# Patient Record
Sex: FEMALE | Race: WHITE | NOT HISPANIC OR LATINO | Employment: OTHER | URBAN - METROPOLITAN AREA
[De-identification: names, ages, dates, MRNs, and addresses within clinical notes are randomized per-mention and may not be internally consistent; named-entity substitution may affect disease eponyms.]

---

## 2018-03-29 PROCEDURE — 87624 HPV HI-RISK TYP POOLED RSLT: CPT | Performed by: OBSTETRICS & GYNECOLOGY

## 2018-03-29 PROCEDURE — G0145 SCR C/V CYTO,THINLAYER,RESCR: HCPCS | Performed by: OBSTETRICS & GYNECOLOGY

## 2018-03-30 ENCOUNTER — LAB REQUISITION (OUTPATIENT)
Dept: LAB | Facility: HOSPITAL | Age: 68
End: 2018-03-30
Payer: COMMERCIAL

## 2018-03-30 DIAGNOSIS — Z11.51 ENCOUNTER FOR SCREENING FOR HUMAN PAPILLOMAVIRUS (HPV): ICD-10-CM

## 2018-03-30 DIAGNOSIS — Z12.72 ENCOUNTER FOR SCREENING FOR MALIGNANT NEOPLASM OF VAGINA: ICD-10-CM

## 2018-04-03 LAB — HPV RRNA GENITAL QL NAA+PROBE: NORMAL

## 2018-04-04 LAB
LAB AP GYN PRIMARY INTERPRETATION: NORMAL
Lab: NORMAL

## 2018-04-24 LAB
ANION GAP SERPL CALCULATED.3IONS-SCNC: 11 MMOL/L (ref 5–14)
BUN SERPL-MCNC: 12 MG/DL (ref 5–25)
CALCIUM SERPL-MCNC: 9.7 MG/DL (ref 8.4–10.2)
CHLORIDE SERPL-SCNC: 100 MEQ/L (ref 97–108)
CO2 SERPL-SCNC: 29 MMOL/L (ref 22–30)
CREATINE, SERUM (HISTORICAL): 0.68 MG/DL (ref 0.6–1.2)
EGFR (HISTORICAL): >60 ML/MIN/1.73 M2
GLUCOSE SERPL-MCNC: 85 MG/DL (ref 70–99)
HCT VFR BLD AUTO: 43.1 % (ref 36–46)
HGB BLD-MCNC: 13.9 G/DL (ref 12–16)
POTASSIUM SERPL-SCNC: 4.4 MEQ/L (ref 3.6–5)
SODIUM SERPL-SCNC: 140 MEQ/L (ref 137–147)

## 2019-03-27 ENCOUNTER — LAB (OUTPATIENT)
Dept: LAB | Facility: CLINIC | Age: 69
End: 2019-03-27
Payer: COMMERCIAL

## 2019-03-27 ENCOUNTER — OFFICE VISIT (OUTPATIENT)
Dept: ENDOCRINOLOGY | Facility: CLINIC | Age: 69
End: 2019-03-27
Payer: COMMERCIAL

## 2019-03-27 ENCOUNTER — TELEPHONE (OUTPATIENT)
Dept: FAMILY MEDICINE CLINIC | Facility: CLINIC | Age: 69
End: 2019-03-27

## 2019-03-27 VITALS
HEIGHT: 68 IN | SYSTOLIC BLOOD PRESSURE: 140 MMHG | DIASTOLIC BLOOD PRESSURE: 90 MMHG | BODY MASS INDEX: 21.86 KG/M2 | WEIGHT: 144.2 LBS | HEART RATE: 67 BPM

## 2019-03-27 DIAGNOSIS — E04.2 MULTIPLE THYROID NODULES: Primary | ICD-10-CM

## 2019-03-27 DIAGNOSIS — E04.2 MULTIPLE THYROID NODULES: ICD-10-CM

## 2019-03-27 DIAGNOSIS — R53.83 FATIGUE, UNSPECIFIED TYPE: ICD-10-CM

## 2019-03-27 LAB
25(OH)D3 SERPL-MCNC: 33.2 NG/ML (ref 30–100)
BASOPHILS # BLD AUTO: 0.08 THOUSANDS/ΜL (ref 0–0.1)
BASOPHILS NFR BLD AUTO: 1 % (ref 0–1)
EOSINOPHIL # BLD AUTO: 0.21 THOUSAND/ΜL (ref 0–0.61)
EOSINOPHIL NFR BLD AUTO: 2 % (ref 0–6)
ERYTHROCYTE [DISTWIDTH] IN BLOOD BY AUTOMATED COUNT: 11.8 % (ref 11.6–15.1)
HCT VFR BLD AUTO: 43.9 % (ref 34.8–46.1)
HGB BLD-MCNC: 14.5 G/DL (ref 11.5–15.4)
IMM GRANULOCYTES # BLD AUTO: 0.02 THOUSAND/UL (ref 0–0.2)
IMM GRANULOCYTES NFR BLD AUTO: 0 % (ref 0–2)
LYMPHOCYTES # BLD AUTO: 2.08 THOUSANDS/ΜL (ref 0.6–4.47)
LYMPHOCYTES NFR BLD AUTO: 21 % (ref 14–44)
MCH RBC QN AUTO: 32 PG (ref 26.8–34.3)
MCHC RBC AUTO-ENTMCNC: 33 G/DL (ref 31.4–37.4)
MCV RBC AUTO: 97 FL (ref 82–98)
MONOCYTES # BLD AUTO: 0.82 THOUSAND/ΜL (ref 0.17–1.22)
MONOCYTES NFR BLD AUTO: 8 % (ref 4–12)
NEUTROPHILS # BLD AUTO: 6.55 THOUSANDS/ΜL (ref 1.85–7.62)
NEUTS SEG NFR BLD AUTO: 68 % (ref 43–75)
NRBC BLD AUTO-RTO: 0 /100 WBCS
PLATELET # BLD AUTO: 282 THOUSANDS/UL (ref 149–390)
PMV BLD AUTO: 9.4 FL (ref 8.9–12.7)
RBC # BLD AUTO: 4.53 MILLION/UL (ref 3.81–5.12)
T4 FREE SERPL-MCNC: 0.94 NG/DL (ref 0.76–1.46)
TSH SERPL DL<=0.05 MIU/L-ACNC: 2.69 UIU/ML (ref 0.36–3.74)
WBC # BLD AUTO: 9.76 THOUSAND/UL (ref 4.31–10.16)

## 2019-03-27 PROCEDURE — 36415 COLL VENOUS BLD VENIPUNCTURE: CPT

## 2019-03-27 PROCEDURE — 82306 VITAMIN D 25 HYDROXY: CPT

## 2019-03-27 PROCEDURE — 85025 COMPLETE CBC W/AUTO DIFF WBC: CPT

## 2019-03-27 PROCEDURE — 84443 ASSAY THYROID STIM HORMONE: CPT

## 2019-03-27 PROCEDURE — 99245 OFF/OP CONSLTJ NEW/EST HI 55: CPT | Performed by: INTERNAL MEDICINE

## 2019-03-27 PROCEDURE — 86376 MICROSOMAL ANTIBODY EACH: CPT

## 2019-03-27 PROCEDURE — 86800 THYROGLOBULIN ANTIBODY: CPT

## 2019-03-27 PROCEDURE — 84439 ASSAY OF FREE THYROXINE: CPT

## 2019-03-27 NOTE — LETTER
March 27, 2019     Moris Alan MD  [address] [place] [address]    Patient: Perry Cotton   YOB: 1950   Date of Visit: 3/27/2019       Dear Dr Churchill : Thank you for referring Perry Cotton to me for evaluation  Below are my notes for this consultation  If you have questions, please do not hesitate to call me  I look forward to following your patient along with you  Sincerely,        Tonie Street MD        CC: No Recipients  Tonie Street MD  3/27/2019  9:26 AM  Sign at close encounter   Perry Cotton 77 y o  female MRN: [id number]    Encounter: [id number]      Assessment/Plan     Problem List Items Addressed This Visit        Endocrine    Multiple thyroid nodules - Primary     Right sided thyroid nodule met criteria for FNAB however its been almost a year since the sonogram was done - will repeat thyroid ultrasound - discuss the option of FNAB with or without option for Afirma to exclude malignancy   patinet is agreeable and Will order with option  Of Afirma   Will check thyroid function tests- if tsh low she will need a scan however she doesn't have any symptoms of hyperthyroidism          Relevant Orders    TSH, 3rd generation Lab Collect    T4, free Lab Collect    Thyroid Antibodies Panel Lab Collect    US thyroid       Other    Fatigue    Relevant Orders    TSH, 3rd generation Lab Collect    CBC and differential Lab Collect    Vitamin D 25 hydroxy Lab Collect        CC:   Thyroid nodule     History of Present Illness     HPI:69 y/o female referred here for evaluation of thyroid nodules- she had a thyroid Ultrasound done  last year - after  She was noticed to have thyromegaly on physical exam    No obstructive symptoms   No FH of thyroid cancer , no history of radiation to neck  C/o fatigue - few years , cold intolerance- chronic      Review of Systems   Constitutional: Positive for fatigue  Negative for unexpected weight change     Respiratory: Negative for cough and shortness of breath  Cardiovascular: Negative for palpitations and leg swelling  Gastrointestinal: Positive for constipation  Negative for nausea and vomiting  Musculoskeletal: Negative for arthralgias, gait problem and myalgias  Skin: Negative for wound  Psychiatric/Behavioral: Negative for sleep disturbance  All other systems reviewed and are negative  Historical Information   History reviewed  No pertinent past medical history  Past Surgical History:   Procedure Laterality Date    APPENDECTOMY      HYSTERECTOMY      total     Social History   Social History     Substance and Sexual Activity   Alcohol Use Yes    Comment: sometimes     Social History     Substance and Sexual Activity   Drug Use Not on file     Social History     Tobacco Use   Smoking Status Former Smoker    Packs/day: 0 50    Types: Cigarettes    Last attempt to quit: 1978    Years since quittin 2   Smokeless Tobacco Never Used   Tobacco Comment    no passive smoke exposure     Family History:   Family History   Problem Relation Age of Onset    Rheum arthritis Sister     Hypothyroidism Sister     Hashimoto's thyroiditis Family        Meds/Allergies   Current Outpatient Medications   Medication Sig Dispense Refill    Multiple Vitamins-Minerals (PRESERVISION AREDS 2+MULTI VIT PO) Take by mouth       No current facility-administered medications for this visit  Allergies   Allergen Reactions    Codeine        Objective   Vitals: Blood pressure 140/90, pulse 67, height 5' 8" (1 727 m), weight 65 4 kg (144 lb 3 2 oz)  Physical Exam   Constitutional: She is oriented to person, place, and time  She appears well-developed and well-nourished  No distress  HENT:   Head: Normocephalic and atraumatic  Eyes: Pupils are equal, round, and reactive to light  EOM are normal    Neck: Normal range of motion  Neck supple  Thyromegaly present     Cardiovascular: Normal rate, regular rhythm and normal heart sounds  No murmur heard  Pulmonary/Chest: Effort normal and breath sounds normal  No respiratory distress  She has no wheezes  She has no rales  Abdominal: Soft  Bowel sounds are normal  She exhibits no distension and no mass  There is no tenderness  Musculoskeletal: Normal range of motion  She exhibits no edema or deformity  Lymphadenopathy:     She has no cervical adenopathy  Neurological: She is alert and oriented to person, place, and time  Skin: Skin is warm and dry  No erythema  Psychiatric: She has a normal mood and affect  Her behavior is normal  Judgment and thought content normal        The history was obtained from the review of the chart, patient  Lab Results:    April 2018- BMP with normal range       Imaging Studies:      May 2018-thyroid ultrasound  Right lobe measures 5 2 x 1 5 x 2 2 cm-at the posterior aspect solid appearing nodule 1 7x1  3x1 4 cm   Left lobe cystic nodule 0 3 cm     I have personally reviewed pertinent reports  Portions of the record may have been created with voice recognition software  Occasional wrong word or "sound a like" substitutions may have occurred due to the inherent limitations of voice recognition software  Read the chart carefully and recognize, using context, where substitutions have occurred

## 2019-03-27 NOTE — ASSESSMENT & PLAN NOTE
Right sided thyroid nodule met criteria for FNAB however its been almost a year since the sonogram was done - will repeat thyroid ultrasound - discuss the option of FNAB with or without option for Afirma to exclude malignancy   giovani is agreeable and Will order with option  Of Afirma     Will check thyroid function tests- if tsh low she will need a scan however she doesn't have any symptoms of hyperthyroidism

## 2019-03-27 NOTE — PROGRESS NOTES
Perry Cotton 77 y o  female MRN: [id number]    Encounter: [id number]      Assessment/Plan     Problem List Items Addressed This Visit        Endocrine    Multiple thyroid nodules - Primary     Right sided thyroid nodule met criteria for FNAB however its been almost a year since the sonogram was done - will repeat thyroid ultrasound - discuss the option of FNAB with or without option for Afirma to exclude malignancy   patinet is agreeable and Will order with option  Of Afirma   Will check thyroid function tests- if tsh low she will need a scan however she doesn't have any symptoms of hyperthyroidism          Relevant Orders    TSH, 3rd generation Lab Collect    T4, free Lab Collect    Thyroid Antibodies Panel Lab Collect    US thyroid       Other    Fatigue    Relevant Orders    TSH, 3rd generation Lab Collect    CBC and differential Lab Collect    Vitamin D 25 hydroxy Lab Collect        CC:   Thyroid nodule     History of Present Illness     HPI:69 y/o female referred here for evaluation of thyroid nodules- she had a thyroid Ultrasound done  last year - after  She was noticed to have thyromegaly on physical exam    No obstructive symptoms   No FH of thyroid cancer , no history of radiation to neck  C/o fatigue - few years , cold intolerance- chronic      Review of Systems   Constitutional: Positive for fatigue  Negative for unexpected weight change  Respiratory: Negative for cough and shortness of breath  Cardiovascular: Negative for palpitations and leg swelling  Gastrointestinal: Positive for constipation  Negative for nausea and vomiting  Musculoskeletal: Negative for arthralgias, gait problem and myalgias  Skin: Negative for wound  Psychiatric/Behavioral: Negative for sleep disturbance  All other systems reviewed and are negative  Historical Information   History reviewed  No pertinent past medical history    Past Surgical History:   Procedure Laterality Date   [address] HYSTERECTOMY      total     Social History   Social History     Substance and Sexual Activity   Alcohol Use Yes    Comment: sometimes     Social History     Substance and Sexual Activity   Drug Use Not on file     Social History     Tobacco Use   Smoking Status Former Smoker    Packs/day: 0 50    Types: Cigarettes    Last attempt to quit: 1978    Years since quittin 2   Smokeless Tobacco Never Used   Tobacco Comment    no passive smoke exposure     Family History:   Family History   Problem Relation Age of Onset    Rheum arthritis Sister     Hypothyroidism Sister     Hashimoto's thyroiditis Family        Meds/Allergies   Current Outpatient Medications   Medication Sig Dispense Refill    Multiple Vitamins-Minerals (PRESERVISION AREDS 2+MULTI VIT PO) Take by mouth       No current facility-administered medications for this visit  Allergies   Allergen Reactions    Codeine        Objective   Vitals: Blood pressure 140/90, pulse 67, height 5' 8" (1 727 m), weight 65 4 kg (144 lb 3 2 oz)  Physical Exam   Constitutional: She is oriented to person, place, and time  She appears well-developed and well-nourished  No distress  HENT:   Head: Normocephalic and atraumatic  Eyes: Pupils are equal, round, and reactive to light  EOM are normal    Neck: Normal range of motion  Neck supple  Thyromegaly present  Cardiovascular: Normal rate, regular rhythm and normal heart sounds  No murmur heard  Pulmonary/Chest: Effort normal and breath sounds normal  No respiratory distress  She has no wheezes  She has no rales  Abdominal: Soft  Bowel sounds are normal  She exhibits no distension and no mass  There is no tenderness  Musculoskeletal: Normal range of motion  She exhibits no edema or deformity  Lymphadenopathy:     She has no cervical adenopathy  Neurological: She is alert and oriented to person, place, and time  Skin: Skin is warm and dry  No erythema     Psychiatric: She has a normal mood and affect  Her behavior is normal  Judgment and thought content normal        The history was obtained from the review of the chart, patient  Lab Results:    April 2018- BMP with normal range       Imaging Studies:      May 2018-thyroid ultrasound  Right lobe measures 5 2 x 1 5 x 2 2 cm-at the posterior aspect solid appearing nodule 1 7x1  3x1 4 cm   Left lobe cystic nodule 0 3 cm     I have personally reviewed pertinent reports  Portions of the record may have been created with voice recognition software  Occasional wrong word or "sound a like" substitutions may have occurred due to the inherent limitations of voice recognition software  Read the chart carefully and recognize, using context, where substitutions have occurred

## 2019-03-28 LAB
THYROGLOB AB SERPL-ACNC: 28 IU/ML (ref 0–0.9)
THYROPEROXIDASE AB SERPL-ACNC: 105 IU/ML (ref 0–34)

## 2019-03-29 ENCOUNTER — TELEPHONE (OUTPATIENT)
Dept: ENDOCRINOLOGY | Facility: CLINIC | Age: 69
End: 2019-03-29

## 2019-04-01 ENCOUNTER — HOSPITAL ENCOUNTER (OUTPATIENT)
Dept: ULTRASOUND IMAGING | Facility: HOSPITAL | Age: 69
Discharge: HOME | End: 2019-04-01
Attending: INTERNAL MEDICINE
Payer: COMMERCIAL

## 2019-04-01 DIAGNOSIS — E04.2 MULTIPLE THYROID NODULES: ICD-10-CM

## 2019-04-01 PROCEDURE — 76536 US EXAM OF HEAD AND NECK: CPT

## 2019-04-09 ENCOUNTER — TELEPHONE (OUTPATIENT)
Dept: ENDOCRINOLOGY | Facility: CLINIC | Age: 69
End: 2019-04-09

## 2019-05-02 ENCOUNTER — OFFICE VISIT (OUTPATIENT)
Dept: OTOLARYNGOLOGY | Facility: CLINIC | Age: 69
End: 2019-05-02
Payer: COMMERCIAL

## 2019-05-02 VITALS
WEIGHT: 141.2 LBS | HEIGHT: 68 IN | HEART RATE: 98 BPM | DIASTOLIC BLOOD PRESSURE: 81 MMHG | BODY MASS INDEX: 21.4 KG/M2 | RESPIRATION RATE: 17 BRPM | SYSTOLIC BLOOD PRESSURE: 136 MMHG

## 2019-05-02 DIAGNOSIS — H92.01 RIGHT EAR PAIN: Primary | ICD-10-CM

## 2019-05-02 DIAGNOSIS — H61.23 BILATERAL IMPACTED CERUMEN: ICD-10-CM

## 2019-05-02 DIAGNOSIS — R13.12 OROPHARYNGEAL DYSPHAGIA: ICD-10-CM

## 2019-05-02 DIAGNOSIS — K14.8 TONGUE MASS: ICD-10-CM

## 2019-05-02 PROCEDURE — 99244 OFF/OP CNSLTJ NEW/EST MOD 40: CPT | Performed by: SPECIALIST

## 2019-05-02 PROCEDURE — 69210 REMOVE IMPACTED EAR WAX UNI: CPT | Performed by: SPECIALIST

## 2019-05-02 PROCEDURE — 31575 DIAGNOSTIC LARYNGOSCOPY: CPT | Performed by: SPECIALIST

## 2019-05-13 ENCOUNTER — TELEPHONE (OUTPATIENT)
Dept: HEMATOLOGY ONCOLOGY | Facility: CLINIC | Age: 69
End: 2019-05-13

## 2019-05-14 ENCOUNTER — HOSPITAL ENCOUNTER (OUTPATIENT)
Dept: RADIOLOGY | Age: 69
Discharge: HOME | End: 2019-05-14
Payer: COMMERCIAL

## 2019-05-14 DIAGNOSIS — C02.9 TONGUE CANCER (HCC): ICD-10-CM

## 2019-05-14 PROCEDURE — 78815 PET IMAGE W/CT SKULL-THIGH: CPT

## 2019-05-14 PROCEDURE — 82948 REAGENT STRIP/BLOOD GLUCOSE: CPT

## 2019-05-14 PROCEDURE — A9552 F18 FDG: HCPCS

## 2019-05-14 RX ORDER — LORAZEPAM 2 MG/ML
1 INJECTION INTRAMUSCULAR
Status: COMPLETED | OUTPATIENT
Start: 2019-05-14 | End: 2019-05-14

## 2019-05-14 RX ADMIN — LORAZEPAM 1 MG: 2 INJECTION INTRAMUSCULAR; INTRAVENOUS at 07:10

## 2019-05-15 LAB — GLUCOSE SERPL-MCNC: 106 MG/DL (ref 65–140)

## 2019-05-16 PROBLEM — C01 CANCER OF BASE OF TONGUE (HCC): Status: ACTIVE | Noted: 2019-05-03

## 2019-05-20 ENCOUNTER — RADIATION ONCOLOGY CONSULT (OUTPATIENT)
Dept: RADIATION ONCOLOGY | Facility: CLINIC | Age: 69
End: 2019-05-20
Attending: RADIOLOGY
Payer: COMMERCIAL

## 2019-05-20 VITALS
DIASTOLIC BLOOD PRESSURE: 80 MMHG | RESPIRATION RATE: 16 BRPM | HEIGHT: 68 IN | WEIGHT: 139.4 LBS | TEMPERATURE: 97.7 F | SYSTOLIC BLOOD PRESSURE: 140 MMHG | HEART RATE: 69 BPM | BODY MASS INDEX: 21.13 KG/M2 | OXYGEN SATURATION: 98 %

## 2019-05-20 DIAGNOSIS — C01 CANCER OF BASE OF TONGUE (HCC): Primary | ICD-10-CM

## 2019-05-20 PROCEDURE — 99211 OFF/OP EST MAY X REQ PHY/QHP: CPT | Performed by: RADIOLOGY

## 2019-05-22 ENCOUNTER — TRANSCRIBE ORDERS (OUTPATIENT)
Dept: ADMINISTRATIVE | Facility: HOSPITAL | Age: 69
End: 2019-05-22

## 2019-05-22 ENCOUNTER — CONSULT (OUTPATIENT)
Dept: HEMATOLOGY ONCOLOGY | Facility: CLINIC | Age: 69
End: 2019-05-22
Payer: COMMERCIAL

## 2019-05-22 VITALS
HEART RATE: 75 BPM | SYSTOLIC BLOOD PRESSURE: 130 MMHG | WEIGHT: 136.6 LBS | DIASTOLIC BLOOD PRESSURE: 80 MMHG | HEIGHT: 68 IN | OXYGEN SATURATION: 97 % | BODY MASS INDEX: 20.7 KG/M2 | RESPIRATION RATE: 18 BRPM | TEMPERATURE: 98.7 F

## 2019-05-22 DIAGNOSIS — C01 CANCER OF BASE OF TONGUE (HCC): Primary | ICD-10-CM

## 2019-05-22 PROCEDURE — 99245 OFF/OP CONSLTJ NEW/EST HI 55: CPT | Performed by: INTERNAL MEDICINE

## 2019-05-23 ENCOUNTER — APPOINTMENT (OUTPATIENT)
Dept: RADIATION ONCOLOGY | Facility: CLINIC | Age: 69
End: 2019-05-23
Attending: RADIOLOGY
Payer: COMMERCIAL

## 2019-05-23 DIAGNOSIS — Z12.31 ENCOUNTER FOR MAMMOGRAM TO ESTABLISH BASELINE MAMMOGRAM: Primary | ICD-10-CM

## 2019-05-23 PROCEDURE — 77334 RADIATION TREATMENT AID(S): CPT | Performed by: RADIOLOGY

## 2019-05-28 ENCOUNTER — HOSPITAL ENCOUNTER (OUTPATIENT)
Dept: MAMMOGRAPHY | Facility: CLINIC | Age: 69
Discharge: HOME | End: 2019-05-28
Payer: COMMERCIAL

## 2019-05-28 ENCOUNTER — TELEPHONE (OUTPATIENT)
Dept: NUTRITION | Facility: CLINIC | Age: 69
End: 2019-05-28

## 2019-05-28 ENCOUNTER — APPOINTMENT (OUTPATIENT)
Dept: LAB | Facility: HOSPITAL | Age: 69
End: 2019-05-28
Attending: INTERNAL MEDICINE
Payer: COMMERCIAL

## 2019-05-28 VITALS — HEIGHT: 68 IN | WEIGHT: 136 LBS | BODY MASS INDEX: 20.61 KG/M2

## 2019-05-28 DIAGNOSIS — C01 CANCER OF BASE OF TONGUE (HCC): ICD-10-CM

## 2019-05-28 DIAGNOSIS — Z12.39 BREAST CANCER SCREENING: ICD-10-CM

## 2019-05-28 LAB
ALBUMIN SERPL BCP-MCNC: 4.3 G/DL (ref 3–5.2)
ALP SERPL-CCNC: 82 U/L (ref 43–122)
ALT SERPL W P-5'-P-CCNC: 17 U/L (ref 9–52)
ANION GAP SERPL CALCULATED.3IONS-SCNC: 10 MMOL/L (ref 5–14)
AST SERPL W P-5'-P-CCNC: 24 U/L (ref 14–36)
BASOPHILS # BLD AUTO: 0.1 THOUSANDS/ΜL (ref 0–0.1)
BASOPHILS NFR BLD AUTO: 1 % (ref 0–1)
BILIRUB SERPL-MCNC: 0.6 MG/DL
BUN SERPL-MCNC: 13 MG/DL (ref 5–25)
CALCIUM SERPL-MCNC: 9.6 MG/DL (ref 8.4–10.2)
CHLORIDE SERPL-SCNC: 102 MMOL/L (ref 97–108)
CO2 SERPL-SCNC: 28 MMOL/L (ref 22–30)
CREAT SERPL-MCNC: 0.87 MG/DL (ref 0.6–1.2)
CRP SERPL QL: <3 MG/L
EOSINOPHIL # BLD AUTO: 0.2 THOUSAND/ΜL (ref 0–0.4)
EOSINOPHIL NFR BLD AUTO: 3 % (ref 0–6)
ERYTHROCYTE [DISTWIDTH] IN BLOOD BY AUTOMATED COUNT: 12.7 %
ERYTHROCYTE [SEDIMENTATION RATE] IN BLOOD: 11 MM/HOUR (ref 1–20)
FERRITIN SERPL-MCNC: 86 NG/ML (ref 8–388)
FOLATE SERPL-MCNC: >20 NG/ML (ref 3.1–17.5)
GFR SERPL CREATININE-BSD FRML MDRD: 67 ML/MIN/1.73SQ M
GLUCOSE P FAST SERPL-MCNC: 99 MG/DL (ref 70–99)
HCT VFR BLD AUTO: 42.1 % (ref 36–46)
HGB BLD-MCNC: 13.8 G/DL (ref 12–16)
IRON SATN MFR SERPL: 18 %
IRON SERPL-MCNC: 55 UG/DL (ref 50–170)
LDH SERPL-CCNC: 424 U/L (ref 313–618)
LYMPHOCYTES # BLD AUTO: 1.9 THOUSANDS/ΜL (ref 0.5–4)
LYMPHOCYTES NFR BLD AUTO: 30 % (ref 25–45)
MCH RBC QN AUTO: 30.9 PG (ref 26–34)
MCHC RBC AUTO-ENTMCNC: 32.9 G/DL (ref 31–36)
MCV RBC AUTO: 94 FL (ref 80–100)
MONOCYTES # BLD AUTO: 0.7 THOUSAND/ΜL (ref 0.2–0.9)
MONOCYTES NFR BLD AUTO: 12 % (ref 1–10)
NEUTROPHILS # BLD AUTO: 3.5 THOUSANDS/ΜL (ref 1.8–7.8)
NEUTS SEG NFR BLD AUTO: 55 % (ref 45–65)
PLATELET # BLD AUTO: 288 THOUSANDS/UL (ref 150–450)
PMV BLD AUTO: 7.7 FL (ref 8.9–12.7)
POTASSIUM SERPL-SCNC: 3.4 MMOL/L (ref 3.6–5)
PROT SERPL-MCNC: 7.2 G/DL (ref 5.9–8.4)
RBC # BLD AUTO: 4.48 MILLION/UL (ref 4–5.2)
SODIUM SERPL-SCNC: 140 MMOL/L (ref 137–147)
TIBC SERPL-MCNC: 302 UG/DL (ref 250–450)
VIT B12 SERPL-MCNC: 394 PG/ML (ref 100–900)
WBC # BLD AUTO: 6.4 THOUSAND/UL (ref 4.5–11)

## 2019-05-28 PROCEDURE — 83615 LACTATE (LD) (LDH) ENZYME: CPT

## 2019-05-28 PROCEDURE — 83550 IRON BINDING TEST: CPT

## 2019-05-28 PROCEDURE — 80053 COMPREHEN METABOLIC PANEL: CPT

## 2019-05-28 PROCEDURE — 85025 COMPLETE CBC W/AUTO DIFF WBC: CPT

## 2019-05-28 PROCEDURE — 83540 ASSAY OF IRON: CPT

## 2019-05-28 PROCEDURE — 82728 ASSAY OF FERRITIN: CPT

## 2019-05-28 PROCEDURE — 77067 SCR MAMMO BI INCL CAD: CPT

## 2019-05-28 PROCEDURE — 85652 RBC SED RATE AUTOMATED: CPT

## 2019-05-28 PROCEDURE — 82746 ASSAY OF FOLIC ACID SERUM: CPT

## 2019-05-28 PROCEDURE — 36415 COLL VENOUS BLD VENIPUNCTURE: CPT

## 2019-05-28 PROCEDURE — 82607 VITAMIN B-12: CPT

## 2019-05-28 PROCEDURE — 86140 C-REACTIVE PROTEIN: CPT

## 2019-05-29 ENCOUNTER — TELEPHONE (OUTPATIENT)
Dept: NUTRITION | Facility: CLINIC | Age: 69
End: 2019-05-29

## 2019-05-29 PROBLEM — I71.2 THORACIC AORTIC ANEURYSM (HCC): Status: ACTIVE | Noted: 2019-05-29

## 2019-05-29 PROBLEM — I71.20 THORACIC AORTIC ANEURYSM: Status: ACTIVE | Noted: 2019-05-29

## 2019-05-30 ENCOUNTER — TRANSCRIBE ORDERS (OUTPATIENT)
Dept: ADMINISTRATIVE | Facility: HOSPITAL | Age: 69
End: 2019-05-30

## 2019-05-30 DIAGNOSIS — C01 MALIGNANT NEOPLASM OF BASE OF TONGUE (HCC): Primary | ICD-10-CM

## 2019-05-31 ENCOUNTER — HOSPITAL ENCOUNTER (OUTPATIENT)
Dept: CT IMAGING | Facility: HOSPITAL | Age: 69
Discharge: HOME | End: 2019-05-31
Payer: COMMERCIAL

## 2019-05-31 DIAGNOSIS — C01 MALIGNANT NEOPLASM OF BASE OF TONGUE (HCC): ICD-10-CM

## 2019-05-31 PROCEDURE — 70491 CT SOFT TISSUE NECK W/DYE: CPT

## 2019-05-31 RX ADMIN — IOHEXOL 85 ML: 350 INJECTION, SOLUTION INTRAVENOUS at 09:36

## 2019-06-03 ENCOUNTER — APPOINTMENT (OUTPATIENT)
Dept: RADIATION ONCOLOGY | Facility: CLINIC | Age: 69
End: 2019-06-03
Attending: RADIOLOGY
Payer: COMMERCIAL

## 2019-06-03 ENCOUNTER — OFFICE VISIT (OUTPATIENT)
Dept: HEMATOLOGY ONCOLOGY | Facility: CLINIC | Age: 69
End: 2019-06-03
Payer: COMMERCIAL

## 2019-06-03 ENCOUNTER — DOCUMENTATION (OUTPATIENT)
Dept: HEMATOLOGY ONCOLOGY | Facility: CLINIC | Age: 69
End: 2019-06-03

## 2019-06-03 ENCOUNTER — TELEPHONE (OUTPATIENT)
Dept: HEMATOLOGY ONCOLOGY | Facility: CLINIC | Age: 69
End: 2019-06-03

## 2019-06-03 ENCOUNTER — OFFICE VISIT (OUTPATIENT)
Dept: FAMILY MEDICINE CLINIC | Facility: CLINIC | Age: 69
End: 2019-06-03
Payer: COMMERCIAL

## 2019-06-03 VITALS
BODY MASS INDEX: 20.76 KG/M2 | WEIGHT: 137 LBS | RESPIRATION RATE: 16 BRPM | HEART RATE: 67 BPM | HEIGHT: 68 IN | OXYGEN SATURATION: 95 % | SYSTOLIC BLOOD PRESSURE: 120 MMHG | TEMPERATURE: 98 F | DIASTOLIC BLOOD PRESSURE: 74 MMHG

## 2019-06-03 VITALS
RESPIRATION RATE: 20 BRPM | HEART RATE: 72 BPM | HEIGHT: 68 IN | DIASTOLIC BLOOD PRESSURE: 72 MMHG | SYSTOLIC BLOOD PRESSURE: 116 MMHG | BODY MASS INDEX: 20.73 KG/M2 | WEIGHT: 136.8 LBS

## 2019-06-03 DIAGNOSIS — C01 CANCER OF BASE OF TONGUE (HCC): Primary | ICD-10-CM

## 2019-06-03 DIAGNOSIS — E04.2 MULTIPLE THYROID NODULES: ICD-10-CM

## 2019-06-03 DIAGNOSIS — Z51.11 ENCOUNTER FOR ANTINEOPLASTIC CHEMOTHERAPY: ICD-10-CM

## 2019-06-03 DIAGNOSIS — I71.2 THORACIC AORTIC ANEURYSM WITHOUT RUPTURE (HCC): ICD-10-CM

## 2019-06-03 DIAGNOSIS — E53.8 B12 DEFICIENCY: ICD-10-CM

## 2019-06-03 PROCEDURE — 99214 OFFICE O/P EST MOD 30 MIN: CPT | Performed by: NURSE PRACTITIONER

## 2019-06-03 PROCEDURE — 99203 OFFICE O/P NEW LOW 30 MIN: CPT | Performed by: FAMILY MEDICINE

## 2019-06-03 RX ORDER — ONDANSETRON 8 MG/1
8 TABLET, ORALLY DISINTEGRATING ORAL EVERY 8 HOURS PRN
Qty: 20 TABLET | Refills: 5 | Status: SHIPPED | OUTPATIENT
Start: 2019-06-03 | End: 2019-06-28 | Stop reason: ALTCHOICE

## 2019-06-03 RX ORDER — OMEPRAZOLE 20 MG/1
20 CAPSULE, DELAYED RELEASE ORAL DAILY
Qty: 30 CAPSULE | Refills: 3 | Status: SHIPPED | OUTPATIENT
Start: 2019-06-03 | End: 2020-06-11

## 2019-06-03 RX ORDER — SODIUM CHLORIDE 9 MG/ML
20 INJECTION, SOLUTION INTRAVENOUS ONCE
Status: CANCELLED | OUTPATIENT
Start: 2019-06-10

## 2019-06-03 RX ORDER — OLANZAPINE 10 MG/1
10 TABLET ORAL
Qty: 3 TABLET | Refills: 2 | Status: SHIPPED | OUTPATIENT
Start: 2019-06-03 | End: 2019-06-17 | Stop reason: SDUPTHER

## 2019-06-03 NOTE — PROGRESS NOTES
Patient education given on Cisplatin  Patient and her daughter present and engaged  Discussed mechanism of action, administration, supportive care measures, and possible adverse effects   Patient provided with a new patient packet, educational brochures on each medication and a thermometer  Patient instructed to hydrate herself well (minimum 2 lt per day excluding caffeineated beverages) call if she develops significant adverse effects, temperature over 100 4° F, or with any further questions or concerns  Patient is aware of how to contact provider/nurse during and after office hours  Patient's questions answered toher satisfaction and the patient expresses understanding and acceptance of instructions and treatment  Chemo consent was signed  Patient to phone office back after her appointment with Rad  Dept on Thursday as she will then know the start date of her Radiation which will be concurrent with chemo  Rxs for Zyprexa, Omeprazole, and Zofran ODT sent to patient's pharmacy

## 2019-06-05 ENCOUNTER — HOSPITAL ENCOUNTER (OUTPATIENT)
Dept: RADIOLOGY | Facility: HOSPITAL | Age: 69
Discharge: HOME | End: 2019-06-05
Payer: COMMERCIAL

## 2019-06-05 ENCOUNTER — HOSPITAL ENCOUNTER (OUTPATIENT)
Dept: INTERVENTIONAL RADIOLOGY/VASCULAR | Facility: HOSPITAL | Age: 69
Discharge: HOME | End: 2019-06-05
Payer: COMMERCIAL

## 2019-06-05 DIAGNOSIS — Z95.828 S/P PICC CENTRAL LINE PLACEMENT: ICD-10-CM

## 2019-06-05 DIAGNOSIS — C01 CANCER OF BASE OF TONGUE (HCC): ICD-10-CM

## 2019-06-05 PROCEDURE — 77338 DESIGN MLC DEVICE FOR IMRT: CPT | Performed by: RADIOLOGY

## 2019-06-05 PROCEDURE — 71045 X-RAY EXAM CHEST 1 VIEW: CPT

## 2019-06-05 PROCEDURE — 77300 RADIATION THERAPY DOSE PLAN: CPT | Performed by: RADIOLOGY

## 2019-06-05 PROCEDURE — 36569 INSJ PICC 5 YR+ W/O IMAGING: CPT

## 2019-06-05 PROCEDURE — NC001 PR NO CHARGE: Performed by: NURSE PRACTITIONER

## 2019-06-05 PROCEDURE — 77301 RADIOTHERAPY DOSE PLAN IMRT: CPT | Performed by: RADIOLOGY

## 2019-06-06 ENCOUNTER — APPOINTMENT (OUTPATIENT)
Dept: RADIATION ONCOLOGY | Facility: CLINIC | Age: 69
End: 2019-06-06
Attending: RADIOLOGY
Payer: COMMERCIAL

## 2019-06-06 DIAGNOSIS — C01 CANCER OF BASE OF TONGUE (HCC): ICD-10-CM

## 2019-06-06 PROCEDURE — 77417 THER RADIOLOGY PORT IMAGE(S): CPT | Performed by: RADIOLOGY

## 2019-06-10 ENCOUNTER — APPOINTMENT (OUTPATIENT)
Dept: RADIATION ONCOLOGY | Facility: CLINIC | Age: 69
End: 2019-06-10
Attending: RADIOLOGY
Payer: COMMERCIAL

## 2019-06-10 ENCOUNTER — HOSPITAL ENCOUNTER (OUTPATIENT)
Dept: INFUSION CENTER | Facility: CLINIC | Age: 69
Discharge: HOME | End: 2019-06-10
Payer: COMMERCIAL

## 2019-06-10 VITALS
DIASTOLIC BLOOD PRESSURE: 84 MMHG | WEIGHT: 135.36 LBS | HEART RATE: 71 BPM | BODY MASS INDEX: 22.55 KG/M2 | HEIGHT: 65 IN | RESPIRATION RATE: 16 BRPM | SYSTOLIC BLOOD PRESSURE: 123 MMHG | TEMPERATURE: 96.1 F

## 2019-06-10 DIAGNOSIS — C01 CANCER OF BASE OF TONGUE (HCC): Primary | ICD-10-CM

## 2019-06-10 LAB
ALBUMIN SERPL BCP-MCNC: 3.8 G/DL (ref 3.5–5.7)
ALP SERPL-CCNC: 83 U/L (ref 46–116)
ALT SERPL W P-5'-P-CCNC: 22 U/L (ref 12–78)
ANION GAP SERPL CALCULATED.3IONS-SCNC: 13 MMOL/L (ref 4–13)
AST SERPL W P-5'-P-CCNC: 28 U/L (ref 5–45)
BASOPHILS # BLD AUTO: 0.03 THOUSANDS/ΜL (ref 0–0.1)
BASOPHILS NFR BLD AUTO: 0 % (ref 0–1)
BILIRUB SERPL-MCNC: 0.9 MG/DL (ref 0.2–1)
BUN SERPL-MCNC: 12 MG/DL (ref 5–25)
CALCIUM SERPL-MCNC: 9.3 MG/DL (ref 8.3–10.1)
CHLORIDE SERPL-SCNC: 104 MMOL/L (ref 98–108)
CO2 SERPL-SCNC: 28 MMOL/L (ref 21–32)
CREAT SERPL-MCNC: 0.7 MG/DL (ref 0.6–1.3)
EOSINOPHIL # BLD AUTO: 0.17 THOUSAND/ΜL (ref 0–0.61)
EOSINOPHIL NFR BLD AUTO: 2 % (ref 0–6)
ERYTHROCYTE [DISTWIDTH] IN BLOOD BY AUTOMATED COUNT: 12.1 % (ref 11.6–15.1)
GFR SERPL CREATININE-BSD FRML MDRD: 87 ML/MIN/1.73SQ M
GLUCOSE SERPL-MCNC: 103 MG/DL (ref 65–140)
HCT VFR BLD AUTO: 39 % (ref 34.8–46.1)
HGB BLD-MCNC: 13.4 G/DL (ref 11.5–15.4)
LYMPHOCYTES # BLD AUTO: 1.39 THOUSANDS/ΜL (ref 0.6–4.47)
LYMPHOCYTES NFR BLD AUTO: 15 % (ref 14–44)
MAGNESIUM SERPL-MCNC: 2.7 MG/DL (ref 1.6–2.6)
MCH RBC QN AUTO: 32.3 PG (ref 26.8–34.3)
MCHC RBC AUTO-ENTMCNC: 34.4 G/DL (ref 31.4–37.4)
MCV RBC AUTO: 94 FL (ref 82–98)
MONOCYTES # BLD AUTO: 0.82 THOUSAND/ΜL (ref 0.17–1.22)
MONOCYTES NFR BLD AUTO: 9 % (ref 4–12)
NEUTROPHILS # BLD AUTO: 6.7 THOUSANDS/ΜL (ref 1.85–7.62)
NEUTS SEG NFR BLD AUTO: 74 % (ref 43–75)
PLATELET # BLD AUTO: 200 THOUSANDS/UL (ref 149–390)
PMV BLD AUTO: 9.3 FL (ref 8.9–12.7)
POTASSIUM SERPL-SCNC: 4 MMOL/L (ref 3.5–5.3)
PROT SERPL-MCNC: 7.1 G/DL (ref 6.4–8.2)
RBC # BLD AUTO: 4.15 MILLION/UL (ref 3.81–5.12)
SODIUM SERPL-SCNC: 145 MMOL/L (ref 136–145)
WBC # BLD AUTO: 9.11 THOUSAND/UL (ref 4.31–10.16)

## 2019-06-10 PROCEDURE — 96367 TX/PROPH/DG ADDL SEQ IV INF: CPT

## 2019-06-10 PROCEDURE — 83735 ASSAY OF MAGNESIUM: CPT

## 2019-06-10 PROCEDURE — 85025 COMPLETE CBC W/AUTO DIFF WBC: CPT

## 2019-06-10 PROCEDURE — 80053 COMPREHEN METABOLIC PANEL: CPT

## 2019-06-10 PROCEDURE — 96366 THER/PROPH/DIAG IV INF ADDON: CPT

## 2019-06-10 PROCEDURE — 77386 HB NTSTY MODUL RAD TX DLVR CPLX: CPT | Performed by: RADIOLOGY

## 2019-06-10 PROCEDURE — 96413 CHEMO IV INFUSION 1 HR: CPT

## 2019-06-10 RX ORDER — SODIUM CHLORIDE 9 MG/ML
20 INJECTION, SOLUTION INTRAVENOUS ONCE
Status: COMPLETED | OUTPATIENT
Start: 2019-06-10 | End: 2019-06-10

## 2019-06-10 RX ADMIN — SODIUM CHLORIDE 150 MG: 0.9 INJECTION, SOLUTION INTRAVENOUS at 12:14

## 2019-06-10 RX ADMIN — CISPLATIN 174 MG: 1 INJECTION INTRAVENOUS at 13:07

## 2019-06-10 RX ADMIN — DEXAMETHASONE SODIUM PHOSPHATE: 10 INJECTION, SOLUTION INTRAMUSCULAR; INTRAVENOUS at 11:40

## 2019-06-10 RX ADMIN — POTASSIUM CHLORIDE: 2 INJECTION, SOLUTION, CONCENTRATE INTRAVENOUS at 14:05

## 2019-06-10 RX ADMIN — SODIUM CHLORIDE 20 ML/HR: 0.9 INJECTION, SOLUTION INTRAVENOUS at 11:39

## 2019-06-10 RX ADMIN — POTASSIUM CHLORIDE: 2 INJECTION, SOLUTION, CONCENTRATE INTRAVENOUS at 09:35

## 2019-06-10 NOTE — PROGRESS NOTES
Patient arrived on unit today for first chemotherapy  Patient has concurrent chemo/radiation  Answered any questions prior to initiating treatment  Patient tolerated treatment today without incident  Groshong dressing changed  Encouraged patient to take antinausea meds prn  Verbalized understanding  Encouraged patient to call Dr Sukhdeep Villalpando office if any issues/concerns after leaving  Aware of next appointment   AVS given to patient

## 2019-06-11 ENCOUNTER — APPOINTMENT (OUTPATIENT)
Dept: RADIATION ONCOLOGY | Facility: CLINIC | Age: 69
End: 2019-06-11
Attending: RADIOLOGY
Payer: COMMERCIAL

## 2019-06-11 PROCEDURE — 77386 HB NTSTY MODUL RAD TX DLVR CPLX: CPT | Performed by: RADIOLOGY

## 2019-06-12 ENCOUNTER — APPOINTMENT (OUTPATIENT)
Dept: RADIATION ONCOLOGY | Facility: CLINIC | Age: 69
End: 2019-06-12
Attending: RADIOLOGY
Payer: COMMERCIAL

## 2019-06-12 ENCOUNTER — DOCUMENTATION (OUTPATIENT)
Dept: NUTRITION | Facility: CLINIC | Age: 69
End: 2019-06-12

## 2019-06-12 DIAGNOSIS — C01 MALIGNANT NEOPLASM OF BASE OF TONGUE (HCC): Primary | ICD-10-CM

## 2019-06-12 PROCEDURE — 77386 HB NTSTY MODUL RAD TX DLVR CPLX: CPT | Performed by: RADIOLOGY

## 2019-06-13 ENCOUNTER — APPOINTMENT (OUTPATIENT)
Dept: RADIATION ONCOLOGY | Facility: CLINIC | Age: 69
End: 2019-06-13
Attending: RADIOLOGY
Payer: COMMERCIAL

## 2019-06-13 PROCEDURE — 77386 HB NTSTY MODUL RAD TX DLVR CPLX: CPT | Performed by: RADIOLOGY

## 2019-06-13 PROCEDURE — 77417 THER RADIOLOGY PORT IMAGE(S): CPT | Performed by: RADIOLOGY

## 2019-06-14 ENCOUNTER — APPOINTMENT (OUTPATIENT)
Dept: RADIATION ONCOLOGY | Facility: CLINIC | Age: 69
End: 2019-06-14
Attending: RADIOLOGY
Payer: COMMERCIAL

## 2019-06-14 PROCEDURE — 77336 RADIATION PHYSICS CONSULT: CPT | Performed by: RADIOLOGY

## 2019-06-14 PROCEDURE — 77386 HB NTSTY MODUL RAD TX DLVR CPLX: CPT | Performed by: RADIOLOGY

## 2019-06-17 ENCOUNTER — APPOINTMENT (OUTPATIENT)
Dept: RADIATION ONCOLOGY | Facility: CLINIC | Age: 69
End: 2019-06-17
Attending: RADIOLOGY
Payer: COMMERCIAL

## 2019-06-17 ENCOUNTER — HOSPITAL ENCOUNTER (OUTPATIENT)
Dept: INFUSION CENTER | Facility: CLINIC | Age: 69
Discharge: HOME | End: 2019-06-17

## 2019-06-17 ENCOUNTER — OFFICE VISIT (OUTPATIENT)
Dept: HEMATOLOGY ONCOLOGY | Facility: CLINIC | Age: 69
End: 2019-06-17
Payer: COMMERCIAL

## 2019-06-17 ENCOUNTER — APPOINTMENT (OUTPATIENT)
Dept: LAB | Facility: CLINIC | Age: 69
End: 2019-06-17
Attending: RADIOLOGY
Payer: COMMERCIAL

## 2019-06-17 VITALS
RESPIRATION RATE: 16 BRPM | BODY MASS INDEX: 21.83 KG/M2 | DIASTOLIC BLOOD PRESSURE: 80 MMHG | HEART RATE: 83 BPM | HEIGHT: 65 IN | TEMPERATURE: 99.1 F | SYSTOLIC BLOOD PRESSURE: 134 MMHG | OXYGEN SATURATION: 98 % | WEIGHT: 131 LBS

## 2019-06-17 DIAGNOSIS — T45.1X5A CHEMOTHERAPY INDUCED NAUSEA AND VOMITING: ICD-10-CM

## 2019-06-17 DIAGNOSIS — C01 CANCER OF BASE OF TONGUE (HCC): Primary | ICD-10-CM

## 2019-06-17 DIAGNOSIS — B37.0 ORAL CANDIDIASIS: ICD-10-CM

## 2019-06-17 DIAGNOSIS — F32.89 OTHER DEPRESSION: ICD-10-CM

## 2019-06-17 DIAGNOSIS — R11.2 CHEMOTHERAPY INDUCED NAUSEA AND VOMITING: ICD-10-CM

## 2019-06-17 DIAGNOSIS — Z51.11 ENCOUNTER FOR ANTINEOPLASTIC CHEMOTHERAPY: ICD-10-CM

## 2019-06-17 PROBLEM — E86.0 DEHYDRATION: Status: ACTIVE | Noted: 2019-06-17

## 2019-06-17 PROCEDURE — 99215 OFFICE O/P EST HI 40 MIN: CPT | Performed by: NURSE PRACTITIONER

## 2019-06-17 PROCEDURE — 77386 HB NTSTY MODUL RAD TX DLVR CPLX: CPT | Performed by: RADIOLOGY

## 2019-06-17 RX ORDER — CLOTRIMAZOLE 10 MG/1
10 LOZENGE ORAL; TOPICAL
Qty: 70 TABLET | Refills: 1 | Status: SHIPPED | OUTPATIENT
Start: 2019-06-17 | End: 2019-09-16 | Stop reason: ALTCHOICE

## 2019-06-17 RX ORDER — PROCHLORPERAZINE MALEATE 10 MG
10 TABLET ORAL EVERY 6 HOURS PRN
Qty: 60 TABLET | Refills: 3 | Status: SHIPPED | OUTPATIENT
Start: 2019-06-17 | End: 2019-07-04 | Stop reason: SDUPTHER

## 2019-06-17 RX ORDER — OLANZAPINE 10 MG/1
10 TABLET ORAL
Qty: 12 TABLET | Refills: 3 | Status: SHIPPED | OUTPATIENT
Start: 2019-06-17 | End: 2019-06-28 | Stop reason: SDUPTHER

## 2019-06-17 NOTE — PROGRESS NOTES
Pt without complaint  Groshong PICC dressing changed and blood work drawn from central line as well  Pt tolerated all well, left unit without question or concern, declines AVS today

## 2019-06-18 ENCOUNTER — HOSPITAL ENCOUNTER (OUTPATIENT)
Dept: INFUSION CENTER | Facility: CLINIC | Age: 69
Discharge: HOME | End: 2019-06-18
Payer: COMMERCIAL

## 2019-06-18 ENCOUNTER — APPOINTMENT (OUTPATIENT)
Dept: RADIATION ONCOLOGY | Facility: CLINIC | Age: 69
End: 2019-06-18
Attending: RADIOLOGY
Payer: COMMERCIAL

## 2019-06-18 DIAGNOSIS — E86.0 DEHYDRATION: Primary | ICD-10-CM

## 2019-06-18 PROCEDURE — 96360 HYDRATION IV INFUSION INIT: CPT

## 2019-06-18 PROCEDURE — 77386 HB NTSTY MODUL RAD TX DLVR CPLX: CPT | Performed by: RADIOLOGY

## 2019-06-18 RX ADMIN — SODIUM CHLORIDE 1000 ML: 0.9 INJECTION, SOLUTION INTRAVENOUS at 12:00

## 2019-06-18 NOTE — PROGRESS NOTES
Pt  Tolerated hydration without adverse event  Pt  Is scheduled for hydration twice weekly at this time  Pt  Verbalizes nausea relieved since beginning Compazine  Pt  Also verbalizes LBM yesterday  Constipation has also been ongoing  Pt  Also states she is being treated for Thrush  Radiation treatment continues  Declined need for AVS  Picc line flushed and secured post hydration

## 2019-06-19 ENCOUNTER — APPOINTMENT (OUTPATIENT)
Dept: RADIATION ONCOLOGY | Facility: CLINIC | Age: 69
End: 2019-06-19
Attending: RADIOLOGY
Payer: COMMERCIAL

## 2019-06-19 ENCOUNTER — TELEPHONE (OUTPATIENT)
Dept: NUTRITION | Facility: CLINIC | Age: 69
End: 2019-06-19

## 2019-06-19 PROCEDURE — 77387 GUIDANCE FOR RADJ TX DLVR: CPT | Performed by: RADIOLOGY

## 2019-06-19 PROCEDURE — 77386 HB NTSTY MODUL RAD TX DLVR CPLX: CPT | Performed by: RADIOLOGY

## 2019-06-20 ENCOUNTER — APPOINTMENT (OUTPATIENT)
Dept: RADIATION ONCOLOGY | Facility: CLINIC | Age: 69
End: 2019-06-20
Attending: RADIOLOGY
Payer: COMMERCIAL

## 2019-06-20 ENCOUNTER — HOSPITAL ENCOUNTER (OUTPATIENT)
Dept: INFUSION CENTER | Facility: CLINIC | Age: 69
Discharge: HOME | End: 2019-06-20
Payer: COMMERCIAL

## 2019-06-20 VITALS
TEMPERATURE: 96.1 F | HEART RATE: 70 BPM | DIASTOLIC BLOOD PRESSURE: 73 MMHG | RESPIRATION RATE: 18 BRPM | SYSTOLIC BLOOD PRESSURE: 123 MMHG

## 2019-06-20 DIAGNOSIS — E86.0 DEHYDRATION: Primary | ICD-10-CM

## 2019-06-20 PROCEDURE — 77417 THER RADIOLOGY PORT IMAGE(S): CPT | Performed by: RADIOLOGY

## 2019-06-20 PROCEDURE — 96360 HYDRATION IV INFUSION INIT: CPT

## 2019-06-20 PROCEDURE — 77386 HB NTSTY MODUL RAD TX DLVR CPLX: CPT | Performed by: RADIOLOGY

## 2019-06-20 RX ADMIN — SODIUM CHLORIDE 1000 ML: 0.9 INJECTION, SOLUTION INTRAVENOUS at 11:46

## 2019-06-20 NOTE — PLAN OF CARE
Problem: PAIN - ADULT  Goal: Verbalizes/displays adequate comfort level or baseline comfort level  Description  Interventions:  - Encourage patient to monitor pain and request assistance  - Assess pain using appropriate pain scale  - Administer analgesics based on type and severity of pain and evaluate response  - Implement non-pharmacological measures as appropriate and evaluate response  - Consider cultural and social influences on pain and pain management  - Notify physician/advanced practitioner if interventions unsuccessful or patient reports new pain  Outcome: Progressing     Problem: INFECTION - ADULT  Goal: Absence or prevention of progression during hospitalization  Description  INTERVENTIONS:  - Assess and monitor for signs and symptoms of infection  - Monitor lab/diagnostic results  - Monitor all insertion sites, i e  indwelling lines, tubes, and drains  - Monitor endotracheal (as able) and nasal secretions for changes in amount and color  - [place] appropriate cooling/warming therapies per order  - Administer medications as ordered  - Instruct and encourage patient and family to use good hand hygiene technique  - Identify and instruct in appropriate isolation precautions for identified infection/condition  Outcome: Progressing  Goal: Absence of fever/infection during neutropenic period  Description  INTERVENTIONS:  - Monitor WBC  - Implement neutropenic guidelines  Outcome: Progressing     Problem: Knowledge Deficit  Goal: Patient/family/caregiver demonstrates understanding of disease process, treatment plan, medications, and discharge instructions  Description  Complete learning assessment and assess knowledge base    Interventions:  - Provide teaching at level of understanding  - Provide teaching via preferred learning methods  Outcome: Progressing

## 2019-06-20 NOTE — PROGRESS NOTES
Pt  Denied new symptoms or concerns today  Pt  Tolerated hydration without adverse event  Future appointments reviewed    Declined AVS

## 2019-06-21 ENCOUNTER — TELEPHONE (OUTPATIENT)
Dept: HEMATOLOGY ONCOLOGY | Facility: CLINIC | Age: 69
End: 2019-06-21

## 2019-06-21 ENCOUNTER — APPOINTMENT (OUTPATIENT)
Dept: RADIATION ONCOLOGY | Facility: CLINIC | Age: 69
End: 2019-06-21
Attending: RADIOLOGY
Payer: COMMERCIAL

## 2019-06-21 DIAGNOSIS — C01 CANCER OF BASE OF TONGUE (HCC): ICD-10-CM

## 2019-06-21 PROCEDURE — 77386 HB NTSTY MODUL RAD TX DLVR CPLX: CPT | Performed by: RADIOLOGY

## 2019-06-21 PROCEDURE — 77336 RADIATION PHYSICS CONSULT: CPT | Performed by: RADIOLOGY

## 2019-06-21 RX ORDER — SODIUM CHLORIDE 9 MG/ML
20 INJECTION, SOLUTION INTRAVENOUS ONCE
Status: CANCELLED | OUTPATIENT
Start: 2019-07-01

## 2019-06-24 ENCOUNTER — APPOINTMENT (OUTPATIENT)
Dept: RADIATION ONCOLOGY | Facility: CLINIC | Age: 69
End: 2019-06-24
Attending: RADIOLOGY
Payer: COMMERCIAL

## 2019-06-24 ENCOUNTER — HOSPITAL ENCOUNTER (OUTPATIENT)
Dept: INFUSION CENTER | Facility: CLINIC | Age: 69
Discharge: HOME | End: 2019-06-24
Payer: COMMERCIAL

## 2019-06-24 ENCOUNTER — APPOINTMENT (OUTPATIENT)
Dept: LAB | Facility: CLINIC | Age: 69
End: 2019-06-24
Attending: RADIOLOGY
Payer: COMMERCIAL

## 2019-06-24 DIAGNOSIS — C01 CANCER OF BASE OF TONGUE (HCC): Primary | ICD-10-CM

## 2019-06-24 PROCEDURE — 77386 HB NTSTY MODUL RAD TX DLVR CPLX: CPT | Performed by: RADIOLOGY

## 2019-06-24 PROCEDURE — 99211 OFF/OP EST MAY X REQ PHY/QHP: CPT

## 2019-06-25 ENCOUNTER — APPOINTMENT (OUTPATIENT)
Dept: RADIATION ONCOLOGY | Facility: CLINIC | Age: 69
End: 2019-06-25
Attending: RADIOLOGY
Payer: COMMERCIAL

## 2019-06-25 ENCOUNTER — HOSPITAL ENCOUNTER (OUTPATIENT)
Dept: INFUSION CENTER | Facility: CLINIC | Age: 69
Discharge: HOME | End: 2019-06-25
Payer: COMMERCIAL

## 2019-06-25 ENCOUNTER — NUTRITION (OUTPATIENT)
Dept: NUTRITION | Facility: CLINIC | Age: 69
End: 2019-06-25

## 2019-06-25 VITALS
DIASTOLIC BLOOD PRESSURE: 75 MMHG | SYSTOLIC BLOOD PRESSURE: 133 MMHG | TEMPERATURE: 97.6 F | RESPIRATION RATE: 18 BRPM | HEART RATE: 75 BPM

## 2019-06-25 DIAGNOSIS — E86.0 DEHYDRATION: Primary | ICD-10-CM

## 2019-06-25 DIAGNOSIS — Z71.3 NUTRITIONAL COUNSELING: Primary | ICD-10-CM

## 2019-06-25 PROCEDURE — 96360 HYDRATION IV INFUSION INIT: CPT

## 2019-06-25 PROCEDURE — 77386 HB NTSTY MODUL RAD TX DLVR CPLX: CPT | Performed by: RADIOLOGY

## 2019-06-25 RX ADMIN — SODIUM CHLORIDE 1000 ML: 0.9 INJECTION, SOLUTION INTRAVENOUS at 12:05

## 2019-06-25 NOTE — PLAN OF CARE
Problem: Potential for Falls  Goal: Patient will remain free of falls  Description  INTERVENTIONS:  - Assess patient frequently for physical needs  -  Identify cognitive and physical deficits and behaviors that affect risk of falls    -  [place] fall precautions as indicated by assessment   - Educate patient/family on patient safety including physical limitations  - Instruct patient to call for assistance with activity based on assessment  - Modify environment to reduce risk of injury  - Consider OT/PT consult to assist with strengthening/mobility  Outcome: Progressing

## 2019-06-26 ENCOUNTER — APPOINTMENT (OUTPATIENT)
Dept: RADIATION ONCOLOGY | Facility: CLINIC | Age: 69
End: 2019-06-26
Attending: RADIOLOGY
Payer: COMMERCIAL

## 2019-06-26 PROCEDURE — 77386 HB NTSTY MODUL RAD TX DLVR CPLX: CPT | Performed by: RADIOLOGY

## 2019-06-27 ENCOUNTER — APPOINTMENT (OUTPATIENT)
Dept: RADIATION ONCOLOGY | Facility: CLINIC | Age: 69
End: 2019-06-27
Attending: RADIOLOGY
Payer: COMMERCIAL

## 2019-06-27 ENCOUNTER — HOSPITAL ENCOUNTER (OUTPATIENT)
Dept: INFUSION CENTER | Facility: CLINIC | Age: 69
Discharge: HOME | End: 2019-06-27
Payer: COMMERCIAL

## 2019-06-27 VITALS
TEMPERATURE: 97.2 F | HEART RATE: 77 BPM | DIASTOLIC BLOOD PRESSURE: 91 MMHG | RESPIRATION RATE: 18 BRPM | SYSTOLIC BLOOD PRESSURE: 145 MMHG

## 2019-06-27 DIAGNOSIS — E86.0 DEHYDRATION: Primary | ICD-10-CM

## 2019-06-27 DIAGNOSIS — C01 CANCER OF BASE OF TONGUE (HCC): ICD-10-CM

## 2019-06-27 DIAGNOSIS — C01 MALIGNANT NEOPLASM OF BASE OF TONGUE (HCC): Primary | ICD-10-CM

## 2019-06-27 PROCEDURE — 96360 HYDRATION IV INFUSION INIT: CPT

## 2019-06-27 PROCEDURE — 77386 HB NTSTY MODUL RAD TX DLVR CPLX: CPT | Performed by: RADIOLOGY

## 2019-06-27 PROCEDURE — 77417 THER RADIOLOGY PORT IMAGE(S): CPT | Performed by: RADIOLOGY

## 2019-06-27 RX ADMIN — SODIUM CHLORIDE 1000 ML: 0.9 INJECTION, SOLUTION INTRAVENOUS at 12:51

## 2019-06-28 ENCOUNTER — OFFICE VISIT (OUTPATIENT)
Dept: HEMATOLOGY ONCOLOGY | Facility: CLINIC | Age: 69
End: 2019-06-28
Payer: COMMERCIAL

## 2019-06-28 ENCOUNTER — APPOINTMENT (OUTPATIENT)
Dept: RADIATION ONCOLOGY | Facility: CLINIC | Age: 69
End: 2019-06-28
Attending: RADIOLOGY
Payer: COMMERCIAL

## 2019-06-28 VITALS
SYSTOLIC BLOOD PRESSURE: 130 MMHG | OXYGEN SATURATION: 97 % | BODY MASS INDEX: 21.8 KG/M2 | HEART RATE: 78 BPM | TEMPERATURE: 99.8 F | DIASTOLIC BLOOD PRESSURE: 80 MMHG | WEIGHT: 131 LBS | RESPIRATION RATE: 16 BRPM

## 2019-06-28 DIAGNOSIS — Z51.11 ENCOUNTER FOR ANTINEOPLASTIC CHEMOTHERAPY: ICD-10-CM

## 2019-06-28 DIAGNOSIS — C01 CANCER OF BASE OF TONGUE (HCC): Primary | ICD-10-CM

## 2019-06-28 PROCEDURE — 77386 HB NTSTY MODUL RAD TX DLVR CPLX: CPT | Performed by: RADIOLOGY

## 2019-06-28 PROCEDURE — 99215 OFFICE O/P EST HI 40 MIN: CPT | Performed by: INTERNAL MEDICINE

## 2019-06-28 PROCEDURE — 77336 RADIATION PHYSICS CONSULT: CPT | Performed by: RADIOLOGY

## 2019-06-28 RX ORDER — OLANZAPINE 10 MG/1
10 TABLET ORAL
Qty: 12 TABLET | Refills: 3 | Status: SHIPPED | OUTPATIENT
Start: 2019-06-28 | End: 2020-06-11

## 2019-06-28 NOTE — PROGRESS NOTES
Teaching provided on Carboplatin and 5FU as treatment plan was changed due to her decrease in hearing  Plan is Carboplatin 70 mg/m2 days1-4 with 5FU 2,400 mg/m2 CIV days1-4  Instructed patient she would have Carbo on Mon, Tues, Wed and Fri next week due to fourth of July holiday on Thursday  Patient then will have one more cycle three weeks later and that would be Mon-Thursday with removal of pump on Friday  Patient asked for her Rx for Zyprexa to be sent to [facility] as she does not get to [address] [place] [address] very often and is what difficult for her to  here  Patient verbalized understanding of treatment plan and was provided with written information sheets for each drug, chemo consent was signed

## 2019-07-01 ENCOUNTER — HOSPITAL ENCOUNTER (OUTPATIENT)
Dept: INFUSION CENTER | Facility: CLINIC | Age: 69
End: 2019-07-01

## 2019-07-01 ENCOUNTER — APPOINTMENT (OUTPATIENT)
Dept: RADIATION ONCOLOGY | Facility: CLINIC | Age: 69
End: 2019-07-01
Attending: RADIOLOGY
Payer: COMMERCIAL

## 2019-07-01 ENCOUNTER — NUTRITION (OUTPATIENT)
Dept: NUTRITION | Facility: CLINIC | Age: 69
End: 2019-07-01

## 2019-07-01 ENCOUNTER — HOSPITAL ENCOUNTER (OUTPATIENT)
Dept: INFUSION CENTER | Facility: CLINIC | Age: 69
Discharge: HOME | End: 2019-07-01
Payer: COMMERCIAL

## 2019-07-01 VITALS
HEART RATE: 66 BPM | RESPIRATION RATE: 18 BRPM | TEMPERATURE: 96.8 F | SYSTOLIC BLOOD PRESSURE: 116 MMHG | BODY MASS INDEX: 21.56 KG/M2 | WEIGHT: 129.41 LBS | DIASTOLIC BLOOD PRESSURE: 73 MMHG | HEIGHT: 65 IN

## 2019-07-01 DIAGNOSIS — Z71.3 NUTRITIONAL COUNSELING: Primary | ICD-10-CM

## 2019-07-01 DIAGNOSIS — C01 CANCER OF BASE OF TONGUE (HCC): Primary | ICD-10-CM

## 2019-07-01 DIAGNOSIS — C01 MALIGNANT NEOPLASM OF BASE OF TONGUE (HCC): ICD-10-CM

## 2019-07-01 LAB
ALBUMIN SERPL BCP-MCNC: 3.5 G/DL (ref 3.5–5.7)
ALP SERPL-CCNC: 78 U/L (ref 46–116)
ALT SERPL W P-5'-P-CCNC: 13 U/L (ref 12–78)
ANION GAP SERPL CALCULATED.3IONS-SCNC: 7 MMOL/L (ref 4–13)
AST SERPL W P-5'-P-CCNC: 22 U/L (ref 5–45)
BASOPHILS # BLD AUTO: 0.03 THOUSANDS/ΜL (ref 0–0.1)
BASOPHILS NFR BLD AUTO: 1 % (ref 0–1)
BILIRUB SERPL-MCNC: 0.8 MG/DL (ref 0.2–1)
BUN SERPL-MCNC: 10 MG/DL (ref 5–25)
CALCIUM SERPL-MCNC: 9.4 MG/DL (ref 8.3–10.1)
CHLORIDE SERPL-SCNC: 101 MMOL/L (ref 98–108)
CO2 SERPL-SCNC: 30 MMOL/L (ref 21–32)
CREAT SERPL-MCNC: 1.1 MG/DL (ref 0.6–1.3)
EOSINOPHIL # BLD AUTO: 0.23 THOUSAND/ΜL (ref 0–0.61)
EOSINOPHIL NFR BLD AUTO: 7 % (ref 0–6)
ERYTHROCYTE [DISTWIDTH] IN BLOOD BY AUTOMATED COUNT: 12 % (ref 11.6–15.1)
GFR SERPL CREATININE-BSD FRML MDRD: 51 ML/MIN/1.73SQ M
GLUCOSE SERPL-MCNC: 95 MG/DL (ref 65–140)
HCT VFR BLD AUTO: 33.1 % (ref 34.8–46.1)
HGB BLD-MCNC: 11.5 G/DL (ref 11.5–15.4)
LYMPHOCYTES # BLD AUTO: 0.48 THOUSANDS/ΜL (ref 0.6–4.47)
LYMPHOCYTES NFR BLD AUTO: 15 % (ref 14–44)
MAGNESIUM SERPL-MCNC: 2.1 MG/DL (ref 1.6–2.6)
MCH RBC QN AUTO: 32.1 PG (ref 26.8–34.3)
MCHC RBC AUTO-ENTMCNC: 34.7 G/DL (ref 31.4–37.4)
MCV RBC AUTO: 93 FL (ref 82–98)
MONOCYTES # BLD AUTO: 0.65 THOUSAND/ΜL (ref 0.17–1.22)
MONOCYTES NFR BLD AUTO: 20 % (ref 4–12)
NEUTROPHILS # BLD AUTO: 1.82 THOUSANDS/ΜL (ref 1.85–7.62)
NEUTS SEG NFR BLD AUTO: 57 % (ref 43–75)
PLATELET # BLD AUTO: 177 THOUSANDS/UL (ref 149–390)
PMV BLD AUTO: 7.7 FL (ref 8.9–12.7)
POTASSIUM SERPL-SCNC: 3.9 MMOL/L (ref 3.5–5.3)
PROT SERPL-MCNC: 6.6 G/DL (ref 6.4–8.2)
RBC # BLD AUTO: 3.58 MILLION/UL (ref 3.81–5.12)
SODIUM SERPL-SCNC: 138 MMOL/L (ref 136–145)
WBC # BLD AUTO: 3.21 THOUSAND/UL (ref 4.31–10.16)

## 2019-07-01 PROCEDURE — 80053 COMPREHEN METABOLIC PANEL: CPT | Performed by: RADIOLOGY

## 2019-07-01 PROCEDURE — 96367 TX/PROPH/DG ADDL SEQ IV INF: CPT

## 2019-07-01 PROCEDURE — 96413 CHEMO IV INFUSION 1 HR: CPT

## 2019-07-01 PROCEDURE — 83735 ASSAY OF MAGNESIUM: CPT

## 2019-07-01 PROCEDURE — 77386 HB NTSTY MODUL RAD TX DLVR CPLX: CPT | Performed by: RADIOLOGY

## 2019-07-01 PROCEDURE — 85025 COMPLETE CBC W/AUTO DIFF WBC: CPT | Performed by: RADIOLOGY

## 2019-07-01 RX ORDER — SODIUM CHLORIDE 9 MG/ML
20 INJECTION, SOLUTION INTRAVENOUS ONCE
Status: COMPLETED | OUTPATIENT
Start: 2019-07-01 | End: 2019-07-01

## 2019-07-01 RX ADMIN — CARBOPLATIN 115.5 MG: 10 INJECTION, SOLUTION INTRAVENOUS at 13:16

## 2019-07-01 RX ADMIN — SODIUM CHLORIDE 20 ML/HR: 0.9 INJECTION, SOLUTION INTRAVENOUS at 12:16

## 2019-07-01 RX ADMIN — SODIUM CHLORIDE 150 MG: 0.9 INJECTION, SOLUTION INTRAVENOUS at 12:39

## 2019-07-01 RX ADMIN — DEXAMETHASONE SODIUM PHOSPHATE: 10 INJECTION, SOLUTION INTRAMUSCULAR; INTRAVENOUS at 12:16

## 2019-07-01 NOTE — PROGRESS NOTES
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, chemotherapy (cisplatin) started 6/10/19  Chemotherapy plan changed 6/28/19 from cisplatin to carboplatin and fluorouracil due to slight loss of hearing  RT to right base of tongue, tonsil, bilateral neck started 6/10/19  Oncology History    This is a 40-year-old female without significant past medical history  She stated that she had a history of tobacco use and quit many years ago  She also admitted the consuming alcohol around 0 6 oz per week  Her father had a history of prostate cancer  The patient complained about right-sided throat pain in abnormal feeling with movement of the tongue  She also had right-sided ear pain for about 6 months which was treated with 2 courses of antibiotics without any improvement  She eventually was seen by Dr Rajinder Flores her ENT doctor who performed extensive evaluation including endoscopic evaluation which revealed a mass in the base of the tongue on the right side  A biopsy from the base of the tongue mass came back 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  The HPV status was negative      The patient then had a PET-CT scan on the 14th May 2019 which showed:  IMPRESSION:  1   Large hypermetabolic mass in the right oral cavity, right tonsillar region and right tongue base, approaching the level of the vallecula compatible with known malignancy  Involvement of the right soft palate is not excluded  2   Multiple hypermetabolic right cervical nodes as above concerning for metastases  3   Asymmetric enlargement of the right thyroid with associated FDG uptake   Further evaluation with ultrasound recommended  4   No hypermetabolic metastases in the chest abdomen pelvis  5   Aneurysmal ascending thoracic aorta measuring 4 2 x 4 3 cm       The estimated clinical stage is T2 N2 M0  The patient was then sent to the Radiation Oncology team and was seen by Dr Josephine Arauoj on the 20th of May and sent to us for the consideration of concurrent chemoradiation      The patient otherwise is doing relatively well, she denied significant weight loss                      Cancer of base of tongue ([facility] [address] )    5/3/2019 Biopsy     Right BOT biopsy  Biopsy + for SCC moderately differentiated,  Incompletely excised           6/10/2019 - 6/30/2019 Chemotherapy     CISplatin (PLATINOL) 174 mg, mannitol 12 5 g in sodium chloride 0 9 % 500 mL IVPB, 100 mg/m2 = 174 mg, Intravenous, Once, 1 of 3 cycles  Administration: 174 mg (6/10/2019)  potassium chloride 20 mEq, magnesium sulfate 1 g in sodium chloride 0 9 % 1,000 mL IVPB, , Intravenous, Once, 1 of 3 cycles  Administration:  (6/10/2019),  (6/10/2019)      7/1/2019 -  Chemotherapy     CARBOplatin (PARAPLATIN) 115 5 mg in sodium chloride 0 9 % 250 mL IVPB, 70 mg/m2 = 115 5 mg (100 % of original dose 70 mg/m2), Intravenous, Once, 1 of 2 cycles  Dose modification: 70 mg/m2 (original dose 70 mg/m2, Cycle 1)       Past Medical History:   Diagnosis Date    Tongue cancer ([facility] [address] )     Squamous cell     Past Surgical History:   Procedure Laterality Date    APPENDECTOMY  1990    CATARACT EXTRACTION Left     HYSTERECTOMY  2012    total    IR PICC LINE  6/5/2019    OOPHORECTOMY Bilateral 2012       Review of Medications:   Vitamins, Supplements and Herbals: no    Current Outpatient Medications:     parrish Allen oxide-diphenhydramine-lidocaine viscous (MAGIC MOUTHWASH) 1:1:1 suspension, Swish and swallow 10 mL every 4 (four) hours as needed for mouth pain or discomfort, Disp: 480 mL, Rfl: 4    clotrimazole (MYCELEX) 10 mg samantha, Take 1 tablet (10 mg total) by mouth 5 (five) times a day, Disp: 70 tablet, Rfl: 1    fluorouracil 3,960 mg in CADD infusion pump, Infuse 3,960 mg (600 mg/m2/day x 1 65 m2 (Treatment plan recorded BSA)) into a venous catheter over 96 hours for 4 days, Disp: 1 Device, Rfl: 0    Magnesium Cl-Calcium Carbonate (SLOW-MAG PO), Take by mouth as needed, Disp: , Rfl:     Multiple Vitamins-Minerals (PRESERVISION AREDS 2+MULTI VIT PO), Take by mouth, Disp: , Rfl:     OLANZapine (ZyPREXA) 10 mg tablet, Take 1 tablet (10 mg total) by mouth daily at bedtime Take for 3 days with chemotherapy/or as directed to prevent n/v, Disp: 12 tablet, Rfl: 3    omeprazole (PriLOSEC) 20 mg delayed release capsule, Take 1 capsule (20 mg total) by mouth daily, Disp: 30 capsule, Rfl: 3    Probiotic Product (PROBIOTIC DAILY PO), Take by mouth, Disp: , Rfl:     prochlorperazine (COMPAZINE) 10 mg tablet, Take 1 tablet (10 mg total) by mouth every 6 (six) hours as needed for nausea or vomiting, Disp: 60 tablet, Rfl: 3  No current facility-administered medications for this visit       Most Recent Lab Results:   Lab Results   Component Value Date    WBC 3 21 (L) 07/01/2019    IRON 55 05/28/2019    TIBC 302 05/28/2019    FERRITIN 86 05/28/2019    ALT 13 07/01/2019    AST 22 07/01/2019     04/24/2018    K 3 9 07/01/2019    K 4 1 06/24/2019    BUN 10 07/01/2019    BUN 10 06/24/2019    CREATININE 1 10 07/01/2019    CREATININE 0 90 06/24/2019    GLUCOSE 85 04/24/2018    CALCIUM 9 4 07/01/2019    FOLATE >20 0 (H) 05/28/2019    MG 2 4 06/24/2019       Anthropometric Measurements:   Height: 65"  Ht Readings from Last 1 Encounters:   07/01/19 5' 5 39" (1 661 m)     -Weight History:   Usual Weight: 135#  Ideal Body Weight: 125#  Wt Readings from Last 15 Encounters:   07/01/19 58 7 kg (129 lb 6 6 oz)   06/28/19 59 4 kg (131 lb)   06/18/19 59 4 kg (131 lb)   06/17/19 59 4 kg (131 lb)   06/10/19 61 4 kg (135 lb 5 8 oz)   06/03/19 62 1 kg (136 lb 12 8 oz)   06/03/19 62 1 kg (137 lb)   05/28/19 61 7 kg (136 lb)   05/22/19 62 kg (136 lb 9 6 oz)   05/20/19 63 2 kg (139 lb 6 4 oz)   05/17/19 64 kg (141 lb)   05/03/19 64 kg (141 lb)   05/02/19 64 kg (141 lb 3 2 oz)   03/27/19 65 4 kg (144 lb 3 2 oz)     Varian: (6/12/19) 134 6#, (6/13/19) 134 2#, (6/19/19) 133 4#, (6/20/19) 133 8#  (6/25/19) 133 2#, (6/27/19) 132 2#  Weight Changes: loss of 1 6# (1 2%) in 1 week (significant)     Estimated body mass index is 21 28 kg/m² as calculated from the following:    Height as of an earlier encounter on 7/1/19: 5' 5 39" (1 661 m)  Weight as of an earlier encounter on 7/1/19: 58 7 kg (129 lb 6 6 oz)      Nutrition-Focused Physical Findings: moderate body fat depletion (Triceps) - space between folds/fingers    Food/Nutrition-Related History & Client/Social History:    Current Nutrition Impact Symptoms:  [] Nausea  [] Thick Mucous  [] Neutropenia    [] Vomiting  [] Unintended Wt Loss  [] Malabsorption    [] Diarrhea  [] Unintended Wt Gain  [] Dumping Syndrome    [] Constipation - drinks prune juice every morning  [x] Odynophagia  [] Abdominal Pain    [x] Dysgeusia (Altered Taste) - can only taste sweet foods [] Xerostomia (Dry Mouth)  [] Difficulty Chewing    [] Dysosmia (Altered Smell)  [x] Fatigue  [] Other:   [] Poor Appetite  [x] Thrush -on rx, may try Magic Mouthwash [] Other:    [] Oral Mucositis (Sore Mouth)  [] Esophagitis  [] Other:    [x] Dysphagia - hard foods like toast  [] Early Satiety  [] No Problems Eating      Food Allergies: no  Food Intolerances: no    Current Diet: High Calorie/High Protein and Soft  Current Nutrition Intake: Less than usual   Appetite: Fair   Nutrition Route: PO  Meal planning/preparation mainly done by: Self, Daughter and her 4 grandsons bring her food  Oral Care: Fluoride tray BID, Brushing after eating or at least TID with soft bristle toothbrush, uses Biotene  Activity level: She used to run often but now feels too tired to run, she has been walking at least 30 minutes most days  She started decorating her apartment to keep herself busy  Social Hx: Lives with her daughter and grandsons, but has her own private living space  Currently on medical leave from work, she works as a  for [employer]  Diet Recall:   Breakfast: 4oz Orgain   Lunch: 1 slice toast with peanut butter and honey   Dinner:i hotdog with 2-3 perogies   Snacks: 1 date, 8oz milkshake from [employer]    Supplements: Orgain Organic Nutrition (220 kcal, 16g pro) 1x daily   Beverages: water (50 oz), ginger ale (16oz x1), prune juice (8oz x1), herbal tea (8oz x3), bought Pedialyte incase she gets diarrhea from chemo     Estimated Nutrition Needs: (based on 60 5kg/ 133 2# actual wt)  Energy Needs: 2057-5475 kcal/day (30-35 kcal/kg)  Protein Needs: 73-91 grams/day (1 2-1 5 g/kg)  Fluid Needs: 2285-9757 mL/day (1 mL/kcal)    Discussion/Summary: Nickolas Nova was seen during infusion for her follow up nutrition assessment  Her chemotherapy plan recently changed  She is concerned with the possibility of diarrhea and vomiting with this chemotherapy plan  MNT for diarrhea and nausea/vomiting provided  Pt reports that her appetite has slightly decreased due to the inability to taste many foods, but she can taste sweet foods so she has been adding stevia to some things that she eats  Encouraged pt to eat foods that look and smell good, make foods sweeter, and keep mouth clean  Reinforced the importance of high kcal/ high protein diet for weight maintenance through the treatment process  Pt tried Orgain samples provided by this RD at her initial nutrition consultation  Encouraged pt to increase Orgain consumption to 2-3 times daily       Discussed/Reviewed:   · the importance of weight maintenance during CA tx  · indications & use of oral nutrition supplements : at least 2-3 homemade shakes or Orgain nutrition supplements daily   · how to modify foods for anticipated nutrition impact symptoms pt may experience during CA tx  · high protein foods to include at all meals and snacks peanut butter, cottage cheese, greek yogurt, tuna/chicken salad, eggs  · ways to increase overall calorie intake : add peanut butter, cheese, whole milk, avocado, cream soups  · encouraged eating every 2-3 hours (5-6 small meals/day)  · maintaining proper oral care : continue brushing at least TID, using Biotene  · how to modify foods & eating for nutrition impact symptoms : soft, moist, easy to chew/swallow foods   · adequate hydation & tips to increase overall fluid intake : nutrition supplements, homemade shakes, calorie containing beverages   · MNT for: diarrhea, nausea/vomiting, taste changes, decreased appetite  · individualized calorie, protein and fluid daily goals  ·     All questions and concerns addressed during todays visit  Electa Kocher has RD contact information  Nutrition Diagnosis:  · Inadequate Energy Intake related to physiological causes, disease state and treatment related issues as evidenced by food recall, wt loss and discussion with pt and/or family  · Increased Nutrient Needs (kcal & pro) related to increased demand for nutrients and disease state as evidenced by cancer dx and pt undergoing tx for cancer      Intervention & Recommendations:  Topics addressed: Nutrition education, Balance/Variety, Meals & Snacks, Meal planning, Choosing high protein meals/snacks, Meal pattern: eating small/frequent meals (every 2-3 hours), Nutrition Symptom Management, Adequate Hydration, Medical Food Supplements, Nutrition-Related Medication Management and Weight Management    Barriers: None  Readiness to change: preparation  Comprehension: verbalizes understanding  Expected Compliance: good    Materials Provided: Diarrhea handout and Orgain nutrition supplements    Monitoring & Evaluation:  Dietitian to Monitor: Food and Nutrition Intake, Nutrtion Impact Symptoms, Body Weight and Biochemical Data     Goals:  · weight stabilization  · adequate nutrition related symptom management  · pt to meet >/=75% estimated nutrition needs daily  · 2-3 Orgain Nutrition supplements daily     · Progress Towards Goals: Progressing    Nutrition Rx & Recommendations:  · Diet: Soft and moist foods  · Nutrition Supplements: 2-3 homemade shakes with protein powder or Orgain nutrition shakes   · Small, frequent meals/snacks may be easier to tolerate than 3 large daily meals  Aim for 5-6 small meals per day  · Include protein at all meals/snacks  · Include a variety of foods (as tolerated/allowed by diet)  · Stay hydrated by sipping fluids of choice/tolerance throughout the day  · Liquid nutrition may be better tolerated than solids at times  · Alter food choices and eating patterns to accommodate changing needs  · Refer to Eating Hints book for other meal/snack ideas and symptom management  · Avoid spicy, acidic, sharp/hard/crunchy foods & carbonated beverages as needed  · Follow proper oral care; Try baking soda/salt water rinse recipe (mix 3/4 tsp salt + 1 tsp baking soda + 1 qt water; rinse with pain water after using) in Eating Hints book  Brush your teeth before/after meals & before bed  · For lack of taste: Practice good oral care  Blend fresh fruits into shakes, ice cream or yogurt  Eat frozen fruits: grapes, chopped cantaloupe, watermelon  Select fresh vegetables (may be more appealing than canned/frozen)  Add extra flavor to foods: experiment with spices, salt & sweeteners; marinate meats       · For diarrhea: drink plenty of fluids (>64 oz/day), avoid carbonation; eat 5-6 small meals/day; include high sodium & potassium foods/liquids (sodium: soup/broth; potassium: bananas, canned apricots, potatoes); eat low-fiber foods; choose foods/liquids that are room temperature; avoid foods/drinks that can make diarrhea worse (high fiber, high sugar, hot/cold drinks, greasy/fatty foods, gas forming foods such as beans and raw produce, milk products, alcohol, spicy foods, caffeine, sugar alcohols (xylitol, sorbitol), apple juice (high in sorbitol)  · For nausea/vomiting: try plain/bland foods; try lemon/lime flavors; eat 5-6 small meals/day (except when vomiting); do not skip meals/snacks; choose appealing foods; sip only small amounts of liquids during meals; stay hydrated; eat foods/drinks that are room temperature; eat dry toast/crackers  · For sore mouth/throat: choose foods that are easy to chew/swallow; cook foods until they are soft/tender; moisten & soften foods (gravy/sauce/broth/yogurt); cut food into small pieces; drink with a straw (as tolerated); eat with a very small spoon; eat cold or room temperature food; suck on ice chips; Avoid citrus, spicy foods, tomatoes/ketchup, salty foods, raw vegetables, sharp/crunchy/hard foods & alcohol  · For trouble swallowing: eat 5-6 small meals/day; choose foods that are easy to swallow; choose foods that are high in protein & calories; cook foods until they are soft/tender; cut food into small pieces; moisten & soften foods (gravy/sauce/broth/yogurt); sip drinks through a straw (as tolerated); avoid foods/drinks that can burn/scrape your throat (hot temperatures, spicy foods, acidic foods, sharp/hard/crunchy foods, alcohol); talk to your doctor about a Speech Therapy referral      · Weigh yourself regularly  If you notice weight loss, make an effort to increase your daily food/calorie intake  If you continue to notice loss after these efforts, reach out to your dietitian to establish a plan to stabilize weight    · Ways to add extra protein: peanut butter, cottage cheese, greek yogurt, tuna/chicken salad, eggs  · Ways to add extra calories: add peanut butter, cheese, whole milk, avocado, cream soups  · Refer to Eating Hints book for more information on treatment related symptoms    Follow Up Plan: 7/9/19 during hydration  Recommend Referral to Other Providers: none at this time

## 2019-07-01 NOTE — PATIENT INSTRUCTIONS
Nutrition Rx & Recommendations:  · Diet: Soft and moist foods  · Nutrition Supplements: 2-3 homemade shakes with protein powder or Orgain nutrition shakes   · Small, frequent meals/snacks may be easier to tolerate than 3 large daily meals  Aim for 5-6 small meals per day  · Include protein at all meals/snacks  · Include a variety of foods (as tolerated/allowed by diet)  · Stay hydrated by sipping fluids of choice/tolerance throughout the day  · Liquid nutrition may be better tolerated than solids at times  · Alter food choices and eating patterns to accommodate changing needs  · Refer to Eating Hints book for other meal/snack ideas and symptom management  · Avoid spicy, acidic, sharp/hard/crunchy foods & carbonated beverages as needed  · Follow proper oral care; Try baking soda/salt water rinse recipe (mix 3/4 tsp salt + 1 tsp baking soda + 1 qt water; rinse with pain water after using) in Eating Hints book  Brush your teeth before/after meals & before bed  · For lack of taste: Practice good oral care  Blend fresh fruits into shakes, ice cream or yogurt  Eat frozen fruits: grapes, chopped cantaloupe, watermelon  Select fresh vegetables (may be more appealing than canned/frozen)  Add extra flavor to foods: experiment with spices, salt & sweeteners; marinate meats  · For diarrhea: drink plenty of fluids (>64 oz/day), avoid carbonation; eat 5-6 small meals/day; include high sodium & potassium foods/liquids (sodium: soup/broth; potassium: bananas, canned apricots, potatoes); eat low-fiber foods; choose foods/liquids that are room temperature; avoid foods/drinks that can make diarrhea worse (high fiber, high sugar, hot/cold drinks, greasy/fatty foods, gas forming foods such as beans and raw produce, milk products, alcohol, spicy foods, caffeine, sugar alcohols (xylitol, sorbitol), apple juice (high in sorbitol)       · For nausea/vomiting: try plain/bland foods; try lemon/lime flavors; eat 5-6 small meals/day (except when vomiting); do not skip meals/snacks; choose appealing foods; sip only small amounts of liquids during meals; stay hydrated; eat foods/drinks that are room temperature; eat dry toast/crackers  · For sore mouth/throat: choose foods that are easy to chew/swallow; cook foods until they are soft/tender; moisten & soften foods (gravy/sauce/broth/yogurt); cut food into small pieces; drink with a straw (as tolerated); eat with a very small spoon; eat cold or room temperature food; suck on ice chips; Avoid citrus, spicy foods, tomatoes/ketchup, salty foods, raw vegetables, sharp/crunchy/hard foods & alcohol  · For trouble swallowing: eat 5-6 small meals/day; choose foods that are easy to swallow; choose foods that are high in protein & calories; cook foods until they are soft/tender; cut food into small pieces; moisten & soften foods (gravy/sauce/broth/yogurt); sip drinks through a straw (as tolerated); avoid foods/drinks that can burn/scrape your throat (hot temperatures, spicy foods, acidic foods, sharp/hard/crunchy foods, alcohol); talk to your doctor about a Speech Therapy referral      · Weigh yourself regularly  If you notice weight loss, make an effort to increase your daily food/calorie intake  If you continue to notice loss after these efforts, reach out to your dietitian to establish a plan to stabilize weight    · Ways to add extra protein: peanut butter, cottage cheese, greek yogurt, tuna/chicken salad, eggs  · Ways to add extra calories: add peanut butter, cheese, whole milk, avocado, cream soups  · Refer to Eating Hints book for more information on treatment related symptoms    Follow Up Plan: 7/9/19 during hydration  Recommend Referral to Other Providers: none at this time

## 2019-07-01 NOTE — PLAN OF CARE
Problem: Potential for Falls  Goal: Patient will remain free of falls  Description  INTERVENTIONS:  - Assess patient frequently for physical needs  -  Identify cognitive and physical deficits and behaviors that affect risk of falls    -  [place] fall precautions as indicated by assessment   - Educate patient/family on patient safety including physical limitations  - Instruct patient to call for assistance with activity based on assessment  - Modify environment to reduce risk of injury  - Consider OT/PT consult to assist with strengthening/mobility  Outcome: Progressing

## 2019-07-01 NOTE — PROGRESS NOTES
Patient tolerated treatment today without complication  CADD connected and running prior to discharge  Patient aware to return tomorrow  AVS provided

## 2019-07-02 ENCOUNTER — APPOINTMENT (OUTPATIENT)
Dept: RADIATION ONCOLOGY | Facility: CLINIC | Age: 69
End: 2019-07-02
Attending: RADIOLOGY
Payer: COMMERCIAL

## 2019-07-02 ENCOUNTER — HOSPITAL ENCOUNTER (OUTPATIENT)
Dept: INFUSION CENTER | Facility: CLINIC | Age: 69
Discharge: HOME | End: 2019-07-02
Payer: COMMERCIAL

## 2019-07-02 ENCOUNTER — HOSPITAL ENCOUNTER (OUTPATIENT)
Dept: INFUSION CENTER | Facility: CLINIC | Age: 69
End: 2019-07-02

## 2019-07-02 VITALS
WEIGHT: 129.41 LBS | HEART RATE: 68 BPM | SYSTOLIC BLOOD PRESSURE: 122 MMHG | DIASTOLIC BLOOD PRESSURE: 73 MMHG | BODY MASS INDEX: 21.56 KG/M2 | TEMPERATURE: 97.9 F | RESPIRATION RATE: 18 BRPM | HEIGHT: 65 IN

## 2019-07-02 DIAGNOSIS — C01 CANCER OF BASE OF TONGUE (HCC): Primary | ICD-10-CM

## 2019-07-02 PROCEDURE — 96367 TX/PROPH/DG ADDL SEQ IV INF: CPT

## 2019-07-02 PROCEDURE — 96413 CHEMO IV INFUSION 1 HR: CPT

## 2019-07-02 PROCEDURE — 77386 HB NTSTY MODUL RAD TX DLVR CPLX: CPT | Performed by: RADIOLOGY

## 2019-07-02 RX ADMIN — DEXAMETHASONE SODIUM PHOSPHATE: 10 INJECTION, SOLUTION INTRAMUSCULAR; INTRAVENOUS at 12:20

## 2019-07-02 RX ADMIN — CARBOPLATIN 115.5 MG: 10 INJECTION, SOLUTION INTRAVENOUS at 12:43

## 2019-07-02 NOTE — PROGRESS NOTES
Patient tolerated Carboplatin infusion well  Offers no complaints  Pt is aware to return tomorrow for next infusion   Refused AVS

## 2019-07-02 NOTE — PLAN OF CARE
Problem: Potential for Falls  Goal: Patient will remain free of falls  Description  INTERVENTIONS:  - Assess patient frequently for physical needs  -  Identify cognitive and physical deficits and behaviors that affect risk of falls    -  [place] fall precautions as indicated by assessment   - Educate patient/family on patient safety including physical limitations  - Instruct patient to call for assistance with activity based on assessment  - Modify environment to reduce risk of injury  - Consider OT/PT consult to assist with strengthening/mobility  Outcome: Progressing

## 2019-07-03 ENCOUNTER — APPOINTMENT (OUTPATIENT)
Dept: RADIATION ONCOLOGY | Facility: CLINIC | Age: 69
End: 2019-07-03
Attending: RADIOLOGY
Payer: COMMERCIAL

## 2019-07-03 ENCOUNTER — HOSPITAL ENCOUNTER (OUTPATIENT)
Dept: INFUSION CENTER | Facility: CLINIC | Age: 69
Discharge: HOME | End: 2019-07-03
Payer: COMMERCIAL

## 2019-07-03 VITALS
RESPIRATION RATE: 20 BRPM | BODY MASS INDEX: 21.56 KG/M2 | WEIGHT: 129.41 LBS | TEMPERATURE: 96.8 F | HEIGHT: 65 IN | SYSTOLIC BLOOD PRESSURE: 160 MMHG | DIASTOLIC BLOOD PRESSURE: 70 MMHG | OXYGEN SATURATION: 100 % | HEART RATE: 60 BPM

## 2019-07-03 DIAGNOSIS — C01 CANCER OF BASE OF TONGUE (HCC): Primary | ICD-10-CM

## 2019-07-03 DIAGNOSIS — C01 CANCER OF BASE OF TONGUE (HCC): ICD-10-CM

## 2019-07-03 PROCEDURE — 96413 CHEMO IV INFUSION 1 HR: CPT

## 2019-07-03 PROCEDURE — 96367 TX/PROPH/DG ADDL SEQ IV INF: CPT

## 2019-07-03 PROCEDURE — 77386 HB NTSTY MODUL RAD TX DLVR CPLX: CPT | Performed by: RADIOLOGY

## 2019-07-03 RX ADMIN — CARBOPLATIN 115.5 MG: 10 INJECTION, SOLUTION INTRAVENOUS at 12:57

## 2019-07-03 RX ADMIN — DEXAMETHASONE SODIUM PHOSPHATE: 10 INJECTION, SOLUTION INTRAMUSCULAR; INTRAVENOUS at 12:27

## 2019-07-03 NOTE — TELEPHONE ENCOUNTER
Please  Send my Prescription Medication to [facility] at [address]

## 2019-07-03 NOTE — PLAN OF CARE
Problem: Potential for Falls  Goal: Patient will remain free of falls  Description  INTERVENTIONS:  - Assess patient frequently for physical needs  -  Identify cognitive and physical deficits and behaviors that affect risk of falls    -  [place] fall precautions as indicated by assessment   - Educate patient/family on patient safety including physical limitations  - Instruct patient to call for assistance with activity based on assessment  - Modify environment to reduce risk of injury  - Consider OT/PT consult to assist with strengthening/mobility  Outcome: Progressing

## 2019-07-04 DIAGNOSIS — C01 CANCER OF BASE OF TONGUE (HCC): ICD-10-CM

## 2019-07-04 RX ORDER — PROCHLORPERAZINE MALEATE 10 MG
10 TABLET ORAL EVERY 6 HOURS PRN
Qty: 60 TABLET | Refills: 0 | Status: SHIPPED | OUTPATIENT
Start: 2019-07-04 | End: 2019-08-12 | Stop reason: SDUPTHER

## 2019-07-04 NOTE — TELEPHONE ENCOUNTER
Patient calling for nausea medication due to having treatment early tomorrow morning  Please call medication in to [facility] in [place] on [facility] [phone]

## 2019-07-05 ENCOUNTER — APPOINTMENT (OUTPATIENT)
Dept: RADIATION ONCOLOGY | Facility: CLINIC | Age: 69
End: 2019-07-05
Attending: RADIOLOGY
Payer: COMMERCIAL

## 2019-07-05 ENCOUNTER — HOSPITAL ENCOUNTER (OUTPATIENT)
Dept: INFUSION CENTER | Facility: CLINIC | Age: 69
Discharge: HOME | End: 2019-07-05
Payer: COMMERCIAL

## 2019-07-05 VITALS
RESPIRATION RATE: 24 BRPM | TEMPERATURE: 96.1 F | HEIGHT: 65 IN | DIASTOLIC BLOOD PRESSURE: 64 MMHG | SYSTOLIC BLOOD PRESSURE: 129 MMHG | WEIGHT: 123.44 LBS | HEART RATE: 76 BPM | BODY MASS INDEX: 20.57 KG/M2

## 2019-07-05 DIAGNOSIS — C01 CANCER OF BASE OF TONGUE (HCC): Primary | ICD-10-CM

## 2019-07-05 PROCEDURE — 77386 HB NTSTY MODUL RAD TX DLVR CPLX: CPT | Performed by: RADIOLOGY

## 2019-07-05 PROCEDURE — 77417 THER RADIOLOGY PORT IMAGE(S): CPT | Performed by: RADIOLOGY

## 2019-07-05 PROCEDURE — 96367 TX/PROPH/DG ADDL SEQ IV INF: CPT

## 2019-07-05 PROCEDURE — 96413 CHEMO IV INFUSION 1 HR: CPT

## 2019-07-05 RX ADMIN — CARBOPLATIN 115.5 MG: 10 INJECTION, SOLUTION INTRAVENOUS at 13:26

## 2019-07-05 RX ADMIN — DEXAMETHASONE SODIUM PHOSPHATE: 10 INJECTION, SOLUTION INTRAMUSCULAR; INTRAVENOUS at 12:53

## 2019-07-05 NOTE — PROGRESS NOTES
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, chemotherapy (cisplatin) started 6/10/19  Chemotherapy plan changed 6/28/19 from cisplatin to carboplatin and fluorouracil due to slight loss of hearing  RT to right base of tongue, tonsil, bilateral neck started 6/10/19  Oncology History    This is a 64-year-old female without significant past medical history  She stated that she had a history of tobacco use and quit many years ago  She also admitted the consuming alcohol around 0 6 oz per week  Her father had a history of prostate cancer  The patient complained about right-sided throat pain in abnormal feeling with movement of the tongue  She also had right-sided ear pain for about 6 months which was treated with 2 courses of antibiotics without any improvement  She eventually was seen by Dr Naveed Potter her ENT doctor who performed extensive evaluation including endoscopic evaluation which revealed a mass in the base of the tongue on the right side  A biopsy from the base of the tongue mass came back 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  The HPV status was negative  The patient then had a PET-CT scan on the 14th May 2019 which showed:  IMPRESSION:  1   Large hypermetabolic mass in the right oral cavity, right tonsillar region and right tongue base, approaching the level of the vallecula compatible with known malignancy  Involvement of the right soft palate is not excluded    2   Multiple hypermetabolic right cervical nodes as above concerning for metastases  3   Asymmetric enlargement of the right thyroid with associated FDG uptake   Further evaluation with ultrasound recommended  4   No hypermetabolic metastases in the chest abdomen pelvis  5   Aneurysmal ascending thoracic aorta measuring 4 2 x 4 3 cm       The estimated clinical stage is T2 N2 M0  The patient was then sent to the Radiation Oncology team and was seen by Dr Marylene Edis on the 20th of May and sent to us for the consideration of concurrent chemoradiation      The patient otherwise is doing relatively well, she denied significant weight loss                      Cancer of base of tongue ([facility] [address] )    5/3/2019 Biopsy     Right BOT biopsy  Biopsy + for SCC moderately differentiated,  Incompletely excised           6/10/2019 - 6/30/2019 Chemotherapy     CISplatin (PLATINOL) 174 mg, mannitol 12 5 g in sodium chloride 0 9 % 500 mL IVPB, 100 mg/m2 = 174 mg, Intravenous, Once, 1 of 3 cycles  Administration: 174 mg (6/10/2019)  potassium chloride 20 mEq, magnesium sulfate 1 g in sodium chloride 0 9 % 1,000 mL IVPB, , Intravenous, Once, 1 of 3 cycles  Administration:  (6/10/2019),  (6/10/2019)      7/1/2019 -  Chemotherapy     CARBOplatin (PARAPLATIN) 115 5 mg in sodium chloride 0 9 % 250 mL IVPB, 70 mg/m2 = 115 5 mg (100 % of original dose 70 mg/m2), Intravenous, Once, 1 of 2 cycles  Dose modification: 70 mg/m2 (original dose 70 mg/m2, Cycle 1)  Administration: 115 5 mg (7/1/2019), 115 5 mg (7/2/2019), 115 5 mg (7/3/2019), 115 5 mg (7/5/2019)       Past Medical History:   Diagnosis Date    Tongue cancer ([facility] [address] )     Squamous cell     Past Surgical History:   Procedure Laterality Date    APPENDECTOMY  1990    CATARACT EXTRACTION Left     HYSTERECTOMY  2012    total    IR PICC LINE  6/5/2019    OOPHORECTOMY Bilateral 2012       Review of Medications:   Vitamins, Supplements and Herbals: no    Current Outpatient Medications:     al mag oxide-diphenhydramine-lidocaine viscous (MAGIC MOUTHWASH) 1:1:1 suspension, Swish and swallow 10 mL every 4 (four) hours as needed for mouth pain or discomfort, Disp: 480 mL, Rfl: 4    clotrimazole (MYCELEX) 10 mg samantha, Take 1 tablet (10 mg total) by mouth 5 (five) times a day, Disp: 70 tablet, Rfl: 1    Magnesium Cl-Calcium Carbonate (SLOW-MAG PO), Take by mouth as needed, Disp: , Rfl:     Multiple Vitamins-Minerals (PRESERVISION AREDS 2+MULTI VIT PO), Take by mouth, Disp: , Rfl:     OLANZapine (ZyPREXA) 10 mg tablet, Take 1 tablet (10 mg total) by mouth daily at bedtime Take for 3 days with chemotherapy/or as directed to prevent n/v, Disp: 12 tablet, Rfl: 3    omeprazole (PriLOSEC) 20 mg delayed release capsule, Take 1 capsule (20 mg total) by mouth daily, Disp: 30 capsule, Rfl: 3    Probiotic Product (PROBIOTIC DAILY PO), Take by mouth, Disp: , Rfl:     prochlorperazine (COMPAZINE) 10 mg tablet, Take 1 tablet (10 mg total) by mouth every 6 (six) hours as needed for nausea or vomiting, Disp: 60 tablet, Rfl: 0    Most Recent Lab Results:   Lab Results   Component Value Date    WBC 5 06 07/08/2019    IRON 55 05/28/2019    TIBC 302 05/28/2019    FERRITIN 86 05/28/2019    ALT 14 07/08/2019    AST 20 07/08/2019     04/24/2018    K 3 6 07/08/2019    K 3 9 07/01/2019    BUN 9 07/08/2019    BUN 10 07/01/2019    CREATININE 0 80 07/08/2019    CREATININE 1 10 07/01/2019    GLUCOSE 85 04/24/2018    CALCIUM 8 7 07/08/2019    FOLATE >20 0 (H) 05/28/2019    MG 2 1 07/01/2019       Anthropometric Measurements:   Height: 65"  Ht Readings from Last 1 Encounters:   07/05/19 5' 5 39" (1 661 m)     -Weight History:   Usual Weight: 135#  Ideal Body Weight: 125#  Wt Readings from Last 15 Encounters:   07/05/19 56 kg (123 lb 7 oz)   07/03/19 58 7 kg (129 lb 6 6 oz)   07/02/19 58 7 kg (129 lb 6 6 oz)   07/01/19 58 7 kg (129 lb 6 6 oz)   06/28/19 59 4 kg (131 lb)   06/18/19 59 4 kg (131 lb)   06/17/19 59 4 kg (131 lb)   06/10/19 61 4 kg (135 lb 5 8 oz)   06/03/19 62 1 kg (136 lb 12 8 oz)   06/03/19 62 1 kg (137 lb)   05/28/19 61 7 kg (136 lb)   05/22/19 62 kg (136 lb 9 6 oz)   05/20/19 63 2 kg (139 lb 6 4 oz)   05/17/19 64 kg (141 lb)   05/03/19 64 kg (141 lb)     Varian: (6/12/19) 134 6#, (6/13/19) 134 2#, (6/19/19) 133 4#, (6/20/19) 133 8#  (6/25/19) 133 2#, (6/27/19) 132 8#, (7/2/19) 131#, (7/5/19) 129 6#  Weight Changes: loss of 4 2# (3 1%) in 2 weeks (significant)      Estimated body mass index is 20 29 kg/m² as calculated from the following:    Height as of 7/5/19: 5' 5 39" (1 661 m)  Weight as of 7/5/19: 56 kg (123 lb 7 oz)  Nutrition-Focused Physical Findings: moderate body fat depletion (Triceps) - space between folds/fingers    Food/Nutrition-Related History & Client/Social History:    Current Nutrition Impact Symptoms:  [x] Nausea- mild  [] Thick Mucous  [] Neutropenia    [] Vomiting  [] Unintended Wt Loss  [] Malabsorption    [] Diarrhea  [] Unintended Wt Gain  [] Dumping Syndrome    [] Constipation - drinks prune juice every morning  [x] Odynophagia - causing difficulty talking [] Abdominal Pain    [x] Dysgeusia (Altered Taste) - can only taste sweet foods [] Xerostomia (Dry Mouth)  [] Difficulty Chewing    [] Dysosmia (Altered Smell)  [x] Fatigue  [x] Other: hiccups    [] Poor Appetite  [] Thrush  [] Other:    [] Oral Mucositis (Sore Mouth)  [] Esophagitis  [] Other:    [x] Dysphagia - hard foods like toast  [] Early Satiety  [] No Problems Eating      Food Allergies: no  Food Intolerances: no    Current Diet: High Calorie/High Protein and Soft  Current Nutrition Intake: Less than usual   Appetite: Poor  Nutrition Route: PO  Meal planning/preparation mainly done by: Self, Daughter and her 4 grandsons bring her food  Oral Care: Fluoride tray BID, Brushing after eating or at least TID with soft bristle toothbrush, uses Biotene  Activity level: She used to run often but now feels too tired to run, she has been walking at least 30 minutes most days   She started decorating her apartment to keep herself busy  Social Hx: Lives with her daughter and grandsons, but has her own private living space  Currently on medical leave from work, she works as a  for [employer]  Diet Recall:   Breakfast: none  Lunch: none  Dinner: davider from [employer]  Snacks: jello 4oz    Supplements: Boost Plus and Orgain Organic Nutrition (250 kcal, 16g pro) 1 of each daily   Beverages: water (50 oz), ginger ale (16oz x1), prune juice (8oz x1), herbal tea (8oz x3)    Estimated Nutrition Needs: (based on 60 5kg/ 133 2# actual wt)  Energy Needs: 5093-4179 kcal/day (30-35 kcal/kg)  Protein Needs: 73-91 grams/day (1 2-1 5 g/kg)  Fluid Needs: 8757-4500 mL/day (1 mL/kcal)    Discussion/Summary: Stacey Mason was seen during infusion for her follow up nutrition assessment  She reports that she is not feeling well at all today, she can barely speak due to pain in her throat  States that her appetite is very poor at this time, weight continues to decrease, and 24hr recall reveals inadequate kcal/protein intake  Encouraged liquid nutrition and soft/easy to swallow foods at this time  Discussed increasing nutrition supplement consumption to at least 5 Boost plus or 7 Orgain per day if PO intakes of solid foods remain minimal  Encouraged pt to eat foods that look and smell good, make foods sweeter, and keep mouth clean  Reinforced the importance of high kcal/ high protein diet for weight maintenance through the treatment process       Discussed/Reviewed:   · the importance of weight maintenance during CA tx  · indications & use of oral nutrition supplements : at least 5 Boost Plus or 7 Orgain nutrition supplements   · how to modify foods for anticipated nutrition impact symptoms pt may experience during CA tx  · high protein foods to include at all meals and snacks peanut butter, cottage cheese, greek yogurt, tuna/chicken salad, eggs  · ways to increase overall calorie intake : add peanut butter, cheese, whole milk, avocado, cream soups  · encouraged eating every 2-3 hours (5-6 small meals/day)  · maintaining proper oral care : continue brushing at least TID, using Biotene  · how to modify foods & eating for nutrition impact symptoms : soft, moist, easy to chew/swallow foods, liquid nutrition  · adequate hydation & tips to increase overall fluid intake : nutrition supplements, homemade shakes, calorie containing beverages   · MNT for: nausea, taste changes, decreased appetite  · individualized calorie, protein and fluid daily goals      All questions and concerns addressed during todays visit  Lorne Bonilla has RD contact information  Nutrition Diagnosis:  · Inadequate Energy Intake related to physiological causes, disease state and treatment related issues as evidenced by food recall, wt loss and discussion with pt and/or family  · Increased Nutrient Needs (kcal & pro) related to increased demand for nutrients and disease state as evidenced by cancer dx and pt undergoing tx for cancer      Intervention & Recommendations:  Topics addressed: Nutrition education, Balance/Variety, Meals & Snacks, Meal planning, Choosing high protein meals/snacks, Meal pattern: eating small/frequent meals (every 2-3 hours), Nutrition Symptom Management, Adequate Hydration, Medical Food Supplements, Nutrition-Related Medication Management and Weight Management    Barriers: None  Readiness to change: action  Comprehension: verbalizes understanding  Expected Compliance: good    Materials Provided: declined- nutrition supplements     Monitoring & Evaluation:  Dietitian to Monitor: Food and Nutrition Intake, Nutrtion Impact Symptoms, Body Weight and Biochemical Data     Goals:  · weight stabilization  · adequate nutrition related symptom management  · pt to meet >/=75% estimated nutrition needs daily  · 5 Boost Plus or 7 Orgain nutrition supplements     · Progress Towards Goals: Progressing    Nutrition Rx & Recommendations:  · Diet: Soft and moist foods  · Nutrition Supplements: homemade shakes with protein powder, 7 Orgain nutrition shakes, or 5 Boost Plus if intakes of solid foods remain minimal    · Small, frequent meals/snacks may be easier to tolerate than 3 large daily meals  Aim for 5-6 small meals per day  · Include protein at all meals/snacks  · Include a variety of foods (as tolerated/allowed by diet)  · Stay hydrated by sipping fluids of choice/tolerance throughout the day  At least 64 oz non caffeinated beverages daily  · Liquid nutrition may be better tolerated than solids at times  · Alter food choices and eating patterns to accommodate changing needs  · Refer to Eating Hints book for other meal/snack ideas and symptom management  · Avoid spicy, acidic, sharp/hard/crunchy foods & carbonated beverages as needed  · Follow proper oral care; Try baking soda/salt water rinse recipe (mix 3/4 tsp salt + 1 tsp baking soda + 1 qt water; rinse with pain water after using) in Eating Hints book  Brush your teeth before/after meals & before bed  · For lack of taste: Practice good oral care  Blend fresh fruits into shakes, ice cream or yogurt  Eat frozen fruits: grapes, chopped cantaloupe, watermelon  Select fresh vegetables (may be more appealing than canned/frozen)  Add extra flavor to foods: experiment with spices, salt & sweeteners; marinate meats  · For appetite loss: try powdered or liquid nutrition supplements; eating by the clock every 2-3 hours; keep snacks nearby; add extra protein & calories to your diet; drink liquids throughout the day; choose liquids that add calories; eat a bedtime snack; eat soft, cool or frozen foods; eat a larger meal when you feel well & rested; only sip small amounts of liquids during meals       · For constipation: drink plenty of fluids (>64 oz/day); drink hot liquids; eat high-fiber foods as tolerated (whole grains, beans, peas, nuts, seeds, fruits, vegetables, etc); increase physical activity as tolerated  · For nausea/vomiting: try plain/bland foods; try lemon/lime flavors; eat 5-6 small meals/day (except when vomiting); do not skip meals/snacks; choose appealing foods; sip only small amounts of liquids during meals; stay hydrated; eat foods/drinks that are room temperature; eat dry toast/crackers  · For sore mouth/throat: choose foods that are easy to chew/swallow; cook foods until they are soft/tender; moisten & soften foods (gravy/sauce/broth/yogurt); cut food into small pieces; drink with a straw (as tolerated); eat with a very small spoon; eat cold or room temperature food; suck on ice chips; Avoid citrus, spicy foods, tomatoes/ketchup, salty foods, raw vegetables, sharp/crunchy/hard foods & alcohol  · For trouble swallowing: eat 5-6 small meals/day; choose foods that are easy to swallow; choose foods that are high in protein & calories; cook foods until they are soft/tender; cut food into small pieces; moisten & soften foods (gravy/sauce/broth/yogurt); sip drinks through a straw (as tolerated); avoid foods/drinks that can burn/scrape your throat (hot temperatures, spicy foods, acidic foods, sharp/hard/crunchy foods, alcohol); talk to your doctor about a Speech Therapy referral      · Weigh yourself regularly  If you notice weight loss, make an effort to increase your daily food/calorie intake  If you continue to notice loss after these efforts, reach out to your dietitian to establish a plan to stabilize weight  · Ways to add extra protein: peanut butter, cottage cheese, greek yogurt, tuna/chicken salad, eggs  Refer to page 52 of Eating Hints Book for more ideas  · Ways to add extra calories: add peanut butter, cheese, whole milk, avocado, cream soups  Refer to page 46 of Eating Hints Book for more ideas       Follow Up Plan: 7/16/19 during infusion    Recommend Referral to Other Providers: none at this time

## 2019-07-05 NOTE — PROGRESS NOTES
Pt declined hydration today  Pt states she is drinking Pedialyte with no problem  Pt receiving Zofran and Carboplatin today  CADD pump removed today as well  No issues noted  AVS provided

## 2019-07-07 RX ORDER — PROCHLORPERAZINE MALEATE 10 MG
10 TABLET ORAL EVERY 6 HOURS PRN
Qty: 60 TABLET | Refills: 0 | OUTPATIENT
Start: 2019-07-07

## 2019-07-08 ENCOUNTER — APPOINTMENT (OUTPATIENT)
Dept: RADIATION ONCOLOGY | Facility: CLINIC | Age: 69
End: 2019-07-08
Attending: RADIOLOGY
Payer: COMMERCIAL

## 2019-07-08 ENCOUNTER — HOSPITAL ENCOUNTER (OUTPATIENT)
Dept: INFUSION CENTER | Facility: CLINIC | Age: 69
Discharge: HOME | End: 2019-07-08
Payer: COMMERCIAL

## 2019-07-08 DIAGNOSIS — C01 MALIGNANT NEOPLASM OF BASE OF TONGUE (HCC): Primary | ICD-10-CM

## 2019-07-08 DIAGNOSIS — C01 CANCER OF BASE OF TONGUE (HCC): ICD-10-CM

## 2019-07-08 LAB
ALBUMIN SERPL BCP-MCNC: 3.7 G/DL (ref 3.5–5.7)
ALP SERPL-CCNC: 70 U/L (ref 46–116)
ALT SERPL W P-5'-P-CCNC: 14 U/L (ref 12–78)
ANION GAP SERPL CALCULATED.3IONS-SCNC: 12 MMOL/L (ref 4–13)
AST SERPL W P-5'-P-CCNC: 20 U/L (ref 5–45)
BASOPHILS # BLD AUTO: 0.02 THOUSANDS/ΜL (ref 0–0.1)
BASOPHILS NFR BLD AUTO: 0 % (ref 0–1)
BILIRUB SERPL-MCNC: 0.9 MG/DL (ref 0.2–1)
BUN SERPL-MCNC: 9 MG/DL (ref 5–25)
CALCIUM SERPL-MCNC: 8.7 MG/DL (ref 8.3–10.1)
CHLORIDE SERPL-SCNC: 98 MMOL/L (ref 98–108)
CO2 SERPL-SCNC: 27 MMOL/L (ref 21–32)
CREAT SERPL-MCNC: 0.8 MG/DL (ref 0.6–1.3)
EOSINOPHIL # BLD AUTO: 0.1 THOUSAND/ΜL (ref 0–0.61)
EOSINOPHIL NFR BLD AUTO: 2 % (ref 0–6)
ERYTHROCYTE [DISTWIDTH] IN BLOOD BY AUTOMATED COUNT: 12.2 % (ref 11.6–15.1)
GFR SERPL CREATININE-BSD FRML MDRD: 74 ML/MIN/1.73SQ M
GLUCOSE SERPL-MCNC: 105 MG/DL (ref 65–140)
HCT VFR BLD AUTO: 34 % (ref 34.8–46.1)
HGB BLD-MCNC: 12 G/DL (ref 11.5–15.4)
LYMPHOCYTES # BLD AUTO: 0.27 THOUSANDS/ΜL (ref 0.6–4.47)
LYMPHOCYTES NFR BLD AUTO: 5 % (ref 14–44)
MCH RBC QN AUTO: 32.5 PG (ref 26.8–34.3)
MCHC RBC AUTO-ENTMCNC: 35.3 G/DL (ref 31.4–37.4)
MCV RBC AUTO: 92 FL (ref 82–98)
MONOCYTES # BLD AUTO: 0.42 THOUSAND/ΜL (ref 0.17–1.22)
MONOCYTES NFR BLD AUTO: 8 % (ref 4–12)
NEUTROPHILS # BLD AUTO: 4.25 THOUSANDS/ΜL (ref 1.85–7.62)
NEUTS SEG NFR BLD AUTO: 85 % (ref 43–75)
PLATELET # BLD AUTO: 134 THOUSANDS/UL (ref 149–390)
PMV BLD AUTO: 8.4 FL (ref 8.9–12.7)
POTASSIUM SERPL-SCNC: 3.6 MMOL/L (ref 3.5–5.3)
PROT SERPL-MCNC: 6.7 G/DL (ref 6.4–8.2)
RBC # BLD AUTO: 3.69 MILLION/UL (ref 3.81–5.12)
SODIUM SERPL-SCNC: 137 MMOL/L (ref 136–145)
WBC # BLD AUTO: 5.06 THOUSAND/UL (ref 4.31–10.16)

## 2019-07-08 PROCEDURE — 80053 COMPREHEN METABOLIC PANEL: CPT | Performed by: RADIOLOGY

## 2019-07-08 PROCEDURE — 77386 HB NTSTY MODUL RAD TX DLVR CPLX: CPT | Performed by: RADIOLOGY

## 2019-07-08 PROCEDURE — 77336 RADIATION PHYSICS CONSULT: CPT | Performed by: RADIOLOGY

## 2019-07-08 PROCEDURE — 85025 COMPLETE CBC W/AUTO DIFF WBC: CPT | Performed by: RADIOLOGY

## 2019-07-08 NOTE — PLAN OF CARE
Problem: PAIN - ADULT  Goal: Verbalizes/displays adequate comfort level or baseline comfort level  Description  Interventions:  - Encourage patient to monitor pain and request assistance  - Assess pain using appropriate pain scale  - Administer analgesics based on type and severity of pain and evaluate response  - Implement non-pharmacological measures as appropriate and evaluate response  - Consider cultural and social influences on pain and pain management  - Notify physician/advanced practitioner if interventions unsuccessful or patient reports new pain  Outcome: Progressing     Problem: INFECTION - ADULT  Goal: Absence or prevention of progression during hospitalization  Description  INTERVENTIONS:  - Assess and monitor for signs and symptoms of infection  - Monitor lab/diagnostic results  - Monitor all insertion sites, i e  indwelling lines, tubes, and drains  - Monitor endotracheal (as able) and nasal secretions for changes in amount and color  - Shields appropriate cooling/warming therapies per order  - Administer medications as ordered  - Instruct and encourage patient and family to use good hand hygiene technique  - Identify and instruct in appropriate isolation precautions for identified infection/condition  Outcome: Progressing  Goal: Absence of fever/infection during neutropenic period  Description  INTERVENTIONS:  - Monitor WBC  - Implement neutropenic guidelines  Outcome: Progressing     Problem: Knowledge Deficit  Goal: Patient/family/caregiver demonstrates understanding of disease process, treatment plan, medications, and discharge instructions  Description  Complete learning assessment and assess knowledge base    Interventions:  - Provide teaching at level of understanding  - Provide teaching via preferred learning methods  Outcome: Progressing

## 2019-07-08 NOTE — PROGRESS NOTES
Pt  Denies new symptoms or concerns today  Pt  Continues with XRT and hydration  Picc Line 7 day dressing change completed  Excellent blood return noted  CBC and CMP obtained as ordered  Future appointments reviewed   Declined AVS

## 2019-07-09 ENCOUNTER — APPOINTMENT (OUTPATIENT)
Dept: RADIATION ONCOLOGY | Facility: CLINIC | Age: 69
End: 2019-07-09
Attending: RADIOLOGY
Payer: COMMERCIAL

## 2019-07-09 ENCOUNTER — NUTRITION (OUTPATIENT)
Dept: NUTRITION | Facility: CLINIC | Age: 69
End: 2019-07-09

## 2019-07-09 ENCOUNTER — HOSPITAL ENCOUNTER (OUTPATIENT)
Dept: INFUSION CENTER | Facility: CLINIC | Age: 69
Discharge: HOME | End: 2019-07-09
Payer: COMMERCIAL

## 2019-07-09 VITALS
SYSTOLIC BLOOD PRESSURE: 121 MMHG | DIASTOLIC BLOOD PRESSURE: 76 MMHG | HEART RATE: 91 BPM | RESPIRATION RATE: 18 BRPM | TEMPERATURE: 97.5 F

## 2019-07-09 DIAGNOSIS — Z71.3 NUTRITIONAL COUNSELING: Primary | ICD-10-CM

## 2019-07-09 DIAGNOSIS — E86.0 DEHYDRATION: Primary | ICD-10-CM

## 2019-07-09 PROCEDURE — 77386 HB NTSTY MODUL RAD TX DLVR CPLX: CPT | Performed by: RADIOLOGY

## 2019-07-09 PROCEDURE — 96360 HYDRATION IV INFUSION INIT: CPT

## 2019-07-09 RX ADMIN — SODIUM CHLORIDE 1000 ML: 0.9 INJECTION, SOLUTION INTRAVENOUS at 12:28

## 2019-07-09 NOTE — PROGRESS NOTES
Pt tolerated IV hydration  Pt without question or concern, declines AVS today   Aware of next appt for same on Thursday

## 2019-07-09 NOTE — PATIENT INSTRUCTIONS
Nutrition Rx & Recommendations:  · Diet: Soft and moist foods  · Nutrition Supplements: homemade shakes with protein powder, 7 Orgain nutrition shakes, or 5 Boost Plus if intakes of solid foods remain minimal    · Small, frequent meals/snacks may be easier to tolerate than 3 large daily meals  Aim for 5-6 small meals per day  · Include protein at all meals/snacks  · Include a variety of foods (as tolerated/allowed by diet)  · Stay hydrated by sipping fluids of choice/tolerance throughout the day  At least 64 oz non caffeinated beverages daily  · Liquid nutrition may be better tolerated than solids at times  · Alter food choices and eating patterns to accommodate changing needs  · Refer to Eating Hints book for other meal/snack ideas and symptom management  · Avoid spicy, acidic, sharp/hard/crunchy foods & carbonated beverages as needed  · Follow proper oral care; Try baking soda/salt water rinse recipe (mix 3/4 tsp salt + 1 tsp baking soda + 1 qt water; rinse with pain water after using) in Eating Hints book  Brush your teeth before/after meals & before bed  · For lack of taste: Practice good oral care  Blend fresh fruits into shakes, ice cream or yogurt  Eat frozen fruits: grapes, chopped cantaloupe, watermelon  Select fresh vegetables (may be more appealing than canned/frozen)  Add extra flavor to foods: experiment with spices, salt & sweeteners; marinate meats  · For appetite loss: try powdered or liquid nutrition supplements; eating by the clock every 2-3 hours; keep snacks nearby; add extra protein & calories to your diet; drink liquids throughout the day; choose liquids that add calories; eat a bedtime snack; eat soft, cool or frozen foods; eat a larger meal when you feel well & rested; only sip small amounts of liquids during meals       · For constipation: drink plenty of fluids (>64 oz/day); drink hot liquids; eat high-fiber foods as tolerated (whole grains, beans, peas, nuts, seeds, fruits, vegetables, etc); increase physical activity as tolerated  · For nausea/vomiting: try plain/bland foods; try lemon/lime flavors; eat 5-6 small meals/day (except when vomiting); do not skip meals/snacks; choose appealing foods; sip only small amounts of liquids during meals; stay hydrated; eat foods/drinks that are room temperature; eat dry toast/crackers  · For sore mouth/throat: choose foods that are easy to chew/swallow; cook foods until they are soft/tender; moisten & soften foods (gravy/sauce/broth/yogurt); cut food into small pieces; drink with a straw (as tolerated); eat with a very small spoon; eat cold or room temperature food; suck on ice chips; Avoid citrus, spicy foods, tomatoes/ketchup, salty foods, raw vegetables, sharp/crunchy/hard foods & alcohol  · For trouble swallowing: eat 5-6 small meals/day; choose foods that are easy to swallow; choose foods that are high in protein & calories; cook foods until they are soft/tender; cut food into small pieces; moisten & soften foods (gravy/sauce/broth/yogurt); sip drinks through a straw (as tolerated); avoid foods/drinks that can burn/scrape your throat (hot temperatures, spicy foods, acidic foods, sharp/hard/crunchy foods, alcohol); talk to your doctor about a Speech Therapy referral      · Weigh yourself regularly  If you notice weight loss, make an effort to increase your daily food/calorie intake  If you continue to notice loss after these efforts, reach out to your dietitian to establish a plan to stabilize weight  · Ways to add extra protein: peanut butter, cottage cheese, greek yogurt, tuna/chicken salad, eggs  Refer to page 52 of Eating Hints Book for more ideas  · Ways to add extra calories: add peanut butter, cheese, whole milk, avocado, cream soups  Refer to page 46 of Eating Hints Book for more ideas       Follow Up Plan: 7/16/19 during infusion    Recommend Referral to Other Providers: none at this time

## 2019-07-09 NOTE — PLAN OF CARE
Problem: Potential for Falls  Goal: Patient will remain free of falls  Description  INTERVENTIONS:  - Assess patient frequently for physical needs  -  Identify cognitive and physical deficits and behaviors that affect risk of falls    -  Herod fall precautions as indicated by assessment   - Educate patient/family on patient safety including physical limitations  - Instruct patient to call for assistance with activity based on assessment  - Modify environment to reduce risk of injury  - Consider OT/PT consult to assist with strengthening/mobility  Outcome: Progressing

## 2019-07-10 ENCOUNTER — APPOINTMENT (OUTPATIENT)
Dept: RADIATION ONCOLOGY | Facility: CLINIC | Age: 69
End: 2019-07-10
Attending: RADIOLOGY
Payer: COMMERCIAL

## 2019-07-10 PROCEDURE — 77386 HB NTSTY MODUL RAD TX DLVR CPLX: CPT | Performed by: RADIOLOGY

## 2019-07-11 ENCOUNTER — APPOINTMENT (OUTPATIENT)
Dept: RADIATION ONCOLOGY | Facility: CLINIC | Age: 69
End: 2019-07-11
Attending: RADIOLOGY
Payer: COMMERCIAL

## 2019-07-11 PROCEDURE — 77386 HB NTSTY MODUL RAD TX DLVR CPLX: CPT | Performed by: RADIOLOGY

## 2019-07-11 PROCEDURE — 77417 THER RADIOLOGY PORT IMAGE(S): CPT | Performed by: RADIOLOGY

## 2019-07-12 ENCOUNTER — APPOINTMENT (OUTPATIENT)
Dept: RADIATION ONCOLOGY | Facility: CLINIC | Age: 69
End: 2019-07-12
Attending: RADIOLOGY
Payer: COMMERCIAL

## 2019-07-12 PROCEDURE — 77386 HB NTSTY MODUL RAD TX DLVR CPLX: CPT | Performed by: RADIOLOGY

## 2019-07-15 ENCOUNTER — OFFICE VISIT (OUTPATIENT)
Dept: FAMILY MEDICINE CLINIC | Facility: CLINIC | Age: 69
End: 2019-07-15
Payer: COMMERCIAL

## 2019-07-15 ENCOUNTER — HOSPITAL ENCOUNTER (OUTPATIENT)
Dept: INFUSION CENTER | Facility: CLINIC | Age: 69
Discharge: HOME | End: 2019-07-15
Payer: COMMERCIAL

## 2019-07-15 ENCOUNTER — APPOINTMENT (OUTPATIENT)
Dept: RADIATION ONCOLOGY | Facility: CLINIC | Age: 69
End: 2019-07-15
Attending: RADIOLOGY
Payer: COMMERCIAL

## 2019-07-15 VITALS
BODY MASS INDEX: 20.49 KG/M2 | DIASTOLIC BLOOD PRESSURE: 68 MMHG | SYSTOLIC BLOOD PRESSURE: 104 MMHG | HEART RATE: 84 BPM | HEIGHT: 65 IN | WEIGHT: 123 LBS

## 2019-07-15 DIAGNOSIS — C01 CANCER OF BASE OF TONGUE (HCC): Primary | ICD-10-CM

## 2019-07-15 DIAGNOSIS — D63.8 ANEMIA IN OTHER CHRONIC DISEASES CLASSIFIED ELSEWHERE: ICD-10-CM

## 2019-07-15 DIAGNOSIS — E86.0 DEHYDRATION: Primary | ICD-10-CM

## 2019-07-15 DIAGNOSIS — C01 CANCER OF BASE OF TONGUE (HCC): ICD-10-CM

## 2019-07-15 DIAGNOSIS — E04.2 MULTIPLE THYROID NODULES: ICD-10-CM

## 2019-07-15 DIAGNOSIS — E86.0 DEHYDRATION: ICD-10-CM

## 2019-07-15 PROBLEM — D64.9 ANEMIA: Status: ACTIVE | Noted: 2019-07-15

## 2019-07-15 LAB
ALBUMIN SERPL BCP-MCNC: 3.6 G/DL (ref 3.5–5.7)
ALP SERPL-CCNC: 65 U/L (ref 46–116)
ALT SERPL W P-5'-P-CCNC: 14 U/L (ref 12–78)
ANION GAP SERPL CALCULATED.3IONS-SCNC: 10 MMOL/L (ref 4–13)
AST SERPL W P-5'-P-CCNC: 22 U/L (ref 5–45)
BASOPHILS # BLD AUTO: 0.02 THOUSANDS/ΜL (ref 0–0.1)
BASOPHILS NFR BLD AUTO: 0 % (ref 0–1)
BILIRUB SERPL-MCNC: 0.8 MG/DL (ref 0.2–1)
BUN SERPL-MCNC: 7 MG/DL (ref 5–25)
CALCIUM SERPL-MCNC: 9.5 MG/DL (ref 8.3–10.1)
CHLORIDE SERPL-SCNC: 99 MMOL/L (ref 98–108)
CO2 SERPL-SCNC: 28 MMOL/L (ref 21–32)
CREAT SERPL-MCNC: 0.8 MG/DL (ref 0.6–1.3)
EOSINOPHIL # BLD AUTO: 0.06 THOUSAND/ΜL (ref 0–0.61)
EOSINOPHIL NFR BLD AUTO: 1 % (ref 0–6)
ERYTHROCYTE [DISTWIDTH] IN BLOOD BY AUTOMATED COUNT: 12.2 % (ref 11.6–15.1)
GFR SERPL CREATININE-BSD FRML MDRD: 74 ML/MIN/1.73SQ M
GLUCOSE SERPL-MCNC: 94 MG/DL (ref 65–140)
HCT VFR BLD AUTO: 28.9 % (ref 34.8–46.1)
HGB BLD-MCNC: 10.1 G/DL (ref 11.5–15.4)
LYMPHOCYTES # BLD AUTO: 0.36 THOUSANDS/ΜL (ref 0.6–4.47)
LYMPHOCYTES NFR BLD AUTO: 8 % (ref 14–44)
MCH RBC QN AUTO: 32.7 PG (ref 26.8–34.3)
MCHC RBC AUTO-ENTMCNC: 34.9 G/DL (ref 31.4–37.4)
MCV RBC AUTO: 94 FL (ref 82–98)
MONOCYTES # BLD AUTO: 0.59 THOUSAND/ΜL (ref 0.17–1.22)
MONOCYTES NFR BLD AUTO: 13 % (ref 4–12)
NEUTROPHILS # BLD AUTO: 3.64 THOUSANDS/ΜL (ref 1.85–7.62)
NEUTS SEG NFR BLD AUTO: 78 % (ref 43–75)
PLATELET # BLD AUTO: 219 THOUSANDS/UL (ref 149–390)
PMV BLD AUTO: 8.3 FL (ref 8.9–12.7)
POTASSIUM SERPL-SCNC: 3.5 MMOL/L (ref 3.5–5.3)
PROT SERPL-MCNC: 6.4 G/DL (ref 6.4–8.2)
RBC # BLD AUTO: 3.09 MILLION/UL (ref 3.81–5.12)
SODIUM SERPL-SCNC: 137 MMOL/L (ref 136–145)
WBC # BLD AUTO: 4.67 THOUSAND/UL (ref 4.31–10.16)

## 2019-07-15 PROCEDURE — 80053 COMPREHEN METABOLIC PANEL: CPT

## 2019-07-15 PROCEDURE — 99214 OFFICE O/P EST MOD 30 MIN: CPT | Performed by: FAMILY MEDICINE

## 2019-07-15 PROCEDURE — 77386 HB NTSTY MODUL RAD TX DLVR CPLX: CPT | Performed by: RADIOLOGY

## 2019-07-15 PROCEDURE — 77336 RADIATION PHYSICS CONSULT: CPT | Performed by: RADIOLOGY

## 2019-07-15 PROCEDURE — 85025 COMPLETE CBC W/AUTO DIFF WBC: CPT

## 2019-07-15 RX ORDER — SODIUM CHLORIDE 9 MG/ML
20 INJECTION, SOLUTION INTRAVENOUS ONCE
Status: CANCELLED | OUTPATIENT
Start: 2019-07-22

## 2019-07-15 NOTE — ASSESSMENT & PLAN NOTE
Patient was anemic today, reviewed the prior testing from the beginning of July  That blood work showed that she was not anemic  Recommend that she speak with Hematology/Oncology about this

## 2019-07-15 NOTE — ASSESSMENT & PLAN NOTE
Continues with XRT and chemo  No changes for the moment  Directed by Radiation Oncology and Hematology Oncology  I did mention that she should probably consider having hand  for people entering her home

## 2019-07-15 NOTE — PROGRESS NOTES
Pt  Denies new symptoms or concerns today  Pt  States she does not feel dehydrated at this time  Labs obtained  7 day picc dressing changed without difficulty  Picc Line flushed well with excellent blood return  Future appointments reviewed  RN spoke to office Bailee Ortiz) related to releasing hydration order in error  Luci Guerrero stated she would discuss with Clay HUGHES  Pt  Will  Call office if hydration needed

## 2019-07-15 NOTE — PROGRESS NOTES
Assessment and Plan:    Problem List Items Addressed This Visit     Anemia     Patient was anemic today, reviewed the prior testing from the beginning of July  That blood work showed that she was not anemic  Recommend that she speak with Hematology/Oncology about this  Cancer of base of tongue ([facility] [address] ) - Primary     Continues with XRT and chemo  No changes for the moment  Directed by Radiation Oncology and Hematology Oncology  I did mention that she should probably consider having hand  for people entering her home  Dehydration     Patient likely does have some minor dehydration secondary to issues with chemo and radiation affecting her senses  She seems to be doing better using [place] instant breakfast   Would recommend that she continue this  Of note, she also has been using boost and Ensure, but needs to dilute those with on milk or water as she cannot tolerate them straight  Multiple thyroid nodules     Stable before  Nothing on CT previously  No changes for the moment  Diagnoses and all orders for this visit:    [facility] base [facility] )    Dehydration    Multiple thyroid nodules    Anemia in other chronic diseases classified elsewhere              Subjective:      Patient ID: Bobby Osorio is a 68 y o  female  CC:    Chief Complaint   Patient presents with    Follow-up     Cancer of base of tongue  mjs       HPI:    Week 6/7 of XRT, and chemo sched for next week  Chemo was changed per Heme onc due to hearing loss  She states she is feeling better about herself  Reviewed and confirmed patient did not want feeding tube  Eating is sparse per patient  Consistancy is the issue  [place] instant breakfast is OK  She noted smell returned yesterday, and she will be looking at diet in future  She states she feels less tired  Some pain in throat   She felt that there were lumps moving, and Heme/onc told her it was side effect of the XRT     She was concerned before about lumps in thyroid  The following portions of the patient's history were reviewed and updated as appropriate: allergies, current medications, past family history, past medical history, past social history, past surgical history and problem list       Review of Systems      Data to review:   CMP reviewed  It was normal   White count 4 67, hemoglobin 10 1, hematocrit 28 9, platelets 937       Objective:    Vitals:    07/15/19 1533   BP: 104/68   Pulse: 84   Weight: 55 8 kg (123 lb)   Height: 5' 5 39" (1 661 m)        Physical Exam

## 2019-07-15 NOTE — ASSESSMENT & PLAN NOTE
Patient likely does have some minor dehydration secondary to issues with chemo and radiation affecting her senses  She seems to be doing better using [place] instant breakfast   Would recommend that she continue this  Of note, she also has been using boost and Ensure, but needs to dilute those with on milk or water as she cannot tolerate them straight

## 2019-07-15 NOTE — PATIENT INSTRUCTIONS
Problem List Items Addressed This Visit     Anemia     Patient was anemic today, reviewed the prior testing from the beginning of July  That blood work showed that she was not anemic  Recommend that she speak with Hematology/Oncology about this  Cancer of base of tongue ([facility] [address] ) - Primary     Continues with XRT and chemo  No changes for the moment  Directed by Radiation Oncology and Hematology Oncology  I did mention that she should probably consider having hand  for people entering her home  Dehydration     Patient likely does have some minor dehydration secondary to issues with chemo and radiation affecting her senses  She seems to be doing better using [place] instant breakfast   Would recommend that she continue this  Of note, she also has been using boost and Ensure, but needs to dilute those with on milk or water as she cannot tolerate them straight  Multiple thyroid nodules     Stable before  Nothing on CT previously  No changes for the moment

## 2019-07-15 NOTE — PROGRESS NOTES
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, chemotherapy (cisplatin) started 6/10/19  Chemotherapy plan changed 6/28/19 from cisplatin to carboplatin and fluorouracil due to slight loss of hearing  RT to right base of tongue, tonsil, bilateral neck started 6/10/19  Oncology History    This is a 68-year-old female without significant past medical history  She stated that she had a history of tobacco use and quit many years ago  She also admitted the consuming alcohol around 0 6 oz per week  Her father had a history of prostate cancer  The patient complained about right-sided throat pain in abnormal feeling with movement of the tongue  She also had right-sided ear pain for about 6 months which was treated with 2 courses of antibiotics without any improvement  She eventually was seen by Dr Gayathri Figueroa her ENT doctor who performed extensive evaluation including endoscopic evaluation which revealed a mass in the base of the tongue on the right side  A biopsy from the base of the tongue mass came back 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  The HPV status was negative  The patient then had a PET-CT scan on the 14th May 2019 which showed:  IMPRESSION:  1   Large hypermetabolic mass in the right oral cavity, right tonsillar region and right tongue base, approaching the level of the vallecula compatible with known malignancy  Involvement of the right soft palate is not excluded    2   Multiple hypermetabolic right cervical nodes as above concerning for metastases  3   Asymmetric enlargement of the right thyroid with associated FDG uptake   Further evaluation with ultrasound recommended  4   No hypermetabolic metastases in the chest abdomen pelvis  5   Aneurysmal ascending thoracic aorta measuring 4 2 x 4 3 cm       The estimated clinical stage is T2 N2 M0  The patient was then sent to the Radiation Oncology team and was seen by Dr Emily Deal on the 20th of May and sent to us for the consideration of concurrent chemoradiation      The patient otherwise is doing relatively well, she denied significant weight loss                      Cancer of base of tongue ([facility] [address] )    5/3/2019 Biopsy     Right BOT biopsy  Biopsy + for SCC moderately differentiated,  Incompletely excised           6/10/2019 - 6/30/2019 Chemotherapy     CISplatin (PLATINOL) 174 mg, mannitol 12 5 g in sodium chloride 0 9 % 500 mL IVPB, 100 mg/m2 = 174 mg, Intravenous, Once, 1 of 3 cycles  Administration: 174 mg (6/10/2019)  potassium chloride 20 mEq, magnesium sulfate 1 g in sodium chloride 0 9 % 1,000 mL IVPB, , Intravenous, Once, 1 of 3 cycles  Administration:  (6/10/2019),  (6/10/2019)      7/1/2019 -  Chemotherapy     CARBOplatin (PARAPLATIN) 115 5 mg in sodium chloride 0 9 % 250 mL IVPB, 70 mg/m2 = 115 5 mg (100 % of original dose 70 mg/m2), Intravenous, Once, 1 of 2 cycles  Dose modification: 70 mg/m2 (original dose 70 mg/m2, Cycle 1)  Administration: 115 5 mg (7/1/2019), 115 5 mg (7/2/2019), 115 5 mg (7/3/2019), 115 5 mg (7/5/2019)       Past Medical History:   Diagnosis Date    Tongue cancer ([facility] [address] )     Squamous cell     Past Surgical History:   Procedure Laterality Date    APPENDECTOMY  1990    CATARACT EXTRACTION Left     HYSTERECTOMY  2012    total    IR PICC LINE  6/5/2019    OOPHORECTOMY Bilateral 2012       Review of Medications:   Vitamins, Supplements and Herbals: no    Current Outpatient Medications:     al mag oxide-diphenhydramine-lidocaine viscous (MAGIC MOUTHWASH) 1:1:1 suspension, Swish and swallow 10 mL every 4 (four) hours as needed for mouth pain or discomfort, Disp: 480 mL, Rfl: 4    clotrimazole (MYCELEX) 10 mg samantha, Take 1 tablet (10 mg total) by mouth 5 (five) times a day, Disp: 70 tablet, Rfl: 1    fluorouracil 3,960 mg in CADD infusion pump, Infuse 3,960 mg (600 mg/m2/day x 1 65 m2 (Treatment plan recorded BSA)) into a venous catheter over 96 hours for 4 days, Disp: 1 Device, Rfl: 0    Magnesium Cl-Calcium Carbonate (SLOW-MAG PO), Take by mouth as needed, Disp: , Rfl:     Multiple Vitamins-Minerals (PRESERVISION AREDS 2+MULTI VIT PO), Take by mouth, Disp: , Rfl:     OLANZapine (ZyPREXA) 10 mg tablet, Take 1 tablet (10 mg total) by mouth daily at bedtime Take for 3 days with chemotherapy/or as directed to prevent n/v, Disp: 12 tablet, Rfl: 3    omeprazole (PriLOSEC) 20 mg delayed release capsule, Take 1 capsule (20 mg total) by mouth daily, Disp: 30 capsule, Rfl: 3    Probiotic Product (PROBIOTIC DAILY PO), Take by mouth, Disp: , Rfl:     prochlorperazine (COMPAZINE) 10 mg tablet, Take 1 tablet (10 mg total) by mouth every 6 (six) hours as needed for nausea or vomiting, Disp: 60 tablet, Rfl: 0  No current facility-administered medications for this visit       Facility-Administered Medications Ordered in Other Visits:     sodium chloride 0 9 % bolus 1,000 mL, 1,000 mL, Intravenous, Once, Syd Herrera MD    Most Recent Lab Results:   Lab Results   Component Value Date    WBC 4 67 07/15/2019    IRON 55 05/28/2019    TIBC 302 05/28/2019    FERRITIN 86 05/28/2019    ALT 14 07/15/2019    AST 22 07/15/2019     04/24/2018    K 3 5 07/15/2019    K 3 6 07/08/2019    BUN 7 07/15/2019    BUN 9 07/08/2019    CREATININE 0 80 07/15/2019    CREATININE 0 80 07/08/2019    GLUCOSE 85 04/24/2018    CALCIUM 9 5 07/15/2019    FOLATE >20 0 (H) 05/28/2019    MG 2 1 07/01/2019       Anthropometric Measurements:   Height: 65"  Ht Readings from Last 1 Encounters: 07/15/19 5' 5 39" (1 661 m)     -Weight History:   Usual Weight: 135#  Ideal Body Weight: 125#  Wt Readings from Last 15 Encounters:   07/15/19 55 8 kg (123 lb)   07/05/19 56 kg (123 lb 7 oz)   07/03/19 58 7 kg (129 lb 6 6 oz)   07/02/19 58 7 kg (129 lb 6 6 oz)   07/01/19 58 7 kg (129 lb 6 6 oz)   06/28/19 59 4 kg (131 lb)   06/18/19 59 4 kg (131 lb)   06/17/19 59 4 kg (131 lb)   06/10/19 61 4 kg (135 lb 5 8 oz)   06/03/19 62 1 kg (136 lb 12 8 oz)   06/03/19 62 1 kg (137 lb)   05/28/19 61 7 kg (136 lb)   05/22/19 62 kg (136 lb 9 6 oz)   05/20/19 63 2 kg (139 lb 6 4 oz)   05/17/19 64 kg (141 lb)     Varian: (6/12/19) 134 6#, (6/13/19) 134 2#, (6/19/19) 133 4#, (6/20/19) 133 8#  (6/25/19) 133 2#, (6/27/19) 132 8#, (7/2/19) 131#, (7/5/19) 129 6#, (7/11/19) 124 2#, (7/16/18) 122 8#  Weight Changes: loss of 1 4# (1 1%) in 1 week (significant) and loss of 11 4# (8 5%) in 1 month (significant)      Estimated body mass index is 20 22 kg/m² as calculated from the following:    Height as of 7/15/19: 5' 5 39" (1 661 m)  Weight as of 7/15/19: 55 8 kg (123 lb)      Nutrition-Focused Physical Findings: severe body fat depletion (Triceps) - space between folds/fingers     Food/Nutrition-Related History & Client/Social History:    Current Nutrition Impact Symptoms:  [] Nausea- improved [] Thick Mucous  [] Neutropenia    [] Vomiting  [] Unintended Wt Loss  [] Malabsorption    [] Diarrhea  [] Unintended Wt Gain  [] Dumping Syndrome    [] Constipation - drinks prune juice every morning  [x] Odynophagia - causing difficulty talking [] Abdominal Pain    [x] Dysgeusia (Altered Taste) - can only taste sweet foods [] Xerostomia (Dry Mouth)  [] Difficulty Chewing    [] Dysosmia (Altered Smell)  [x] Fatigue  [x] Other: hiccups    [] Poor Appetite  [] Thrush  [] Other:    [] Oral Mucositis (Sore Mouth)  [] Esophagitis  [] Other:    [x] Dysphagia - hard foods like toast, feels like she has a lump in her throat  [] Early Satiety  [] No Problems Eating      Food Allergies: no  Food Intolerances: no    Current Diet: High Calorie/High Protein and Soft  Current Nutrition Intake: Unchanged from usual  Appetite: Poor  Nutrition Route: PO  Meal planning/preparation mainly done by: Self, Daughter and her 4 grandsons bring her food  Oral Care: Fluoride tray BID, Brushing after eating or at least TID with soft bristle toothbrush, uses Biotene  Activity level: She used to run often but now feels too tired to run, she has been walking at least 30 minutes most days  She started decorating her apartment to keep herself busy  Social Hx: Lives with her daughter and grandsons, but has her own private living space  Currently on medical leave from work, she works as a  for [employer]  Diet Recall:   Breakfast: [place] instant breakfast made with water (130kcal, 5g pro)  Lunch: blueberries 1/2c  Dinner: [place] instant breakfast made with water   Snack: [employer] nutrition shake     Supplements: [employer] 1-2x daily and [employer]gain Organic Nutrition (250 kcal, 16g pro) 1x daily  Beverages: water (50 oz), ginger ale (16oz x1), prune juice (8oz x1), herbal tea (8oz x1-3)    Estimated Nutrition Needs: (based on 55 8kg/ 122 8# actual wt)  Energy Needs: 1955-8944 kcal/day (35-40 kcal/kg)  Protein Needs:  grams/day (1 5-2 g/kg)  Fluid Needs: 3564-7693 mL/day (1 mL/kcal)    Discussion/Summary: [facility] was seen during infusion for her follow up nutrition assessment  She reports that she started drinking [place] Instant breakfast mixed with water  Encouraged pt to mix with whole milk or vanilla Orgain nutrition shake instead of water for higher calories and protein  24 hr recall reveals inadequate PO intakes, encouraged pt to increase nutrition supplement intake, at least 5 Boost plus or 7 Orgain per day if PO intakes of solid foods remain inadequate   Reinforced the importance of high kcal/ high protein diet for weight maintenance through the treatment process  Lee Ann Liu reports that she feels like she has a lump in her throat which makes eating difficult  Reinforced soft/easy to swallow diet and liquid nutrition  Provided MNT for taste changes, poor appetite, and difficulty swallowing  Discussed/Reviewed:   · the importance of weight maintenance during CA tx  · indications & use of oral nutrition supplements : at least 5 Boost Plus/[place] instant breakfast, 7 Orgain nutrition supplements   · how to modify foods for anticipated nutrition impact symptoms pt may experience during CA tx  · high protein foods to include at all meals and snacks peanut butter, cottage cheese, greek yogurt, tuna/chicken salad, eggs  · ways to increase overall calorie intake : add peanut butter, cheese, whole milk, avocado, cream soups  · encouraged eating every 2-3 hours (5-6 small meals/day)  · maintaining proper oral care : continue brushing at least TID, using Biotene  · how to modify foods & eating for nutrition impact symptoms : soft, moist, easy to chew/swallow foods, liquid nutrition  · adequate hydation & tips to increase overall fluid intake : nutrition supplements, homemade shakes, calorie containing beverages   · MNT for: taste changes, difficulty swallowing, poor appetite  · individualized calorie, protein and fluid daily goals      All questions and concerns addressed during todays visit  Lee Ann Liu has RD contact information  Nutrition Diagnosis:  · Inadequate Energy Intake related to physiological causes, disease state and treatment related issues as evidenced by food recall, wt loss and discussion with pt and/or family  · Increased Nutrient Needs (kcal & pro) related to increased demand for nutrients and disease state as evidenced by cancer dx and pt undergoing tx for cancer    · Patient has clinical indicators (or ASPEN criteria) consistent with severe protein-calorie malnutrition in the context of Chronic Illness as evidenced by >5% wt loss in 1 month, </=75% energy intake vs  Estimated needs for >/=1 month and severe body fat depletion (Triceps) - space between folds/fingers  Intervention & Recommendations:  Topics addressed: Nutrition education, Balance/Variety, Meals & Snacks, Meal planning, Choosing high protein meals/snacks, Meal pattern: eating small/frequent meals (every 2-3 hours), Nutrition Symptom Management, Adequate Hydration, Medical Food Supplements, Nutrition-Related Medication Management and Weight Management    Barriers: None  Readiness to change: action  Comprehension: verbalizes understanding  Expected Compliance: good    Materials Provided: Orgain Organic Nutrition Shakes, Orgain coupons    Monitoring & Evaluation:  Dietitian to Monitor: Food and Nutrition Intake, Nutrtion Impact Symptoms, Body Weight and Biochemical Data     Goals:  · weight stabilization  · adequate nutrition related symptom management  · pt to meet >/=75% estimated nutrition needs daily  · 5 Boost Plus or 7 Orgain nutrition supplements     · Progress Towards Goals: Progressing    Nutrition Rx & Recommendations:  · Diet: Soft and moist foods  · Nutrition Supplements: homemade shakes with protein powder, 7 [employer]gain nutrition shakes, or 5 Boost Plus if intakes of solid foods remain minimal    · Small, frequent meals/snacks may be easier to tolerate than 3 large daily meals  Aim for 5-6 small meals per day  · Include protein at all meals/snacks  · Include a variety of foods (as tolerated/allowed by diet)  · Stay hydrated by sipping fluids of choice/tolerance throughout the day  At least 64 oz non caffeinated beverages daily  · Liquid nutrition may be better tolerated than solids at times  · Alter food choices and eating patterns to accommodate changing needs  · Refer to Eating Hints book for other meal/snack ideas and symptom management  · Avoid spicy, acidic, sharp/hard/crunchy foods & carbonated beverages as needed    · Follow proper oral care; Try baking soda/salt water rinse recipe (mix 3/4 tsp salt + 1 tsp baking soda + 1 qt water; rinse with pain water after using) in Eating Hints book  Brush your teeth before/after meals & before bed  · For lack of taste: Practice good oral care  Blend fresh fruits into shakes, ice cream or yogurt  Eat frozen fruits: grapes, chopped cantaloupe, watermelon  Select fresh vegetables (may be more appealing than canned/frozen)  Add extra flavor to foods: experiment with spices, salt & sweeteners; marinate meats  · For appetite loss: try powdered or liquid nutrition supplements; eating by the clock every 2-3 hours; keep snacks nearby; add extra protein & calories to your diet; drink liquids throughout the day; choose liquids that add calories; eat a bedtime snack; eat soft, cool or frozen foods; eat a larger meal when you feel well & rested; only sip small amounts of liquids during meals  · For sore mouth/throat: choose foods that are easy to chew/swallow; cook foods until they are soft/tender; moisten & soften foods (gravy/sauce/broth/yogurt); cut food into small pieces; drink with a straw (as tolerated); eat with a very small spoon; eat cold or room temperature food; suck on ice chips; Avoid citrus, spicy foods, tomatoes/ketchup, salty foods, raw vegetables, sharp/crunchy/hard foods & alcohol  · For trouble swallowing: eat 5-6 small meals/day; choose foods that are easy to swallow; choose foods that are high in protein & calories; cook foods until they are soft/tender; cut food into small pieces; moisten & soften foods (gravy/sauce/broth/yogurt); sip drinks through a straw (as tolerated); avoid foods/drinks that can burn/scrape your throat (hot temperatures, spicy foods, acidic foods, sharp/hard/crunchy foods, alcohol); talk to your doctor about a Speech Therapy referral      · Weigh yourself regularly  If you notice weight loss, make an effort to increase your daily food/calorie intake   If you continue to notice loss after these efforts, reach out to your dietitian to establish a plan to stabilize weight  · Ways to add extra protein: peanut butter, cottage cheese, greek yogurt, tuna/chicken salad, eggs  Refer to page 52 of Eating Hints Book for more ideas  · Ways to add extra calories: add peanut butter, cheese, whole milk, avocado, cream soups  Refer to page 46 of Eating Hints Book for more ideas       Follow Up Plan: 7/23/19 during infusion   Recommend Referral to Other Providers: none at this time

## 2019-07-16 ENCOUNTER — HOSPITAL ENCOUNTER (OUTPATIENT)
Dept: INFUSION CENTER | Facility: CLINIC | Age: 69
Discharge: HOME | End: 2019-07-16
Payer: COMMERCIAL

## 2019-07-16 ENCOUNTER — NUTRITION (OUTPATIENT)
Dept: NUTRITION | Facility: CLINIC | Age: 69
End: 2019-07-16

## 2019-07-16 ENCOUNTER — APPOINTMENT (OUTPATIENT)
Dept: RADIATION ONCOLOGY | Facility: CLINIC | Age: 69
End: 2019-07-16
Attending: RADIOLOGY
Payer: COMMERCIAL

## 2019-07-16 VITALS
DIASTOLIC BLOOD PRESSURE: 70 MMHG | HEART RATE: 72 BPM | TEMPERATURE: 98.3 F | SYSTOLIC BLOOD PRESSURE: 115 MMHG | RESPIRATION RATE: 16 BRPM

## 2019-07-16 DIAGNOSIS — C01 CANCER OF BASE OF TONGUE (HCC): ICD-10-CM

## 2019-07-16 DIAGNOSIS — E86.0 DEHYDRATION: Primary | ICD-10-CM

## 2019-07-16 DIAGNOSIS — Z71.3 NUTRITIONAL COUNSELING: Primary | ICD-10-CM

## 2019-07-16 PROCEDURE — 77386 HB NTSTY MODUL RAD TX DLVR CPLX: CPT | Performed by: RADIOLOGY

## 2019-07-16 PROCEDURE — 96360 HYDRATION IV INFUSION INIT: CPT

## 2019-07-16 RX ADMIN — SODIUM CHLORIDE 1000 ML: 0.9 INJECTION, SOLUTION INTRAVENOUS at 11:45

## 2019-07-16 NOTE — PATIENT INSTRUCTIONS
Nutrition Rx & Recommendations:  · Diet: Soft and moist foods  · Nutrition Supplements: homemade shakes with protein powder, 7 Orgain nutrition shakes, or 5 Boost Plus if intakes of solid foods remain minimal    · Small, frequent meals/snacks may be easier to tolerate than 3 large daily meals  Aim for 5-6 small meals per day  · Include protein at all meals/snacks  · Include a variety of foods (as tolerated/allowed by diet)  · Stay hydrated by sipping fluids of choice/tolerance throughout the day  At least 64 oz non caffeinated beverages daily  · Liquid nutrition may be better tolerated than solids at times  · Alter food choices and eating patterns to accommodate changing needs  · Refer to Eating Hints book for other meal/snack ideas and symptom management  · Avoid spicy, acidic, sharp/hard/crunchy foods & carbonated beverages as needed  · Follow proper oral care; Try baking soda/salt water rinse recipe (mix 3/4 tsp salt + 1 tsp baking soda + 1 qt water; rinse with pain water after using) in Eating Hints book  Brush your teeth before/after meals & before bed  · For lack of taste: Practice good oral care  Blend fresh fruits into shakes, ice cream or yogurt  Eat frozen fruits: grapes, chopped cantaloupe, watermelon  Select fresh vegetables (may be more appealing than canned/frozen)  Add extra flavor to foods: experiment with spices, salt & sweeteners; marinate meats  · For appetite loss: try powdered or liquid nutrition supplements; eating by the clock every 2-3 hours; keep snacks nearby; add extra protein & calories to your diet; drink liquids throughout the day; choose liquids that add calories; eat a bedtime snack; eat soft, cool or frozen foods; eat a larger meal when you feel well & rested; only sip small amounts of liquids during meals       · For sore mouth/throat: choose foods that are easy to chew/swallow; cook foods until they are soft/tender; moisten & soften foods (gravy/sauce/broth/yogurt); cut food into small pieces; drink with a straw (as tolerated); eat with a very small spoon; eat cold or room temperature food; suck on ice chips; Avoid citrus, spicy foods, tomatoes/ketchup, salty foods, raw vegetables, sharp/crunchy/hard foods & alcohol  · For trouble swallowing: eat 5-6 small meals/day; choose foods that are easy to swallow; choose foods that are high in protein & calories; cook foods until they are soft/tender; cut food into small pieces; moisten & soften foods (gravy/sauce/broth/yogurt); sip drinks through a straw (as tolerated); avoid foods/drinks that can burn/scrape your throat (hot temperatures, spicy foods, acidic foods, sharp/hard/crunchy foods, alcohol); talk to your doctor about a Speech Therapy referral      · Weigh yourself regularly  If you notice weight loss, make an effort to increase your daily food/calorie intake  If you continue to notice loss after these efforts, reach out to your dietitian to establish a plan to stabilize weight  · Ways to add extra protein: peanut butter, cottage cheese, greek yogurt, tuna/chicken salad, eggs  Refer to page 52 of Eating Hints Book for more ideas  · Ways to add extra calories: add peanut butter, cheese, whole milk, avocado, cream soups  Refer to page 46 of Eating Hints Book for more ideas       Follow Up Plan: 7/23/19 during infusion   Recommend Referral to Other Providers: none at this time

## 2019-07-16 NOTE — PROGRESS NOTES
Pt  Denies new symptoms or concern today  Pt  Continues to have difficulty eating and drinking, but states she is tolerating carnation instant breakfast   Hydration ordered today; 1000 ml of NSS infusing

## 2019-07-17 ENCOUNTER — APPOINTMENT (OUTPATIENT)
Dept: RADIATION ONCOLOGY | Facility: CLINIC | Age: 69
End: 2019-07-17
Attending: RADIOLOGY
Payer: COMMERCIAL

## 2019-07-17 PROCEDURE — 77386 HB NTSTY MODUL RAD TX DLVR CPLX: CPT | Performed by: RADIOLOGY

## 2019-07-18 ENCOUNTER — DOCUMENTATION (OUTPATIENT)
Dept: HEMATOLOGY ONCOLOGY | Facility: CLINIC | Age: 69
End: 2019-07-18

## 2019-07-18 ENCOUNTER — APPOINTMENT (OUTPATIENT)
Dept: RADIATION ONCOLOGY | Facility: CLINIC | Age: 69
End: 2019-07-18
Attending: RADIOLOGY
Payer: COMMERCIAL

## 2019-07-18 ENCOUNTER — HOSPITAL ENCOUNTER (OUTPATIENT)
Dept: INFUSION CENTER | Facility: CLINIC | Age: 69
Discharge: HOME | End: 2019-07-18
Payer: COMMERCIAL

## 2019-07-18 VITALS
DIASTOLIC BLOOD PRESSURE: 76 MMHG | SYSTOLIC BLOOD PRESSURE: 127 MMHG | RESPIRATION RATE: 18 BRPM | TEMPERATURE: 99.2 F | HEART RATE: 71 BPM

## 2019-07-18 DIAGNOSIS — C01 CANCER OF BASE OF TONGUE (HCC): Primary | ICD-10-CM

## 2019-07-18 DIAGNOSIS — E86.0 DEHYDRATION: ICD-10-CM

## 2019-07-18 PROCEDURE — 77417 THER RADIOLOGY PORT IMAGE(S): CPT | Performed by: RADIOLOGY

## 2019-07-18 PROCEDURE — 96360 HYDRATION IV INFUSION INIT: CPT

## 2019-07-18 PROCEDURE — 77386 HB NTSTY MODUL RAD TX DLVR CPLX: CPT | Performed by: RADIOLOGY

## 2019-07-18 RX ORDER — PHENOL 1.4 %
1 AEROSOL, SPRAY (ML) MUCOUS MEMBRANE
COMMUNITY
End: 2020-06-11

## 2019-07-18 RX ADMIN — SODIUM CHLORIDE 1000 ML: 0.9 INJECTION, SOLUTION INTRAVENOUS at 12:49

## 2019-07-18 NOTE — PROGRESS NOTES
Patients CHG dressing appeared to be cloudy and bulging  Upon removing the dressing, drainage and redness was noted  A photo was sent via tiger text to Dr Martina Daniel  The dressing was replaced and the patient was encouraged to call Dr Martina Daniel if it starts to itch, drain or she has a fever  [facility] NP responded after the patient had left  I relayed the information to Saqib

## 2019-07-18 NOTE — PLAN OF CARE
Problem: Potential for Falls  Goal: Patient will remain free of falls  Description  INTERVENTIONS:  - Assess patient frequently for physical needs  -  Identify cognitive and physical deficits and behaviors that affect risk of falls    -  [place] fall precautions as indicated by assessment   - Educate patient/family on patient safety including physical limitations  - Instruct patient to call for assistance with activity based on assessment  - Modify environment to reduce risk of injury  - Consider OT/PT consult to assist with strengthening/mobility  Outcome: Progressing

## 2019-07-19 ENCOUNTER — DOCUMENTATION (OUTPATIENT)
Dept: HEMATOLOGY ONCOLOGY | Facility: CLINIC | Age: 69
End: 2019-07-19

## 2019-07-19 ENCOUNTER — OFFICE VISIT (OUTPATIENT)
Dept: HEMATOLOGY ONCOLOGY | Facility: CLINIC | Age: 69
End: 2019-07-19
Payer: COMMERCIAL

## 2019-07-19 ENCOUNTER — APPOINTMENT (OUTPATIENT)
Dept: RADIATION ONCOLOGY | Facility: CLINIC | Age: 69
End: 2019-07-19
Attending: RADIOLOGY
Payer: COMMERCIAL

## 2019-07-19 VITALS
TEMPERATURE: 98.8 F | SYSTOLIC BLOOD PRESSURE: 110 MMHG | WEIGHT: 124.8 LBS | DIASTOLIC BLOOD PRESSURE: 60 MMHG | BODY MASS INDEX: 20.79 KG/M2 | HEIGHT: 65 IN | RESPIRATION RATE: 16 BRPM | OXYGEN SATURATION: 98 % | HEART RATE: 81 BPM

## 2019-07-19 DIAGNOSIS — C01 CANCER OF BASE OF TONGUE (HCC): Primary | ICD-10-CM

## 2019-07-19 DIAGNOSIS — K11.7 INCREASED OROPHARYNGEAL SECRETIONS: ICD-10-CM

## 2019-07-19 PROCEDURE — 77386 HB NTSTY MODUL RAD TX DLVR CPLX: CPT | Performed by: RADIOLOGY

## 2019-07-19 PROCEDURE — 99214 OFFICE O/P EST MOD 30 MIN: CPT | Performed by: NURSE PRACTITIONER

## 2019-07-19 RX ORDER — SODIUM CHLORIDE 9 MG/ML
20 INJECTION, SOLUTION INTRAVENOUS ONCE
Status: CANCELLED | OUTPATIENT
Start: 2019-07-30

## 2019-07-19 RX ORDER — SCOLOPAMINE TRANSDERMAL SYSTEM 1 MG/1
1 PATCH, EXTENDED RELEASE TRANSDERMAL
Qty: 10 PATCH | Refills: 1 | Status: SHIPPED | OUTPATIENT
Start: 2019-07-19 | End: 2019-08-09 | Stop reason: ALTCHOICE

## 2019-07-19 NOTE — PROGRESS NOTES
Met with patient in [facility] radiation oncology  She is now almost done with her RT  She is concerned about returning to work in time to keep her job  I encourage her to reach out to HR person and discuss this with them  She will be seeing Dr Trupti Crawford later today should she need any further paperwork  She has red, sore skin on her neck, pain in her throat, but overall is doing ok  She has my contact information should she need help with anything

## 2019-07-19 NOTE — PROGRESS NOTES
Hematology/Oncology Outpatient Follow-up  Ronit Figueroa 68 y o  female 1950 [id number]    Date:  7/19/2019      Assessment and Plan:  1  Cancer of base of tongue [facility])  Patient will get her final cycle of carboplatin and 5 FU concurrently with her radiation therapy on Monday 07/22/2019 pending her repeat labs Monday morning  She states that her last radiation therapy treatment will be 07/29/2019  She was encouraged to continue to try to eat/drink as much as possible p o  and keep drinking the [place] instant breakfast multiple times per day if that is the only thing that she can tolerate  We will continue the biweekly additional IV hydration, she was told that we could do this daily Monday through Friday if she is unable to get adequate p o  Hydration  She should continue to take her p r n  Antiemetics as needed for nausea  The patient will follow up again in about 2 weeks with repeat labs prior  She is aware to contact us if needed in the interim  There is no obvious hint of infection, erythema, edema or warmth to the left PICC antecubital site based off of today's examination, the dressing is intact  There is what appears to be some whitish gray eschar tissue surrounding the insertion site and a small area of skin irritation/erythema under the dressing to the right of the insertion site  Patient is without fever and denies pain or tenderness to the area  She was instructed to monitor the area and her temperature closely over the weekend; if she notices any purulent draining, redness or fever over 100° F she should report immediately to the emergency department for further evaluation  Otherwise will re-evaluate the site on Monday prior to her treatment and make a decision if she will need to have a new PICC line or not  - CBC and differential; Future  - Comprehensive metabolic panel; Future  - Magnesium;  Future  - Infusion Calculated Appointment Request; Future  - CBC and differential; Future  - Comprehensive metabolic panel; Future  - Magnesium; Future  - Infusion Calculated Appointment Request; Future    2  Increased oropharyngeal secretions  Patient is complaining about the thick ropy secretion that gets stuck/pool in her throat  She has been using Mucinex without any relief  We will try a scopolamine patch to see if this will help decrease the secretions  She was instructed on use script sent to her local pharmacy  - scopolamine (TRANSDERM-SCOP) 1 5 mg/3 days TD 72 hr patch; Place 1 patch on the skin every third day  Dispense: 10 patch; Refill: 1    HPI:  The patient presents today for a follow-up visit  She is scheduled to get her final cycle of her carboplatin +5 FU concurrently with her radiation therapy on Monday 07/22/2019  The patient states that she is tolerating the carboplatin and 5 FU better than the cisplatin  She is reporting a moderate amount of fatigue, poor p o  Intake, taste changes, mild nausea which is relieved with her p r n  antiemetics, and thick ropy secretions that tend to pool in her throat  She is taking Mucinex for the secretions and that it does not seem to be helping  She is mainly only tolerating [place] instant breakfast at this point and some fluids p o  She tries to eat and drink other things and they tend to come right back up  She continues to get additional IV hydration twice weekly in the infusion center  She has lost approximately 6 lb since her last visit 3 weeks ago  The patient denies any fevers, rigors, infection or diarrhea  There was apparently some concern over her left antecubital PICC line yesterday with reported redness and yellow drainage with her dressing change  Her most recent laboratory studies from Monday 07/15/2019 showed normal WBC 4 67, ANC 3 64, H&H 10 1/28 9 and platelet 283  CMP and magnesium were normal   She is scheduled to get some repeat blood work Monday morning prior to starting her treatment      Oncology History    This is a 68-year-old female without significant past medical history  She stated that she had a history of tobacco use and quit many years ago  She also admitted the consuming alcohol around 0 6 oz per week  Her father had a history of prostate cancer  The patient complained about right-sided throat pain in abnormal feeling with movement of the tongue  She also had right-sided ear pain for about 6 months which was treated with 2 courses of antibiotics without any improvement  She eventually was seen by Dr Yonathan Canales her ENT doctor who performed extensive evaluation including endoscopic evaluation which revealed a mass in the base of the tongue on the right side  A biopsy from the base of the tongue mass came back 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  The HPV status was negative  The patient then had a PET-CT scan on the 14th May 2019 which showed:  IMPRESSION:  1   Large hypermetabolic mass in the right oral cavity, right tonsillar region and right tongue base, approaching the level of the vallecula compatible with known malignancy  Involvement of the right soft palate is not excluded  2   Multiple hypermetabolic right cervical nodes as above concerning for metastases  3   Asymmetric enlargement of the right thyroid with associated FDG uptake   Further evaluation with ultrasound recommended  4   No hypermetabolic metastases in the chest abdomen pelvis  5   Aneurysmal ascending thoracic aorta measuring 4 2 x 4 3 cm       The estimated clinical stage is T2 N2 M0  The patient was then sent to the Radiation Oncology team and was seen by Dr Arnol Burrell on the 20th of May and sent to us for the consideration of concurrent chemoradiation      The patient otherwise is doing relatively well, she denied significant weight loss                      Cancer of base of tongue ([facility] [address] )    5/3/2019 Biopsy     Right BOT biopsy   Biopsy + for SCC moderately differentiated,  Incompletely excised  6/10/2019 - 6/30/2019 Chemotherapy     CISplatin (PLATINOL) 174 mg, mannitol 12 5 g in sodium chloride 0 9 % 500 mL IVPB, 100 mg/m2 = 174 mg, Intravenous, Once, 1 of 3 cycles  Administration: 174 mg (6/10/2019)  potassium chloride 20 mEq, magnesium sulfate 1 g in sodium chloride 0 9 % 1,000 mL IVPB, , Intravenous, Once, 1 of 3 cycles  Administration:  (6/10/2019),  (6/10/2019)  (1 cycle only d/c'd due to significant hearing loss after 1 treatment)        7/1/2019 -  Chemotherapy     CARBOplatin (PARAPLATIN) 115 5 mg in sodium chloride 0 9 % 250 mL IVPB, 70 mg/m2 = 115 5 mg (100 % of original dose 70 mg/m2), Intravenous, Once, 2 of 2 cycles  Dose modification: 70 mg/m2 (original dose 70 mg/m2, Cycle 1)  Administration: 115 5 mg (7/1/2019), 115 5 mg (7/2/2019), 115 5 mg (7/3/2019), 115 5 mg (7/5/2019)         Interval history:    ROS: Review of Systems   Constitutional: Positive for unexpected weight change  Negative for activity change, appetite change, chills, fatigue and fever  Pain in throat/neck rated 5/10   HENT: Positive for sore throat and trouble swallowing  Negative for mouth sores and nosebleeds  Eyes: Negative  Respiratory: Positive for cough (mild)  Negative for chest tightness and shortness of breath  Cardiovascular: Negative for chest pain, palpitations and leg swelling  Gastrointestinal: Positive for constipation and nausea  Negative for abdominal distention, abdominal pain, blood in stool, diarrhea and vomiting  Genitourinary: Negative for difficulty urinating, dysuria, frequency, hematuria and urgency  Musculoskeletal: Negative for arthralgias, back pain, gait problem, joint swelling and myalgias  Skin: Positive for rash and wound (radiation skin changes to the neck)  Negative for color change and pallor  Neurological: Negative for dizziness, weakness, light-headedness, numbness and headaches  Hematological: Negative for adenopathy   Does not bruise/bleed easily  Psychiatric/Behavioral: Positive for dysphoric mood and sleep disturbance (improved with p r n  Melatonin)  The patient is nervous/anxious  Past Medical History:   Diagnosis Date    Tongue cancer ([address] )     Squamous cell       Past Surgical History:   Procedure Laterality Date    APPENDECTOMY      CATARACT EXTRACTION Left     HYSTERECTOMY  2012    total    IR PICC LINE  2019    OOPHORECTOMY Bilateral 2012       Social History     Socioeconomic History    Marital status:      Spouse name: None    Number of children: None    Years of education: None    Highest education level: None   Occupational History    None   Social Needs    Financial resource strain: None    Food insecurity:     Worry: None     Inability: None    Transportation needs:     Medical: None     Non-medical: None   Tobacco Use    Smoking status: Former Smoker     Packs/day: 0 50     Years: 5 00     Pack years: 2 50     Types: Cigarettes     Last attempt to quit: 1978     Years since quittin 5    Smokeless tobacco: Never Used    Tobacco comment: no passive smoke exposure   Substance and Sexual Activity    Alcohol use:  Yes     Alcohol/week: 1 0 standard drinks     Types: 1 Glasses of wine per week     Frequency: 2-4 times a month     Drinks per session: 1 or 2     Binge frequency: Never     Comment: occasional    Drug use: Never    Sexual activity: None   Lifestyle    Physical activity:     Days per week: None     Minutes per session: None    Stress: None   Relationships    Social connections:     Talks on phone: None     Gets together: None     Attends Bahai service: None     Active member of club or organization: None     Attends meetings of clubs or organizations: None     Relationship status: None    Intimate partner violence:     Fear of current or ex partner: None     Emotionally abused: None     Physically abused: None     Forced sexual activity: None   Other Topics Concern    None   Social History Narrative    None       Family History   Problem Relation Age of Onset    Rheum arthritis Sister     Hypothyroidism Sister    Garrett Hero Hashimoto's thyroiditis Family     Prostate cancer Father     Parkinsonism Mother         Cardiac abnormality [facility])       Allergies   Allergen Reactions    Codeine Anaphylaxis         Current Outpatient Medications:     al mag oxide-diphenhydramine-lidocaine viscous (MAGIC MOUTHWASH) 1:1:1 suspension, Swish and swallow 10 mL every 4 (four) hours as needed for mouth pain or discomfort, Disp: 480 mL, Rfl: 4    clotrimazole (MYCELEX) 10 mg samantha, Take 1 tablet (10 mg total) by mouth 5 (five) times a day, Disp: 70 tablet, Rfl: 1    fluorouracil 3,960 mg in CADD infusion pump, Infuse 3,960 mg (600 mg/m2/day x 1 65 m2 (Treatment plan recorded BSA)) into a venous catheter over 96 hours for 4 days, Disp: 1 Device, Rfl: 0    Magnesium Cl-Calcium Carbonate (SLOW-MAG PO), Take by mouth as needed, Disp: , Rfl:     Melatonin 10 MG TABS, Take 1 tablet by mouth daily at bedtime as needed, Disp: , Rfl:     Multiple Vitamins-Minerals (PRESERVISION AREDS 2+MULTI VIT PO), Take by mouth, Disp: , Rfl:     OLANZapine (ZyPREXA) 10 mg tablet, Take 1 tablet (10 mg total) by mouth daily at bedtime Take for 3 days with chemotherapy/or as directed to prevent n/v, Disp: 12 tablet, Rfl: 3    omeprazole (PriLOSEC) 20 mg delayed release capsule, Take 1 capsule (20 mg total) by mouth daily, Disp: 30 capsule, Rfl: 3    Probiotic Product (PROBIOTIC DAILY PO), Take by mouth, Disp: , Rfl:     prochlorperazine (COMPAZINE) 10 mg tablet, Take 1 tablet (10 mg total) by mouth every 6 (six) hours as needed for nausea or vomiting, Disp: 60 tablet, Rfl: 0    scopolamine (TRANSDERM-SCOP) 1 5 mg/3 days TD 72 hr patch, Place 1 patch on the skin every third day, Disp: 10 patch, Rfl: 1      Physical Exam:  /60 (BP Location: Right arm, Cuff Size: Adult)   Pulse 81   Temp 98 8 °F (37 1 °C) (Tympanic)   Resp 16    5' 5 39" (1 661 m)   Wt 56 6 kg (124 lb 12 8 oz)   SpO2 98%   BMI 20 52 kg/m²     Physical Exam   Constitutional: She is oriented to person, place, and time  She appears well-developed and well-nourished  No distress  HENT:   Head: Normocephalic and atraumatic  Mouth/Throat: Oral lesions (mass to the base of the right tongue significantly decreased in size) present  Posterior oropharyngeal erythema present  No oropharyngeal exudate  Eyes: Pupils are equal, round, and reactive to light  Conjunctivae are normal  No scleral icterus  Neck: Normal range of motion  Neck supple  No thyromegaly present  Cardiovascular: Normal rate, regular rhythm, normal heart sounds and intact distal pulses  No murmur heard  Pulmonary/Chest: Effort normal and breath sounds normal  No respiratory distress  Abdominal: Soft  Bowel sounds are normal  She exhibits no distension  There is no hepatosplenomegaly  There is no tenderness  Musculoskeletal: Normal range of motion  She exhibits no edema  Lymphadenopathy:     She has no cervical adenopathy  She has no axillary adenopathy  Neurological: She is alert and oriented to person, place, and time  Skin: Skin is warm and dry  No rash noted  She is not diaphoretic  There is erythema (to the entire neck, some sloghing yellow eschar tissue noted to the anterior and right neck)  No pallor  Psychiatric: She has a normal mood and affect  Her behavior is normal  Judgment and thought content normal    Vitals reviewed          Labs:  Lab Results   Component Value Date    WBC 4 67 07/15/2019    HGB 10 1 (L) 07/15/2019    HCT 28 9 (L) 07/15/2019    MCV 94 07/15/2019     07/15/2019     Lab Results   Component Value Date     04/24/2018    K 3 5 07/15/2019    CL 99 07/15/2019    CO2 28 07/15/2019    ANIONGAP 11 04/24/2018    BUN 7 07/15/2019    CREATININE 0 80 07/15/2019    GLUCOSE 85 04/24/2018    GLUF 99 05/28/2019    CALCIUM 9 5 07/15/2019    AST 22 07/15/2019    ALT 14 07/15/2019    ALKPHOS 65 07/15/2019    EGFR 74 07/15/2019         Patient voiced understanding and agreement in the above discussion  Aware to contact our office with questions/symptoms in the interim

## 2019-07-22 ENCOUNTER — TELEPHONE (OUTPATIENT)
Dept: HEMATOLOGY ONCOLOGY | Facility: CLINIC | Age: 69
End: 2019-07-22

## 2019-07-22 ENCOUNTER — HOSPITAL ENCOUNTER (OUTPATIENT)
Dept: INFUSION CENTER | Facility: CLINIC | Age: 69
Discharge: HOME | End: 2019-07-22
Payer: COMMERCIAL

## 2019-07-22 ENCOUNTER — APPOINTMENT (OUTPATIENT)
Dept: RADIATION ONCOLOGY | Facility: CLINIC | Age: 69
End: 2019-07-22
Attending: RADIOLOGY
Payer: COMMERCIAL

## 2019-07-22 VITALS
DIASTOLIC BLOOD PRESSURE: 71 MMHG | TEMPERATURE: 98.3 F | SYSTOLIC BLOOD PRESSURE: 112 MMHG | RESPIRATION RATE: 16 BRPM | WEIGHT: 120.04 LBS | HEART RATE: 74 BPM | BODY MASS INDEX: 19.74 KG/M2

## 2019-07-22 DIAGNOSIS — C01 CANCER OF BASE OF TONGUE (HCC): Primary | ICD-10-CM

## 2019-07-22 DIAGNOSIS — B99.9 LOCALIZED INFECTION: Primary | ICD-10-CM

## 2019-07-22 LAB
ALBUMIN SERPL BCP-MCNC: 3.4 G/DL (ref 3.5–5.7)
ALP SERPL-CCNC: 63 U/L (ref 46–116)
ALT SERPL W P-5'-P-CCNC: 13 U/L (ref 12–78)
ANION GAP SERPL CALCULATED.3IONS-SCNC: 6 MMOL/L (ref 4–13)
AST SERPL W P-5'-P-CCNC: 20 U/L (ref 5–45)
BASOPHILS # BLD MANUAL: 0.03 THOUSAND/UL (ref 0–0.1)
BASOPHILS NFR MAR MANUAL: 1 % (ref 0–1)
BILIRUB SERPL-MCNC: 0.6 MG/DL (ref 0.2–1)
BUN SERPL-MCNC: 9 MG/DL (ref 5–25)
CALCIUM SERPL-MCNC: 9.1 MG/DL (ref 8.3–10.1)
CHLORIDE SERPL-SCNC: 103 MMOL/L (ref 98–108)
CO2 SERPL-SCNC: 30 MMOL/L (ref 21–32)
CREAT SERPL-MCNC: 0.7 MG/DL (ref 0.6–1.3)
EOSINOPHIL # BLD MANUAL: 0.05 THOUSAND/UL (ref 0–0.4)
EOSINOPHIL NFR BLD MANUAL: 2 % (ref 0–6)
ERYTHROCYTE [DISTWIDTH] IN BLOOD BY AUTOMATED COUNT: 13.2 % (ref 11.6–15.1)
GFR SERPL CREATININE-BSD FRML MDRD: 87 ML/MIN/1.73SQ M
GLUCOSE SERPL-MCNC: 102 MG/DL (ref 65–140)
HCT VFR BLD AUTO: 28.7 % (ref 34.8–46.1)
HGB BLD-MCNC: 10.1 G/DL (ref 11.5–15.4)
LYMPHOCYTES # BLD AUTO: 0.3 THOUSAND/UL (ref 0.6–4.47)
LYMPHOCYTES # BLD AUTO: 11 % (ref 14–44)
MAGNESIUM SERPL-MCNC: 2.1 MG/DL (ref 1.6–2.6)
MCH RBC QN AUTO: 33 PG (ref 26.8–34.3)
MCHC RBC AUTO-ENTMCNC: 35.2 G/DL (ref 31.4–37.4)
MCV RBC AUTO: 94 FL (ref 82–98)
MONOCYTES # BLD AUTO: 0.49 THOUSAND/UL (ref 0–1.22)
MONOCYTES NFR BLD: 18 % (ref 4–12)
NEUTROPHILS # BLD MANUAL: 1.84 THOUSAND/UL (ref 1.85–7.62)
NEUTS BAND NFR BLD MANUAL: 5 % (ref 0–8)
NEUTS SEG NFR BLD AUTO: 63 % (ref 43–75)
PLATELET # BLD AUTO: 165 THOUSANDS/UL (ref 149–390)
PLATELET BLD QL SMEAR: ADEQUATE
PMV BLD AUTO: 7.5 FL (ref 8.9–12.7)
POTASSIUM SERPL-SCNC: 3.5 MMOL/L (ref 3.5–5.3)
PROT SERPL-MCNC: 6.4 G/DL (ref 6.4–8.2)
RBC # BLD AUTO: 3.06 MILLION/UL (ref 3.81–5.12)
RBC MORPH BLD: NORMAL
SODIUM SERPL-SCNC: 139 MMOL/L (ref 136–145)
TOTAL CELLS COUNTED SPEC: 100
WBC # BLD AUTO: 2.7 THOUSAND/UL (ref 4.31–10.16)

## 2019-07-22 PROCEDURE — 80053 COMPREHEN METABOLIC PANEL: CPT

## 2019-07-22 PROCEDURE — 77386 HB NTSTY MODUL RAD TX DLVR CPLX: CPT | Performed by: RADIOLOGY

## 2019-07-22 PROCEDURE — 85027 COMPLETE CBC AUTOMATED: CPT

## 2019-07-22 PROCEDURE — 87070 CULTURE OTHR SPECIMN AEROBIC: CPT | Performed by: NURSE PRACTITIONER

## 2019-07-22 PROCEDURE — 85007 BL SMEAR W/DIFF WBC COUNT: CPT

## 2019-07-22 PROCEDURE — 77336 RADIATION PHYSICS CONSULT: CPT | Performed by: RADIOLOGY

## 2019-07-22 PROCEDURE — 83735 ASSAY OF MAGNESIUM: CPT

## 2019-07-22 RX ORDER — CEPHALEXIN 500 MG/1
500 CAPSULE ORAL EVERY 6 HOURS SCHEDULED
Qty: 28 CAPSULE | Refills: 0 | Status: SHIPPED | OUTPATIENT
Start: 2019-07-22 | End: 2019-07-29

## 2019-07-22 NOTE — PROGRESS NOTES
PICC line removed and tip sent for culture  Patient aware that chemo will be rescheduled  Cecile Cohen will follow up

## 2019-07-22 NOTE — PROGRESS NOTES
Patient's PICC line site appears to be worsening since Friday with increased yellowish drainage and increased erythema over the weekend  We will discontinue the PICC line today and culture the tip  Patient is without systemic symptoms appears to be localized  We will start patient on Keflex 500 mg p o  Q i d  X7 days  I did discuss with Dr Carole Ward patient's final concurrent chemotherapy treatment that is due today and current situation  He would prefer that we delay her chemotherapy by 1 week while she is on the antibiotics  We will have patient come in for a nurse visit on Friday to assess symptoms/re-evaluate the area and make a decision at that time if a new PICC line will reinserted or not  Her final treatment will likely be given next Monday 7/29/19

## 2019-07-22 NOTE — PLAN OF CARE
Problem: INFECTION - ADULT  Goal: Absence or prevention of progression during hospitalization  Description  INTERVENTIONS:  - Assess and monitor for signs and symptoms of infection  - Monitor lab/diagnostic results  - Monitor all insertion sites, i e  indwelling lines, tubes, and drains  - Monitor endotracheal (as able) and nasal secretions for changes in amount and color  - [place] appropriate cooling/warming therapies per order  - Administer medications as ordered  - Instruct and encourage patient and family to use good hand hygiene technique  - Identify and instruct in appropriate isolation precautions for identified infection/condition  Outcome: Progressing

## 2019-07-22 NOTE — PROGRESS NOTES
Central labs were drawn  PICC line dressing appears to be swollen  Upon removal of the dressing  Exudate was noted at the insertion site  A photo was sent to Clay Marks NP via tiger text  Clear dressing applied while awaiting response

## 2019-07-22 NOTE — TELEPHONE ENCOUNTER
Phoned patient and informed her that Dr Trupti Crawford did want patinet to wait one week before having picc line replaced  Patient will stop here on Friday 7/26/19 so nurse can evaluate the picc line site  Patient will be on her Rx Keflex for one week  Patient does want to have the IV hydration on Tuesday and Thursday this week and Im was sent to Faythe Bosworth, RN infusion nurse at [facility] [place]

## 2019-07-23 ENCOUNTER — NUTRITION (OUTPATIENT)
Dept: NUTRITION | Facility: CLINIC | Age: 69
End: 2019-07-23

## 2019-07-23 ENCOUNTER — APPOINTMENT (OUTPATIENT)
Dept: RADIATION ONCOLOGY | Facility: CLINIC | Age: 69
End: 2019-07-23
Attending: RADIOLOGY
Payer: COMMERCIAL

## 2019-07-23 ENCOUNTER — HOSPITAL ENCOUNTER (OUTPATIENT)
Dept: INFUSION CENTER | Facility: CLINIC | Age: 69
Discharge: HOME | End: 2019-07-23
Payer: COMMERCIAL

## 2019-07-23 VITALS
DIASTOLIC BLOOD PRESSURE: 62 MMHG | SYSTOLIC BLOOD PRESSURE: 98 MMHG | TEMPERATURE: 98.9 F | HEART RATE: 70 BPM | RESPIRATION RATE: 18 BRPM

## 2019-07-23 DIAGNOSIS — Z71.3 NUTRITIONAL COUNSELING: Primary | ICD-10-CM

## 2019-07-23 DIAGNOSIS — E86.0 DEHYDRATION: Primary | ICD-10-CM

## 2019-07-23 PROCEDURE — 77386 HB NTSTY MODUL RAD TX DLVR CPLX: CPT | Performed by: RADIOLOGY

## 2019-07-23 PROCEDURE — 96360 HYDRATION IV INFUSION INIT: CPT

## 2019-07-23 RX ADMIN — SODIUM CHLORIDE 1000 ML: 0.9 INJECTION, SOLUTION INTRAVENOUS at 12:24

## 2019-07-23 NOTE — PROGRESS NOTES
Pt without complaint, tolerated IV hydration via NSL well, left unit in stable condition,declines AVS but aware of appt on Thursday for IV hydration again

## 2019-07-23 NOTE — PLAN OF CARE
Problem: Potential for Falls  Goal: Patient will remain free of falls  Description  INTERVENTIONS:  - Assess patient frequently for physical needs  -  Identify cognitive and physical deficits and behaviors that affect risk of falls    -  [place] fall precautions as indicated by assessment   - Educate patient/family on patient safety including physical limitations  - Instruct patient to call for assistance with activity based on assessment  - Modify environment to reduce risk of injury  - Consider OT/PT consult to assist with strengthening/mobility  Outcome: Progressing

## 2019-07-23 NOTE — PROGRESS NOTES
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, chemotherapy (cisplatin) started 6/10/19  Chemotherapy plan changed 6/28/19 from cisplatin to carboplatin and fluorouracil due to slight loss of hearing  RT to right base of tongue, tonsil, bilateral neck started 6/10/19  Oncology History    This is a 15-year-old female without significant past medical history  She stated that she had a history of tobacco use and quit many years ago  She also admitted the consuming alcohol around 0 6 oz per week  Her father had a history of prostate cancer  The patient complained about right-sided throat pain in abnormal feeling with movement of the tongue  She also had right-sided ear pain for about 6 months which was treated with 2 courses of antibiotics without any improvement  She eventually was seen by Dr Isabella Davis her ENT doctor who performed extensive evaluation including endoscopic evaluation which revealed a mass in the base of the tongue on the right side  A biopsy from the base of the tongue mass came back 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  The HPV status was negative  The patient then had a PET-CT scan on the 14th May 2019 which showed:  IMPRESSION:  1   Large hypermetabolic mass in the right oral cavity, right tonsillar region and right tongue base, approaching the level of the vallecula compatible with known malignancy  Involvement of the right soft palate is not excluded    2   Multiple hypermetabolic right cervical nodes as above concerning for metastases  3   Asymmetric enlargement of the right thyroid with associated FDG uptake   Further evaluation with ultrasound recommended  4   No hypermetabolic metastases in the chest abdomen pelvis  5   Aneurysmal ascending thoracic aorta measuring 4 2 x 4 3 cm       The estimated clinical stage is T2 N2 M0  The patient was then sent to the Radiation Oncology team and was seen by Dr Sebastián Rose on the 20th of May and sent to us for the consideration of concurrent chemoradiation      The patient otherwise is doing relatively well, she denied significant weight loss                      Cancer of base of tongue ([facility] [address] )    5/3/2019 Biopsy     Right BOT biopsy  Biopsy + for SCC moderately differentiated,  Incompletely excised           6/10/2019 - 6/30/2019 Chemotherapy     CISplatin (PLATINOL) 174 mg, mannitol 12 5 g in sodium chloride 0 9 % 500 mL IVPB, 100 mg/m2 = 174 mg, Intravenous, Once, 1 of 3 cycles  Administration: 174 mg (6/10/2019)  potassium chloride 20 mEq, magnesium sulfate 1 g in sodium chloride 0 9 % 1,000 mL IVPB, , Intravenous, Once, 1 of 3 cycles  Administration:  (6/10/2019),  (6/10/2019)  (1 cycle only d/c'd due to significant hearing loss after 1 treatment)        7/1/2019 -  Chemotherapy     CARBOplatin (PARAPLATIN) 115 5 mg in sodium chloride 0 9 % 250 mL IVPB, 70 mg/m2 = 115 5 mg (100 % of original dose 70 mg/m2), Intravenous, Once, 2 of 2 cycles  Dose modification: 70 mg/m2 (original dose 70 mg/m2, Cycle 1)  Administration: 115 5 mg (7/1/2019), 115 5 mg (7/2/2019), 115 5 mg (7/3/2019), 115 5 mg (7/5/2019)       Past Medical History:   Diagnosis Date    Tongue cancer ([address] )     Squamous cell     Past Surgical History:   Procedure Laterality Date    APPENDECTOMY  1990    CATARACT EXTRACTION Left     HYSTERECTOMY  2012    total    IR PICC LINE  6/5/2019    OOPHORECTOMY Bilateral 2012       Review of Medications:   Vitamins, Supplements and Herbals: no    Current Outpatient Medications:     al mag oxide-diphenhydramine-lidocaine viscous (MAGIC MOUTHWASH) 1:1:1 suspension, Swish and swallow 10 mL every 4 (four) hours as needed for mouth pain or discomfort, Disp: 480 mL, Rfl: 4    cephalexin (KEFLEX) 500 mg capsule, Take 1 capsule (500 mg total) by mouth every 6 (six) hours for 7 days, Disp: 28 capsule, Rfl: 0    clotrimazole (MYCELEX) 10 mg samantha, Take 1 tablet (10 mg total) by mouth 5 (five) times a day, Disp: 70 tablet, Rfl: 1    Magnesium Cl-Calcium Carbonate (SLOW-MAG PO), Take by mouth as needed, Disp: , Rfl:     Melatonin 10 MG TABS, Take 1 tablet by mouth daily at bedtime as needed, Disp: , Rfl:     Multiple Vitamins-Minerals (PRESERVISION AREDS 2+MULTI VIT PO), Take by mouth, Disp: , Rfl:     OLANZapine (ZyPREXA) 10 mg tablet, Take 1 tablet (10 mg total) by mouth daily at bedtime Take for 3 days with chemotherapy/or as directed to prevent n/v, Disp: 12 tablet, Rfl: 3    omeprazole (PriLOSEC) 20 mg delayed release capsule, Take 1 capsule (20 mg total) by mouth daily, Disp: 30 capsule, Rfl: 3    Probiotic Product (PROBIOTIC DAILY PO), Take by mouth, Disp: , Rfl:     prochlorperazine (COMPAZINE) 10 mg tablet, Take 1 tablet (10 mg total) by mouth every 6 (six) hours as needed for nausea or vomiting, Disp: 60 tablet, Rfl: 0    scopolamine (TRANSDERM-SCOP) 1 5 mg/3 days TD 72 hr patch, Place 1 patch on the skin every third day, Disp: 10 patch, Rfl: 1    Most Recent Lab Results:   Lab Results   Component Value Date    WBC 2 70 (L) 07/22/2019    IRON 55 05/28/2019    TIBC 302 05/28/2019    FERRITIN 86 05/28/2019    ALT 13 07/22/2019    AST 20 07/22/2019     04/24/2018    K 3 5 07/22/2019    K 3 5 07/15/2019    BUN 9 07/22/2019    BUN 7 07/15/2019    CREATININE 0 70 07/22/2019    CREATININE 0 80 07/15/2019    GLUCOSE 85 04/24/2018    CALCIUM 9 1 07/22/2019    FOLATE >20 0 (H) 05/28/2019    MG 2 1 07/22/2019       Anthropometric Measurements:   Height: 65"  Ht Readings from Last 1 Encounters:   07/19/19 5' 5 39" (1 661 m)     -Weight History:   Usual Weight: 135#  Ideal Body Weight: 125#  Wt Readings from Last 15 Encounters:   07/22/19 54 5 kg (120 lb 0 7 oz)   07/19/19 56 6 kg (124 lb 12 8 oz)   07/15/19 55 8 kg (123 lb)   07/05/19 56 kg (123 lb 7 oz)   07/03/19 58 7 kg (129 lb 6 6 oz)   07/02/19 58 7 kg (129 lb 6 6 oz)   07/01/19 58 7 kg (129 lb 6 6 oz)   06/28/19 59 4 kg (131 lb)   06/18/19 59 4 kg (131 lb)   06/17/19 59 4 kg (131 lb)   06/10/19 61 4 kg (135 lb 5 8 oz)   06/03/19 62 1 kg (136 lb 12 8 oz)   06/03/19 62 1 kg (137 lb)   05/28/19 61 7 kg (136 lb)   05/22/19 62 kg (136 lb 9 6 oz)     Varian: (6/12/19) 134 6#, (6/13/19) 134 2#, (6/19/19) 133 4#, (6/20/19) 133 8#  (6/25/19) 133 2#, (6/27/19) 132 8#, (7/2/19) 131#, (7/5/19) 129 6#, (7/11/19) 124 2#, (7/16/18) 122 8#, (7/18/19) 123 2#, (7/23/19) 120 0#  Weight Changes: loss of 2 8# (2 3%) in 1 week (significant) and loss of 13 2# (9 9%) in 1 month (significant)    Estimated body mass index is 19 74 kg/m² as calculated from the following:    Height as of 7/19/19: 5' 5 39" (1 661 m)  Weight as of 7/22/19: 54 5 kg (120 lb 0 7 oz)      Nutrition-Focused Physical Findings: severe body fat depletion (Triceps) - space between folds/fingers     Food/Nutrition-Related History & Client/Social History:    Current Nutrition Impact Symptoms:  [] Nausea [] Thick Mucous  [] Neutropenia    [] Vomiting  [] Unintended Wt Loss  [] Malabsorption    [] Diarrhea  [] Unintended Wt Gain  [] Dumping Syndrome    [] Constipation - drinks prune juice every morning  [x] Odynophagia  [] Abdominal Pain    [x] Dysgeusia (Altered Taste) - can only taste sweet foods [] Xerostomia (Dry Mouth)  [] Difficulty Chewing    [] Dysosmia (Altered Smell)  [x] Fatigue  [] Other:    [] Poor Appetite  [] Thrush  [] Other:    [] Oral Mucositis (Sore Mouth)  [] Esophagitis  [] Other:    [x] Dysphagia - hard foods like toast, feels like she has a lump in her throat [] Early Satiety  [] No Problems Eating      Food Allergies: no  Food Intolerances: no    Current Diet: High Calorie/High Protein, Soft and Liquids  Current Nutrition Intake: Unchanged from usual  Appetite: Poor  Nutrition Route: PO  Meal planning/preparation mainly done by: Self, Daughter and her 4 grandsons bring her food  Oral Care: Fluoride tray BID, Brushing after eating or at least TID with soft bristle toothbrush, uses Biotene  Activity level: She used to run often but now feels too tired to run, she has been walking at least 30 minutes most days  She started decorating her apartment to keep herself busy  Social Hx: Lives with her daughter and grandsons, but has her own private living space  Currently on medical leave from work, she works as a  for [employer] Recall:   Breakfast: carnation instant breakfast made with 5 5oz Orgain nutrition shake   Lunch: Ensure Enlive   Dinner: Ensure [employer]  Snack: Ensure [employer]      Supplements: Ensure Enlive 3x daily, [employer] 1x daily  and  half (5 5 oz) Orgain Organic Nutrition   Beverages: water (50 oz), ginger ale (16oz x1), prune juice (8oz x1), herbal tea (8oz x1-3), Pedialyte (8oz daily)     Estimated Nutrition Needs: (based on 55 8kg/ 122 8# actual wt)  Energy Needs: 6944-0082 kcal/day (35-40 kcal/kg)  Protein Needs:  grams/day (1 5-2 g/kg)  Fluid Needs: 7263-5300 mL/day (1 mL/kcal)    Discussion/Summary: Dain Reyes was seen in [facility] for her nutrition follow up  She reports that her main source of nutrition is liquid nutrition (3 Ensure Enlive, 1 [place] Instant Breakfast with 5 5 oz Orgain nutrition shake)  This =1220 kcal and 73g pro (58% est calorie needs, 80% estimated protein needs)   24 hr recall reveals inadequate PO intakes, encouraged patient to consume at least 5 Ensure Enlive and 1 [place] Instant breakfast mixed with 1 whole carton of Orgain nutrition shake (11oz) instead of 1/2 carton to help prevent further weight loss  Patient expressed concern that her RT is ending next week, but she continues to have trouble eating  Explained to patient that symptoms like dysgeusia, dysphagia, and odynophagia may linger for some time after radiation treatments end and reassured patient that she can continue to meet with this RD even after treatment ends to discuss cancer preventative eating patterns until she feels comfortable with her nutrition status  Discussed/Reviewed:   · the importance of weight maintenance during CA tx  · indications & use of oral nutrition supplements 5 Ensure Enlive + 1 [place] Breakfast mixed with 11oz Orgain Organic Nutrition Supplement   · how to modify foods for anticipated nutrition impact symptoms pt may experience during CA tx  · high protein foods to include at all meals and snacks peanut butter, cottage cheese, greek yogurt, tuna/chicken salad, eggs  · ways to increase overall calorie intake : add peanut butter, cheese, whole milk, avocado, cream soups  · encouraged eating every 2-3 hours (5-6 small meals/day)  · maintaining proper oral care : continue brushing at least TID, using Biotene  · how to modify foods & eating for nutrition impact symptoms : soft, moist, easy to chew/swallow foods, liquid nutrition  · adequate hydation & tips to increase overall fluid intake : nutrition supplements, homemade shakes, calorie containing beverages   · MNT for: taste changes, difficulty swallowing, poor appetite  · recipe suggestions/resources  · individualized calorie, protein and fluid daily goals      All questions and concerns addressed during todays visit  Carmine Alfaro has RD contact information  Nutrition Diagnosis:  · Inadequate Energy Intake related to physiological causes, disease state and treatment related issues as evidenced by food recall, wt loss and discussion with pt and/or family    · Increased Nutrient Needs (kcal & pro) related to increased demand for nutrients and disease state as evidenced by cancer dx and pt undergoing tx for cancer  · Patient has clinical indicators (or ASPEN criteria) consistent with severe protein-calorie malnutrition in the context of Chronic Illness as evidenced by >5% wt loss in 1 month, </=75% energy intake vs  Estimated needs for >/=1 month and severe body fat depletion (Triceps) - space between folds/fingers  Intervention & Recommendations:  Topics addressed: Nutrition education, Balance/Variety, Meals & Snacks, Meal planning, Choosing high protein meals/snacks, Meal pattern: eating small/frequent meals (every 2-3 hours), Nutrition Symptom Management, Adequate Hydration, Medical Food Supplements, Nutrition-Related Medication Management and Weight Management    Barriers: None  Readiness to change: action  Comprehension: verbalizes understanding  Expected Compliance: good    Materials Provided: Orgain Organic Nutrition Shakes    Monitoring & Evaluation:  Dietitian to Monitor: Food and Nutrition Intake, Nutrtion Impact Symptoms, Body Weight and Biochemical Data     Goals:  · weight stabilization  · adequate nutrition related symptom management  · pt to meet >/=75% estimated nutrition needs daily  · 5 Ensure Enlive, 1 [place] instant breakfast mized with 11 oz Orgain     · Progress Towards Goals: Progressing    Nutrition Rx & Recommendations:  · Diet: Soft and moist foods, high calorie, high protein  · Nutrition Supplements: 5 Ensure Enlive, 1 [place] Instant Breakfast mixed with 11 oz Orgain  · Small, frequent meals/snacks may be easier to tolerate than 3 large daily meals  Aim for 5-6 small meals per day  · Include protein at all meals/snacks  · Include a variety of foods (as tolerated/allowed by diet)  · Stay hydrated by sipping fluids of choice/tolerance throughout the day  At least 64 oz non caffeinated beverages daily  · Liquid nutrition may be better tolerated than solids at times    · Alter food choices and eating patterns to accommodate changing needs  · Refer to Eating Hints book for other meal/snack ideas and symptom management  · Avoid spicy, acidic, sharp/hard/crunchy foods & carbonated beverages as needed  · Follow proper oral care; Try baking soda/salt water rinse recipe (mix 3/4 tsp salt + 1 tsp baking soda + 1 qt water; rinse with pain water after using) in Eating Hints book  Brush your teeth before/after meals & before bed  · For lack of taste: Practice good oral care  Blend fresh fruits into shakes, ice cream or yogurt  Eat frozen fruits: grapes, chopped cantaloupe, watermelon  Select fresh vegetables (may be more appealing than canned/frozen)  Add extra flavor to foods: experiment with spices, salt & sweeteners; marinate meats  · For appetite loss: try powdered or liquid nutrition supplements; eating by the clock every 2-3 hours; keep snacks nearby; add extra protein & calories to your diet; drink liquids throughout the day; choose liquids that add calories; eat a bedtime snack; eat soft, cool or frozen foods; eat a larger meal when you feel well & rested; only sip small amounts of liquids during meals  · For sore mouth/throat: choose foods that are easy to chew/swallow; cook foods until they are soft/tender; moisten & soften foods (gravy/sauce/broth/yogurt); cut food into small pieces; drink with a straw (as tolerated); eat with a very small spoon; eat cold or room temperature food; suck on ice chips; Avoid citrus, spicy foods, tomatoes/ketchup, salty foods, raw vegetables, sharp/crunchy/hard foods & alcohol       · For trouble swallowing: eat 5-6 small meals/day; choose foods that are easy to swallow; choose foods that are high in protein & calories; cook foods until they are soft/tender; cut food into small pieces; moisten & soften foods (gravy/sauce/broth/yogurt); sip drinks through a straw (as tolerated); avoid foods/drinks that can burn/scrape your throat (hot temperatures, spicy foods, acidic foods, sharp/hard/crunchy foods, alcohol); talk to your doctor about a Speech Therapy referral      · Weigh yourself regularly  If you notice weight loss, make an effort to increase your daily food/calorie intake  If you continue to notice loss after these efforts, reach out to your dietitian to establish a plan to stabilize weight  · High calorie foods to try:  peanut butter, cheese, whole milk, avocado, cream soups  (see pages 52-53 in your Eating Hints book)  · High protein foods to try: peanut butter, cottage cheese, greek yogurt, tuna/chicken salad, eggs    (see pages 49-51 in your Eating Hints book)    Follow Up Plan: 7/30/19 during infusion    Recommend Referral to Other Providers: none at this time

## 2019-07-23 NOTE — PATIENT INSTRUCTIONS
Nutrition Rx & Recommendations:  · Diet: Soft and moist foods, high calorie, high protein  · Nutrition Supplements: 5 Ensure Enlive, 1 [place] Instant Breakfast mixed with 11 oz Orgain  · Small, frequent meals/snacks may be easier to tolerate than 3 large daily meals  Aim for 5-6 small meals per day  · Include protein at all meals/snacks  · Include a variety of foods (as tolerated/allowed by diet)  · Stay hydrated by sipping fluids of choice/tolerance throughout the day  At least 64 oz non caffeinated beverages daily  · Liquid nutrition may be better tolerated than solids at times  · Alter food choices and eating patterns to accommodate changing needs  · Refer to Eating Hints book for other meal/snack ideas and symptom management  · Avoid spicy, acidic, sharp/hard/crunchy foods & carbonated beverages as needed  · Follow proper oral care; Try baking soda/salt water rinse recipe (mix 3/4 tsp salt + 1 tsp baking soda + 1 qt water; rinse with pain water after using) in Eating Hints book  Brush your teeth before/after meals & before bed  · For lack of taste: Practice good oral care  Blend fresh fruits into shakes, ice cream or yogurt  Eat frozen fruits: grapes, chopped cantaloupe, watermelon  Select fresh vegetables (may be more appealing than canned/frozen)  Add extra flavor to foods: experiment with spices, salt & sweeteners; marinate meats  · For appetite loss: try powdered or liquid nutrition supplements; eating by the clock every 2-3 hours; keep snacks nearby; add extra protein & calories to your diet; drink liquids throughout the day; choose liquids that add calories; eat a bedtime snack; eat soft, cool or frozen foods; eat a larger meal when you feel well & rested; only sip small amounts of liquids during meals       · For sore mouth/throat: choose foods that are easy to chew/swallow; cook foods until they are soft/tender; moisten & soften foods (gravy/sauce/broth/yogurt); cut food into small pieces; drink with a straw (as tolerated); eat with a very small spoon; eat cold or room temperature food; suck on ice chips; Avoid citrus, spicy foods, tomatoes/ketchup, salty foods, raw vegetables, sharp/crunchy/hard foods & alcohol  · For trouble swallowing: eat 5-6 small meals/day; choose foods that are easy to swallow; choose foods that are high in protein & calories; cook foods until they are soft/tender; cut food into small pieces; moisten & soften foods (gravy/sauce/broth/yogurt); sip drinks through a straw (as tolerated); avoid foods/drinks that can burn/scrape your throat (hot temperatures, spicy foods, acidic foods, sharp/hard/crunchy foods, alcohol); talk to your doctor about a Speech Therapy referral      · Weigh yourself regularly  If you notice weight loss, make an effort to increase your daily food/calorie intake  If you continue to notice loss after these efforts, reach out to your dietitian to establish a plan to stabilize weight  · High calorie foods to try:  peanut butter, cheese, whole milk, avocado, cream soups  (see pages 52-53 in your Eating Hints book)  · High protein foods to try: peanut butter, cottage cheese, greek yogurt, tuna/chicken salad, eggs    (see pages 49-51 in your Eating Hints book)    Follow Up Plan: 7/30/19 during infusion    Recommend Referral to Other Providers: none at this time

## 2019-07-24 ENCOUNTER — APPOINTMENT (OUTPATIENT)
Dept: RADIATION ONCOLOGY | Facility: CLINIC | Age: 69
End: 2019-07-24
Attending: RADIOLOGY
Payer: COMMERCIAL

## 2019-07-24 PROCEDURE — 77386 HB NTSTY MODUL RAD TX DLVR CPLX: CPT | Performed by: RADIOLOGY

## 2019-07-25 ENCOUNTER — HOSPITAL ENCOUNTER (OUTPATIENT)
Dept: INFUSION CENTER | Facility: CLINIC | Age: 69
Discharge: HOME | End: 2019-07-25
Payer: COMMERCIAL

## 2019-07-25 ENCOUNTER — APPOINTMENT (OUTPATIENT)
Dept: RADIATION ONCOLOGY | Facility: CLINIC | Age: 69
End: 2019-07-25
Attending: RADIOLOGY
Payer: COMMERCIAL

## 2019-07-25 VITALS
RESPIRATION RATE: 18 BRPM | SYSTOLIC BLOOD PRESSURE: 103 MMHG | DIASTOLIC BLOOD PRESSURE: 65 MMHG | HEART RATE: 69 BPM | TEMPERATURE: 97.6 F

## 2019-07-25 DIAGNOSIS — E86.0 DEHYDRATION: Primary | ICD-10-CM

## 2019-07-25 LAB
BACTERIA CATH TIP CULT: NO GROWTH
BACTERIA CATH TIP CULT: NORMAL

## 2019-07-25 PROCEDURE — 77386 HB NTSTY MODUL RAD TX DLVR CPLX: CPT | Performed by: RADIOLOGY

## 2019-07-25 PROCEDURE — 96360 HYDRATION IV INFUSION INIT: CPT

## 2019-07-25 RX ADMIN — SODIUM CHLORIDE 1000 ML: 0.9 INJECTION, SOLUTION INTRAVENOUS at 11:51

## 2019-07-26 ENCOUNTER — APPOINTMENT (OUTPATIENT)
Dept: RADIATION ONCOLOGY | Facility: CLINIC | Age: 69
End: 2019-07-26
Attending: RADIOLOGY
Payer: COMMERCIAL

## 2019-07-26 ENCOUNTER — TELEPHONE (OUTPATIENT)
Dept: HEMATOLOGY ONCOLOGY | Facility: CLINIC | Age: 69
End: 2019-07-26

## 2019-07-26 DIAGNOSIS — Z51.11 ENCOUNTER FOR ANTINEOPLASTIC CHEMOTHERAPY: Primary | ICD-10-CM

## 2019-07-26 DIAGNOSIS — C01 CANCER OF BASE OF TONGUE (HCC): ICD-10-CM

## 2019-07-26 PROCEDURE — 77386 HB NTSTY MODUL RAD TX DLVR CPLX: CPT | Performed by: RADIOLOGY

## 2019-07-26 NOTE — TELEPHONE ENCOUNTER
Phoned [facility] to inform them that patient will have a new piccline placed on Tuesday at 7:30 am in [place], per infusion staff I will call patient and have her go to [facility] after picc line placement  Patient will have the Cadd pump removed on Sat 8/3/19 in [place]  Patient was informed that she should report to [facility] which is on [facility] in [place]  Patient and her daughter verbalized understanding of all

## 2019-07-26 NOTE — PROGRESS NOTES
Patient came in today for evaluation of picc line site after removal of Picc line on Mon  Skin is healed and no signs of infection  Patient denies any fevers or chills  Scheduled picc line placement at [facility] on Tues 7/30/19 at 7:30 am then patient to go over to [facility] after picc line placement  Patient already scheduled for removal of Cadd pump at [place] on Sat 8/319

## 2019-07-26 NOTE — PROGRESS NOTES
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, chemotherapy (cisplatin) started 6/10/19  Chemotherapy plan changed 6/28/19 from cisplatin to carboplatin and fluorouracil due to slight loss of hearing  RT to right base of tongue, tonsil, bilateral neck started 6/10/19, EOT 7/29/19  Oncology History    This is a 33-year-old female without significant past medical history  She stated that she had a history of tobacco use and quit many years ago  She also admitted the consuming alcohol around 0 6 oz per week  Her father had a history of prostate cancer  The patient complained about right-sided throat pain in abnormal feeling with movement of the tongue  She also had right-sided ear pain for about 6 months which was treated with 2 courses of antibiotics without any improvement  She eventually was seen by Dr Oconnell Fails her ENT doctor who performed extensive evaluation including endoscopic evaluation which revealed a mass in the base of the tongue on the right side  A biopsy from the base of the tongue mass came back 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  The HPV status was negative  The patient then had a PET-CT scan on the 14th May 2019 which showed:  IMPRESSION:  1   Large hypermetabolic mass in the right oral cavity, right tonsillar region and right tongue base, approaching the level of the vallecula compatible with known malignancy  Involvement of the right soft palate is not excluded    2   Multiple hypermetabolic right cervical nodes as above concerning for metastases  3   Asymmetric enlargement of the right thyroid with associated FDG uptake   Further evaluation with ultrasound recommended  4   No hypermetabolic metastases in the chest abdomen pelvis  5   Aneurysmal ascending thoracic aorta measuring 4 2 x 4 3 cm       The estimated clinical stage is T2 N2 M0  The patient was then sent to the Radiation Oncology team and was seen by Dr Josephine Araujo on the 20th of May and sent to us for the consideration of concurrent chemoradiation      The patient otherwise is doing relatively well, she denied significant weight loss                      Cancer of base of tongue ([facility] [address] )    5/3/2019 Biopsy     Right BOT biopsy  Biopsy + for SCC moderately differentiated,  Incompletely excised           6/10/2019 - 6/30/2019 Chemotherapy     CISplatin (PLATINOL) 174 mg, mannitol 12 5 g in sodium chloride 0 9 % 500 mL IVPB, 100 mg/m2 = 174 mg, Intravenous, Once, 1 of 3 cycles  Administration: 174 mg (6/10/2019)  potassium chloride 20 mEq, magnesium sulfate 1 g in sodium chloride 0 9 % 1,000 mL IVPB, , Intravenous, Once, 1 of 3 cycles  Administration:  (6/10/2019),  (6/10/2019)  (1 cycle only d/c'd due to significant hearing loss after 1 treatment)        7/1/2019 -  Chemotherapy     CARBOplatin (PARAPLATIN) 115 5 mg in sodium chloride 0 9 % 250 mL IVPB, 70 mg/m2 = 115 5 mg (100 % of original dose 70 mg/m2), Intravenous, Once, 2 of 2 cycles  Dose modification: 70 mg/m2 (original dose 70 mg/m2, Cycle 1)  Administration: 115 5 mg (7/1/2019), 115 5 mg (7/2/2019), 115 5 mg (7/3/2019), 115 5 mg (7/5/2019)       Past Medical History:   Diagnosis Date    Tongue cancer ([address] )     Squamous cell     Past Surgical History:   Procedure Laterality Date    APPENDECTOMY  1990    CATARACT EXTRACTION Left     HYSTERECTOMY  2012    total    IR PICC LINE  6/5/2019    OOPHORECTOMY Bilateral 2012       Review of Medications:   Vitamins, Supplements and Herbals: no    Current Outpatient Medications:     al mag oxide-diphenhydramine-lidocaine viscous (MAGIC MOUTHWASH) 1:1:1 suspension, Swish and swallow 10 mL every 4 (four) hours as needed for mouth pain or discomfort, Disp: 480 mL, Rfl: 4    clotrimazole (MYCELEX) 10 mg samantha, Take 1 tablet (10 mg total) by mouth 5 (five) times a day, Disp: 70 tablet, Rfl: 1    fluorouracil 3,960 mg in CADD infusion pump, Infuse 3,960 mg (600 mg/m2/day x 1 65 m2 (Treatment plan recorded BSA)) into a venous catheter over 96 hours for 4 days, Disp: 1 Device, Rfl: 0    Magnesium Cl-Calcium Carbonate (SLOW-MAG PO), Take by mouth as needed, Disp: , Rfl:     Melatonin 10 MG TABS, Take 1 tablet by mouth daily at bedtime as needed, Disp: , Rfl:     Multiple Vitamins-Minerals (PRESERVISION AREDS 2+MULTI VIT PO), Take by mouth, Disp: , Rfl:     OLANZapine (ZyPREXA) 10 mg tablet, Take 1 tablet (10 mg total) by mouth daily at bedtime Take for 3 days with chemotherapy/or as directed to prevent n/v, Disp: 12 tablet, Rfl: 3    omeprazole (PriLOSEC) 20 mg delayed release capsule, Take 1 capsule (20 mg total) by mouth daily, Disp: 30 capsule, Rfl: 3    Probiotic Product (PROBIOTIC DAILY PO), Take by mouth, Disp: , Rfl:     prochlorperazine (COMPAZINE) 10 mg tablet, Take 1 tablet (10 mg total) by mouth every 6 (six) hours as needed for nausea or vomiting, Disp: 60 tablet, Rfl: 0    scopolamine (TRANSDERM-SCOP) 1 5 mg/3 days TD 72 hr patch, Place 1 patch on the skin every third day, Disp: 10 patch, Rfl: 1    Most Recent Lab Results:   Lab Results   Component Value Date    WBC 2 70 (L) 07/22/2019    IRON 55 05/28/2019    TIBC 302 05/28/2019    FERRITIN 86 05/28/2019    ALT 13 07/22/2019    AST 20 07/22/2019     04/24/2018    K 3 5 07/22/2019    K 3 5 07/15/2019    BUN 9 07/22/2019    BUN 7 07/15/2019    CREATININE 0 70 07/22/2019    CREATININE 0 80 07/15/2019    GLUCOSE 85 04/24/2018    CALCIUM 9 1 07/22/2019    FOLATE >20 0 (H) 05/28/2019    MG 2 1 07/22/2019       Anthropometric Measurements:   Height: 65"  Ht Readings from Last 1 Encounters:   07/19/19 5' 5 39" (1 661 m)     -Weight History:   Usual Weight: 135#  Ideal Body Weight: 125#  Wt Readings from Last 15 Encounters:   07/22/19 54 5 kg (120 lb 0 7 oz)   07/19/19 56 6 kg (124 lb 12 8 oz)   07/15/19 55 8 kg (123 lb)   07/05/19 56 kg (123 lb 7 oz)   07/03/19 58 7 kg (129 lb 6 6 oz)   07/02/19 58 7 kg (129 lb 6 6 oz)   07/01/19 58 7 kg (129 lb 6 6 oz)   06/28/19 59 4 kg (131 lb)   06/18/19 59 4 kg (131 lb)   06/17/19 59 4 kg (131 lb)   06/10/19 61 4 kg (135 lb 5 8 oz)   06/03/19 62 1 kg (136 lb 12 8 oz)   06/03/19 62 1 kg (137 lb)   05/28/19 61 7 kg (136 lb)   05/22/19 62 kg (136 lb 9 6 oz)     Varian: (6/12/19) 134 6#, (6/13/19) 134 2#, (6/19/19) 133 4#, (6/20/19) 133 8#  (6/25/19) 133 2#, (6/27/19) 132 8#, (7/2/19) 131#, (7/5/19) 129 6#, (7/11/19) 124 2#, (7/16/18) 122 8#, (7/18/19) 123 2#, (7/23/19) 120 0#, (7/26/19) 121#  Weight Changes: loss of 2 2# (1 8%) in 1 week (significant) and loss of 12 2# (9 2%) in 1 month (significant) - varian weights used for calculation       Estimated body mass index is 19 74 kg/m² as calculated from the following:    Height as of 7/19/19: 5' 5 39" (1 661 m)  Weight as of 7/22/19: 54 5 kg (120 lb 0 7 oz)      Nutrition-Focused Physical Findings: severe body fat depletion (Triceps) - space between folds/fingers     Food/Nutrition-Related History & Client/Social History:    Current Nutrition Impact Symptoms:  [] Nausea [x] Thick Mucous  [] Neutropenia    [] Vomiting  [] Unintended Wt Loss  [] Malabsorption    [] Diarrhea  [] Unintended Wt Gain  [] Dumping Syndrome    [] Constipation - drinks prune juice every morning  [x] Odynophagia -improving [] Abdominal Pain    [x] Dysgeusia (Altered Taste) - can only taste sweet foods [x] Xerostomia (Dry Mouth)-uses Biotene  [] Difficulty Chewing    [] Dysosmia (Altered Smell)  [x] Fatigue  [] Other:    [x] Poor Appetite  [] Zeenat Sifuentes [] Other:    [] Oral Mucositis (Sore Mouth)  [] Esophagitis  [] Other:    [x] Dysphagia - due to throat pain  [] Early Satiety  [] No Problems Eating      Food Allergies: no  Food Intolerances: no    Current Diet: Liquids  Current Nutrition Intake: Unchanged from usual  Appetite: Poor  Nutrition Route: PO  Meal planning/preparation mainly done by: Self, Daughter and her 4 grandsons bring her food  Oral Care: Fluoride tray BID, Brushing after eating or at least TID with soft bristle toothbrush, uses Biotene  Activity level: She used to run often but now feels too tired to run, she has been walking at least 30 minutes most days  She started decorating her apartment to keep herself busy  Social Hx: Lives with her daughter and grandsons, but has her own private living space  Currently on medical leave from work, she works as a  for [employer]:   Breakfast: carnation instant breakfast made with 5 5oz Orgain nutrition shake   Lunch: Ensure Enlive with 1/4c milk  Dinner: [employer] with 5 5 oz Orgain nutrition shake   Snack: none    Supplements: Ensure Enlive mixed with 1/4c milk and [employer] mixed with 1/2 carton (5 5 oz) Orgain Organic Nutrition shake  - total of 3x daily of Ensure/milk or [place]/Orgain   Beverages: water (50 oz), ginger ale (16oz x1), prune juice (8oz x1), herbal tea (8oz x1-3), Pedialyte (8oz daily)     Estimated Nutrition Needs: (based on 55 8kg/ 122 8# actual wt)  Energy Needs: 3229-6536 kcal/day (35-40 kcal/kg)  Protein Needs:  grams/day (1 5-2 g/kg)  Fluid Needs: 9234-9312 mL/day (1 mL/kcal)    Discussion/Summary: Jessica Perez was seen in infusion for nutrition follow up  7/29/19 was her EOT day for radiation therapy  She reports that main source of nutrition continues to be liquid nutrition   She will have a total of 3 daily of either Ensure Enlive mixed with 1/4c milk or [employer] mixed with 1/2 carton (5 5 oz) of Orgain Organic Nutrition Shake  Encouraged pt to increase her total intake to at least 5-6 Ensure/milk or [place]/Orgain to better help meet nutrition needs  Jame Peoples reports that her throat pain has improved slightly  Discussed soft/easy to swallow foods that patient can trial such as scrambled eggs, jello, greek yogurt  Encouraged proper hydration and use of Biotene or Baking Soda Salt Water Rinse to help dry mouth  Will continue to meet with patient and provide nutrition counseling as nutrition impact symptoms continue to inhibit her ability to consume adequate calories and protein  Discussed/Reviewed:   · the importance of weight maintenance during CA tx  · indications & use of oral nutrition supplements 5-6 Ensure Enlive OR [place] Breakfast mixed with 11oz Orgain Organic Nutrition Supplement   · how to modify foods for anticipated nutrition impact symptoms pt may experience during CA tx  · high protein foods to include at all meals and snacks peanut butter, cottage cheese, greek yogurt, tuna/chicken salad, eggs  · ways to increase overall calorie intake : add peanut butter, cheese, whole milk, avocado, cream soups  · encouraged eating every 2-3 hours (5-6 small meals/day)  · maintaining proper oral care : continue brushing at least TID, using Biotene  · how to modify foods & eating for nutrition impact symptoms : soft, moist, easy to chew/swallow foods, liquid nutrition  · adequate hydation & tips to increase overall fluid intake : nutrition supplements, homemade shakes, calorie containing beverages   · MNT for: taste changes, difficulty swallowing, poor appetite, dry mouth  · recipe suggestions/resources  · nutrition strategies to promote healing post-tx  · individualized calorie, protein and fluid daily goals      All questions and concerns addressed during todays visit  Jame Peoples has RD contact information      Nutrition Diagnosis:  · Inadequate Energy Intake related to physiological causes, disease state and treatment related issues as evidenced by food recall, wt loss and discussion with pt and/or family  · Increased Nutrient Needs (kcal & pro) related to increased demand for nutrients and disease state as evidenced by cancer dx and pt undergoing tx for cancer  · Patient has clinical indicators (or ASPEN criteria) consistent with severe protein-calorie malnutrition in the context of Chronic Illness as evidenced by >5% wt loss in 1 month, </=75% energy intake vs  Estimated needs for >/=1 month and severe body fat depletion (Triceps) - space between folds/fingers  Intervention & Recommendations:  Topics addressed: Nutrition education, Balance/Variety, Meals & Snacks, Meal planning, Choosing high protein meals/snacks, Meal pattern: eating small/frequent meals (every 2-3 hours), Nutrition Symptom Management, Adequate Hydration, Medical Food Supplements, Nutrition-Related Medication Management and Weight Management    Barriers: None  Readiness to change: action  Comprehension: verbalizes understanding  Expected Compliance: good    Materials Provided: Orgain samples, Orgain coupons and [employer] samples    Monitoring & Evaluation:  Dietitian to Monitor: Food and Nutrition Intake, Nutrtion Impact Symptoms, Body Weight and Biochemical Data     Goals:  · weight stabilization  · adequate nutrition related symptom management  · pt to meet >/=75% estimated nutrition needs daily  · 5-6 Ensure Enlive OR Botkins instant breakfast mized with 11 oz Orgain     · Progress Towards Goals: Progressing    Nutrition Rx & Recommendations:  · Diet: Soft and moist foods, high calorie, high protein  · Nutrition Supplements: 5-6 Ensure Enlive OR Botkins Instant Breakfast mixed with 11 oz Orgain  May use homemade shakes/smoothies as desired  If using a pre-made shake/bar, choose ones with >300 calories and >10 grams protein (ex  Ensure Enlive, Ensure Plus, Boost Plus, Boost Very High Calorie, etc )    · Small, frequent meals/snacks may be easier to tolerate than 3 large daily meals  Aim for 5-6 small meals per day  · Include protein at all meals/snacks  · Include a variety of foods (as tolerated/allowed by diet)  · Stay hydrated by sipping fluids of choice/tolerance throughout the day  At least 64 oz non caffeinated beverages daily  · Liquid nutrition may be better tolerated than solids at times  · Alter food choices and eating patterns to accommodate changing needs  · Refer to Eating Hints book for other meal/snack ideas and symptom management  · Avoid spicy, acidic, sharp/hard/crunchy foods & carbonated beverages as needed  · Follow proper oral care; Try baking soda/salt water rinse recipe (mix 3/4 tsp salt + 1 tsp baking soda + 1 qt water; rinse with pain water after using) in Eating Hints book (pg 18)  Brush your teeth before/after meals & before bed  · For lack of taste: Practice good oral care  Blend fresh fruits into shakes, ice cream or yogurt  Eat frozen fruits: grapes, chopped cantaloupe, watermelon  Select fresh vegetables (may be more appealing than canned/frozen)  Add extra flavor to foods: experiment with spices, salt & sweeteners, extra oil/butter; marinate meats (see pages 29-30 in your Eating Hints book)  · For appetite loss: try powdered or liquid nutrition supplements; eating by the clock every 2-3 hours; set a timer to remind yourself to eat, keep snacks nearby; add extra protein & calories to your diet; drink liquids in between meals, choose liquids that add calories; eat a bedtime snack; eat soft, cool or frozen foods; eat a larger meal when you feel well & rested; only sip small amounts of liquids during meals (see pages 10-12 in your Eating Hints book)    · For sore mouth/throat: choose foods that are easy to chew/swallow; cook foods until they are soft/tender; moisten & soften foods (gravy/sauce/broth/yogurt); cut food into small pieces; drink with a straw (as tolerated); eat with a very small spoon; eat cold or room temperature food; suck on ice chips; Avoid citrus, spicy foods, tomatoes/ketchup, salty foods, raw vegetables, sharp/crunchy/hard foods & alcohol (see pages 23-28 in your Eating Hints book)  · For trouble swallowing: eat 5-6 small meals/day; choose foods that are easy to swallow; choose foods that are high in protein & calories; cook foods until they are soft/tender; cut food into small pieces; moisten & soften foods (gravy/sauce/broth/yogurt); sip drinks through a straw (as tolerated); avoid foods/drinks that can burn/scrape your throat (hot temperatures, spicy foods, acidic foods, sharp/hard/crunchy foods, alcohol); talk to your doctor about a Speech Therapy referral for a swallowing evaluation (see page 26-28 in your Eating Hints book)  · For dry mouth: sip water throughout the day; try very sweet drinks; chew gum or suck on hard candy, popsicles, & ice chips; eat easy to swallow foods (such as pureed cooked foods or soups); moisten food with sauce/gravy/salad dressing; do not drink beer, wine, or any type of alcohol; keep lips moist with lip balm; avoid tobacco products & second-hand smoke; try Biotene as needed (see pages 17-18 in your Eating Hints book)  Follow proper oral care; Try baking soda/salt water rinse recipe (mix 3/4 tsp salt + 1 tsp baking soda + 1 qt water; rinse with pain water after using) in Eating Hints book (pg 18)  Brush your teeth before/after meals & before bed  · For thick mucous: make sure you are staying well hydrated (>64 oz/day), try swishing and spitting with plain carbonated water (unless you have a sore mouth), talk to your doctor about trying Mucinex  · Weigh yourself regularly  If you notice weight loss, make an effort to increase your daily food/calorie intake  If you continue to notice loss after these efforts, reach out to your dietitian to establish a plan to stabilize weight    · High calorie foods to try:  peanut butter, cheese, whole milk, avocado, cream soups  (see pages 52-53 in your Eating Hints book)  · High protein foods to try: peanut butter, cottage cheese, greek yogurt, tuna/chicken salad, eggs    (see pages 49-51 in your Eating Hints book)  · Soft/easy to swallow foods to try: jello, greek yogurt, scrambled eggs, mashed potatoes     Follow Up Plan: 8/14/19 phone visit @11am   Recommend Referral to Other Providers: none at this time

## 2019-07-29 ENCOUNTER — APPOINTMENT (OUTPATIENT)
Dept: RADIATION ONCOLOGY | Facility: CLINIC | Age: 69
End: 2019-07-29
Attending: RADIOLOGY
Payer: COMMERCIAL

## 2019-07-29 DIAGNOSIS — C01 CANCER OF BASE OF TONGUE (HCC): Primary | ICD-10-CM

## 2019-07-29 PROCEDURE — 77336 RADIATION PHYSICS CONSULT: CPT | Performed by: RADIOLOGY

## 2019-07-29 PROCEDURE — 77386 HB NTSTY MODUL RAD TX DLVR CPLX: CPT | Performed by: RADIOLOGY

## 2019-07-30 ENCOUNTER — HOSPITAL ENCOUNTER (OUTPATIENT)
Dept: INFUSION CENTER | Facility: HOSPITAL | Age: 69
Discharge: HOME | End: 2019-07-30
Payer: COMMERCIAL

## 2019-07-30 ENCOUNTER — NUTRITION (OUTPATIENT)
Dept: NUTRITION | Facility: CLINIC | Age: 69
End: 2019-07-30

## 2019-07-30 ENCOUNTER — HOSPITAL ENCOUNTER (OUTPATIENT)
Dept: INFUSION CENTER | Facility: CLINIC | Age: 69
Discharge: HOME | End: 2019-07-30
Payer: COMMERCIAL

## 2019-07-30 ENCOUNTER — HOSPITAL ENCOUNTER (OUTPATIENT)
Dept: INFUSION CENTER | Facility: CLINIC | Age: 69
End: 2019-07-30

## 2019-07-30 VITALS
RESPIRATION RATE: 18 BRPM | HEART RATE: 73 BPM | BODY MASS INDEX: 19.47 KG/M2 | HEIGHT: 65 IN | WEIGHT: 116.84 LBS | SYSTOLIC BLOOD PRESSURE: 112 MMHG | DIASTOLIC BLOOD PRESSURE: 68 MMHG | TEMPERATURE: 97.7 F

## 2019-07-30 DIAGNOSIS — C01 CANCER OF BASE OF TONGUE (HCC): Primary | ICD-10-CM

## 2019-07-30 DIAGNOSIS — C01 MALIGNANT NEOPLASM OF BASE OF TONGUE (HCC): Primary | ICD-10-CM

## 2019-07-30 DIAGNOSIS — Z51.11 ENCOUNTER FOR CHEMOTHERAPY MANAGEMENT: ICD-10-CM

## 2019-07-30 DIAGNOSIS — E86.0 DEHYDRATION: Primary | ICD-10-CM

## 2019-07-30 DIAGNOSIS — Z71.3 NUTRITIONAL COUNSELING: Primary | ICD-10-CM

## 2019-07-30 DIAGNOSIS — C01 CANCER OF BASE OF TONGUE (HCC): ICD-10-CM

## 2019-07-30 LAB
ALBUMIN SERPL BCP-MCNC: 3.4 G/DL (ref 3.5–5.7)
ALP SERPL-CCNC: 61 U/L (ref 46–116)
ALT SERPL W P-5'-P-CCNC: 11 U/L (ref 12–78)
ANION GAP SERPL CALCULATED.3IONS-SCNC: 8 MMOL/L (ref 4–13)
AST SERPL W P-5'-P-CCNC: 20 U/L (ref 5–45)
BASOPHILS # BLD AUTO: 0.02 THOUSANDS/ΜL (ref 0–0.1)
BASOPHILS NFR BLD AUTO: 0 % (ref 0–1)
BILIRUB SERPL-MCNC: 0.7 MG/DL (ref 0.2–1)
BUN SERPL-MCNC: 10 MG/DL (ref 5–25)
CALCIUM SERPL-MCNC: 9.8 MG/DL (ref 8.3–10.1)
CHLORIDE SERPL-SCNC: 104 MMOL/L (ref 98–108)
CO2 SERPL-SCNC: 30 MMOL/L (ref 21–32)
CREAT SERPL-MCNC: 0.8 MG/DL (ref 0.6–1.3)
EOSINOPHIL # BLD AUTO: 0.08 THOUSAND/ΜL (ref 0–0.61)
EOSINOPHIL NFR BLD AUTO: 1 % (ref 0–6)
ERYTHROCYTE [DISTWIDTH] IN BLOOD BY AUTOMATED COUNT: 14.1 % (ref 11.6–15.1)
GFR SERPL CREATININE-BSD FRML MDRD: 74 ML/MIN/1.73SQ M
GLUCOSE SERPL-MCNC: 94 MG/DL (ref 65–140)
HCT VFR BLD AUTO: 28.9 % (ref 34.8–46.1)
HGB BLD-MCNC: 9.9 G/DL (ref 11.5–15.4)
LYMPHOCYTES # BLD AUTO: 0.35 THOUSANDS/ΜL (ref 0.6–4.47)
LYMPHOCYTES NFR BLD AUTO: 6 % (ref 14–44)
MAGNESIUM SERPL-MCNC: 2.1 MG/DL (ref 1.6–2.6)
MCH RBC QN AUTO: 32.9 PG (ref 26.8–34.3)
MCHC RBC AUTO-ENTMCNC: 34.3 G/DL (ref 31.4–37.4)
MCV RBC AUTO: 96 FL (ref 82–98)
MONOCYTES # BLD AUTO: 0.38 THOUSAND/ΜL (ref 0.17–1.22)
MONOCYTES NFR BLD AUTO: 7 % (ref 4–12)
NEUTROPHILS # BLD AUTO: 4.78 THOUSANDS/ΜL (ref 1.85–7.62)
NEUTS SEG NFR BLD AUTO: 86 % (ref 43–75)
PLATELET # BLD AUTO: 207 THOUSANDS/UL (ref 149–390)
PMV BLD AUTO: 8.6 FL (ref 8.9–12.7)
POTASSIUM SERPL-SCNC: 3.9 MMOL/L (ref 3.5–5.3)
PROT SERPL-MCNC: 6.5 G/DL (ref 6.4–8.2)
RBC # BLD AUTO: 3.01 MILLION/UL (ref 3.81–5.12)
SODIUM SERPL-SCNC: 142 MMOL/L (ref 136–145)
WBC # BLD AUTO: 5.61 THOUSAND/UL (ref 4.31–10.16)

## 2019-07-30 PROCEDURE — 36569 INSJ PICC 5 YR+ W/O IMAGING: CPT

## 2019-07-30 PROCEDURE — 83735 ASSAY OF MAGNESIUM: CPT | Performed by: INTERNAL MEDICINE

## 2019-07-30 PROCEDURE — 85025 COMPLETE CBC W/AUTO DIFF WBC: CPT | Performed by: INTERNAL MEDICINE

## 2019-07-30 PROCEDURE — 96367 TX/PROPH/DG ADDL SEQ IV INF: CPT

## 2019-07-30 PROCEDURE — 36593 DECLOT VASCULAR DEVICE: CPT

## 2019-07-30 PROCEDURE — G0498 CHEMO EXTEND IV INFUS W/PUMP: HCPCS

## 2019-07-30 PROCEDURE — 80053 COMPREHEN METABOLIC PANEL: CPT | Performed by: INTERNAL MEDICINE

## 2019-07-30 PROCEDURE — 96413 CHEMO IV INFUSION 1 HR: CPT

## 2019-07-30 RX ORDER — SODIUM CHLORIDE 9 MG/ML
20 INJECTION, SOLUTION INTRAVENOUS ONCE
Status: COMPLETED | OUTPATIENT
Start: 2019-07-30 | End: 2019-07-30

## 2019-07-30 RX ADMIN — SODIUM CHLORIDE 20 ML/HR: 0.9 INJECTION, SOLUTION INTRAVENOUS at 12:42

## 2019-07-30 RX ADMIN — SODIUM CHLORIDE 150 MG: 0.9 INJECTION, SOLUTION INTRAVENOUS at 13:09

## 2019-07-30 RX ADMIN — ALTEPLASE 2 MG: 2.2 INJECTION, POWDER, LYOPHILIZED, FOR SOLUTION INTRAVENOUS at 13:30

## 2019-07-30 RX ADMIN — CARBOPLATIN 115.5 MG: 10 INJECTION, SOLUTION INTRAVENOUS at 13:43

## 2019-07-30 RX ADMIN — DEXAMETHASONE SODIUM PHOSPHATE: 10 INJECTION, SOLUTION INTRAMUSCULAR; INTRAVENOUS at 12:43

## 2019-07-30 NOTE — PROCEDURES
PICC Line Insertion  Date/Time: 7/30/2019 10:28 AM  Performed by: Sal Damian RN  Authorized by: John Medrano MD     Patient location:  Other (comment) (infusion center)  Other Assisting Provider: Yes (comment) ([facility] inf tech)    Consent:     Consent obtained:  Written    Consent given by:  Patient    Procedural risks discussed: consent obtained by physician  [place] protocol:     Procedure explained and questions answered to patient or proxy's satisfaction: yes      Relevant documents present and verified: yes      Test results available and properly labeled: yes      Radiology Images displayed and confirmed  If images not available, report reviewed: yes      Required blood products, implants, devices, and special equipment available: yes      Site/side marked: yes      Immediately prior to procedure, a time out was called: yes      Patient identity confirmed:  Verbally with patient  Pre-procedure details:     Hand hygiene: Hand hygiene performed prior to insertion      Skin preparation:  ChloraPrep    Skin preparation agent: Skin preparation agent completely dried prior to procedure    Indications:     PICC line indications: chemotherapy    Anesthesia (see MAR for exact dosages):      Anesthesia method:  Local infiltration    Local anesthetic:  Lidocaine 1% w/o epi (2ml)  Procedure details:     Location:  [place]    Vessel type: vein      Laterality:  Right    Approach: percutaneous technique used      Patient position:  Flat    Procedural supplies:  Double lumen    Catheter size:  5 Fr    Landmarks identified: yes      Ultrasound guidance: yes      Sterile ultrasound techniques: Sterile gel and sterile probe covers were used      Number of attempts:  1    Successful placement: yes      Vessel of catheter tip end:  [place]lock 3CG confirmed    Total catheter length (cm):  39    Catheter out on skin (cm):  0    Max flow rate:  999    Arm circumference:  23  Post-procedure details: Post-procedure:  Securement device placed and dressing applied    Assessment:  Blood return through all ports and free fluid flow    Post-procedure complications: none      Patient tolerance of procedure:   Tolerated well, no immediate complications

## 2019-07-30 NOTE — PLAN OF CARE
Problem: Potential for Falls  Goal: Patient will remain free of falls  Description  INTERVENTIONS:  - Assess patient frequently for physical needs  -  Identify cognitive and physical deficits and behaviors that affect risk of falls    -  [place] fall precautions as indicated by assessment   - Educate patient/family on patient safety including physical limitations  - Instruct patient to call for assistance with activity based on assessment  - Modify environment to reduce risk of injury  - Consider OT/PT consult to assist with strengthening/mobility  Outcome: Progressing

## 2019-07-30 NOTE — PROGRESS NOTES
Cth-rigoberto 2mg to purple port of PICC r/t no blood return, after repositioning several times with attempting to flush in between

## 2019-07-30 NOTE — PATIENT INSTRUCTIONS
Nutrition Rx & Recommendations:  · Diet: Soft and moist foods, high calorie, high protein  · Nutrition Supplements: 5-6 Ensure Enlive OR [place] Instant Breakfast mixed with 11 oz [place]  May use homemade shakes/smoothies as desired  If using a pre-made shake/bar, choose ones with >300 calories and >10 grams protein (ex  Ensure Enlive, Ensure Plus, Boost Plus, Boost Very High Calorie, etc )  · Small, frequent meals/snacks may be easier to tolerate than 3 large daily meals  Aim for 5-6 small meals per day  · Include protein at all meals/snacks  · Include a variety of foods (as tolerated/allowed by diet)  · Stay hydrated by sipping fluids of choice/tolerance throughout the day  At least 64 oz non caffeinated beverages daily  · Liquid nutrition may be better tolerated than solids at times  · Alter food choices and eating patterns to accommodate changing needs  · Refer to Eating Hints book for other meal/snack ideas and symptom management  · Avoid spicy, acidic, sharp/hard/crunchy foods & carbonated beverages as needed  · Follow proper oral care; Try baking soda/salt water rinse recipe (mix 3/4 tsp salt + 1 tsp baking soda + 1 qt water; rinse with pain water after using) in Eating Hints book (pg 18)  Brush your teeth before/after meals & before bed  · For lack of taste: Practice good oral care  Blend fresh fruits into shakes, ice cream or yogurt  Eat frozen fruits: grapes, chopped cantaloupe, watermelon  Select fresh vegetables (may be more appealing than canned/frozen)  Add extra flavor to foods: experiment with spices, salt & sweeteners, extra oil/butter; marinate meats (see pages 29-30 in your Eating Hints book)    · For appetite loss: try powdered or liquid nutrition supplements; eating by the clock every 2-3 hours; set a timer to remind yourself to eat, keep snacks nearby; add extra protein & calories to your diet; drink liquids in between meals, choose liquids that add calories; eat a bedtime snack; eat soft, cool or frozen foods; eat a larger meal when you feel well & rested; only sip small amounts of liquids during meals (see pages 10-12 in your Eating Hints book)  · For sore mouth/throat: choose foods that are easy to chew/swallow; cook foods until they are soft/tender; moisten & soften foods (gravy/sauce/broth/yogurt); cut food into small pieces; drink with a straw (as tolerated); eat with a very small spoon; eat cold or room temperature food; suck on ice chips; Avoid citrus, spicy foods, tomatoes/ketchup, salty foods, raw vegetables, sharp/crunchy/hard foods & alcohol (see pages 23-28 in your Eating Hints book)  · For trouble swallowing: eat 5-6 small meals/day; choose foods that are easy to swallow; choose foods that are high in protein & calories; cook foods until they are soft/tender; cut food into small pieces; moisten & soften foods (gravy/sauce/broth/yogurt); sip drinks through a straw (as tolerated); avoid foods/drinks that can burn/scrape your throat (hot temperatures, spicy foods, acidic foods, sharp/hard/crunchy foods, alcohol); talk to your doctor about a Speech Therapy referral for a swallowing evaluation (see page 26-28 in your Eating Hints book)  · For dry mouth: sip water throughout the day; try very sweet drinks; chew gum or suck on hard candy, popsicles, & ice chips; eat easy to swallow foods (such as pureed cooked foods or soups); moisten food with sauce/gravy/salad dressing; do not drink beer, wine, or any type of alcohol; keep lips moist with lip balm; avoid tobacco products & second-hand smoke; try Biotene as needed (see pages 17-18 in your Eating Hints book)  Follow proper oral care; Try baking soda/salt water rinse recipe (mix 3/4 tsp salt + 1 tsp baking soda + 1 qt water; rinse with pain water after using) in Eating Hints book (pg 18)  Brush your teeth before/after meals & before bed    · For thick mucous: make sure you are staying well hydrated (>64 oz/day), try swishing and spitting with plain carbonated water (unless you have a sore mouth), talk to your doctor about trying Mucinex  · Weigh yourself regularly  If you notice weight loss, make an effort to increase your daily food/calorie intake  If you continue to notice loss after these efforts, reach out to your dietitian to establish a plan to stabilize weight  · High calorie foods to try:  peanut butter, cheese, whole milk, avocado, cream soups  (see pages 52-53 in your Eating Hints book)  · High protein foods to try: peanut butter, cottage cheese, greek yogurt, tuna/chicken salad, eggs    (see pages 49-51 in your Eating Hints book)  · Soft/easy to swallow foods to try: jello, greek yogurt, scrambled eggs, mashed potatoes     Follow Up Plan: 8/14/19 phone visit @11am   Recommend Referral to Other Providers: none at this time

## 2019-07-30 NOTE — PROGRESS NOTES
Cath-rigoberto effectual in purple port  Both ports patent with blood return noted  Tolerated chemo well, will return 7/31 for day #2 carboplatin

## 2019-07-31 ENCOUNTER — HOSPITAL ENCOUNTER (OUTPATIENT)
Dept: INFUSION CENTER | Facility: CLINIC | Age: 69
Discharge: HOME | End: 2019-07-31
Payer: COMMERCIAL

## 2019-07-31 ENCOUNTER — APPOINTMENT (OUTPATIENT)
Dept: RADIATION ONCOLOGY | Facility: CLINIC | Age: 69
End: 2019-07-31
Attending: RADIOLOGY
Payer: COMMERCIAL

## 2019-07-31 VITALS
HEIGHT: 65 IN | HEART RATE: 61 BPM | WEIGHT: 116.84 LBS | BODY MASS INDEX: 19.47 KG/M2 | DIASTOLIC BLOOD PRESSURE: 67 MMHG | TEMPERATURE: 97.2 F | RESPIRATION RATE: 18 BRPM | SYSTOLIC BLOOD PRESSURE: 103 MMHG

## 2019-07-31 DIAGNOSIS — Z51.11 ENCOUNTER FOR CHEMOTHERAPY MANAGEMENT: Primary | ICD-10-CM

## 2019-07-31 DIAGNOSIS — C01 CANCER OF BASE OF TONGUE (HCC): ICD-10-CM

## 2019-07-31 PROCEDURE — 96367 TX/PROPH/DG ADDL SEQ IV INF: CPT

## 2019-07-31 PROCEDURE — 96413 CHEMO IV INFUSION 1 HR: CPT

## 2019-07-31 RX ADMIN — CARBOPLATIN 115.5 MG: 10 INJECTION, SOLUTION INTRAVENOUS at 12:42

## 2019-07-31 RX ADMIN — DEXAMETHASONE SODIUM PHOSPHATE: 10 INJECTION, SOLUTION INTRAMUSCULAR; INTRAVENOUS at 12:17

## 2019-07-31 NOTE — PROGRESS NOTES
Pt  Tolerated Carboplatin without adverse event  5FU continues to infuse as ordered via cadd pump through Picc Line, red lumen  Carboplatin was infused through purple lumen and flushed with excellent blood return upon completion of chemotherapy  Future appointments scheduled  AVS provided

## 2019-07-31 NOTE — PLAN OF CARE
Problem: PAIN - ADULT  Goal: Verbalizes/displays adequate comfort level or baseline comfort level  Description  Interventions:  - Encourage patient to monitor pain and request assistance  - Assess pain using appropriate pain scale  - Administer analgesics based on type and severity of pain and evaluate response  - Implement non-pharmacological measures as appropriate and evaluate response  - Consider cultural and social influences on pain and pain management  - Notify physician/advanced practitioner if interventions unsuccessful or patient reports new pain  Outcome: Progressing     Problem: INFECTION - ADULT  Goal: Absence or prevention of progression during hospitalization  Description  INTERVENTIONS:  - Assess and monitor for signs and symptoms of infection  - Monitor lab/diagnostic results  - Monitor all insertion sites, i e  indwelling lines, tubes, and drains  - Monitor endotracheal (as able) and nasal secretions for changes in amount and color  - [place] appropriate cooling/warming therapies per order  - Administer medications as ordered  - Instruct and encourage patient and family to use good hand hygiene technique  - Identify and instruct in appropriate isolation precautions for identified infection/condition  Outcome: Progressing  Goal: Absence of fever/infection during neutropenic period  Description  INTERVENTIONS:  - Monitor WBC  - Implement neutropenic guidelines  Outcome: Progressing     Problem: Knowledge Deficit  Goal: Patient/family/caregiver demonstrates understanding of disease process, treatment plan, medications, and discharge instructions  Description  Complete learning assessment and assess knowledge base    Interventions:  - Provide teaching at level of understanding  - Provide teaching via preferred learning methods  Outcome: Progressing

## 2019-08-01 ENCOUNTER — HOSPITAL ENCOUNTER (OUTPATIENT)
Dept: INFUSION CENTER | Facility: CLINIC | Age: 69
Discharge: HOME | End: 2019-08-01
Payer: COMMERCIAL

## 2019-08-01 VITALS — BODY MASS INDEX: 19.47 KG/M2 | WEIGHT: 116.84 LBS | HEIGHT: 65 IN

## 2019-08-01 DIAGNOSIS — Z51.11 ENCOUNTER FOR CHEMOTHERAPY MANAGEMENT: ICD-10-CM

## 2019-08-01 DIAGNOSIS — C01 CANCER OF BASE OF TONGUE (HCC): ICD-10-CM

## 2019-08-01 DIAGNOSIS — E86.0 DEHYDRATION: Primary | ICD-10-CM

## 2019-08-01 PROCEDURE — 96367 TX/PROPH/DG ADDL SEQ IV INF: CPT

## 2019-08-01 PROCEDURE — 96413 CHEMO IV INFUSION 1 HR: CPT

## 2019-08-01 RX ORDER — SODIUM CHLORIDE 9 MG/ML
20 INJECTION, SOLUTION INTRAVENOUS CONTINUOUS
Status: DISCONTINUED | OUTPATIENT
Start: 2019-08-02 | End: 2019-08-05 | Stop reason: HOSPADM

## 2019-08-01 RX ORDER — SODIUM CHLORIDE 9 MG/ML
20 INJECTION, SOLUTION INTRAVENOUS CONTINUOUS
Status: DISCONTINUED | OUTPATIENT
Start: 2019-08-01 | End: 2019-08-04 | Stop reason: HOSPADM

## 2019-08-01 RX ADMIN — SODIUM CHLORIDE 20 ML/HR: 0.9 INJECTION, SOLUTION INTRAVENOUS at 12:05

## 2019-08-01 RX ADMIN — DEXAMETHASONE SODIUM PHOSPHATE: 10 INJECTION, SOLUTION INTRAMUSCULAR; INTRAVENOUS at 12:23

## 2019-08-01 RX ADMIN — CARBOPLATIN 115.5 MG: 10 INJECTION, SOLUTION INTRAVENOUS at 12:43

## 2019-08-01 NOTE — PROGRESS NOTES
Pt  Denied new symptoms or concerns today  5FU continuous infusion noted via cadd pump through red lumen of Picc Line  Pt  Tolerated carboplatin via purple lumen without adverse event  Future appointments reviewed  Pt  Will return 8/2/2019   Declined AVS

## 2019-08-01 NOTE — PLAN OF CARE
Problem: PAIN - ADULT  Goal: Verbalizes/displays adequate comfort level or baseline comfort level  Description  Interventions:  - Encourage patient to monitor pain and request assistance  - Assess pain using appropriate pain scale  - Administer analgesics based on type and severity of pain and evaluate response  - Implement non-pharmacological measures as appropriate and evaluate response  - Consider cultural and social influences on pain and pain management  - Notify physician/advanced practitioner if interventions unsuccessful or patient reports new pain  Outcome: Progressing     Problem: INFECTION - ADULT  Goal: Absence or prevention of progression during hospitalization  Description  INTERVENTIONS:  - Assess and monitor for signs and symptoms of infection  - Monitor lab/diagnostic results  - Monitor all insertion sites, i e  indwelling lines, tubes, and drains  - Monitor endotracheal (as able) and nasal secretions for changes in amount and color  - [place] appropriate cooling/warming therapies per order  - Administer medications as ordered  - Instruct and encourage patient and family to use good hand hygiene technique  - Identify and instruct in appropriate isolation precautions for identified infection/condition  Outcome: Progressing  Goal: Absence of fever/infection during neutropenic period  Description  INTERVENTIONS:  - Monitor WBC  - Implement neutropenic guidelines  Outcome: Progressing     Problem: Knowledge Deficit  Goal: Patient/family/caregiver demonstrates understanding of disease process, treatment plan, medications, and discharge instructions  Description  Complete learning assessment and assess knowledge base    Interventions:  - Provide teaching at level of understanding  - Provide teaching via preferred learning methods  Outcome: Progressing

## 2019-08-02 ENCOUNTER — DOCUMENTATION (OUTPATIENT)
Dept: HEMATOLOGY ONCOLOGY | Facility: CLINIC | Age: 69
End: 2019-08-02

## 2019-08-02 ENCOUNTER — HOSPITAL ENCOUNTER (OUTPATIENT)
Dept: INFUSION CENTER | Facility: CLINIC | Age: 69
Discharge: HOME | End: 2019-08-02
Payer: COMMERCIAL

## 2019-08-02 VITALS
RESPIRATION RATE: 18 BRPM | WEIGHT: 116.84 LBS | HEART RATE: 70 BPM | SYSTOLIC BLOOD PRESSURE: 102 MMHG | BODY MASS INDEX: 19.47 KG/M2 | TEMPERATURE: 97.5 F | DIASTOLIC BLOOD PRESSURE: 68 MMHG | HEIGHT: 65 IN

## 2019-08-02 DIAGNOSIS — C01 CANCER OF BASE OF TONGUE (HCC): ICD-10-CM

## 2019-08-02 DIAGNOSIS — Z51.11 ENCOUNTER FOR CHEMOTHERAPY MANAGEMENT: Primary | ICD-10-CM

## 2019-08-02 PROCEDURE — 96367 TX/PROPH/DG ADDL SEQ IV INF: CPT

## 2019-08-02 PROCEDURE — 96413 CHEMO IV INFUSION 1 HR: CPT

## 2019-08-02 RX ADMIN — SODIUM CHLORIDE 20 ML/HR: 0.9 INJECTION, SOLUTION INTRAVENOUS at 12:21

## 2019-08-02 RX ADMIN — DEXAMETHASONE SODIUM PHOSPHATE: 10 INJECTION, SOLUTION INTRAMUSCULAR; INTRAVENOUS at 12:21

## 2019-08-02 RX ADMIN — CARBOPLATIN 115.5 MG: 10 INJECTION, SOLUTION INTRAVENOUS at 12:48

## 2019-08-02 NOTE — PROGRESS NOTES
Pt here for day 4, cycle 2 chemotherapy  Pt received Carboplatin as ordered and tolerated well  96hour 5FU continues to infuse via PICC line and CADD pump  Infusion will be complete tomorrow at 1500  At that time, PICC line is due for removal  Edgar Huerta RN with Dr Kwaku Malik notified that we need an order for PICC line to be removed  She will obtain order from Dr Kwaku Malik and place in [place]  Pt has appts scheduled next week for prn hydration  At this time, she does not think she will need/want these appts but we have left them on the schedule just in case pt's decides otherwise  Pt left unit in stable condition, without question or concern

## 2019-08-02 NOTE — PLAN OF CARE
Problem: Potential for Falls  Goal: Patient will remain free of falls  Description  INTERVENTIONS:  - Assess patient frequently for physical needs  -  Identify cognitive and physical deficits and behaviors that affect risk of falls    -  [place] fall precautions as indicated by assessment   - Educate patient/family on patient safety including physical limitations  - Instruct patient to call for assistance with activity based on assessment  - Modify environment to reduce risk of injury  - Consider OT/PT consult to assist with strengthening/mobility  Outcome: Progressing

## 2019-08-02 NOTE — PROGRESS NOTES
Met with Angy Blanco in the infusion center  Overall she is feeling well  No has Silvadene for her neck which she says is working well for her  She was able to get her [place] paperwork completed  She has no other questions or concerns at this time  She will call if anything changes

## 2019-08-03 ENCOUNTER — HOSPITAL ENCOUNTER (OUTPATIENT)
Dept: INFUSION CENTER | Facility: HOSPITAL | Age: 69
Discharge: HOME | End: 2019-08-03

## 2019-08-03 DIAGNOSIS — C01 CANCER OF BASE OF TONGUE (HCC): ICD-10-CM

## 2019-08-03 DIAGNOSIS — Z51.11 ENCOUNTER FOR CHEMOTHERAPY MANAGEMENT: Primary | ICD-10-CM

## 2019-08-03 NOTE — PROGRESS NOTES
Pt tolerated PICC removal without incident, removed by GAIL Santos, RN  Pressure held for several minutes, pressure dressing/coband placed, pt aware to leave dressing in place for 24 hours

## 2019-08-03 NOTE — PROGRESS NOTES
Pt here for CADD disconnect, pump has reservoir volume of 3 1 ml with run time of 1 ml/hr  Paged Dr Justin Berger, [facility] to disconnect pump with remaining volume, verbal order taken

## 2019-08-06 ENCOUNTER — HOSPITAL ENCOUNTER (OUTPATIENT)
Dept: INFUSION CENTER | Facility: CLINIC | Age: 69
Discharge: HOME | End: 2019-08-06
Payer: COMMERCIAL

## 2019-08-06 VITALS
SYSTOLIC BLOOD PRESSURE: 99 MMHG | TEMPERATURE: 98 F | DIASTOLIC BLOOD PRESSURE: 71 MMHG | RESPIRATION RATE: 18 BRPM | HEART RATE: 97 BPM

## 2019-08-06 DIAGNOSIS — E86.0 DEHYDRATION: Primary | ICD-10-CM

## 2019-08-06 PROCEDURE — 96360 HYDRATION IV INFUSION INIT: CPT

## 2019-08-06 RX ADMIN — SODIUM CHLORIDE 1000 ML: 0.9 INJECTION, SOLUTION INTRAVENOUS at 12:10

## 2019-08-08 ENCOUNTER — HOSPITAL ENCOUNTER (OUTPATIENT)
Dept: INFUSION CENTER | Facility: CLINIC | Age: 69
Discharge: HOME | End: 2019-08-08
Payer: COMMERCIAL

## 2019-08-08 VITALS
RESPIRATION RATE: 18 BRPM | WEIGHT: 115 LBS | TEMPERATURE: 99.3 F | BODY MASS INDEX: 19.14 KG/M2 | HEART RATE: 90 BPM | SYSTOLIC BLOOD PRESSURE: 117 MMHG | DIASTOLIC BLOOD PRESSURE: 72 MMHG

## 2019-08-08 DIAGNOSIS — E86.0 DEHYDRATION: Primary | ICD-10-CM

## 2019-08-08 PROCEDURE — 96360 HYDRATION IV INFUSION INIT: CPT

## 2019-08-08 RX ADMIN — SODIUM CHLORIDE 1000 ML: 0.9 INJECTION, SOLUTION INTRAVENOUS at 13:25

## 2019-08-08 NOTE — PLAN OF CARE
Problem: Potential for Falls  Goal: Patient will remain free of falls  Description  INTERVENTIONS:  - Assess patient frequently for physical needs  -  Identify cognitive and physical deficits and behaviors that affect risk of falls    -  [place] fall precautions as indicated by assessment   - Educate patient/family on patient safety including physical limitations  - Instruct patient to call for assistance with activity based on assessment  - Modify environment to reduce risk of injury  - Consider OT/PT consult to assist with strengthening/mobility  Outcome: Progressing

## 2019-08-08 NOTE — PROGRESS NOTES
Patient arrived to the unit reporting that it is difficult to swallow and drink  I will contact radiation for her since she is having trouble talking  Patient tolerated her iv hydration well without adverse reaction

## 2019-08-09 ENCOUNTER — OFFICE VISIT (OUTPATIENT)
Dept: HEMATOLOGY ONCOLOGY | Facility: CLINIC | Age: 69
End: 2019-08-09
Payer: COMMERCIAL

## 2019-08-09 ENCOUNTER — APPOINTMENT (OUTPATIENT)
Dept: LAB | Facility: HOSPITAL | Age: 69
End: 2019-08-09
Payer: COMMERCIAL

## 2019-08-09 VITALS
DIASTOLIC BLOOD PRESSURE: 62 MMHG | HEIGHT: 65 IN | HEART RATE: 87 BPM | OXYGEN SATURATION: 100 % | BODY MASS INDEX: 19.09 KG/M2 | RESPIRATION RATE: 16 BRPM | WEIGHT: 114.6 LBS | SYSTOLIC BLOOD PRESSURE: 110 MMHG | TEMPERATURE: 96.9 F

## 2019-08-09 DIAGNOSIS — C01 CANCER OF BASE OF TONGUE (HCC): ICD-10-CM

## 2019-08-09 DIAGNOSIS — E87.6 HYPOKALEMIA: ICD-10-CM

## 2019-08-09 DIAGNOSIS — C01 CANCER OF BASE OF TONGUE (HCC): Primary | ICD-10-CM

## 2019-08-09 LAB
ALBUMIN SERPL BCP-MCNC: 3.8 G/DL (ref 3–5.2)
ALP SERPL-CCNC: 61 U/L (ref 43–122)
ALT SERPL W P-5'-P-CCNC: 16 U/L (ref 9–52)
ANION GAP SERPL CALCULATED.3IONS-SCNC: 8 MMOL/L (ref 5–14)
ANISOCYTOSIS BLD QL SMEAR: PRESENT
AST SERPL W P-5'-P-CCNC: 24 U/L (ref 14–36)
BASOPHILS # BLD AUTO: 0.03 THOUSAND/UL (ref 0–0.1)
BASOPHILS NFR MAR MANUAL: 1 % (ref 0–1)
BILIRUB SERPL-MCNC: 0.5 MG/DL
BUN SERPL-MCNC: 10 MG/DL (ref 5–25)
CALCIUM SERPL-MCNC: 9.5 MG/DL (ref 8.4–10.2)
CHLORIDE SERPL-SCNC: 100 MMOL/L (ref 97–108)
CO2 SERPL-SCNC: 31 MMOL/L (ref 22–30)
CREAT SERPL-MCNC: 0.68 MG/DL (ref 0.6–1.2)
EOSINOPHIL # BLD AUTO: 0.03 THOUSAND/UL (ref 0–0.4)
EOSINOPHIL NFR BLD MANUAL: 1 % (ref 0–6)
ERYTHROCYTE [DISTWIDTH] IN BLOOD BY AUTOMATED COUNT: 16.1 %
FERRITIN SERPL-MCNC: 458 NG/ML (ref 8–388)
GFR SERPL CREATININE-BSD FRML MDRD: 88 ML/MIN/1.73SQ M
GLUCOSE SERPL-MCNC: 115 MG/DL (ref 70–99)
HCT VFR BLD AUTO: 27.9 % (ref 36–46)
HGB BLD-MCNC: 9.5 G/DL (ref 12–16)
IRON SATN MFR SERPL: 23 %
IRON SERPL-MCNC: 41 UG/DL (ref 50–170)
LYMPHOCYTES # BLD AUTO: 0.31 THOUSAND/UL (ref 0.5–4)
LYMPHOCYTES # BLD AUTO: 9 % (ref 25–45)
MAGNESIUM SERPL-MCNC: 1.9 MG/DL (ref 1.6–2.3)
MCH RBC QN AUTO: 33 PG (ref 26–34)
MCHC RBC AUTO-ENTMCNC: 34.1 G/DL (ref 31–36)
MCV RBC AUTO: 97 FL (ref 80–100)
MONOCYTES # BLD AUTO: 0.65 THOUSAND/UL (ref 0.2–0.9)
MONOCYTES NFR BLD AUTO: 19 % (ref 1–10)
NEUTS BAND NFR BLD MANUAL: 1 % (ref 0–8)
NEUTS SEG # BLD: 2.38 THOUSAND/UL (ref 1.8–7.8)
NEUTS SEG NFR BLD AUTO: 69 %
PLATELET # BLD AUTO: 213 THOUSANDS/UL (ref 150–450)
PLATELET BLD QL SMEAR: ADEQUATE
PMV BLD AUTO: 6.8 FL (ref 8.9–12.7)
POTASSIUM SERPL-SCNC: 3.4 MMOL/L (ref 3.6–5)
PROT SERPL-MCNC: 6.7 G/DL (ref 5.9–8.4)
RBC # BLD AUTO: 2.89 MILLION/UL (ref 4–5.2)
RBC MORPH BLD: ABNORMAL
SODIUM SERPL-SCNC: 139 MMOL/L (ref 137–147)
TIBC SERPL-MCNC: 178 UG/DL (ref 250–450)
TOTAL CELLS COUNTED SPEC: 100
VIT B12 SERPL-MCNC: 723 PG/ML (ref 100–900)
WBC # BLD AUTO: 3.4 THOUSAND/UL (ref 4.5–11)

## 2019-08-09 PROCEDURE — 85027 COMPLETE CBC AUTOMATED: CPT

## 2019-08-09 PROCEDURE — 99214 OFFICE O/P EST MOD 30 MIN: CPT | Performed by: NURSE PRACTITIONER

## 2019-08-09 PROCEDURE — 83735 ASSAY OF MAGNESIUM: CPT | Performed by: NURSE PRACTITIONER

## 2019-08-09 PROCEDURE — 85007 BL SMEAR W/DIFF WBC COUNT: CPT

## 2019-08-09 PROCEDURE — 80053 COMPREHEN METABOLIC PANEL: CPT

## 2019-08-09 PROCEDURE — 82728 ASSAY OF FERRITIN: CPT

## 2019-08-09 PROCEDURE — 82607 VITAMIN B-12: CPT

## 2019-08-09 PROCEDURE — 36415 COLL VENOUS BLD VENIPUNCTURE: CPT

## 2019-08-09 PROCEDURE — 83550 IRON BINDING TEST: CPT

## 2019-08-09 PROCEDURE — 83540 ASSAY OF IRON: CPT

## 2019-08-09 NOTE — PROGRESS NOTES
Hematology/Oncology Outpatient Follow-up  Maria M Welsh 68 y o  female [id number]    Date:  8/9/2019      Assessment and Plan:  1  Cancer of base of tongue ([facility] [address] )  The patient completed her concurrent radiation and chemotherapy- carboplatin and 5 FU last week and is currently recovering from the treatment  As expected she does have a lot of fatigue, dysphagia, mucositis, radiation skin changes  She was encouraged to rest and hydrate herself well  She will continue to drink her Ensure and [place] instant breakfast drinks for her nutrition  The patient was encouraged to continue to use her Magic mouthwash as needed along with the Mycelex Troches to prevent further oral candidiasis  We will continue patient's additional IV hydration twice a week for now until she is feeling better  Patient will follow up again in about 3 weeks with Dr Sarbjit Mireles to assess how she is progressing post treatment  She is aware to call us in the interim with any issues, questions or concerns  We will aim to get repeat imaging with either a PET-CT or CT scan around 12 weeks post treatment which will be due around mid October 2019, this will be scheduled at her next follow-up visit if not already ordered by Radiation Oncology  - CBC and differential; Future  - Comprehensive metabolic panel; Future  - Magnesium; Future  - Iron Panel (Includes Iron Saturation, Iron, and TIBC); Future  - Ferritin; Future  - Vitamin B12; Future  - CBC and differential; Future  - Comprehensive metabolic panel; Future  - Magnesium; Future     2  Hypokalemia  Patient has a mild hypokalemia 3 4 likely from poor p o  intake and recent chemotherapy  We will add some potassium to her IV fluids she is receiving twice a week  HPI:  The patient presents today for a follow-up visit  She completed her radiation therapy on 07/29/2019 and completed her final cycle of her the carboplatin+5 FU on 08/03/2019    Her final cycle of chemotherapy was delayed by 1 week due to a local infection to her left PICC line site which cleared up nicely after DC and 1 week's worth of Keflex  The tip of the catheter was cultured and was negative for any bacterial growth  The patient is recovering from her concurrent chemo radiation, she reports significant fatigue and sleeping a lot  She does reports dysphagia, sore throat, mucositis and frequent thick ropy pooling secretions in her throat  She has found that using toothettes to clean and remove the mucus from her oral cavity is very helpful  She continues to use her Mycelex Troches on a regular basis and her Magic mouthwash as needed for throat and mouth discomfort which is helpful  She is not able to tolerate solid foods and has no taste at this time  She is drinking 3 Ensure or [place] instant breakfast drinks daily and is hydrating herself with about 4 large glasses of water per day  The patient is also receiving additional IV hydration twice a week in the infusion center  She has lost about 10 lb since her last office visit about 3 weeks ago  Continues to have significant radiation dermatitis to her neck for which she is applying Silvadene t i d  and states that this has improved the skin irritation  The patient denies any dizziness or headaches but does admit to me that she took Benadryl 1 day last week at bedtime and got very dizzy to the point she almost passed out during the night when she got up to go to the bathroom  She has not had any more Benadryl with no further episodes of dizziness  The patient also mentions to me that 1 day last week she was extremely constipated and while straining on the toilet she got hot and then cold and almost passed out which was likely a vasovagal response  She has had no further episodes of this and is moving her bowels now without any difficulty  The patient states she has a follow-up appointment with Radiation Oncology on 08/30/2019 and ENT 09/16/2019      The patient did have some repeat laboratory studies after the visit today which showed WBC 3 4, ANC 2 38, H&H 9 5/27 9 platelet count 745  Her CMP and magnesium are within normal limits with the exception of a slightly lower than average potassium 3 4  Oncology History    This is a 77-year-old female without significant past medical history  She stated that she had a history of tobacco use and quit many years ago  She also admitted the consuming alcohol around 0 6 oz per week  Her father had a history of prostate cancer  The patient complained about right-sided throat pain in abnormal feeling with movement of the tongue  She also had right-sided ear pain for about 6 months which was treated with 2 courses of antibiotics without any improvement  She eventually was seen by Dr Yonathan Canales her ENT doctor who performed extensive evaluation including endoscopic evaluation which revealed a mass in the base of the tongue on the right side  A biopsy from the base of the tongue mass came back 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  The HPV status was negative  The patient then had a PET-CT scan on the 14th May 2019 which showed:  IMPRESSION:  1   Large hypermetabolic mass in the right oral cavity, right tonsillar region and right tongue base, approaching the level of the vallecula compatible with known malignancy  Involvement of the right soft palate is not excluded  2   Multiple hypermetabolic right cervical nodes as above concerning for metastases  3   Asymmetric enlargement of the right thyroid with associated FDG uptake   Further evaluation with ultrasound recommended  4   No hypermetabolic metastases in the chest abdomen pelvis  5   Aneurysmal ascending thoracic aorta measuring 4 2 x 4 3 cm       The estimated clinical stage is T2 N2 M0    The patient was then sent to the Radiation Oncology team and was seen by Dr Arnol Burrell on the 20th of May and sent to us for the consideration of concurrent chemoradiation      The patient otherwise is doing relatively well, she denied significant weight loss                      Cancer of base of tongue ([facility] [place] [address] )    5/3/2019 Biopsy     Right BOT biopsy  Biopsy + for SCC moderately differentiated,  Incompletely excised  6/10/2019 - 6/30/2019 Chemotherapy     CISplatin (PLATINOL) 174 mg, mannitol 12 5 g in sodium chloride 0 9 % 500 mL IVPB, 100 mg/m2 = 174 mg, Intravenous, Once, 1 of 3 cycles  Administration: 174 mg (6/10/2019)  potassium chloride 20 mEq, magnesium sulfate 1 g in sodium chloride 0 9 % 1,000 mL IVPB, , Intravenous, Once, 1 of 3 cycles  Administration:  (6/10/2019),  (6/10/2019)  (1 cycle only d/c'd due to significant hearing loss after 1 treatment)        7/1/2019 - 8/3/2019 Chemotherapy     CARBOplatin (PARAPLATIN) 115 5 mg in sodium chloride 0 9 % 250 mL IVPB, 70 mg/m2 = 115 5 mg (100 % of original dose 70 mg/m2), Intravenous, Once, 2 of 2 cycles  Dose modification: 70 mg/m2 (original dose 70 mg/m2, Cycle 1)  Administration: 115 5 mg (7/1/2019), 115 5 mg (7/2/2019), 115 5 mg (7/3/2019), 115 5 mg (7/5/2019), 115 5 mg (7/31/2019), 115 5 mg (8/1/2019), 115 5 mg (8/2/2019), 115 5 mg (7/30/2019)  (completed 2 cycles)           Interval history:    ROS: Review of Systems   Constitutional: Positive for appetite change, fatigue and unexpected weight change  Negative for activity change, chills and fever  HENT: Positive for mouth sores and trouble swallowing  Negative for congestion, nosebleeds and sore throat  Hearing loss: Improved  Rates the pain in her throat/tongue and roof of her mouth 9/10  Eyes: Negative  Respiratory: Positive for cough  Negative for chest tightness and shortness of breath  Cardiovascular: Negative for chest pain, palpitations and leg swelling  Gastrointestinal: Negative for abdominal distention, abdominal pain, blood in stool, constipation, diarrhea, nausea and vomiting     Genitourinary: Negative for difficulty urinating, dysuria, frequency, hematuria and urgency  Musculoskeletal: Negative for arthralgias, back pain, gait problem, joint swelling and myalgias  Skin: Positive for wound  Negative for color change, pallor and rash  Neurological: Positive for weakness  Negative for dizziness, light-headedness, numbness and headaches  Hematological: Negative for adenopathy  Does not bruise/bleed easily  Psychiatric/Behavioral: Positive for dysphoric mood and sleep disturbance  The patient is nervous/anxious  Past Medical History:   Diagnosis Date    Tongue cancer ([facility] [address] )     Squamous cell       Past Surgical History:   Procedure Laterality Date    APPENDECTOMY      CATARACT EXTRACTION Left     HYSTERECTOMY  2012    total    IR PICC LINE  2019    OOPHORECTOMY Bilateral 2012       Social History     Socioeconomic History    Marital status:      Spouse name: None    Number of children: None    Years of education: None    Highest education level: None   Occupational History    None   Social Needs    Financial resource strain: None    Food insecurity:     Worry: None     Inability: None    Transportation needs:     Medical: None     Non-medical: None   Tobacco Use    Smoking status: Former Smoker     Packs/day: 0 50     Years: 5 00     Pack years: 2 50     Types: Cigarettes     Last attempt to quit: 1978     Years since quittin 6    Smokeless tobacco: Never Used    Tobacco comment: no passive smoke exposure   Substance and Sexual Activity    Alcohol use:  Yes     Alcohol/week: 1 0 standard drinks     Types: 1 Glasses of wine per week     Frequency: 2-4 times a month     Drinks per session: 1 or 2     Binge frequency: Never     Comment: occasional    Drug use: Never    Sexual activity: None   Lifestyle    Physical activity:     Days per week: None     Minutes per session: None    Stress: None   Relationships    Social connections:     Talks on phone: None Gets together: None     Attends [employer] service: None     Active member of club or organization: None     Attends meetings of clubs or organizations: None     Relationship status: None    Intimate partner violence:     Fear of current or ex partner: None     Emotionally abused: None     Physically abused: None     Forced sexual activity: None   Other Topics Concern    None   Social History Narrative    None       Family History   Problem Relation Age of Onset    Rheum arthritis Sister     Hypothyroidism Sister     Hashimoto's thyroiditis Family     Prostate cancer Father     Parkinsonism Mother         Cardiac abnormality ([place])       Allergies   Allergen Reactions    Codeine Anaphylaxis         Current Outpatient Medications:     al mag oxide-diphenhydramine-lidocaine viscous (MAGIC MOUTHWASH) 1:1:1 suspension, Swish and swallow 10 mL every 4 (four) hours as needed for mouth pain or discomfort, Disp: 480 mL, Rfl: 4    clotrimazole (MYCELEX) 10 mg samantha, Take 1 tablet (10 mg total) by mouth 5 (five) times a day, Disp: 70 tablet, Rfl: 1    DENTAGEL 1 1 % GEL, AT BED AFTER BRUSHING AND FLOSSING APPLY 1 DROP OF GEL PER TOOTH LEAVE TRAYS IN MOUTH FOR 10 MINUTES, Disp: , Rfl: 11    Magnesium Cl-Calcium Carbonate (SLOW-MAG PO), Take by mouth as needed, Disp: , Rfl:     Melatonin 10 MG TABS, Take 1 tablet by mouth daily at bedtime as needed, Disp: , Rfl:     Multiple Vitamins-Minerals (PRESERVISION AREDS 2+MULTI VIT PO), Take by mouth, Disp: , Rfl:     OLANZapine (ZyPREXA) 10 mg tablet, Take 1 tablet (10 mg total) by mouth daily at bedtime Take for 3 days with chemotherapy/or as directed to prevent n/v, Disp: 12 tablet, Rfl: 3    omeprazole (PriLOSEC) 20 mg delayed release capsule, Take 1 capsule (20 mg total) by mouth daily, Disp: 30 capsule, Rfl: 3    Probiotic Product (PROBIOTIC DAILY PO), Take by mouth, Disp: , Rfl:     prochlorperazine (COMPAZINE) 10 mg tablet, Take 1 tablet (10 mg total) by mouth every 6 (six) hours as needed for nausea or vomiting, Disp: 60 tablet, Rfl: 0    scopolamine (TRANSDERM-SCOP) 1 5 mg/3 days TD 72 hr patch, Place 1 patch on the skin every third day, Disp: 10 patch, Rfl: 1    silver sulfadiazine (SILVADENE,SSD) 1 % cream, Apply topically 2 (two) times a day, Disp: 400 g, Rfl: 2  No current facility-administered medications for this visit  Physical Exam:  /62 (BP Location: Left arm, Cuff Size: Adult)   Pulse 87   Temp (!) 96 9 °F (36 1 °C) (Tympanic)   Resp 16   Ht 5' 5" (1 651 m)   Wt 52 kg (114 lb 9 6 oz)   SpO2 100%   BMI 19 07 kg/m²     Physical Exam   Constitutional: She is oriented to person, place, and time  No distress  Appears fatigued   HENT:   Head: Normocephalic and atraumatic  Mouth/Throat: Posterior oropharyngeal erythema present  Right posterior pharynx white/ulcerated   Eyes: Pupils are equal, round, and reactive to light  Conjunctivae are normal  No scleral icterus  Neck: Normal range of motion  Neck supple  No thyromegaly present  Cardiovascular: Normal rate, regular rhythm, normal heart sounds and intact distal pulses  No murmur heard  Pulmonary/Chest: Effort normal and breath sounds normal  No respiratory distress  Abdominal: Soft  Bowel sounds are normal  She exhibits no distension  There is no hepatosplenomegaly  There is no tenderness  Musculoskeletal: Normal range of motion  She exhibits no edema  Lymphadenopathy:     She has no cervical adenopathy  She has no axillary adenopathy  Neurological: She is alert and oriented to person, place, and time  Skin: Skin is warm and dry  No rash noted  She is not diaphoretic  There is erythema (To the mid and right neck, skin is intact, sloughing yellow eschar noted to the right neck and a small amount to the anterior neck)  No pallor  Psychiatric: Her behavior is normal  Judgment and thought content normal  She exhibits a depressed mood     Vitals reviewed  Labs:  Lab Results   Component Value Date    WBC 3 40 (L) 08/09/2019    HGB 9 5 (L) 08/09/2019    HCT 27 9 (L) 08/09/2019    MCV 97 08/09/2019     08/09/2019     Lab Results   Component Value Date     04/24/2018    K 3 4 (L) 08/09/2019     08/09/2019    CO2 31 (H) 08/09/2019    ANIONGAP 11 04/24/2018    BUN 10 08/09/2019    CREATININE 0 68 08/09/2019    GLUCOSE 85 04/24/2018    GLUF 99 05/28/2019    CALCIUM 9 5 08/09/2019    AST 24 08/09/2019    ALT 16 08/09/2019    ALKPHOS 61 08/09/2019    EGFR 88 08/09/2019         Patient voiced understanding and agreement in the above discussion  Aware to contact our office with questions/symptoms in the interim

## 2019-08-12 ENCOUNTER — TELEPHONE (OUTPATIENT)
Dept: HEMATOLOGY ONCOLOGY | Facility: CLINIC | Age: 69
End: 2019-08-12

## 2019-08-12 DIAGNOSIS — C01 CANCER OF BASE OF TONGUE (HCC): ICD-10-CM

## 2019-08-12 RX ORDER — PROCHLORPERAZINE MALEATE 10 MG
10 TABLET ORAL EVERY 6 HOURS PRN
Qty: 60 TABLET | Refills: 0 | Status: SHIPPED | OUTPATIENT
Start: 2019-08-12 | End: 2020-06-11

## 2019-08-13 ENCOUNTER — HOSPITAL ENCOUNTER (OUTPATIENT)
Dept: INFUSION CENTER | Facility: CLINIC | Age: 69
Discharge: HOME | End: 2019-08-13
Payer: COMMERCIAL

## 2019-08-13 VITALS
TEMPERATURE: 97.1 F | HEART RATE: 80 BPM | RESPIRATION RATE: 18 BRPM | DIASTOLIC BLOOD PRESSURE: 72 MMHG | SYSTOLIC BLOOD PRESSURE: 108 MMHG

## 2019-08-13 DIAGNOSIS — E87.6 HYPOKALEMIA: ICD-10-CM

## 2019-08-13 DIAGNOSIS — E86.0 DEHYDRATION: Primary | ICD-10-CM

## 2019-08-13 PROCEDURE — 96365 THER/PROPH/DIAG IV INF INIT: CPT

## 2019-08-13 RX ADMIN — POTASSIUM CHLORIDE: 2 INJECTION, SOLUTION, CONCENTRATE INTRAVENOUS at 13:49

## 2019-08-13 NOTE — PLAN OF CARE
Problem: Potential for Falls  Goal: Patient will remain free of falls  Description  INTERVENTIONS:  - Assess patient frequently for physical needs  -  Identify cognitive and physical deficits and behaviors that affect risk of falls    -  Mobile fall precautions as indicated by assessment   - Educate patient/family on patient safety including physical limitations  - Instruct patient to call for assistance with activity based on assessment  - Modify environment to reduce risk of injury  - Consider OT/PT consult to assist with strengthening/mobility  Outcome: Progressing

## 2019-08-13 NOTE — PROGRESS NOTES
Pt tolerated hydration today without incident  Pt was provided with AVS and is aware of future appts

## 2019-08-14 ENCOUNTER — NUTRITION (OUTPATIENT)
Dept: NUTRITION | Facility: CLINIC | Age: 69
End: 2019-08-14

## 2019-08-14 DIAGNOSIS — Z71.3 NUTRITIONAL COUNSELING: Primary | ICD-10-CM

## 2019-08-14 NOTE — PATIENT INSTRUCTIONS
Nutrition Rx & Recommendations:  · Diet: Soft and moist foods, high calorie, high protein  · Nutrition Supplements: 5-6 Ensure Enlive OR [place] Instant Breakfast mixed with Ensure Enlive  May use homemade shakes/smoothies as desired  If using a pre-made shake/bar, choose ones with >300 calories and >10 grams protein (ex  Ensure Enlive, Ensure Plus, Boost Plus, Boost Very High Calorie, etc )  · Small, frequent meals/snacks may be easier to tolerate than 3 large daily meals  Aim for 5-6 small meals per day  · Include protein at all meals/snacks  · Include a variety of foods (as tolerated/allowed by diet)  · Stay hydrated by sipping fluids of choice/tolerance throughout the day  At least 64 oz non caffeinated beverages daily  · Liquid nutrition may be better tolerated than solids at times  · Alter food choices and eating patterns to accommodate changing needs  · Refer to Eating Hints book for other meal/snack ideas and symptom management  · Avoid spicy, acidic, sharp/hard/crunchy foods & carbonated beverages as needed  · Follow proper oral care; Try baking soda/salt water rinse recipe (mix 3/4 tsp salt + 1 tsp baking soda + 1 qt water; rinse with pain water after using) in Eating Hints book (pg 18)  Brush your teeth before/after meals & before bed  · For lack of taste: Practice good oral care  Blend fresh fruits into shakes, ice cream or yogurt  Eat frozen fruits: grapes, chopped cantaloupe, watermelon  Select fresh vegetables (may be more appealing than canned/frozen)  Add extra flavor to foods: experiment with spices, salt & sweeteners, extra oil/butter; marinate meats (see pages 29-30 in your Eating Hints book)    · For appetite loss: try powdered or liquid nutrition supplements; eating by the clock every 2-3 hours; set a timer to remind yourself to eat, keep snacks nearby; add extra protein & calories to your diet; drink liquids in between meals, choose liquids that add calories; eat a bedtime snack; eat soft, cool or frozen foods; eat a larger meal when you feel well & rested; only sip small amounts of liquids during meals (see pages 10-12 in your Eating Hints book)  · For sore mouth/throat: choose foods that are easy to chew/swallow; cook foods until they are soft/tender; moisten & soften foods (gravy/sauce/broth/yogurt); cut food into small pieces; drink with a straw (as tolerated); eat with a very small spoon; eat cold or room temperature food; suck on ice chips; Avoid citrus, spicy foods, tomatoes/ketchup, salty foods, raw vegetables, sharp/crunchy/hard foods & alcohol (see pages 23-28 in your Eating Hints book)  · For trouble swallowing: eat 5-6 small meals/day; choose foods that are easy to swallow; choose foods that are high in protein & calories; cook foods until they are soft/tender; cut food into small pieces; moisten & soften foods (gravy/sauce/broth/yogurt); sip drinks through a straw (as tolerated); avoid foods/drinks that can burn/scrape your throat (hot temperatures, spicy foods, acidic foods, sharp/hard/crunchy foods, alcohol); talk to your doctor about a Speech Therapy referral for a swallowing evaluation (see page 26-28 in your Eating Hints book)  · For dry mouth: sip water throughout the day; try very sweet drinks; chew gum or suck on hard candy, popsicles, & ice chips; eat easy to swallow foods (such as pureed cooked foods or soups); moisten food with sauce/gravy/salad dressing; do not drink beer, wine, or any type of alcohol; keep lips moist with lip balm; avoid tobacco products & second-hand smoke; try Biotene as needed (see pages 17-18 in your Eating Hints book)  Follow proper oral care; Try baking soda/salt water rinse recipe (mix 3/4 tsp salt + 1 tsp baking soda + 1 qt water; rinse with pain water after using) in Eating Hints book (pg 18)  Brush your teeth before/after meals & before bed    · For thick mucous: make sure you are staying well hydrated (>64 oz/day), try swishing and spitting with plain carbonated water (unless you have a sore mouth), talk to your doctor about trying Mucinex  · Weigh yourself regularly  If you notice weight loss, make an effort to increase your daily food/calorie intake  If you continue to notice loss after these efforts, reach out to your dietitian to establish a plan to stabilize weight  · High calorie foods to try:  peanut butter, cheese, whole milk, avocado, cream soups  (see pages 52-53 in your Eating Hints book)  · High protein foods to try: peanut butter, cottage cheese, greek yogurt, tuna/chicken salad, eggs    (see pages 49-51 in your Eating Hints book)  · Soft/easy to swallow foods to try: jello, greek yogurt, scrambled eggs, mashed potatoes     Follow Up Plan: 8/27/19 during infusion   Recommend Referral to Other Providers: none at this time

## 2019-08-14 NOTE — PROGRESS NOTES
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, chemotherapy (cisplatin) started 6/10/19  Chemotherapy plan changed 6/28/19 from cisplatin to carboplatin and fluorouracil due to slight loss of hearing  RT to right base of tongue, tonsil, bilateral neck started 6/10/19, EOT 7/29/19  Oncology History    This is a 25-year-old female without significant past medical history  She stated that she had a history of tobacco use and quit many years ago  She also admitted the consuming alcohol around 0 6 oz per week  Her father had a history of prostate cancer  The patient complained about right-sided throat pain in abnormal feeling with movement of the tongue  She also had right-sided ear pain for about 6 months which was treated with 2 courses of antibiotics without any improvement  She eventually was seen by Dr Neo Fierro her ENT doctor who performed extensive evaluation including endoscopic evaluation which revealed a mass in the base of the tongue on the right side  A biopsy from the base of the tongue mass came back 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  The HPV status was negative  The patient then had a PET-CT scan on the 14th May 2019 which showed:  IMPRESSION:  1   Large hypermetabolic mass in the right oral cavity, right tonsillar region and right tongue base, approaching the level of the vallecula compatible with known malignancy  Involvement of the right soft palate is not excluded    2   Multiple hypermetabolic right cervical nodes as above concerning for metastases  3   Asymmetric enlargement of the right thyroid with associated FDG uptake   Further evaluation with ultrasound recommended  4   No hypermetabolic metastases in the chest abdomen pelvis  5   Aneurysmal ascending thoracic aorta measuring 4 2 x 4 3 cm       The estimated clinical stage is T2 N2 M0  The patient was then sent to the Radiation Oncology team and was seen by Dr Nilda Mike on the 20th of May and sent to us for the consideration of concurrent chemoradiation      The patient otherwise is doing relatively well, she denied significant weight loss                      Cancer of base of tongue ([address] )    5/3/2019 Biopsy     Right BOT biopsy  Biopsy + for SCC moderately differentiated,  Incompletely excised           6/10/2019 - 6/30/2019 Chemotherapy     CISplatin (PLATINOL) 174 mg, mannitol 12 5 g in sodium chloride 0 9 % 500 mL IVPB, 100 mg/m2 = 174 mg, Intravenous, Once, 1 of 3 cycles  Administration: 174 mg (6/10/2019)  potassium chloride 20 mEq, magnesium sulfate 1 g in sodium chloride 0 9 % 1,000 mL IVPB, , Intravenous, Once, 1 of 3 cycles  Administration:  (6/10/2019),  (6/10/2019)  (1 cycle only d/c'd due to significant hearing loss after 1 treatment)        7/1/2019 - 8/3/2019 Chemotherapy     CARBOplatin (PARAPLATIN) 115 5 mg in sodium chloride 0 9 % 250 mL IVPB, 70 mg/m2 = 115 5 mg (100 % of original dose 70 mg/m2), Intravenous, Once, 2 of 2 cycles  Dose modification: 70 mg/m2 (original dose 70 mg/m2, Cycle 1)  Administration: 115 5 mg (7/1/2019), 115 5 mg (7/2/2019), 115 5 mg (7/3/2019), 115 5 mg (7/5/2019), 115 5 mg (7/31/2019), 115 5 mg (8/1/2019), 115 5 mg (8/2/2019), 115 5 mg (7/30/2019)  (completed 2 cycles)         Past Medical History:   Diagnosis Date    Tongue cancer ([address] )     Squamous cell     Past Surgical History:   Procedure Laterality Date    APPENDECTOMY  1990    CATARACT EXTRACTION Left     HYSTERECTOMY  2012    total    IR PICC LINE  6/5/2019  OOPHORECTOMY Bilateral 2012       Review of Medications:   Vitamins, Supplements and Herbals: no    Current Outpatient Medications:     al mag oxide-diphenhydramine-lidocaine viscous (MAGIC MOUTHWASH) 1:1:1 suspension, Swish and swallow 10 mL every 4 (four) hours as needed for mouth pain or discomfort, Disp: 480 mL, Rfl: 4    clotrimazole (MYCELEX) 10 mg samantha, Take 1 tablet (10 mg total) by mouth 5 (five) times a day, Disp: 70 tablet, Rfl: 1    DENTAGEL 1 1 % GEL, AT BED AFTER BRUSHING AND FLOSSING APPLY 1 DROP OF GEL PER TOOTH LEAVE TRAYS IN MOUTH FOR 10 MINUTES, Disp: , Rfl: 11    Magnesium Cl-Calcium Carbonate (SLOW-MAG PO), Take by mouth as needed, Disp: , Rfl:     Melatonin 10 MG TABS, Take 1 tablet by mouth daily at bedtime as needed, Disp: , Rfl:     Multiple Vitamins-Minerals (PRESERVISION AREDS 2+MULTI VIT PO), Take by mouth, Disp: , Rfl:     OLANZapine (ZyPREXA) 10 mg tablet, Take 1 tablet (10 mg total) by mouth daily at bedtime Take for 3 days with chemotherapy/or as directed to prevent n/v, Disp: 12 tablet, Rfl: 3    omeprazole (PriLOSEC) 20 mg delayed release capsule, Take 1 capsule (20 mg total) by mouth daily, Disp: 30 capsule, Rfl: 3    Probiotic Product (PROBIOTIC DAILY PO), Take by mouth, Disp: , Rfl:     prochlorperazine (COMPAZINE) 10 mg tablet, Take 1 tablet (10 mg total) by mouth every 6 (six) hours as needed for nausea or vomiting, Disp: 60 tablet, Rfl: 0    silver sulfadiazine (SILVADENE,SSD) 1 % cream, Apply topically 2 (two) times a day, Disp: 400 g, Rfl: 2  No current facility-administered medications for this visit       Most Recent Lab Results:   Lab Results   Component Value Date    WBC 3 40 (L) 08/09/2019    IRON 41 (L) 08/09/2019    TIBC 178 (L) 08/09/2019    FERRITIN 458 (H) 08/09/2019    ALT 16 08/09/2019    AST 24 08/09/2019     04/24/2018    K 3 4 (L) 08/09/2019    K 3 9 07/30/2019    BUN 10 08/09/2019    BUN 10 07/30/2019    CREATININE 0 68 08/09/2019 CREATININE 0 80 07/30/2019    GLUCOSE 85 04/24/2018    CALCIUM 9 5 08/09/2019    FOLATE >20 0 (H) 05/28/2019    MG 1 9 08/09/2019       Anthropometric Measurements:   Height: 65"  Ht Readings from Last 1 Encounters:   08/09/19 5' 5" (1 651 m)     -Weight History:   Usual Weight: 135#  Ideal Body Weight: 125#  Wt Readings from Last 15 Encounters:   08/09/19 52 kg (114 lb 9 6 oz)   08/08/19 52 2 kg (115 lb)   08/05/19 51 3 kg (113 lb)   08/02/19 53 kg (116 lb 13 5 oz)   08/01/19 53 kg (116 lb 13 5 oz)   07/31/19 53 kg (116 lb 13 5 oz)   07/30/19 53 kg (116 lb 13 5 oz)   07/22/19 54 5 kg (120 lb 0 7 oz)   07/19/19 56 6 kg (124 lb 12 8 oz)   07/15/19 55 8 kg (123 lb)   07/05/19 56 kg (123 lb 7 oz)   07/03/19 58 7 kg (129 lb 6 6 oz)   07/02/19 58 7 kg (129 lb 6 6 oz)   07/01/19 58 7 kg (129 lb 6 6 oz)   06/28/19 59 4 kg (131 lb)     Varian: (6/12/19) 134 6#, (6/13/19) 134 2#, (6/19/19) 133 4#, (6/20/19) 133 8#  (6/25/19) 133 2#, (6/27/19) 132 8#, (7/2/19) 131#, (7/5/19) 129 6#, (7/11/19) 124 2#, (7/16/18) 122 8#, (7/18/19) 123 2#, (7/23/19) 120 0#, (7/26/19) 121#  Weight Changes: loss of 2 2# (1 9%) in 1 week (not significant) and loss of 8 8# (7 2%) in 1 month (significant)- calculated using Epic weights  Estimated body mass index is 19 07 kg/m² as calculated from the following:    Height as of 8/9/19: 5' 5" (1 651 m)  Weight as of 8/9/19: 52 kg (114 lb 9 6 oz)      Nutrition-Focused Physical Findings: n/a-phone visit      Food/Nutrition-Related History & Client/Social History:    Current Nutrition Impact Symptoms:  [] Nausea [x] Thick Mucous -continues  [] Neutropenia    [] Vomiting  [] Unintended Wt Loss  [] Malabsorption    [] Diarrhea  [] Unintended Wt Gain  [] Dumping Syndrome    [] Constipation - drinks prune juice every morning  [x] Odynophagia  [] Abdominal Pain    [x] Dysgeusia (Altered Taste) - can only taste sweet foods [x] Xerostomia (Dry Mouth)-uses Biotene  [] Difficulty Chewing    [] Dysosmia (Altered Smell)  [x] Fatigue  [] Other:    [x] Poor Appetite  [] Thrush  [] Other:    [x] Oral Mucositis (Sore Mouth)- mouth sores, uses Magic Mouthwash    [] Esophagitis  [] Other:    [x] Dysphagia - due to throat pain  [] Early Satiety  [] No Problems Eating      Food Allergies: no  Food Intolerances: no    Current Diet: Liquids  Current Nutrition Intake: Unchanged from usual  Appetite: Poor  Nutrition Route: PO  Meal planning/preparation mainly done by: Self, Daughter and her 4 grandsons bring her food  Oral Care: Fluoride tray BID, Brushing after eating or at least TID with soft bristle toothbrush, uses Biotene  Activity level: She used to run often but now feels too tired to run, she has been trying to walk everyday  Social Hx: Lives with her daughter and grandsons, but has her own private living space  Currently on medical leave from work, she works as a  for [email or web address] Recall:   Breakfast: carnation instant breakfast made with 4oz Ensure [employer]   Lunch: Ensure Enlive with 1/4c milk  Dinner: [employer] with [employer]   Snack: Ensure [employer]     Supplements: Quartzy alone or mixed with 1/4c milk  and [employer] mixed with [employer] Enlive  - total of 3x daily of Ensure/milk or [place]/Ensure   Beverages: water (16 oz x4-5 oz)    Estimated Nutrition Needs: (based on 55 8kg/ 122 8# actual wt)  Energy Needs: 7059-6302 kcal/day (35-40 kcal/kg)  Protein Needs:  grams/day (1 5-2 g/kg)  Fluid Needs: 1681-0245 mL/day (1 mL/kcal)    Discussion/Summary: Dc Darling was contacted for nutrition follow up  She states that she does not feel well today  She continues having a difficult time eating due to mouth sores, taste changes, thick mucus, and throat pain  She continues to rely on liquid nutrition as her main source of calories and protein as she cannot tolerate solid foods at this time   She will have a total of 3 daily of either Ensure Enlive mixed with 1/4c milk or [employer] mixed with [employer]  This meets about 58% estimated calorie needs, 74% estimated protein needs  Encouraged pt to increase her total intake to at least 5-6 Ensure/milk or [place]/Ensure to better help meet nutrition needs  She states that she may try to eat Jello today if she feels better this afternoon  Encouraged patient to continue liquid nutrition and trial soft foods as tolerated like jello, greek yogurt, ice cream, pudding, scrambled eggs  Encouraged proper hydration and use of Biotene or Baking Soda Salt Water Rinse to help dry mouth  Discussed/Reviewed:   · weight management  · indications & use of oral nutrition supplements 5-6 Ensure Enlive OR [place] Breakfast mixed with Ensure Enlive   · high protein foods to include at all meals and snacks peanut butter, cottage cheese, greek yogurt, tuna/chicken salad, eggs  · ways to increase overall calorie intake : add peanut butter, cheese, whole milk, avocado, cream soups  · encouraged eating every 2-3 hours (5-6 small meals/day)  · maintaining proper oral care : continue brushing at least TID, using Biotene, Magic Mouthwash, Baking Soda Salt Water Rinse   · how to modify foods & eating for nutrition impact symptoms : soft, moist, easy to chew/swallow foods, liquid nutrition  · adequate hydation & tips to increase overall fluid intake : nutrition supplements, homemade shakes, calorie containing beverages   · MNT for: taste changes, difficulty swallowing, poor appetite, thick mucus, dry mouth, sore mouth/throat  · recipe suggestions/resources  · nutrition strategies to promote healing post-tx  · individualized calorie, protein and fluid daily goals      All questions and concerns addressed during todays visit  Ramiro Elizabeth has RD contact information      Nutrition Diagnosis:  · Inadequate Energy Intake related to physiological causes, disease state and treatment related issues as evidenced by food recall, wt loss and discussion with pt and/or family  · Increased Nutrient Needs (kcal & pro) related to increased demand for nutrients and disease state as evidenced by recovering from cancer tx  · Patient has clinical indicators (or ASPEN criteria) consistent with severe protein-calorie malnutrition in the context of Chronic Illness as evidenced by >5% wt loss in 1 month and </=75% energy intake vs  Estimated needs for >/=1 month  Intervention & Recommendations:  Topics addressed: Nutrition education, Balance/Variety, Meals & Snacks, Meal planning, Choosing high protein meals/snacks, Meal pattern: eating small/frequent meals (every 2-3 hours), Nutrition Symptom Management, Adequate Hydration, Medical Food Supplements, Nutrition-Related Medication Management and Weight Management    Barriers: None  Readiness to change: action  Comprehension: verbalizes understanding  Expected Compliance: good    Materials Provided: Ensure Coupons and Tiger Coupons -sent via mail     Monitoring & Evaluation:  Dietitian to Monitor: Food and Nutrition Intake, Nutrtion Impact Symptoms, Body Weight and Biochemical Data     Goals:  · weight stabilization  · adequate nutrition related symptom management  · pt to meet >/=75% estimated nutrition needs daily    · Progress Towards Goals: Progressing    Nutrition Rx & Recommendations:  · Diet: Soft and moist foods, high calorie, high protein  · Nutrition Supplements: 5-6 Ensure Enlive OR [employer] Instant Breakfast mixed with Ensure Enlive  May use homemade shakes/smoothies as desired  If using a pre-made shake/bar, choose ones with >300 calories and >10 grams protein (ex  Ensure Enlive, Ensure Plus, Boost Plus, Boost Very High Calorie, etc )  · Small, frequent meals/snacks may be easier to tolerate than 3 large daily meals  Aim for 5-6 small meals per day  · Include protein at all meals/snacks  · Include a variety of foods (as tolerated/allowed by diet)    · Stay hydrated by sipping fluids of choice/tolerance throughout the day  At least 64 oz non caffeinated beverages daily  · Liquid nutrition may be better tolerated than solids at times  · Alter food choices and eating patterns to accommodate changing needs  · Refer to Eating Hints book for other meal/snack ideas and symptom management  · Avoid spicy, acidic, sharp/hard/crunchy foods & carbonated beverages as needed  · Follow proper oral care; Try baking soda/salt water rinse recipe (mix 3/4 tsp salt + 1 tsp baking soda + 1 qt water; rinse with pain water after using) in Eating Hints book (pg 18)  Brush your teeth before/after meals & before bed  · For lack of taste: Practice good oral care  Blend fresh fruits into shakes, ice cream or yogurt  Eat frozen fruits: grapes, chopped cantaloupe, watermelon  Select fresh vegetables (may be more appealing than canned/frozen)  Add extra flavor to foods: experiment with spices, salt & sweeteners, extra oil/butter; marinate meats (see pages 29-30 in your Eating Hints book)  · For appetite loss: try powdered or liquid nutrition supplements; eating by the clock every 2-3 hours; set a timer to remind yourself to eat, keep snacks nearby; add extra protein & calories to your diet; drink liquids in between meals, choose liquids that add calories; eat a bedtime snack; eat soft, cool or frozen foods; eat a larger meal when you feel well & rested; only sip small amounts of liquids during meals (see pages 10-12 in your Eating Hints book)  · For sore mouth/throat: choose foods that are easy to chew/swallow; cook foods until they are soft/tender; moisten & soften foods (gravy/sauce/broth/yogurt); cut food into small pieces; drink with a straw (as tolerated); eat with a very small spoon; eat cold or room temperature food; suck on ice chips;  Avoid citrus, spicy foods, tomatoes/ketchup, salty foods, raw vegetables, sharp/crunchy/hard foods & alcohol (see pages 23-28 in your Eating Hints book)   · For trouble swallowing: eat 5-6 small meals/day; choose foods that are easy to swallow; choose foods that are high in protein & calories; cook foods until they are soft/tender; cut food into small pieces; moisten & soften foods (gravy/sauce/broth/yogurt); sip drinks through a straw (as tolerated); avoid foods/drinks that can burn/scrape your throat (hot temperatures, spicy foods, acidic foods, sharp/hard/crunchy foods, alcohol); talk to your doctor about a Speech Therapy referral for a swallowing evaluation (see page 26-28 in your Eating Hints book)  · For dry mouth: sip water throughout the day; try very sweet drinks; chew gum or suck on hard candy, popsicles, & ice chips; eat easy to swallow foods (such as pureed cooked foods or soups); moisten food with sauce/gravy/salad dressing; do not drink beer, wine, or any type of alcohol; keep lips moist with lip balm; avoid tobacco products & second-hand smoke; try Biotene as needed (see pages 17-18 in your Eating Hints book)  Follow proper oral care; Try baking soda/salt water rinse recipe (mix 3/4 tsp salt + 1 tsp baking soda + 1 qt water; rinse with pain water after using) in Eating Hints book (pg 18)  Brush your teeth before/after meals & before bed  · For thick mucous: make sure you are staying well hydrated (>64 oz/day), try swishing and spitting with plain carbonated water (unless you have a sore mouth), talk to your doctor about trying Mucinex  · Weigh yourself regularly  If you notice weight loss, make an effort to increase your daily food/calorie intake  If you continue to notice loss after these efforts, reach out to your dietitian to establish a plan to stabilize weight  · High calorie foods to try:  peanut butter, cheese, whole milk, avocado, cream soups  (see pages 52-53 in your Eating Hints book)  · High protein foods to try: peanut butter, cottage cheese, greek yogurt, tuna/chicken salad, eggs    (see pages 49-51 in your Eating Hints book)  · Soft/easy to swallow foods to try: jello, greek yogurt, scrambled eggs, mashed potatoes     Follow Up Plan: 8/27/19 during infusion   Recommend Referral to Other Providers: none at this time

## 2019-08-15 ENCOUNTER — HOSPITAL ENCOUNTER (OUTPATIENT)
Dept: INFUSION CENTER | Facility: CLINIC | Age: 69
Discharge: HOME | End: 2019-08-15
Payer: COMMERCIAL

## 2019-08-15 VITALS
HEART RATE: 93 BPM | TEMPERATURE: 98.8 F | DIASTOLIC BLOOD PRESSURE: 68 MMHG | RESPIRATION RATE: 16 BRPM | SYSTOLIC BLOOD PRESSURE: 102 MMHG

## 2019-08-15 DIAGNOSIS — E86.0 DEHYDRATION: Primary | ICD-10-CM

## 2019-08-15 DIAGNOSIS — E87.6 HYPOKALEMIA: ICD-10-CM

## 2019-08-15 PROCEDURE — 96365 THER/PROPH/DIAG IV INF INIT: CPT

## 2019-08-15 RX ADMIN — POTASSIUM CHLORIDE: 2 INJECTION, SOLUTION, CONCENTRATE INTRAVENOUS at 12:42

## 2019-08-15 NOTE — PROGRESS NOTES
Patient arrived on unit for hydration  Patient stated her throat pain, from radiation, is at an "8", slowly improving  Patient does mouthwash and uses  Drinking Ensures  Still having difficulty swallowing whole foods   Treatment initiated

## 2019-08-20 ENCOUNTER — HOSPITAL ENCOUNTER (OUTPATIENT)
Dept: INFUSION CENTER | Facility: CLINIC | Age: 69
Discharge: HOME | End: 2019-08-20

## 2019-08-22 ENCOUNTER — HOSPITAL ENCOUNTER (OUTPATIENT)
Dept: INFUSION CENTER | Facility: CLINIC | Age: 69
End: 2019-08-22

## 2019-08-27 ENCOUNTER — HOSPITAL ENCOUNTER (OUTPATIENT)
Dept: INFUSION CENTER | Facility: CLINIC | Age: 69
Discharge: HOME | End: 2019-08-27

## 2019-08-27 NOTE — PROGRESS NOTES
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, chemotherapy (cisplatin) started 6/10/19  Chemotherapy plan changed 6/28/19 from cisplatin to carboplatin and fluorouracil due to slight loss of hearing  RT to right base of tongue, tonsil, bilateral neck started 6/10/19, EOT 7/29/19  Oncology History    This is a 42-year-old female without significant past medical history  She stated that she had a history of tobacco use and quit many years ago  She also admitted the consuming alcohol around 0 6 oz per week  Her father had a history of prostate cancer  The patient complained about right-sided throat pain in abnormal feeling with movement of the tongue  She also had right-sided ear pain for about 6 months which was treated with 2 courses of antibiotics without any improvement  She eventually was seen by Dr Chandler Gomez her ENT doctor who performed extensive evaluation including endoscopic evaluation which revealed a mass in the base of the tongue on the right side  A biopsy from the base of the tongue mass came back 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  The HPV status was negative  The patient then had a PET-CT scan on the 14th May 2019 which showed:  IMPRESSION:  1   Large hypermetabolic mass in the right oral cavity, right tonsillar region and right tongue base, approaching the level of the vallecula compatible with known malignancy   Involvement of the right soft palate is not excluded  2   Multiple hypermetabolic right cervical nodes as above concerning for metastases  3   Asymmetric enlargement of the right thyroid with associated FDG uptake   Further evaluation with ultrasound recommended  4   No hypermetabolic metastases in the chest abdomen pelvis  5   Aneurysmal ascending thoracic aorta measuring 4 2 x 4 3 cm       The estimated clinical stage is T2 N2 M0  The patient was then sent to the Radiation Oncology team and was seen by Dr Clair Brown on the 20th of May and sent to us for the consideration of concurrent chemoradiation      The patient otherwise is doing relatively well, she denied significant weight loss                      Cancer of base of tongue ([facility] [address] )    5/3/2019 Biopsy     Right BOT biopsy  Biopsy + for SCC moderately differentiated,  Incompletely excised           6/10/2019 - 6/30/2019 Chemotherapy     CISplatin (PLATINOL) 174 mg, mannitol 12 5 g in sodium chloride 0 9 % 500 mL IVPB, 100 mg/m2 = 174 mg, Intravenous, Once, 1 of 3 cycles  Administration: 174 mg (6/10/2019)  potassium chloride 20 mEq, magnesium sulfate 1 g in sodium chloride 0 9 % 1,000 mL IVPB, , Intravenous, Once, 1 of 3 cycles  Administration:  (6/10/2019),  (6/10/2019)  (1 cycle only d/c'd due to significant hearing loss after 1 treatment)        7/1/2019 - 8/3/2019 Chemotherapy     CARBOplatin (PARAPLATIN) 115 5 mg in sodium chloride 0 9 % 250 mL IVPB, 70 mg/m2 = 115 5 mg (100 % of original dose 70 mg/m2), Intravenous, Once, 2 of 2 cycles  Dose modification: 70 mg/m2 (original dose 70 mg/m2, Cycle 1)  Administration: 115 5 mg (7/1/2019), 115 5 mg (7/2/2019), 115 5 mg (7/3/2019), 115 5 mg (7/5/2019), 115 5 mg (7/31/2019), 115 5 mg (8/1/2019), 115 5 mg (8/2/2019), 115 5 mg (7/30/2019)  (completed 2 cycles)         Past Medical History:   Diagnosis Date    Tongue cancer ([address] )     Squamous cell     Past Surgical History:   Procedure Laterality Date    APPENDECTOMY  1990    CATARACT EXTRACTION Left  HYSTERECTOMY  2012    total    IR PICC LINE  6/5/2019    OOPHORECTOMY Bilateral 2012       Review of Medications:   Vitamins, Supplements and Herbals: no    Current Outpatient Medications:     al mag oxide-diphenhydramine-lidocaine viscous (MAGIC MOUTHWASH) 1:1:1 suspension, Swish and swallow 10 mL every 4 (four) hours as needed for mouth pain or discomfort, Disp: 480 mL, Rfl: 4    clotrimazole (MYCELEX) 10 mg samantha, Take 1 tablet (10 mg total) by mouth 5 (five) times a day, Disp: 70 tablet, Rfl: 1    DENTAGEL 1 1 % GEL, AT BED AFTER BRUSHING AND FLOSSING APPLY 1 DROP OF GEL PER TOOTH LEAVE TRAYS IN MOUTH FOR 10 MINUTES, Disp: , Rfl: 11    Magnesium Cl-Calcium Carbonate (SLOW-MAG PO), Take by mouth as needed, Disp: , Rfl:     Melatonin 10 MG TABS, Take 1 tablet by mouth daily at bedtime as needed, Disp: , Rfl:     Multiple Vitamins-Minerals (PRESERVISION AREDS 2+MULTI VIT PO), Take by mouth, Disp: , Rfl:     OLANZapine (ZyPREXA) 10 mg tablet, Take 1 tablet (10 mg total) by mouth daily at bedtime Take for 3 days with chemotherapy/or as directed to prevent n/v, Disp: 12 tablet, Rfl: 3    omeprazole (PriLOSEC) 20 mg delayed release capsule, Take 1 capsule (20 mg total) by mouth daily, Disp: 30 capsule, Rfl: 3    Probiotic Product (PROBIOTIC DAILY PO), Take by mouth, Disp: , Rfl:     prochlorperazine (COMPAZINE) 10 mg tablet, Take 1 tablet (10 mg total) by mouth every 6 (six) hours as needed for nausea or vomiting, Disp: 60 tablet, Rfl: 0    silver sulfadiazine (SILVADENE,SSD) 1 % cream, Apply topically 2 (two) times a day, Disp: 400 g, Rfl: 2    Most Recent Lab Results:   Lab Results   Component Value Date    WBC 3 40 (L) 08/09/2019    IRON 41 (L) 08/09/2019    TIBC 178 (L) 08/09/2019    FERRITIN 458 (H) 08/09/2019    ALT 16 08/09/2019    AST 24 08/09/2019     04/24/2018    K 3 4 (L) 08/09/2019    K 3 9 07/30/2019    BUN 10 08/09/2019    BUN 10 07/30/2019    CREATININE 0 68 08/09/2019 CREATININE 0 80 07/30/2019    GLUCOSE 85 04/24/2018    CALCIUM 9 5 08/09/2019    FOLATE >20 0 (H) 05/28/2019    MG 1 9 08/09/2019       Anthropometric Measurements:   Height: 65"  Ht Readings from Last 1 Encounters:   08/09/19 5' 5" (1 651 m)     -Weight History:   Usual Weight: 135#  Ideal Body Weight: 125#  Wt Readings from Last 15 Encounters:   08/09/19 52 kg (114 lb 9 6 oz)   08/08/19 52 2 kg (115 lb)   08/05/19 51 3 kg (113 lb)   08/02/19 53 kg (116 lb 13 5 oz)   08/01/19 53 kg (116 lb 13 5 oz)   07/31/19 53 kg (116 lb 13 5 oz)   07/30/19 53 kg (116 lb 13 5 oz)   07/22/19 54 5 kg (120 lb 0 7 oz)   07/19/19 56 6 kg (124 lb 12 8 oz)   07/15/19 55 8 kg (123 lb)   07/05/19 56 kg (123 lb 7 oz)   07/03/19 58 7 kg (129 lb 6 6 oz)   07/02/19 58 7 kg (129 lb 6 6 oz)   07/01/19 58 7 kg (129 lb 6 6 oz)   06/28/19 59 4 kg (131 lb)     Varian: (6/12/19) 134 6#, (6/13/19) 134 2#, (6/19/19) 133 4#, (6/20/19) 133 8#  (6/25/19) 133 2#, (6/27/19) 132 8#, (7/2/19) 131#, (7/5/19) 129 6#, (7/11/19) 124 2#, (7/16/18) 122 8#, (7/18/19) 123 2#, (7/23/19) 120 0#, (7/26/19) 121#  Weight Changes: loss of 2 2# (1 9%) in 1 week (not significant) and loss of 8 8# (7 2%) in 1 month (significant)- calculated using Epic weights  No new wts since 8/9  Pt has not been weighing herself at home but feels like she hasnt lost weight  Estimated body mass index is 19 07 kg/m² as calculated from the following:    Height as of 8/9/19: 5' 5" (1 651 m)  Weight as of 8/9/19: 52 kg (114 lb 9 6 oz)      Nutrition-Focused Physical Findings: n/a -phone visit     Food/Nutrition-Related History & Client/Social History:    Current Nutrition Impact Symptoms:  [] Nausea [x] Thick Mucous -continues  [] Neutropenia    [] Vomiting  [] Unintended Wt Loss  [] Malabsorption    [] Diarrhea  [] Unintended Wt Gain  [] Dumping Syndrome    [] Constipation - drinks prune juice every morning  [x] Odynophagia -improving  [] Abdominal Pain    [x] Dysgeusia (Altered Taste) - can only taste foods that are warm  [x] Xerostomia (Dry Mouth)-uses Biotene  [] Difficulty Chewing    [] Dysosmia (Altered Smell)  [x] Fatigue  [] Other:    [] Poor Appetite -improving [] Thrush  [] Other:    [x] Oral Mucositis (Sore Mouth)- mouth sores, uses Magic Mouthwash    [] Esophagitis  [] Other:    [x] Dysphagia - improving slightly, pt able to tolerate more solid foods at this time  [] Early Satiety  [] No Problems Eating      Food Allergies: no  Food Intolerances: no    Current Diet: Soft  Current Nutrition Intake: Increased since last visit  Appetite: Fair   Nutrition Route: PO  Meal planning/preparation mainly done by: Self, Daughter and her 4 grandsons bring her food  Oral Care: Fluoride tray BID, Brushing after eating or at least TID with soft bristle toothbrush, uses Biotene  Activity level: She used to run often but now feels too tired to run, she has been trying to walk everyday  Her energy has been increasing slightly  Social Hx: Lives with her daughter and grandsons, but has her own private living space  Currently on medical leave from work, she works as a  for [employer]  Diet Recall:   Breakfast: eggs (2) scrambled with ketchup   Lunch: mushrooms with spinach sauteed with mozzarella cheese   Dinner: eggplant baked with 1-2 tsp red sauce and parmesan cheese   Snacks: ice cream sandwich      Supplements: Ensure Enlive alone or mixed with 1/4c milk  and [employer] mixed with [employer]  - has not been drinking every day, 1-2x /week   Beverages: water (16 oz x4-5 oz), almond milk (8oz x1), coffee with almond milk (8oz x1)     Estimated Nutrition Needs: (based on 55 8kg/ 122 8# actual wt)  Energy Needs: 5982-5265 kcal/day (35-40 kcal/kg)  Protein Needs:  grams/day (1 5-2 g/kg)  Fluid Needs: 9448-3977 mL/day (1 mL/kcal)    Discussion/Summary: Ann was contacted for nutrition follow up   She reports that her appetite and energy have slowly been increasing  She has incorporated some different soft foods back into her diet  Yue Hoffmanner reports that she does still have some difficutly swallowing so hse has not been eating anything too try or hard  Disucssed some soft/easy to swallow meal ideas  She has not been drinking Ensure Enlive or [place] daily, encouraged her to continue to drink at least 1-2 Ensure Enlive or [place] mixed with [employer] as her PO intakes are not meeting at least 75% of her estimated nutrition needs  Reinforced need for adequate hydration and proper oral care to help manage nutrition impact symptoms  Discussed/Reviewed:   · weight management  · indications & use of oral nutrition supplements 1-2 Ensure Enlive OR [place] Breakfast mixed with Ensure Enlive   · high protein foods to include at all meals and snacks peanut butter, cottage cheese, greek yogurt, tuna/chicken salad, eggs  · ways to increase overall calorie intake : add peanut butter, cheese, whole milk, avocado, cream soups  · encouraged eating every 2-3 hours (5-6 small meals/day)  · maintaining proper oral care : continue brushing at least TID, using Biotene, Magic Mouthwash, Baking Soda Salt Water Rinse   · how to modify foods & eating for nutrition impact symptoms : soft, moist, easy to chew/swallow foods, liquid nutrition  · adequate hydation & tips to increase overall fluid intake : nutrition supplements, homemade shakes, calorie containing beverages   · MNT for: taste changes, difficulty swallowing, thick mucus, dry mouth, sore mouth/throat  · recipe suggestions/resources  · nutrition strategies to promote healing post-tx  · individualized calorie, protein and fluid daily goals      All questions and concerns addressed during todays visit  Changelmer Mackenzie has RD contact information      Nutrition Diagnosis:  · Inadequate Energy Intake related to physiological causes, disease state and treatment related issues as evidenced by food recall, wt loss and discussion with pt and/or family  · Increased Nutrient Needs (kcal & pro) related to increased demand for nutrients and disease state as evidenced by recovering from cancer tx  · Patient has clinical indicators (or ASPEN criteria) consistent with severe protein-calorie malnutrition in the context of Chronic Illness as evidenced by >5% wt loss in 1 month and </=75% energy intake vs  Estimated needs for >/=1 month  Intervention & Recommendations:  Topics addressed: Nutrition education, Balance/Variety, Meals & Snacks, Meal planning, Choosing high protein meals/snacks, Meal pattern: eating small/frequent meals (every 2-3 hours), Nutrition Symptom Management, Adequate Hydration, Medical Food Supplements, Nutrition-Related Medication Management and Weight Management    Barriers: None  Readiness to change: action  Comprehension: verbalizes understanding  Expected Compliance: good    Materials Provided: not applicable    Monitoring & Evaluation:  Dietitian to Monitor: Food and Nutrition Intake, Nutrtion Impact Symptoms, Body Weight and Biochemical Data     Goals:  · weight stabilization  · adequate nutrition related symptom management  · pt to meet >/=75% estimated nutrition needs daily    · Progress Towards Goals: Progressing    Nutrition Rx & Recommendations:  · Diet: Soft and moist foods, high calorie, high protein  · Nutrition Supplements: 1-2 Ensure Enlive OR [place] Instant Breakfast mixed with Ensure Enlive  May use homemade shakes/smoothies as desired  If using a pre-made shake/bar, choose ones with >300 calories and >10 grams protein (ex  Ensure Enlive, Ensure Plus, Boost Plus, Boost Very High Calorie, etc )  · Small, frequent meals/snacks may be easier to tolerate than 3 large daily meals  Aim for 5-6 small meals per day  · Include protein at all meals/snacks  · Include a variety of foods (as tolerated/allowed by diet)  · Stay hydrated by sipping fluids of choice/tolerance throughout the day   At least 64 oz non caffeinated beverages daily  · Liquid nutrition may be better tolerated than solids at times  · Alter food choices and eating patterns to accommodate changing needs  · Refer to Eating Hints book for other meal/snack ideas and symptom management  · Avoid spicy, acidic, sharp/hard/crunchy foods & carbonated beverages as needed  · Follow proper oral care; Try baking soda/salt water rinse recipe (mix 3/4 tsp salt + 1 tsp baking soda + 1 qt water; rinse with pain water after using) in Eating Hints book (pg 18)  Brush your teeth before/after meals & before bed  · For lack of taste: Practice good oral care  Blend fresh fruits into shakes, ice cream or yogurt  Eat frozen fruits: grapes, chopped cantaloupe, watermelon  Select fresh vegetables (may be more appealing than canned/frozen)  Add extra flavor to foods: experiment with spices, salt & sweeteners, extra oil/butter; marinate meats (see pages 29-30 in your Eating Hints book)  · For appetite loss: try powdered or liquid nutrition supplements; eating by the clock every 2-3 hours; set a timer to remind yourself to eat, keep snacks nearby; add extra protein & calories to your diet; drink liquids in between meals, choose liquids that add calories; eat a bedtime snack; eat soft, cool or frozen foods; eat a larger meal when you feel well & rested; only sip small amounts of liquids during meals (see pages 10-12 in your Eating Hints book)  · For sore mouth/throat: choose foods that are easy to chew/swallow; cook foods until they are soft/tender; moisten & soften foods (gravy/sauce/broth/yogurt); cut food into small pieces; drink with a straw (as tolerated); eat with a very small spoon; eat cold or room temperature food; suck on ice chips; Avoid citrus, spicy foods, tomatoes/ketchup, salty foods, raw vegetables, sharp/crunchy/hard foods & alcohol (see pages 23-28 in your Eating Hints book)    · For trouble swallowing: eat 5-6 small meals/day; choose foods that are easy to swallow; choose foods that are high in protein & calories; cook foods until they are soft/tender; cut food into small pieces; moisten & soften foods (gravy/sauce/broth/yogurt); sip drinks through a straw (as tolerated); avoid foods/drinks that can burn/scrape your throat (hot temperatures, spicy foods, acidic foods, sharp/hard/crunchy foods, alcohol); talk to your doctor about a Speech Therapy referral for a swallowing evaluation (see page 26-28 in your Eating Hints book)  · For dry mouth: sip water throughout the day; try very sweet drinks; chew gum or suck on hard candy, popsicles, & ice chips; eat easy to swallow foods (such as pureed cooked foods or soups); moisten food with sauce/gravy/salad dressing; do not drink beer, wine, or any type of alcohol; keep lips moist with lip balm; avoid tobacco products & second-hand smoke; try Biotene as needed (see pages 17-18 in your Eating Hints book)  Follow proper oral care; Try baking soda/salt water rinse recipe (mix 3/4 tsp salt + 1 tsp baking soda + 1 qt water; rinse with pain water after using) in Eating Hints book (pg 18)  Brush your teeth before/after meals & before bed  · For thick mucous: make sure you are staying well hydrated (>64 oz/day), try swishing and spitting with plain carbonated water (unless you have a sore mouth), talk to your doctor about trying Mucinex  · Weigh yourself regularly  If you notice weight loss, make an effort to increase your daily food/calorie intake  If you continue to notice loss after these efforts, reach out to your dietitian to establish a plan to stabilize weight  · High calorie foods to try:  peanut butter, cheese, whole milk, avocado, cream soups  (see pages 52-53 in your Eating Hints book)  · High protein foods to try: peanut butter, cottage cheese, greek yogurt, tuna/chicken salad, eggs    (see pages 49-51 in your Eating Hints book)  · Soft/easy to swallow foods to try: jello, greek yogurt, scrambled eggs, mashed potatoes     Follow Up Plan: 9/16/19 phone visit @2 pm   Recommend Referral to Other Providers: none at this time

## 2019-08-28 ENCOUNTER — NUTRITION (OUTPATIENT)
Dept: NUTRITION | Facility: CLINIC | Age: 69
End: 2019-08-28

## 2019-08-28 DIAGNOSIS — Z71.3 NUTRITIONAL COUNSELING: Primary | ICD-10-CM

## 2019-08-28 NOTE — PATIENT INSTRUCTIONS
Nutrition Rx & Recommendations:  · Diet: Soft and moist foods, high calorie, high protein  · Nutrition Supplements: 1-2 Ensure Enlive OR [place] Instant Breakfast mixed with Ensure Enlive  May use homemade shakes/smoothies as desired  If using a pre-made shake/bar, choose ones with >300 calories and >10 grams protein (ex  Ensure Enlive, Ensure Plus, Boost Plus, Boost Very High Calorie, etc )  · Small, frequent meals/snacks may be easier to tolerate than 3 large daily meals  Aim for 5-6 small meals per day  · Include protein at all meals/snacks  · Include a variety of foods (as tolerated/allowed by diet)  · Stay hydrated by sipping fluids of choice/tolerance throughout the day  At least 64 oz non caffeinated beverages daily  · Liquid nutrition may be better tolerated than solids at times  · Alter food choices and eating patterns to accommodate changing needs  · Refer to Eating Hints book for other meal/snack ideas and symptom management  · Avoid spicy, acidic, sharp/hard/crunchy foods & carbonated beverages as needed  · Follow proper oral care; Try baking soda/salt water rinse recipe (mix 3/4 tsp salt + 1 tsp baking soda + 1 qt water; rinse with pain water after using) in Eating Hints book (pg 18)  Brush your teeth before/after meals & before bed  · For lack of taste: Practice good oral care  Blend fresh fruits into shakes, ice cream or yogurt  Eat frozen fruits: grapes, chopped cantaloupe, watermelon  Select fresh vegetables (may be more appealing than canned/frozen)  Add extra flavor to foods: experiment with spices, salt & sweeteners, extra oil/butter; marinate meats (see pages 29-30 in your Eating Hints book)    · For appetite loss: try powdered or liquid nutrition supplements; eating by the clock every 2-3 hours; set a timer to remind yourself to eat, keep snacks nearby; add extra protein & calories to your diet; drink liquids in between meals, choose liquids that add calories; eat a bedtime snack; eat soft, cool or frozen foods; eat a larger meal when you feel well & rested; only sip small amounts of liquids during meals (see pages 10-12 in your Eating Hints book)  · For sore mouth/throat: choose foods that are easy to chew/swallow; cook foods until they are soft/tender; moisten & soften foods (gravy/sauce/broth/yogurt); cut food into small pieces; drink with a straw (as tolerated); eat with a very small spoon; eat cold or room temperature food; suck on ice chips; Avoid citrus, spicy foods, tomatoes/ketchup, salty foods, raw vegetables, sharp/crunchy/hard foods & alcohol (see pages 23-28 in your Eating Hints book)  · For trouble swallowing: eat 5-6 small meals/day; choose foods that are easy to swallow; choose foods that are high in protein & calories; cook foods until they are soft/tender; cut food into small pieces; moisten & soften foods (gravy/sauce/broth/yogurt); sip drinks through a straw (as tolerated); avoid foods/drinks that can burn/scrape your throat (hot temperatures, spicy foods, acidic foods, sharp/hard/crunchy foods, alcohol); talk to your doctor about a Speech Therapy referral for a swallowing evaluation (see page 26-28 in your Eating Hints book)  · For dry mouth: sip water throughout the day; try very sweet drinks; chew gum or suck on hard candy, popsicles, & ice chips; eat easy to swallow foods (such as pureed cooked foods or soups); moisten food with sauce/gravy/salad dressing; do not drink beer, wine, or any type of alcohol; keep lips moist with lip balm; avoid tobacco products & second-hand smoke; try Biotene as needed (see pages 17-18 in your Eating Hints book)  Follow proper oral care; Try baking soda/salt water rinse recipe (mix 3/4 tsp salt + 1 tsp baking soda + 1 qt water; rinse with pain water after using) in Eating Hints book (pg 18)  Brush your teeth before/after meals & before bed    · For thick mucous: make sure you are staying well hydrated (>64 oz/day), try swishing and spitting with plain carbonated water (unless you have a sore mouth), talk to your doctor about trying Mucinex  · Weigh yourself regularly  If you notice weight loss, make an effort to increase your daily food/calorie intake  If you continue to notice loss after these efforts, reach out to your dietitian to establish a plan to stabilize weight  · High calorie foods to try:  peanut butter, cheese, whole milk, avocado, cream soups  (see pages 52-53 in your Eating Hints book)  · High protein foods to try: peanut butter, cottage cheese, greek yogurt, tuna/chicken salad, eggs    (see pages 49-51 in your Eating Hints book)  · Soft/easy to swallow foods to try: jello, greek yogurt, scrambled eggs, mashed potatoes     Follow Up Plan: 9/16/19 phone visit @2 pm   Recommend Referral to Other Providers: none at this time

## 2019-08-29 ENCOUNTER — TELEPHONE (OUTPATIENT)
Dept: HEMATOLOGY ONCOLOGY | Facility: CLINIC | Age: 69
End: 2019-08-29

## 2019-08-29 ENCOUNTER — APPOINTMENT (OUTPATIENT)
Dept: LAB | Facility: HOSPITAL | Age: 69
End: 2019-08-29
Payer: COMMERCIAL

## 2019-08-29 DIAGNOSIS — C01 CANCER OF BASE OF TONGUE (HCC): ICD-10-CM

## 2019-08-29 LAB
ALBUMIN SERPL BCP-MCNC: 4 G/DL (ref 3–5.2)
ALP SERPL-CCNC: 62 U/L (ref 43–122)
ALT SERPL W P-5'-P-CCNC: 17 U/L (ref 9–52)
ANION GAP SERPL CALCULATED.3IONS-SCNC: 9 MMOL/L (ref 5–14)
AST SERPL W P-5'-P-CCNC: 21 U/L (ref 14–36)
BASOPHILS # BLD AUTO: 0.1 THOUSANDS/ΜL (ref 0–0.1)
BASOPHILS NFR BLD AUTO: 1 % (ref 0–1)
BILIRUB SERPL-MCNC: 0.5 MG/DL
BUN SERPL-MCNC: 7 MG/DL (ref 5–25)
CALCIUM SERPL-MCNC: 9.5 MG/DL (ref 8.4–10.2)
CHLORIDE SERPL-SCNC: 101 MMOL/L (ref 97–108)
CO2 SERPL-SCNC: 29 MMOL/L (ref 22–30)
CREAT SERPL-MCNC: 0.7 MG/DL (ref 0.6–1.2)
EOSINOPHIL # BLD AUTO: 0.1 THOUSAND/ΜL (ref 0–0.4)
EOSINOPHIL NFR BLD AUTO: 2 % (ref 0–6)
ERYTHROCYTE [DISTWIDTH] IN BLOOD BY AUTOMATED COUNT: 19.4 %
GFR SERPL CREATININE-BSD FRML MDRD: 87 ML/MIN/1.73SQ M
GLUCOSE SERPL-MCNC: 87 MG/DL (ref 70–99)
HCT VFR BLD AUTO: 29.1 % (ref 36–46)
HGB BLD-MCNC: 9.7 G/DL (ref 12–16)
HYPERCHROMIA BLD QL SMEAR: PRESENT
LYMPHOCYTES # BLD AUTO: 0.4 THOUSANDS/ΜL (ref 0.5–4)
LYMPHOCYTES NFR BLD AUTO: 10 % (ref 25–45)
MCH RBC QN AUTO: 34 PG (ref 26–34)
MCHC RBC AUTO-ENTMCNC: 33.4 G/DL (ref 31–36)
MCV RBC AUTO: 102 FL (ref 80–100)
MONOCYTES # BLD AUTO: 0.5 THOUSAND/ΜL (ref 0.2–0.9)
MONOCYTES NFR BLD AUTO: 11 % (ref 1–10)
NEUTROPHILS # BLD AUTO: 3.2 THOUSANDS/ΜL (ref 1.8–7.8)
NEUTS SEG NFR BLD AUTO: 76 % (ref 45–65)
PLATELET # BLD AUTO: 293 THOUSANDS/UL (ref 150–450)
PLATELET BLD QL SMEAR: ADEQUATE
PMV BLD AUTO: 6.6 FL (ref 8.9–12.7)
POTASSIUM SERPL-SCNC: 3.6 MMOL/L (ref 3.6–5)
PROT SERPL-MCNC: 6.7 G/DL (ref 5.9–8.4)
RBC # BLD AUTO: 2.86 MILLION/UL (ref 4–5.2)
RBC MORPH BLD: NORMAL
SODIUM SERPL-SCNC: 139 MMOL/L (ref 137–147)
WBC # BLD AUTO: 4.2 THOUSAND/UL (ref 4.5–11)

## 2019-08-29 PROCEDURE — 85025 COMPLETE CBC W/AUTO DIFF WBC: CPT

## 2019-08-29 PROCEDURE — 80053 COMPREHEN METABOLIC PANEL: CPT

## 2019-08-29 PROCEDURE — 36415 COLL VENOUS BLD VENIPUNCTURE: CPT

## 2019-08-29 NOTE — TELEPHONE ENCOUNTER
I spoke to the patient, I reminded her that she should have her blood work done prior her appointment  She will get it done, she said

## 2019-08-30 ENCOUNTER — CLINICAL SUPPORT (OUTPATIENT)
Dept: RADIATION ONCOLOGY | Facility: CLINIC | Age: 69
End: 2019-08-30
Attending: RADIOLOGY
Payer: COMMERCIAL

## 2019-08-30 ENCOUNTER — OFFICE VISIT (OUTPATIENT)
Dept: HEMATOLOGY ONCOLOGY | Facility: CLINIC | Age: 69
End: 2019-08-30
Payer: COMMERCIAL

## 2019-08-30 VITALS
RESPIRATION RATE: 18 BRPM | OXYGEN SATURATION: 98 % | DIASTOLIC BLOOD PRESSURE: 63 MMHG | BODY MASS INDEX: 18.36 KG/M2 | WEIGHT: 110.2 LBS | SYSTOLIC BLOOD PRESSURE: 98 MMHG | TEMPERATURE: 98.2 F | HEART RATE: 72 BPM | HEIGHT: 65 IN

## 2019-08-30 VITALS
TEMPERATURE: 98.3 F | HEART RATE: 79 BPM | BODY MASS INDEX: 18.66 KG/M2 | SYSTOLIC BLOOD PRESSURE: 100 MMHG | WEIGHT: 111.99 LBS | DIASTOLIC BLOOD PRESSURE: 68 MMHG | HEIGHT: 65 IN | RESPIRATION RATE: 18 BRPM

## 2019-08-30 DIAGNOSIS — C01 CANCER OF BASE OF TONGUE (HCC): Primary | ICD-10-CM

## 2019-08-30 DIAGNOSIS — D63.8 ANEMIA IN OTHER CHRONIC DISEASES CLASSIFIED ELSEWHERE: ICD-10-CM

## 2019-08-30 PROCEDURE — 99214 OFFICE O/P EST MOD 30 MIN: CPT | Performed by: INTERNAL MEDICINE

## 2019-08-30 PROCEDURE — 99211 OFF/OP EST MAY X REQ PHY/QHP: CPT | Performed by: RADIOLOGY

## 2019-08-30 NOTE — PROGRESS NOTES
Hematology/Oncology Outpatient Follow-up  Nicki Alonso 68 y o  female 1950 [id number]    Date:  8/30/2019        Assessment and Plan:  1  Cancer of base of tongue ([address] )  The patient completed the concurrent chemo-radiation about a month ago with typical side effects including odynophagia and dysphagia  We will obtain a PET-CT scan around the end of October for evaluation post treatment  Patient was instructed to increase her oral hydration  She stated that she does not need IV hydration support anymore  - CBC and differential; Future  - Comprehensive metabolic panel; Future  - C-reactive protein; Future  - Sedimentation rate, automated; Future  - NM PET CT skull base to mid thigh; Future    2  Anemia in other chronic diseases classified elsewhere  Her anemia is related to recent concurrent chemo-radiation  We will check her thyroid function as a baseline post treatment  - CBC and differential; Future  - TSH, 3rd generation with Free T4 reflex; Future        HPI:  The patient came in today for a follow-up visit  She completed her radiation therapy on the 29th July 2019   The chemotherapy final cycle with carboplatin 5 FU was completed on the 3rd of August   The patient tolerated the treatment relatively well with the typical side effects including severe oropharyngeal pain and dysphagia  She did complain also about taste loss  Her most recent blood work on the 29th of August showed hemoglobin of 9 7 with white cell count of 4 2 and platelet count of 984  Her creatinine was 0 7 with normal liver enzymes and normal calcium and potassium  The patient denied hemoptysis or bleeding from any site  Oncology History    Nicki Alonso is a 68year old female with a locally advanced stage DEON, 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, 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, 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 extending into the right tonsillar region and toward the right soft palate    Staging PET-CT confirmed disease in the right oral cavity, tonsillar region and right tongue base approaching the level of the vallecula compatible with malignancy  There were multiple right cervical lymph nodes consistent with metastatic disease  There were no metastasis in the chest, abdomen, or pelvis  She returns today for one month follow up after completing radiation therapy on 8/3/19     8/5/19 Dr Jose Mistry f/u   - Oropharyngeal dysphagia and mucositis  - encouraged to increase her oral intake with high protein drinks  - Cisplatin induced ototoxicity of both ears -  audiometric testing showed hearing to be stable with slight improvement, continue to monitor  - return in 6 weeks    8/9/19 Medical Onc f/u  -  encouraged to continue to use Magic mouthwash as needed and Mycelex Troches to prevent further oral candidiasis  - continue  IV hydration twice a week   - plan for repeat imaging with either a PET-CT or CT scan around 12 weeks post treatment which will be due around mid October 2019 8/30/19 Dr Jian Shaw f/u  9/16/19 Dr Jose Mistry f/u               Cancer of base of tongue ([facility] [address] )    5/3/2019 Biopsy     Right BOT biopsy  Biopsy + for SCC moderately differentiated,  Incompletely excised           6/10/2019 - 6/30/2019 Chemotherapy     CISplatin (PLATINOL) 174 mg, mannitol 12 5 g in sodium chloride 0 9 % 500 mL IVPB, 100 mg/m2 = 174 mg, Intravenous, Once, 1 of 3 cycles  Administration: 174 mg (6/10/2019)  potassium chloride 20 mEq, magnesium sulfate 1 g in sodium chloride 0 9 % 1,000 mL IVPB, , Intravenous, Once, 1 of 3 cycles  Administration:  (6/10/2019),  (6/10/2019)  (1 cycle only d/c'd due to significant hearing loss after 1 treatment)        6/10/2019 - 7/29/2019 Radiation     Plan ID Energy Fractions Dose per Fraction (cGy) Dose Correction (cGy) Total Dose Delivered (cGy) Elapsed Days   Head and Neck 6X 35 / 35 200 0 7,000 52     Dr Britni Shah      7/1/2019 - 8/3/2019 Chemotherapy     CARBOplatin (PARAPLATIN) 115 5 mg in sodium chloride 0 9 % 250 mL IVPB, 70 mg/m2 = 115 5 mg (100 % of original dose 70 mg/m2), Intravenous, Once, 2 of 2 cycles  Dose modification: 70 mg/m2 (original dose 70 mg/m2, Cycle 1)  Administration: 115 5 mg (7/1/2019), 115 5 mg (7/2/2019), 115 5 mg (7/3/2019), 115 5 mg (7/5/2019), 115 5 mg (7/31/2019), 115 5 mg (8/1/2019), 115 5 mg (8/2/2019), 115 5 mg (7/30/2019)  (completed 2 cycles)           Interval history:    ROS: Review of Systems   Constitutional: Positive for appetite change, fatigue and unexpected weight change  Negative for activity change, chills, diaphoresis and fever  HENT: Positive for trouble swallowing  Negative for congestion, dental problem, ear discharge, ear pain, facial swelling, hearing loss, mouth sores, nosebleeds, postnasal drip, sore throat and tinnitus  Eyes: Negative for discharge, redness, itching and visual disturbance  Respiratory: Negative for cough, chest tightness, shortness of breath and wheezing  Cardiovascular: Negative for chest pain, palpitations and leg swelling  Gastrointestinal: Negative for abdominal distention, abdominal pain, anal bleeding, blood in stool, constipation, diarrhea, nausea and vomiting  Genitourinary: Negative for difficulty urinating, dysuria, flank pain, frequency, hematuria and urgency  Musculoskeletal: Negative for arthralgias, back pain, gait problem, joint swelling, myalgias and neck pain  Skin: Negative for color change, pallor, rash and wound  Neurological: Negative for dizziness, syncope, speech difficulty, weakness, light-headedness, numbness and headaches  Hematological: Negative for adenopathy  Does not bruise/bleed easily  Psychiatric/Behavioral: Negative for agitation, behavioral problems, confusion and sleep disturbance         Past Medical History:   Diagnosis Date    Tongue cancer ([facility] [address] )     Squamous cell       Past Surgical History:   Procedure Laterality Date    APPENDECTOMY  1990    CATARACT EXTRACTION Left     HYSTERECTOMY  2012    total    IR PICC LINE  6/5/2019    OOPHORECTOMY Bilateral 2012       Social History     Socioeconomic History    Marital status:      Spouse name: None    Number of children: None    Years of education: None    Highest education level: None   Occupational History    None   Social Needs    Financial resource strain: None    Food insecurity:     Worry: None     Inability: None    Transportation needs:     Medical: None     Non-medical: None   Tobacco Use    Smoking status: Former Smoker     Packs/day: 0 50     Years: 5 00     Pack years: 2 50     Types: Cigarettes     Last attempt to quit: 1978     Years since quittin 6    Smokeless tobacco: Never Used    Tobacco comment: no passive smoke exposure   Substance and Sexual Activity    Alcohol use:  Yes     Alcohol/week: 1 0 standard drinks     Types: 1 Glasses of wine per week     Frequency: 2-4 times a month     Drinks per session: 1 or 2     Binge frequency: Never     Comment: occasional    Drug use: Never    Sexual activity: None   Lifestyle    Physical activity:     Days per week: None     Minutes per session: None    Stress: None   Relationships    Social connections:     Talks on phone: None     Gets together: None     Attends Gnosticism service: None     Active member of club or organization: None     Attends meetings of clubs or organizations: None     Relationship status: None    Intimate partner violence:     Fear of current or ex partner: None     Emotionally abused: None     Physically abused: None     Forced sexual activity: None   Other Topics Concern    None   Social History Narrative    None       Family History   Problem Relation Age of Onset    Rheum arthritis Sister     Hypothyroidism Sister     Hashimoto's thyroiditis Family     Prostate cancer Father     Parkinsonism Mother         Cardiac abnormality [facility])       Allergies   Allergen Reactions    Codeine Anaphylaxis         Current Outpatient Medications:     parrish Costello oxide-diphenhydramine-lidocaine viscous (MAGIC MOUTHWASH) 1:1:1 suspension, Swish and swallow 10 mL every 4 (four) hours as needed for mouth pain or discomfort, Disp: 480 mL, Rfl: 4    clotrimazole (MYCELEX) 10 mg samantha, Take 1 tablet (10 mg total) by mouth 5 (five) times a day, Disp: 70 tablet, Rfl: 1    DENTAGEL 1 1 % GEL, AT BED AFTER BRUSHING AND FLOSSING APPLY 1 DROP OF GEL PER TOOTH LEAVE TRAYS IN MOUTH FOR 10 MINUTES, Disp: , Rfl: 11    Magnesium Cl-Calcium Carbonate (SLOW-MAG PO), Take by mouth as needed, Disp: , Rfl:     Melatonin 10 MG TABS, Take 1 tablet by mouth daily at bedtime as needed, Disp: , Rfl:     Multiple Vitamins-Minerals (PRESERVISION AREDS 2+MULTI VIT PO), Take by mouth, Disp: , Rfl:     OLANZapine (ZyPREXA) 10 mg tablet, Take 1 tablet (10 mg total) by mouth daily at bedtime Take for 3 days with chemotherapy/or as directed to prevent n/v, Disp: 12 tablet, Rfl: 3    omeprazole (PriLOSEC) 20 mg delayed release capsule, Take 1 capsule (20 mg total) by mouth daily, Disp: 30 capsule, Rfl: 3    Probiotic Product (PROBIOTIC DAILY PO), Take by mouth, Disp: , Rfl:     prochlorperazine (COMPAZINE) 10 mg tablet, Take 1 tablet (10 mg total) by mouth every 6 (six) hours as needed for nausea or vomiting, Disp: 60 tablet, Rfl: 0    silver sulfadiazine (SILVADENE,SSD) 1 % cream, Apply topically 2 (two) times a day, Disp: 400 g, Rfl: 2      Physical Exam:  BP 98/63 (BP Location: Left arm, Cuff Size: Adult)   Pulse 72   Temp 98 2 °F (36 8 °C) (Tympanic)   Resp 18   Ht 5' 5" (1 651 m)   Wt 50 kg (110 lb 3 2 oz)   SpO2 98%   BMI 18 34 kg/m²     Physical Exam   Constitutional: She is oriented to person, place, and time  She appears well-developed  No distress  Patient looks malnourished   HENT:   Head: Normocephalic and atraumatic  Nose: Nose normal    Dry mouth with the erythema oropharynx   Eyes: Pupils are equal, round, and reactive to light   Conjunctivae and EOM are normal  Right eye exhibits no discharge  Left eye exhibits no discharge  No scleral icterus  Neck: Normal range of motion  Neck supple  No JVD present  No tracheal deviation present  No thyromegaly present  Skin changes due to recent radiation of the neck   Cardiovascular: Normal rate, regular rhythm and normal heart sounds  Exam reveals no friction rub  No murmur heard  Pulmonary/Chest: Effort normal and breath sounds normal  No stridor  No respiratory distress  She has no wheezes  She has no rales  She exhibits no tenderness  Abdominal: Soft  Bowel sounds are normal  She exhibits no distension and no mass  There is no hepatosplenomegaly, splenomegaly or hepatomegaly  There is no tenderness  There is no rebound and no guarding  Musculoskeletal: Normal range of motion  She exhibits no edema, tenderness or deformity  Lymphadenopathy:     She has no cervical adenopathy  Neurological: She is alert and oriented to person, place, and time  She has normal reflexes  No cranial nerve deficit  Coordination normal    Skin: Skin is warm and dry  No rash noted  She is not diaphoretic  No erythema  No pallor  Psychiatric: She has a normal mood and affect  Her behavior is normal  Judgment and thought content normal          Labs:  Lab Results   Component Value Date    WBC 4 20 (L) 08/29/2019    HGB 9 7 (L) 08/29/2019    HCT 29 1 (L) 08/29/2019     (H) 08/29/2019     08/29/2019     Lab Results   Component Value Date     04/24/2018    K 3 6 08/29/2019     08/29/2019    CO2 29 08/29/2019    ANIONGAP 11 04/24/2018    BUN 7 08/29/2019    CREATININE 0 70 08/29/2019    GLUCOSE 85 04/24/2018    GLUF 99 05/28/2019    CALCIUM 9 5 08/29/2019    AST 21 08/29/2019    ALT 17 08/29/2019    ALKPHOS 62 08/29/2019    EGFR 87 08/29/2019     No results found for: TSH    Patient voiced understanding and agreement in the above discussion  Aware to contact our office with questions/symptoms in the interim

## 2019-08-30 NOTE — PROGRESS NOTES
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 extending into the right tonsillar region and toward the right soft palate  Staging PET-CT confirmed disease in the right oral cavity, tonsillar region and right tongue base approaching the level of the vallecula compatible with malignancy  There were multiple right cervical lymph nodes consistent with metastatic disease  There were no metastasis in the chest, abdomen, or pelvis  She returns today for one month follow up after completing radiation therapy on 8/3/19     8/5/19 Dr Miguel Dye f/u   - Oropharyngeal dysphagia and mucositis  - encouraged to increase her oral intake with high protein drinks  - Cisplatin induced ototoxicity of both ears -  audiometric testing showed hearing to be stable with slight improvement, continue to monitor  - return in 6 weeks    8/9/19 Medical Onc f/u  -  encouraged to continue to use Magic mouthwash as needed and Mycelex Troches to prevent further oral candidiasis  - continue  IV hydration twice a week   - plan for repeat imaging with either a PET-CT or CT scan around 12 weeks post treatment which will be due around mid October 2019 8/30/19 Dr Levi Burciaga   We will obtain a PET-CT scan around the end of October for evaluation post treatment  Patient was instructed to increase her oral hydration  She stated that she does not need IV hydration support anymore       9/16/19 Dr Miguel Dye f/u          Cancer of base of tongue [facility])    5/3/2019 Biopsy     Right BOT biopsy  Biopsy + for SCC moderately differentiated,  Incompletely excised           6/10/2019 - 6/30/2019 Chemotherapy     CISplatin (PLATINOL) 174 mg, mannitol 12 5 g in sodium chloride 0 9 % 500 mL IVPB, 100 mg/m2 = 174 mg, Intravenous, Once, 1 of 3 cycles  Administration: 174 mg (6/10/2019)  potassium chloride 20 mEq, magnesium sulfate 1 g in sodium chloride 0 9 % 1,000 mL IVPB, , Intravenous, Once, 1 of 3 cycles  Administration:  (6/10/2019),  (6/10/2019)  (1 cycle only d/c'd due to significant hearing loss after 1 treatment)        6/10/2019 - 7/29/2019 Radiation     Plan ID Energy Fractions Dose per Fraction (cGy) Dose Correction (cGy) Total Dose Delivered (cGy) Elapsed Days   Head and Neck 6X 35 / 35 200 0 7,000 52     Dr Michelle Andujar      7/1/2019 - 8/3/2019 Chemotherapy     CARBOplatin (PARAPLATIN) 115 5 mg in sodium chloride 0 9 % 250 mL IVPB, 70 mg/m2 = 115 5 mg (100 % of original dose 70 mg/m2), Intravenous, Once, 2 of 2 cycles  Dose modification: 70 mg/m2 (original dose 70 mg/m2, Cycle 1)  Administration: 115 5 mg (7/1/2019), 115 5 mg (7/2/2019), 115 5 mg (7/3/2019), 115 5 mg (7/5/2019), 115 5 mg (7/31/2019), 115 5 mg (8/1/2019), 115 5 mg (8/2/2019), 115 5 mg (7/30/2019)  (completed 2 cycles)         Clinical Trial: no    Interval History    Health Maintenance   Topic Date Due    Hepatitis C Screening  1950    CRC Screening: Colonoscopy  1950    BMI: Followup Plan  05/22/1966    Pneumococcal Vaccine: 65+ Years (1 of 2 - PCV13) 05/22/2013    INFLUENZA VACCINE  07/01/2019    Fall Risk  05/20/2020    MAMMOGRAM  05/28/2020    Urinary Incontinence Screening  06/03/2020    BMI: Adult  08/30/2020    DTaP,Tdap,and Td Vaccines (3 - Td) 01/17/2029    Pneumococcal Vaccine: Pediatrics (0 to 5 Years) and At-Risk Patients (6 to 59 Years)  Aged Out    HEPATITIS B VACCINES  Aged Out       Patient Active Problem List   Diagnosis    Multiple thyroid nodules    Fatigue    Cancer of base of tongue ([facility] [address] )    Thoracic aortic aneurysm ([facility] [address] )    Dehydration    Anemia    Encounter for chemotherapy management    Hypokalemia     Past Medical History:   Diagnosis Date    Tongue cancer ([facility] [address] )     Squamous cell     Past Surgical History: Procedure Laterality Date    APPENDECTOMY      CATARACT EXTRACTION Left     HYSTERECTOMY  2012    total    IR PICC LINE  2019    OOPHORECTOMY Bilateral 2012     Family History   Problem Relation Age of Onset    Rheum arthritis Sister     Hypothyroidism Sister    Ivonne Carranza Hashimoto's thyroiditis Family     Prostate cancer Father     Parkinsonism Mother         Cardiac abnormality ([place])     Social History     Socioeconomic History    Marital status:      Spouse name: Not on file    Number of children: Not on file    Years of education: Not on file    Highest education level: Not on file   Occupational History    Not on file   Social Needs    Financial resource strain: Not on file    Food insecurity:     Worry: Not on file     Inability: Not on file    Transportation needs:     Medical: Not on file     Non-medical: Not on file   Tobacco Use    Smoking status: Former Smoker     Packs/day: 0 50     Years: 5 00     Pack years: 2 50     Types: Cigarettes     Last attempt to quit: 1978     Years since quittin 6    Smokeless tobacco: Never Used    Tobacco comment: no passive smoke exposure   Substance and Sexual Activity    Alcohol use:  Yes     Alcohol/week: 1 0 standard drinks     Types: 1 Glasses of wine per week     Frequency: 2-4 times a month     Drinks per session: 1 or 2     Binge frequency: Never     Comment: occasional    Drug use: Never    Sexual activity: Not on file   Lifestyle    Physical activity:     Days per week: Not on file     Minutes per session: Not on file    Stress: Not on file   Relationships    Social connections:     Talks on phone: Not on file     Gets together: Not on file     Attends Sabianism service: Not on file     Active member of club or organization: Not on file     Attends meetings of clubs or organizations: Not on file     Relationship status: Not on file    Intimate partner violence:     Fear of current or ex partner: Not on file Emotionally abused: Not on file     Physically abused: Not on file     Forced sexual activity: Not on file   Other Topics Concern    Not on file   Social History Narrative    Not on file       Current Outpatient Medications:     al mag oxide-diphenhydramine-lidocaine viscous (MAGIC MOUTHWASH) 1:1:1 suspension, Swish and swallow 10 mL every 4 (four) hours as needed for mouth pain or discomfort, Disp: 480 mL, Rfl: 4    clotrimazole (MYCELEX) 10 mg samantha, Take 1 tablet (10 mg total) by mouth 5 (five) times a day, Disp: 70 tablet, Rfl: 1    DENTAGEL 1 1 % GEL, AT BED AFTER BRUSHING AND FLOSSING APPLY 1 DROP OF GEL PER TOOTH LEAVE TRAYS IN MOUTH FOR 10 MINUTES, Disp: , Rfl: 11    Magnesium Cl-Calcium Carbonate (SLOW-MAG PO), Take by mouth as needed, Disp: , Rfl:     Melatonin 10 MG TABS, Take 1 tablet by mouth daily at bedtime as needed, Disp: , Rfl:     Multiple Vitamins-Minerals (PRESERVISION AREDS 2+MULTI VIT PO), Take by mouth, Disp: , Rfl:     OLANZapine (ZyPREXA) 10 mg tablet, Take 1 tablet (10 mg total) by mouth daily at bedtime Take for 3 days with chemotherapy/or as directed to prevent n/v, Disp: 12 tablet, Rfl: 3    omeprazole (PriLOSEC) 20 mg delayed release capsule, Take 1 capsule (20 mg total) by mouth daily, Disp: 30 capsule, Rfl: 3    Probiotic Product (PROBIOTIC DAILY PO), Take by mouth, Disp: , Rfl:     prochlorperazine (COMPAZINE) 10 mg tablet, Take 1 tablet (10 mg total) by mouth every 6 (six) hours as needed for nausea or vomiting, Disp: 60 tablet, Rfl: 0    silver sulfadiazine (SILVADENE,SSD) 1 % cream, Apply topically 2 (two) times a day, Disp: 400 g, Rfl: 2  Allergies   Allergen Reactions    Codeine Anaphylaxis       Review of Systems:  Review of Systems   Constitutional: Negative  HENT: Negative  Eyes: Negative  Respiratory: Negative  Cardiovascular: Negative  Gastrointestinal: Negative  Endocrine: Negative  Genitourinary: Negative  Musculoskeletal: Negative  Skin: Negative  Allergic/Immunologic: Negative  Neurological: Negative  Hematological: Negative  Psychiatric/Behavioral: Negative  There were no vitals filed for this visit  Imaging:No results found      Teaching

## 2019-09-03 ENCOUNTER — HOSPITAL ENCOUNTER (OUTPATIENT)
Dept: INFUSION CENTER | Facility: CLINIC | Age: 69
Discharge: HOME | End: 2019-09-03

## 2019-09-23 ENCOUNTER — TELEPHONE (OUTPATIENT)
Dept: HEMATOLOGY ONCOLOGY | Facility: CLINIC | Age: 69
End: 2019-09-23

## 2019-09-23 NOTE — TELEPHONE ENCOUNTER
Phoned patient after discussing with Dr Qi Robertson reassured patient that this is common from the Radiation treatment, neck will almost look like a turkey neck and patient says this is what her neck looks like  She feels better and I reassured he to keep her Pet Scan appointment and her F/U  Appts  She verbalized understanding

## 2019-09-23 NOTE — TELEPHONE ENCOUNTER
Patient called stating she has swelling under her chin  Pt stated sometimes it subsides, but today it is more prominent  Pt noticed the swelling on Sunday  Pt would like to know if she needs to be seen in the office; f/u apt not until 11/01/19  Pt can be reached at [phone]

## 2019-10-25 ENCOUNTER — APPOINTMENT (OUTPATIENT)
Dept: LAB | Facility: HOSPITAL | Age: 69
End: 2019-10-25
Attending: INTERNAL MEDICINE
Payer: COMMERCIAL

## 2019-10-25 DIAGNOSIS — D63.8 ANEMIA IN OTHER CHRONIC DISEASES CLASSIFIED ELSEWHERE: ICD-10-CM

## 2019-10-25 DIAGNOSIS — C01 CANCER OF BASE OF TONGUE (HCC): ICD-10-CM

## 2019-10-25 LAB
ALBUMIN SERPL BCP-MCNC: 4.1 G/DL (ref 3–5.2)
ALP SERPL-CCNC: 71 U/L (ref 43–122)
ALT SERPL W P-5'-P-CCNC: 17 U/L (ref 9–52)
ANION GAP SERPL CALCULATED.3IONS-SCNC: 7 MMOL/L (ref 5–14)
AST SERPL W P-5'-P-CCNC: 20 U/L (ref 14–36)
BILIRUB SERPL-MCNC: 0.4 MG/DL
BUN SERPL-MCNC: 11 MG/DL (ref 5–25)
CALCIUM SERPL-MCNC: 9.9 MG/DL (ref 8.4–10.2)
CHLORIDE SERPL-SCNC: 100 MMOL/L (ref 97–108)
CO2 SERPL-SCNC: 33 MMOL/L (ref 22–30)
CREAT SERPL-MCNC: 0.7 MG/DL (ref 0.6–1.2)
CRP SERPL QL: 6.4 MG/L
EOSINOPHIL # BLD AUTO: 0.34 THOUSAND/UL (ref 0–0.4)
EOSINOPHIL NFR BLD MANUAL: 9 % (ref 0–6)
ERYTHROCYTE [DISTWIDTH] IN BLOOD BY AUTOMATED COUNT: 12.3 %
ERYTHROCYTE [SEDIMENTATION RATE] IN BLOOD: 15 MM/HOUR (ref 1–20)
GFR SERPL CREATININE-BSD FRML MDRD: 87 ML/MIN/1.73SQ M
GLUCOSE P FAST SERPL-MCNC: 88 MG/DL (ref 70–99)
HCT VFR BLD AUTO: 35.6 % (ref 36–46)
HGB BLD-MCNC: 12 G/DL (ref 12–16)
LYMPHOCYTES # BLD AUTO: 0.49 THOUSAND/UL (ref 0.5–4)
LYMPHOCYTES # BLD AUTO: 13 % (ref 25–45)
MCH RBC QN AUTO: 34 PG (ref 26–34)
MCHC RBC AUTO-ENTMCNC: 33.6 G/DL (ref 31–36)
MCV RBC AUTO: 101 FL (ref 80–100)
MONOCYTES # BLD AUTO: 0.38 THOUSAND/UL (ref 0.2–0.9)
MONOCYTES NFR BLD AUTO: 10 % (ref 1–10)
NEUTS BAND NFR BLD MANUAL: 5 % (ref 0–8)
NEUTS SEG # BLD: 2.58 THOUSAND/UL (ref 1.8–7.8)
NEUTS SEG NFR BLD AUTO: 63 %
PLATELET # BLD AUTO: 268 THOUSANDS/UL (ref 150–450)
PLATELET BLD QL SMEAR: ADEQUATE
PMV BLD AUTO: 6.7 FL (ref 8.9–12.7)
POTASSIUM SERPL-SCNC: 4.2 MMOL/L (ref 3.6–5)
PROT SERPL-MCNC: 7.1 G/DL (ref 5.9–8.4)
RBC # BLD AUTO: 3.52 MILLION/UL (ref 4–5.2)
RBC MORPH BLD: NORMAL
SODIUM SERPL-SCNC: 140 MMOL/L (ref 137–147)
T4 FREE SERPL-MCNC: 0.71 NG/DL (ref 0.76–1.46)
TOTAL CELLS COUNTED SPEC: 100
TSH SERPL DL<=0.05 MIU/L-ACNC: 8.14 UIU/ML (ref 0.47–4.68)
WBC # BLD AUTO: 3.8 THOUSAND/UL (ref 4.5–11)

## 2019-10-25 PROCEDURE — 84439 ASSAY OF FREE THYROXINE: CPT

## 2019-10-25 PROCEDURE — 85007 BL SMEAR W/DIFF WBC COUNT: CPT

## 2019-10-25 PROCEDURE — 36415 COLL VENOUS BLD VENIPUNCTURE: CPT

## 2019-10-25 PROCEDURE — 85027 COMPLETE CBC AUTOMATED: CPT

## 2019-10-25 PROCEDURE — 86140 C-REACTIVE PROTEIN: CPT

## 2019-10-25 PROCEDURE — 85652 RBC SED RATE AUTOMATED: CPT

## 2019-10-25 PROCEDURE — 80053 COMPREHEN METABOLIC PANEL: CPT

## 2019-10-25 PROCEDURE — 84443 ASSAY THYROID STIM HORMONE: CPT

## 2019-10-28 ENCOUNTER — HOSPITAL ENCOUNTER (OUTPATIENT)
Dept: NUCLEAR MEDICINE | Facility: HOSPITAL | Age: 69
Discharge: HOME | End: 2019-10-28
Attending: INTERNAL MEDICINE
Payer: COMMERCIAL

## 2019-10-28 DIAGNOSIS — C01 CANCER OF BASE OF TONGUE (HCC): ICD-10-CM

## 2019-10-28 LAB — GLUCOSE SERPL-MCNC: 98 MG/DL (ref 65–140)

## 2019-10-28 PROCEDURE — 78815 PET IMAGE W/CT SKULL-THIGH: CPT

## 2019-10-28 PROCEDURE — A9552 F18 FDG: HCPCS

## 2019-10-28 PROCEDURE — 82948 REAGENT STRIP/BLOOD GLUCOSE: CPT

## 2019-11-01 ENCOUNTER — OFFICE VISIT (OUTPATIENT)
Dept: HEMATOLOGY ONCOLOGY | Facility: CLINIC | Age: 69
End: 2019-11-01
Payer: COMMERCIAL

## 2019-11-01 VITALS
OXYGEN SATURATION: 96 % | BODY MASS INDEX: 18.93 KG/M2 | HEIGHT: 65 IN | TEMPERATURE: 96.7 F | RESPIRATION RATE: 12 BRPM | HEART RATE: 65 BPM | DIASTOLIC BLOOD PRESSURE: 72 MMHG | SYSTOLIC BLOOD PRESSURE: 128 MMHG | WEIGHT: 113.6 LBS

## 2019-11-01 DIAGNOSIS — C01 CANCER OF BASE OF TONGUE (HCC): Primary | ICD-10-CM

## 2019-11-01 DIAGNOSIS — E03.9 HYPOTHYROIDISM, UNSPECIFIED TYPE: ICD-10-CM

## 2019-11-01 DIAGNOSIS — B37.0 ORAL THRUSH: ICD-10-CM

## 2019-11-01 PROCEDURE — 99214 OFFICE O/P EST MOD 30 MIN: CPT | Performed by: INTERNAL MEDICINE

## 2019-11-01 RX ORDER — FLUCONAZOLE 100 MG/1
100 TABLET ORAL DAILY
Qty: 7 TABLET | Refills: 1 | Status: SHIPPED | OUTPATIENT
Start: 2019-11-01 | End: 2019-11-08

## 2019-11-01 NOTE — PROGRESS NOTES
Hematology/Oncology Outpatient Follow-up  Kade Guzman 68 y o  female 1950 [id number]    Date:  11/1/2019        Assessment and Plan:  1  Cancer of base of tongue ([facility] [place] [address] )  The patient and her daughter were both educated about the PET-CT scan findings which show showed significant improvement of the activity in the base of the tongue  However, there is still some residual mild activity with an SUV around 3 3 in the right tongue base of unknown etiology or significance  The patient was told that she will need to be seen by her ENT doctor for further evaluation with the direct visualization  We will also discuss her case and the PET-CT scan findings at the next ENT working group meeting  The patient seems to be interested in going back to work which is appropriate  I will see her again in about 3 months from now for close follow-up  - CBC and differential; Future  - Comprehensive metabolic panel; Future    2  Hypothyroidism, unspecified type  The patient seems to have hypothyroidism which needs to be corrected with the Synthroid  I did ask her to discuss this with her primary care team   - TSH, 3rd generation with Free T4 reflex; Future    3  Oral thrush  She seems to have oral thrush which is not responding to Mycelex Mayuri   We will get her started on Diflucan for 7 days and repeated if needed  - fluconazole (DIFLUCAN) 100 mg tablet; Take 1 tablet (100 mg total) by mouth daily for 7 days  Dispense: 7 tablet; Refill: 1        HPI:  The patient came today for a follow-up, visit she had a PET-CT scan done on 10/28/2019 which showed:  IMPRESSION:     1  Significantly decreased FDG uptake at the right tongue base and right oral cavity compatible with response to treatment  Mild residual FDG uptake remaining at the right tongue base, residual disease is not excluded  2   No FDG avid lymph nodes remaining    3   Persistent asymmetry in the right thyroid gland of uncertain significance, question inflammatory  No underlying nodule seen here on prior anatomic imaging  New FDG uptake in what is likely pyramidal lobe activity just superior to the thyroid   cartilage  Correlate with thyroid function studies for thyroiditis  4  No findings for hypermetabolic metastasis in the chest, abdomen or pelvis  She also had a recent blood work on 10/25/2019 which showed hypothyroidism with TSH of 8 1 and free T4 of 0 7, her hemoglobin was 12 0 with white cell count 3 8 and normal platelets  Her CMP was normal   The patient can't swallow the food pretty well including solid and soft  She did complain however about the occasional difficulty swallowing pills  Cancer of base of tongue ([facility] [address] )    5/3/2019 Biopsy     Right BOT biopsy  Biopsy + for SCC moderately differentiated,  Incompletely excised           6/10/2019 - 6/30/2019 Chemotherapy     CISplatin (PLATINOL) 174 mg, mannitol 12 5 g in sodium chloride 0 9 % 500 mL IVPB, 100 mg/m2 = 174 mg, Intravenous, Once, 1 of 3 cycles  Administration: 174 mg (6/10/2019)  potassium chloride 20 mEq, magnesium sulfate 1 g in sodium chloride 0 9 % 1,000 mL IVPB, , Intravenous, Once, 1 of 3 cycles  Administration:  (6/10/2019),  (6/10/2019)  (1 cycle only d/c'd due to significant hearing loss after 1 treatment)        6/10/2019 - 7/29/2019 Radiation     Plan ID Energy Fractions Dose per Fraction (cGy) Dose Correction (cGy) Total Dose Delivered (cGy) Elapsed Days   Head and Neck 6X 35 / 35 200 0 7,000 52     Dr Cedeno Congress      7/1/2019 - 8/3/2019 Chemotherapy     CARBOplatin (PARAPLATIN) 115 5 mg in sodium chloride 0 9 % 250 mL IVPB, 70 mg/m2 = 115 5 mg (100 % of original dose 70 mg/m2), Intravenous, Once, 2 of 2 cycles  Dose modification: 70 mg/m2 (original dose 70 mg/m2, Cycle 1)  Administration: 115 5 mg (7/1/2019), 115 5 mg (7/2/2019), 115 5 mg (7/3/2019), 115 5 mg (7/5/2019), 115 5 mg (7/31/2019), 115 5 mg (8/1/2019), 115 5 mg (8/2/2019), 115 5 mg (7/30/2019)  (completed 2 cycles)           Interval history:    ROS: Review of Systems   Constitutional: Negative for activity change, appetite change, chills, diaphoresis, fatigue, fever and unexpected weight change  HENT: Positive for mouth sores and trouble swallowing  Negative for congestion, dental problem, ear discharge, ear pain, facial swelling, hearing loss, nosebleeds, postnasal drip, sore throat and tinnitus  Eyes: Negative for discharge, redness, itching and visual disturbance  Respiratory: Negative for cough, chest tightness, shortness of breath and wheezing  Cardiovascular: Negative for chest pain, palpitations and leg swelling  Gastrointestinal: Negative for abdominal distention, abdominal pain, anal bleeding, blood in stool, constipation, diarrhea, nausea and vomiting  Genitourinary: Negative for difficulty urinating, dysuria, flank pain, frequency, hematuria and urgency  Musculoskeletal: Negative for arthralgias, back pain, gait problem, joint swelling, myalgias and neck pain  Skin: Negative for color change, pallor, rash and wound  Neurological: Negative for dizziness, syncope, speech difficulty, weakness, light-headedness, numbness and headaches  Hematological: Negative for adenopathy  Does not bruise/bleed easily  Psychiatric/Behavioral: Negative for agitation, behavioral problems, confusion and sleep disturbance         Past Medical History:   Diagnosis Date    Tongue cancer ([facility] [address] )     Squamous cell       Past Surgical History:   Procedure Laterality Date    APPENDECTOMY  1990    CATARACT EXTRACTION Left     HYSTERECTOMY  2012    total    IR PICC LINE  6/5/2019    OOPHORECTOMY Bilateral 2012       Social History     Socioeconomic History    Marital status:      Spouse name: None    Number of children: None    Years of education: None    Highest education level: None   Occupational History    None   Social Needs    Financial resource strain: None    Food insecurity:     Worry: None     Inability: None    Transportation needs:     Medical: None     Non-medical: None   Tobacco Use    Smoking status: Former Smoker     Packs/day: 0 50     Years: 5 00     Pack years: 2 50     Types: Cigarettes     Last attempt to quit: 1978     Years since quittin 8    Smokeless tobacco: Never Used    Tobacco comment: no passive smoke exposure   Substance and Sexual Activity    Alcohol use:  Yes     Alcohol/week: 1 0 standard drinks     Types: 1 Glasses of wine per week     Frequency: 2-4 times a month     Drinks per session: 1 or 2     Binge frequency: Never     Comment: occasional    Drug use: Never    Sexual activity: None   Lifestyle    Physical activity:     Days per week: None     Minutes per session: None    Stress: None   Relationships    Social connections:     Talks on phone: None     Gets together: None     Attends Restorationist service: None     Active member of club or organization: None     Attends meetings of clubs or organizations: None     Relationship status: None    Intimate partner violence:     Fear of current or ex partner: None     Emotionally abused: None     Physically abused: None     Forced sexual activity: None   Other Topics Concern    None   Social History Narrative    None       Family History   Problem Relation Age of Onset    Rheum arthritis Sister     Hypothyroidism Sister     Hashimoto's thyroiditis Family     Prostate cancer Father     Parkinsonism Mother         Cardiac abnormality ([place])       Allergies   Allergen Reactions    Codeine Anaphylaxis         Current Outpatient Medications:     DENTAGEL 1 1 % GEL, AT BED AFTER BRUSHING AND FLOSSING APPLY 1 DROP OF GEL PER TOOTH LEAVE TRAYS IN MOUTH FOR 10 MINUTES, Disp: , Rfl: 11    al mag oxide-diphenhydramine-lidocaine viscous (MAGIC MOUTHWASH) 1:1:1 suspension, Swish and swallow 10 mL every 4 (four) hours as needed for mouth pain or discomfort (Patient not taking: Reported on 2019), Disp: 480 mL, Rfl: 4    fluconazole (DIFLUCAN) 100 mg tablet, Take 1 tablet (100 mg total) by mouth daily for 7 days, Disp: 7 tablet, Rfl: 1    Magnesium Cl-Calcium Carbonate (SLOW-MAG PO), Take by mouth as needed, Disp: , Rfl:     Melatonin 10 MG TABS, Take 1 tablet by mouth daily at bedtime as needed, Disp: , Rfl:     Multiple Vitamins-Minerals (PRESERVISION AREDS 2+MULTI VIT PO), Take by mouth, Disp: , Rfl:     OLANZapine (ZyPREXA) 10 mg tablet, Take 1 tablet (10 mg total) by mouth daily at bedtime Take for 3 days with chemotherapy/or as directed to prevent n/v (Patient not taking: Reported on 11/1/2019), Disp: 12 tablet, Rfl: 3    omeprazole (PriLOSEC) 20 mg delayed release capsule, Take 1 capsule (20 mg total) by mouth daily (Patient not taking: Reported on 11/1/2019), Disp: 30 capsule, Rfl: 3    Probiotic Product (PROBIOTIC DAILY PO), Take by mouth, Disp: , Rfl:     prochlorperazine (COMPAZINE) 10 mg tablet, Take 1 tablet (10 mg total) by mouth every 6 (six) hours as needed for nausea or vomiting (Patient not taking: Reported on 11/1/2019), Disp: 60 tablet, Rfl: 0      Physical Exam:  /72 (BP Location: Left arm, Patient Position: Sitting, Cuff Size: Adult)   Pulse 65   Temp (!) 96 7 °F (35 9 °C) (Tympanic)   Resp 12   Ht 5' 5" (1 651 m)   Wt 51 5 kg (113 lb 9 6 oz)   SpO2 96%   BMI 18 90 kg/m²     Physical Exam   Constitutional: She is oriented to person, place, and time  She appears well-developed and well-nourished  No distress  HENT:   Head: Normocephalic and atraumatic  Nose: Nose normal    Mouth/Throat: Oropharynx is clear and moist    Oral thrush on the tongue   Eyes: Pupils are equal, round, and reactive to light  Conjunctivae and EOM are normal  Right eye exhibits no discharge  Left eye exhibits no discharge  No scleral icterus  Neck: Normal range of motion  Neck supple  No JVD present  No tracheal deviation present  No thyromegaly present     Cardiovascular: Normal rate, regular rhythm and normal heart sounds  Exam reveals no friction rub  No murmur heard  Pulmonary/Chest: Effort normal and breath sounds normal  No stridor  No respiratory distress  She has no wheezes  She has no rales  She exhibits no tenderness  Abdominal: Soft  Bowel sounds are normal  She exhibits no distension and no mass  There is no hepatosplenomegaly, splenomegaly or hepatomegaly  There is no tenderness  There is no rebound and no guarding  Musculoskeletal: Normal range of motion  She exhibits no edema, tenderness or deformity  Lymphadenopathy:     She has no cervical adenopathy  Neurological: She is alert and oriented to person, place, and time  She has normal reflexes  No cranial nerve deficit  Coordination normal    Skin: Skin is warm and dry  No rash noted  She is not diaphoretic  No erythema  No pallor  Psychiatric: She has a normal mood and affect  Her behavior is normal  Judgment and thought content normal          Labs:  Lab Results   Component Value Date    WBC 3 80 (L) 10/25/2019    HGB 12 0 10/25/2019    HCT 35 6 (L) 10/25/2019     (H) 10/25/2019     10/25/2019     Lab Results   Component Value Date     04/24/2018    K 4 2 10/25/2019     10/25/2019    CO2 33 (H) 10/25/2019    ANIONGAP 11 04/24/2018    BUN 11 10/25/2019    CREATININE 0 70 10/25/2019    GLUCOSE 85 04/24/2018    GLUF 88 10/25/2019    CALCIUM 9 9 10/25/2019    AST 20 10/25/2019    ALT 17 10/25/2019    ALKPHOS 71 10/25/2019    EGFR 87 10/25/2019     No results found for: TSH    Patient voiced understanding and agreement in the above discussion  Aware to contact our office with questions/symptoms in the interim

## 2019-11-14 ENCOUNTER — OFFICE VISIT (OUTPATIENT)
Dept: FAMILY MEDICINE CLINIC | Facility: CLINIC | Age: 69
End: 2019-11-14
Payer: COMMERCIAL

## 2019-11-14 VITALS
BODY MASS INDEX: 18.96 KG/M2 | DIASTOLIC BLOOD PRESSURE: 80 MMHG | SYSTOLIC BLOOD PRESSURE: 120 MMHG | TEMPERATURE: 97.7 F | HEIGHT: 65 IN | WEIGHT: 113.8 LBS | HEART RATE: 80 BPM

## 2019-11-14 DIAGNOSIS — I71.2 THORACIC AORTIC ANEURYSM WITHOUT RUPTURE (HCC): ICD-10-CM

## 2019-11-14 DIAGNOSIS — E03.9 HYPOTHYROIDISM, UNSPECIFIED TYPE: ICD-10-CM

## 2019-11-14 DIAGNOSIS — C01 CANCER OF BASE OF TONGUE (HCC): Primary | ICD-10-CM

## 2019-11-14 DIAGNOSIS — D63.8 ANEMIA IN OTHER CHRONIC DISEASES CLASSIFIED ELSEWHERE: ICD-10-CM

## 2019-11-14 PROCEDURE — 1036F TOBACCO NON-USER: CPT | Performed by: FAMILY MEDICINE

## 2019-11-14 PROCEDURE — 99214 OFFICE O/P EST MOD 30 MIN: CPT | Performed by: FAMILY MEDICINE

## 2019-11-14 PROCEDURE — 1160F RVW MEDS BY RX/DR IN RCRD: CPT | Performed by: FAMILY MEDICINE

## 2019-11-14 NOTE — PROGRESS NOTES
Assessment and Plan:    Problem List Items Addressed This Visit     Anemia     Will recheck as scheduled by Heme/Onc  Cancer of base of tongue ([facility] [address] ) - Primary     Stable for the moment  Following with ENT as well as Oncology  No particular concerns today  She did mention that she has an appointment coming up with radiation oncology  Hypothyroid     Stable at the moment  TSH was quite a bit elevated  ENT started her on Synthroid  Hematology/Oncology had ordered a repeat TSH  I offered that she could have it sooner if she wished, but patient was satisfied with checking in February as scheduled  For the time being, watch symptoms of hypothyroid if they occur, as well as hyperthyroid  Thoracic aortic aneurysm [facility])     Reviewed again that the patient did have a thoracic aneurysm noted  We reviewed the recommendation to follow with CT surgery  At this time, the patient is more concerned about completing chemo and radiation oncology, so she would prefer to wait until after cleared by that before having further evaluations  This seems reasonable, so will defer till later  Diagnoses and all orders for this visit:    Cancer of base of tongue ([facility] [address] )    Hypothyroidism, unspecified type    Thoracic aortic aneurysm without rupture ([facility] [address] )    Anemia in other chronic diseases classified elsewhere              Subjective:      Patient ID: Arnol Peguero is a 68 y o  female  CC:    Chief Complaint   Patient presents with    Follow-up     Pt is here to discuss all things that have been ongoing  HPI:    Patient is here to follow-up on multiple issues  She did see ENT recently  They reviewed her PET scan, and saw that there was continued activity in an area at the base of the tongue  Laryngoscopy and direct exam were done by ENT, they felt that this was normal   The explanation of the PET scan abnormality is that is likely healing and inflammation from that    There was no evidence on physical of continued problems  She does have follow-up scheduled with ENT  While she was at the ENT office, they adjusted Synthroid  Patient is also seen Hematology/Oncology  Reviewed that note  This visit was performed prior to her seeing ENT  They recommended following with ENT based on the PET-CT results  Further, she did have a TSH that was slightly abnormal, they recommended following with primary team     Hypothyroidism:  Patient did have her medications started recently  She is currently on 50 mcg Synthroid  She reports she was not on Synthroid prior  Aneurysm:  I did review with the patient again that that was noted previously  At this point, she is not interested in evaluation yet  She would like to complete more of the treatment with the oral cancer  The following portions of the patient's history were reviewed and updated as appropriate: allergies, current medications, past family history, past medical history, past social history, past surgical history and problem list       Review of Systems   Constitutional: Negative  HENT: Positive for trouble swallowing (Swallowing is improved versus before  )  Eyes: Negative  Respiratory: Negative  Cardiovascular: Negative  Gastrointestinal: Negative  All other systems reviewed and are negative  Data to review:   Laboratories from October 25th reviewed  Sodium 140, potassium 4 2, calcium 9 9  Blood sugar 88  Creatinine 0 7  GFR 87  AST 20, ALT 17    White count 3 8  Hemoglobin 12 0, hematocrit 35 6  Platelets 984  TSH 8 14  T4:  0 71, slightly low verses 0 76  CRP 6 4  Sed rate 15       Objective:    Vitals:    11/14/19 0829   BP: 120/80   BP Location: Left arm   Patient Position: Sitting   Cuff Size: Adult   Pulse: 80   Temp: 97 7 °F (36 5 °C)   TempSrc: Oral   Weight: 51 6 kg (113 lb 12 8 oz)   Height: 5' 5" (1 651 m)        Physical Exam   Constitutional: She appears well-developed and well-nourished  HENT:   Head: Normocephalic and atraumatic  Cardiovascular: Normal rate, regular rhythm and normal heart sounds  Pulses:       Carotid pulses are 2+ on the right side, and 2+ on the left side  Pulmonary/Chest: Effort normal and breath sounds normal  She has no wheezes  She has no rales  She exhibits no tenderness  Nursing note and vitals reviewed

## 2019-11-14 NOTE — ASSESSMENT & PLAN NOTE
Reviewed again that the patient did have a thoracic aneurysm noted  We reviewed the recommendation to follow with CT surgery  At this time, the patient is more concerned about completing chemo and radiation oncology, so she would prefer to wait until after cleared by that before having further evaluations  This seems reasonable, so will defer till later

## 2019-11-14 NOTE — ASSESSMENT & PLAN NOTE
Stable for the moment  Following with ENT as well as Oncology  No particular concerns today  She did mention that she has an appointment coming up with radiation oncology

## 2019-11-14 NOTE — PATIENT INSTRUCTIONS
Problem List Items Addressed This Visit     Anemia     Will recheck as scheduled by Heme/Onc  Cancer of base of tongue ([facility] [address] ) - Primary     Stable for the moment  Following with ENT as well as Oncology  No particular concerns today  She did mention that she has an appointment coming up with radiation oncology  Hypothyroid     Stable at the moment  TSH was quite a bit elevated  ENT started her on Synthroid  Hematology/Oncology had ordered a repeat TSH  I offered that she could have it sooner if she wished, but patient was satisfied with checking in February as scheduled  For the time being, watch symptoms of hypothyroid if they occur, as well as hyperthyroid  Thoracic aortic aneurysm [facility])     Reviewed again that the patient did have a thoracic aneurysm noted  We reviewed the recommendation to follow with CT surgery  At this time, the patient is more concerned about completing chemo and radiation oncology, so she would prefer to wait until after cleared by that before having further evaluations  This seems reasonable, so will defer till later

## 2019-11-14 NOTE — ASSESSMENT & PLAN NOTE
Stable at the moment  TSH was quite a bit elevated  ENT started her on Synthroid  Hematology/Oncology had ordered a repeat TSH  I offered that she could have it sooner if she wished, but patient was satisfied with checking in February as scheduled  For the time being, watch symptoms of hypothyroid if they occur, as well as hyperthyroid

## 2019-11-17 ENCOUNTER — DOCUMENTATION (OUTPATIENT)
Dept: FAMILY MEDICINE CLINIC | Facility: CLINIC | Age: 69
End: 2019-11-17

## 2019-11-17 NOTE — PROGRESS NOTES
Patient lives with family, and grandson just do with pertussis   Azithromycin called in to [facility]

## 2019-12-06 ENCOUNTER — TELEPHONE (OUTPATIENT)
Dept: NUTRITION | Facility: CLINIC | Age: 69
End: 2019-12-06

## 2019-12-06 NOTE — TELEPHONE ENCOUNTER
José Vera was scheduled for nutrition follow up today at 12pm, appointment was cancelled via automatic reminder system  This RD contacted José Vera to check in and offer time to reschedule apt  José Vera reports that she is doing well and has not nutrition questions/concerns, will reach out if she would like to reschedule nutrition follow up  Patient has RD contact information

## 2019-12-18 ENCOUNTER — CLINICAL SUPPORT (OUTPATIENT)
Dept: RADIATION ONCOLOGY | Facility: CLINIC | Age: 69
End: 2019-12-18
Attending: RADIOLOGY
Payer: COMMERCIAL

## 2019-12-18 VITALS
WEIGHT: 117.9 LBS | OXYGEN SATURATION: 100 % | RESPIRATION RATE: 16 BRPM | BODY MASS INDEX: 17.87 KG/M2 | DIASTOLIC BLOOD PRESSURE: 78 MMHG | HEIGHT: 68 IN | TEMPERATURE: 98.2 F | SYSTOLIC BLOOD PRESSURE: 120 MMHG | HEART RATE: 78 BPM

## 2019-12-18 DIAGNOSIS — C01 CANCER OF BASE OF TONGUE (HCC): Primary | ICD-10-CM

## 2019-12-18 PROCEDURE — 99211 OFF/OP EST MAY X REQ PHY/QHP: CPT | Performed by: RADIOLOGY

## 2019-12-18 NOTE — PROGRESS NOTES
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 extending into the right tonsillar region and toward the right soft palate   Staging PET-CT confirmed disease in the right oral cavity, tonsillar region and right tongue base approaching the level of the vallecula compatible with malignancy   There were multiple right cervical lymph nodes consistent with metastatic disease   There were no metastasis in the chest, abdomen, or pelvis  Treated with chemo x3 cycles, ending 8/3/19      She returns today for routine scheduled follow up after completing radiation therapy on 7/29/19  Last seen 8/30/19       10/28/19 PET/CT showed significant improvement of the activity in the base of the tongue  However, there is still some residual mild activity with an SUV around 3 3 in the right tongue base of unknown etiology or significance  11/1/19 F/U with Dr Martina Daniel:   PET-CT scan findings showed significant improvement of the activity in the base of the tongue  However, there is still some residual mild activity with an SUV around 3 3 in the right tongue base of unknown etiology or significance  Will need to be seen by ENT doctor for further evaluation with the direct visualization  Will discuss her case and the PET-CT scan findings at the next ENT working group meeting  Continues with oral thrush which is not responding to Mycelex Mayuri  To start on Diflucan for 7 days and repeated if needed  11/4/19 F/U with Dr Meghna Dey: Recent PET scan still shows some activity in the right tongue base  Laryngoscopy and bimanual palpation of the tongue base showed no evidence of any masses  This most likely is residual inflammation secondary to healing in this area  Continue to monitor  No evidence of any other systemic disease    Patient with significant improvement in her overall status including diet and taste  10/25/19 TSH was 140  Start Synthroid 50 mcg  Continue with hydration for her xerostomia  Discussed using a cool mist humidifier at night and while at work to try and moisturize her mucosa  1/14/20-f/u with Dr Alverto La ENT  2/14/20 F/U with Dr Giorgio Bueno of base of tongue [facility])    5/3/2019 Biopsy     Right BOT biopsy  Biopsy + for SCC moderately differentiated,  Incompletely excised           6/10/2019 - 6/30/2019 Chemotherapy     CISplatin (PLATINOL) 174 mg, mannitol 12 5 g in sodium chloride 0 9 % 500 mL IVPB, 100 mg/m2 = 174 mg, Intravenous, Once, 1 of 3 cycles  Administration: 174 mg (6/10/2019)  potassium chloride 20 mEq, magnesium sulfate 1 g in sodium chloride 0 9 % 1,000 mL IVPB, , Intravenous, Once, 1 of 3 cycles  Administration:  (6/10/2019),  (6/10/2019)  (1 cycle only d/c'd due to significant hearing loss after 1 treatment)        6/10/2019 - 7/29/2019 Radiation     Plan ID Energy Fractions Dose per Fraction (cGy) Dose Correction (cGy) Total Dose Delivered (cGy) Elapsed Days   Head and Neck 6X 35 / 35 200 0 7,000 52     Dr Claire Paz      7/1/2019 - 8/3/2019 Chemotherapy     CARBOplatin (PARAPLATIN) 115 5 mg in sodium chloride 0 9 % 250 mL IVPB, 70 mg/m2 = 115 5 mg (100 % of original dose 70 mg/m2), Intravenous, Once, 2 of 2 cycles  Dose modification: 70 mg/m2 (original dose 70 mg/m2, Cycle 1)  Administration: 115 5 mg (7/1/2019), 115 5 mg (7/2/2019), 115 5 mg (7/3/2019), 115 5 mg (7/5/2019), 115 5 mg (7/31/2019), 115 5 mg (8/1/2019), 115 5 mg (8/2/2019), 115 5 mg (7/30/2019)  (completed 2 cycles)           Clinical Trial: no      Imaging    Health Maintenance   Topic Date Due    Hepatitis C Screening  1950    CRC Screening: Colonoscopy  1950    BMI: Followup Plan  05/22/1966    Pneumococcal Vaccine: 65+ Years (1 of 2 - PCV13) 05/22/2013    Fall Risk  05/20/2020    MAMMOGRAM  05/28/2020    Urinary Incontinence Screening  06/03/2020    BMI: Adult  2020    DTaP,Tdap,and Td Vaccines (3 - Td) 2029    Influenza Vaccine  Completed    Pneumococcal Vaccine: Pediatrics (0 to 5 Years) and At-Risk Patients (6 to 59 Years)  Aged Out    HIB Vaccine  Aged Out    Hepatitis B Vaccine  Aged Out    IPV Vaccine  Aged Out    Hepatitis A Vaccine  Aged Out    Meningococcal ACWY Vaccine  Aged Out    HPV Vaccine  Aged Out       Patient Active Problem List   Diagnosis    Multiple thyroid nodules    Fatigue    [facility] [address]    Thoracic aortic aneurysm [address] [address]    Dehydration    Anemia    Encounter for chemotherapy management    Hypokalemia    Hypothyroid    Oral thrush     Past Medical History:   Diagnosis Date    Tongue cancer ([facility] [address]     Squamous cell     Past Surgical History:   Procedure Laterality Date    APPENDECTOMY      CATARACT EXTRACTION Left     HYSTERECTOMY      total    IR PICC LINE  2019    OOPHORECTOMY Bilateral      Family History   Problem Relation Age of Onset    Rheum arthritis Sister     Hypothyroidism Sister     Hashimoto's thyroiditis Family     Prostate cancer Father     Parkinsonism Mother         Cardiac abnormality ([place])     Social History     Socioeconomic History    Marital status:      Spouse name: Not on file    Number of children: Not on file    Years of education: Not on file    Highest education level: Not on file   Occupational History    Not on file   Social Needs    Financial resource strain: Not on file    Food insecurity:     Worry: Not on file     Inability: Not on file    Transportation needs:     Medical: Not on file     Non-medical: Not on file   Tobacco Use    Smoking status: Former Smoker     Packs/day: 0 50     Years: 5 00     Pack years: 2 50     Types: Cigarettes     Last attempt to quit: 1978     Years since quittin 9    Smokeless tobacco: Never Used    Tobacco comment: no passive smoke exposure   Substance and Sexual Activity    Alcohol use:  Yes     Alcohol/week: 1 0 standard drinks     Types: 1 Glasses of wine per week     Frequency: 2-4 times a month     Drinks per session: 1 or 2     Binge frequency: Never     Comment: occasional    Drug use: Never    Sexual activity: Not on file   Lifestyle    Physical activity:     Days per week: Not on file     Minutes per session: Not on file    Stress: Not on file   Relationships    Social connections:     Talks on phone: Not on file     Gets together: Not on file     Attends Nondenominational service: Not on file     Active member of club or organization: Not on file     Attends meetings of clubs or organizations: Not on file     Relationship status: Not on file    Intimate partner violence:     Fear of current or ex partner: Not on file     Emotionally abused: Not on file     Physically abused: Not on file     Forced sexual activity: Not on file   Other Topics Concern    Not on file   Social History Narrative    Not on file       Current Outpatient Medications:     DENTAGEL 1 1 % GEL, AT BED AFTER BRUSHING AND FLOSSING APPLY 1 DROP OF GEL PER TOOTH LEAVE TRAYS IN MOUTH FOR 10 MINUTES, Disp: , Rfl: 11    levothyroxine 50 mcg tablet, Take 1 tablet (50 mcg total) by mouth daily, Disp: 30 tablet, Rfl: 5    Magnesium Cl-Calcium Carbonate (SLOW-MAG PO), Take by mouth as needed, Disp: , Rfl:     Multiple Vitamins-Minerals (PRESERVISION AREDS 2+MULTI VIT PO), Take by mouth, Disp: , Rfl:     al mag oxide-diphenhydramine-lidocaine viscous (MAGIC MOUTHWASH) 1:1:1 suspension, Swish and swallow 10 mL every 4 (four) hours as needed for mouth pain or discomfort (Patient not taking: Reported on 12/18/2019), Disp: 480 mL, Rfl: 4    Melatonin 10 MG TABS, Take 1 tablet by mouth daily at bedtime as needed, Disp: , Rfl:     OLANZapine (ZyPREXA) 10 mg tablet, Take 1 tablet (10 mg total) by mouth daily at bedtime Take for 3 days with chemotherapy/or as directed to prevent n/v (Patient not taking: Reported on 12/18/2019), Disp: 12 tablet, Rfl: 3    omeprazole (PriLOSEC) 20 mg delayed release capsule, Take 1 capsule (20 mg total) by mouth daily (Patient not taking: Reported on 12/18/2019), Disp: 30 capsule, Rfl: 3    Probiotic Product (PROBIOTIC DAILY PO), Take by mouth, Disp: , Rfl:     prochlorperazine (COMPAZINE) 10 mg tablet, Take 1 tablet (10 mg total) by mouth every 6 (six) hours as needed for nausea or vomiting (Patient not taking: Reported on 12/18/2019), Disp: 60 tablet, Rfl: 0  Allergies   Allergen Reactions    Codeine Anaphylaxis       Review of Systems:  Review of Systems   Constitutional: Negative for appetite change, chills, fatigue and fever  HENT: Positive for sore throat (after eating certain solid foods), trouble swallowing and voice change (lose voice at times)  Negative for ear pain, facial swelling, sinus pressure and sinus pain  Eyes:        Wears glasses  Beginning macular degeneration   Respiratory: Negative for cough and shortness of breath  Cardiovascular: Negative for chest pain and leg swelling  Gastrointestinal: Negative for constipation, diarrhea and nausea  Endocrine: Negative  Negative for cold intolerance and heat intolerance  Genitourinary: Positive for frequency (at night)  Musculoskeletal: Negative  Skin: Negative  Allergic/Immunologic: Negative  Neurological: Negative  Negative for dizziness, light-headedness and headaches  Hematological: Negative  Psychiatric/Behavioral: Negative  Vitals:    12/18/19 1410   BP: 120/78   BP Location: Right arm   Pulse: 78   Resp: 16   Temp: 98 2 °F (36 8 °C)   TempSrc: Temporal   SpO2: 100%   Weight: 53 5 kg (117 lb 14 4 oz)   Height: 5' 8" (1 727 m)        Pain assessment:0           Imaging:No results found      Teaching

## 2019-12-18 NOTE — PROGRESS NOTES
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 extending into the right tonsillar region and toward the right soft palate   Staging PET-CT confirmed disease in the right oral cavity, tonsillar region and right tongue base approaching the level of the vallecula compatible with malignancy   There were multiple right cervical lymph nodes consistent with metastatic disease   There were no metastasis in the chest, abdomen, or pelvis  Treated with chemo x3 cycles, ending 8/3/19      She returns today for routine scheduled follow up after completing radiation therapy on 7/29/19  Last seen 8/30/19  Interval History:  10/28/19 PET/CT showed significant improvement of the activity in the base of the tongue  However, there is still some residual mild activity with an SUV around 3 3 in the right tongue base of unknown etiology or significance      11/1/19 F/U with Dr Akin Hicks:   PET-CT scan findings showed significant improvement of the activity in the base of the tongue  However, there is still some residual mild activity with an SUV around 3 3 in the right tongue base of unknown etiology or significance  Will need to be seen by ENT doctor for further evaluation with the direct visualization  Will discuss her case and the PET-CT scan findings at the next ENT working group meeting  Continues with oral thrush which is not responding to Mycelex Mayuri  To start on Diflucan for 7 days and repeated if needed      11/4/19 F/U with Dr Jordy Figueroa: Recent PET scan still shows some activity in the right tongue base    Laryngoscopy and bimanual palpation of the tongue base showed no evidence of any masses  This most likely is residual inflammation secondary to healing in this area  Continue to monitor  No evidence of any other systemic disease  Patient with significant improvement in her overall status including diet and taste  10/25/19 TSH was 140  Start Synthroid 50 mcg  Continue with hydration for her xerostomia  Discussed using a cool mist humidifier at night and while at work to try and moisturize her mucosa      She reports she is feeling much better  She is eating well and back to a regular diet  She has been working at [employer] for 15 years  She worked previously full-time as an  and then was off for about 6 months to receive her treatments  She has now resume working part-time on the weekends only  She is having no difficulty with her teeth and is seeing her dentist every 3-4 months  She does have fluoride trays  1/14/20-f/u with Dr [facility] ENT  2/14/20 F/U with Dr Josep Rhodes of base of tongue [facility])    5/3/2019 Biopsy     Right BOT biopsy  Biopsy + for SCC moderately differentiated,  Incompletely excised           6/10/2019 - 6/30/2019 Chemotherapy     CISplatin (PLATINOL) 174 mg, mannitol 12 5 g in sodium chloride 0 9 % 500 mL IVPB, 100 mg/m2 = 174 mg, Intravenous, Once, 1 of 3 cycles  Administration: 174 mg (6/10/2019)  potassium chloride 20 mEq, magnesium sulfate 1 g in sodium chloride 0 9 % 1,000 mL IVPB, , Intravenous, Once, 1 of 3 cycles  Administration:  (6/10/2019),  (6/10/2019)  (1 cycle only d/c'd due to significant hearing loss after 1 treatment)        6/10/2019 - 7/29/2019 Radiation     Plan ID Energy Fractions Dose per Fraction (cGy) Dose Correction (cGy) Total Dose Delivered (cGy) Elapsed Days   Head and Neck 6X 35 / 35 200 0 7,000 52     Dr Gramajo Smoker      7/1/2019 - 8/3/2019 Chemotherapy     CARBOplatin (PARAPLATIN) 115 5 mg in sodium chloride 0 9 % 250 mL IVPB, 70 mg/m2 = 115 5 mg (100 % of original dose 70 mg/m2), Intravenous, Once, 2 of 2 cycles  Dose modification: 70 mg/m2 (original dose 70 mg/m2, Cycle 1)  Administration: 115 5 mg (2019), 115 5 mg (2019), 115 5 mg (7/3/2019), 115 5 mg (2019), 115 5 mg (2019), 115 5 mg (2019), 115 5 mg (2019), 115 5 mg (2019)  (completed 2 cycles)           Past Medical History:   Diagnosis Date    Tongue cancer ([facility] [address] )     Squamous cell     Past Surgical History:   Procedure Laterality Date    APPENDECTOMY      CATARACT EXTRACTION Left     HYSTERECTOMY      total    IR PICC LINE  2019    OOPHORECTOMY Bilateral        Social History   Social History     Substance and Sexual Activity   Alcohol Use Yes    Alcohol/week: 1 0 standard drinks    Types: 1 Glasses of wine per week    Frequency: 2-4 times a month    Drinks per session: 1 or 2    Binge frequency: Never    Comment: occasional     Social History     Substance and Sexual Activity   Drug Use Never     Social History     Tobacco Use   Smoking Status Former Smoker    Packs/day: 0 50    Years: 5 00    Pack years: 2 50    Types: Cigarettes    Last attempt to quit: 1978    Years since quittin 9   Smokeless Tobacco Never Used   Tobacco Comment    no passive smoke exposure     Meds/Allergies     Current Outpatient Medications:     DENTAGEL 1 1 % GEL, AT BED AFTER BRUSHING AND FLOSSING APPLY 1 DROP OF GEL PER TOOTH LEAVE TRAYS IN MOUTH FOR 10 MINUTES, Disp: , Rfl: 11    levothyroxine 50 mcg tablet, Take 1 tablet (50 mcg total) by mouth daily, Disp: 30 tablet, Rfl: 5    Magnesium Cl-Calcium Carbonate (SLOW-MAG PO), Take by mouth as needed, Disp: , Rfl:     Multiple Vitamins-Minerals (PRESERVISION AREDS 2+MULTI VIT PO), Take by mouth, Disp: , Rfl:     al mag oxide-diphenhydramine-lidocaine viscous (MAGIC MOUTHWASH) 1:1:1 suspension, Swish and swallow 10 mL every 4 (four) hours as needed for mouth pain or discomfort (Patient not taking: Reported on 12/18/2019), Disp: 480 mL, Rfl: 4    Melatonin 10 MG TABS, Take 1 tablet by mouth daily at bedtime as needed, Disp: , Rfl:     OLANZapine (ZyPREXA) 10 mg tablet, Take 1 tablet (10 mg total) by mouth daily at bedtime Take for 3 days with chemotherapy/or as directed to prevent n/v (Patient not taking: Reported on 12/18/2019), Disp: 12 tablet, Rfl: 3    omeprazole (PriLOSEC) 20 mg delayed release capsule, Take 1 capsule (20 mg total) by mouth daily (Patient not taking: Reported on 12/18/2019), Disp: 30 capsule, Rfl: 3    Probiotic Product (PROBIOTIC DAILY PO), Take by mouth, Disp: , Rfl:     prochlorperazine (COMPAZINE) 10 mg tablet, Take 1 tablet (10 mg total) by mouth every 6 (six) hours as needed for nausea or vomiting (Patient not taking: Reported on 12/18/2019), Disp: 60 tablet, Rfl: 0  Allergies   Allergen Reactions    Codeine Anaphylaxis     Review of Systems   Constitutional: Negative for appetite change, chills, fatigue and fever  HENT: Positive for sore throat (after eating certain solid foods), trouble swallowing and voice change (lose voice at times)  Negative for ear pain, facial swelling, sinus pressure and sinus pain  Eyes:        Wears glasses  Beginning macular degeneration   Respiratory: Negative for cough and shortness of breath  Cardiovascular: Negative for chest pain and leg swelling  Gastrointestinal: Negative for constipation, diarrhea and nausea  Endocrine: Negative  Negative for cold intolerance and heat intolerance  Genitourinary: Positive for frequency (at night)  Musculoskeletal: Negative  Skin: Negative  Allergic/Immunologic: Negative  Neurological: Negative  Negative for dizziness, light-headedness and headaches  Hematological: Negative      Psychiatric/Behavioral: Negative        OBJECTIVE:   /78 (BP Location: Right arm)   Pulse 78   Temp 98 2 °F (36 8 °C) (Temporal)   Resp 16   Ht 5' 8" (1 727 m)   Wt 53 5 kg (117 lb 14 4 oz)   SpO2 100%   BMI 17 93 kg/m²   Pain Assessment:  0  ECOG/Zubrod/WHO: 1 - Symptomatic but completely ambulatory    Physical Exam  Constitutional: She is oriented to person, place, and time  She appears well-developed and well-nourished  No distress  HENT:   Head: Normocephalic and atraumatic  Mouth/Throat: No oropharyngeal exudate    Right base of tongue mass resolved without tongue deviation   Her teeth are in good repair    Eyes: Pupils are equal, round, and reactive to light  Conjunctivae and EOM are normal  No scleral icterus  Neck: Normal range of motion  Neck supple  No tracheal deviation present  No thyromegaly present  Cardiovascular: Normal rate, regular rhythm and normal heart sounds  Pulmonary/Chest: Effort normal and breath sounds normal  No respiratory distress  She has no wheezes  She has no rales  Abdominal: Soft  Bowel sounds are normal  She exhibits no distension and no mass  There is no tenderness  Musculoskeletal: Normal range of motion  She exhibits no edema or tenderness  Lymphadenopathy:     She has no cervical adenopathy  Neurological: She is alert and oriented to person, place, and time  No cranial nerve deficit  Coordination normal    Skin: Skin is warm and dry  No rash noted  She is not diaphoretic  No erythema  No pallor  Psychiatric: She has a normal mood and affect  Her behavior is normal  Judgment and thought content normal    Nursing note and vitals reviewed  RESULTS    Lab Results: No results found for this or any previous visit (from the past 672 hour(s))  Imaging Studies:See Above    Assessment/Plan:  No orders of the defined types were placed in this encounter       Emma Sage is a 68y o  year old female with a locally advanced stage DEON, 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, 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, 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 extending into the right tonsillar region and toward the right soft palate   Staging PET-CT confirmed disease in the right oral cavity, tonsillar region and right tongue base approaching the level of the vallecula compatible with malignancy  There were multiple right cervical lymph nodes consistent with metastatic disease   There were no metastasis in the chest, abdomen, or pelvis   She understood that due to the extent of her disease, surgical resection was not possible   We recommended definitive radiation therapy with concurrent chemotherapy  She completed treatment on August 3, 2019 and returns today for follow-up      She is recovering well from treatment  She is gaining weight and eating well by mouth  She no longer requires any IV hydration and is not taking any Boost or Ensure  She stopped taking her Magic mouth solution  She stop smoking tobacco in 1978  She had repeat PET-CT study October 28, 2019 that showed a significant improvement with good response in the base of tongue region  There was some low-level residual activity that is likely post treatment changes and will be followed  She had follow-up appointment November 4 with Dr Rusty Chanel and examination revealed no evidence of disease  Her examination today also is negative for any disease  She has follow-up appointment again next month with Dr Rusty Chanel and in 2 months with Dr Nunes   She will return here for follow-up in 6 months  Fadia Quesada MD  12/18/2019,3:11 PM    Portions of the record may have been created with voice recognition software   Occasional wrong word or "sound a like" substitutions may have occurred due to the inherent limitations of voice recognition software   Read the chart carefully and recognize, using context, where substitutions have occurred

## 2020-02-10 ENCOUNTER — TELEPHONE (OUTPATIENT)
Dept: HEMATOLOGY ONCOLOGY | Facility: CLINIC | Age: 70
End: 2020-02-10

## 2020-02-11 ENCOUNTER — APPOINTMENT (OUTPATIENT)
Dept: LAB | Facility: HOSPITAL | Age: 70
End: 2020-02-11
Attending: INTERNAL MEDICINE
Payer: COMMERCIAL

## 2020-02-11 DIAGNOSIS — C01 CANCER OF BASE OF TONGUE (HCC): ICD-10-CM

## 2020-02-11 DIAGNOSIS — E03.9 HYPOTHYROIDISM, UNSPECIFIED TYPE: ICD-10-CM

## 2020-02-11 LAB
ALBUMIN SERPL BCP-MCNC: 4.3 G/DL (ref 3–5.2)
ALP SERPL-CCNC: 63 U/L (ref 43–122)
ALT SERPL W P-5'-P-CCNC: 17 U/L (ref 9–52)
ANION GAP SERPL CALCULATED.3IONS-SCNC: 7 MMOL/L (ref 5–14)
AST SERPL W P-5'-P-CCNC: 25 U/L (ref 14–36)
BASOPHILS # BLD AUTO: 0 THOUSANDS/ΜL (ref 0–0.1)
BASOPHILS NFR BLD AUTO: 1 % (ref 0–1)
BILIRUB SERPL-MCNC: 0.6 MG/DL
BUN SERPL-MCNC: 11 MG/DL (ref 5–25)
CALCIUM SERPL-MCNC: 10 MG/DL (ref 8.4–10.2)
CHLORIDE SERPL-SCNC: 100 MMOL/L (ref 97–108)
CO2 SERPL-SCNC: 32 MMOL/L (ref 22–30)
CREAT SERPL-MCNC: 0.79 MG/DL (ref 0.6–1.2)
EOSINOPHIL # BLD AUTO: 0.2 THOUSAND/ΜL (ref 0–0.4)
EOSINOPHIL NFR BLD AUTO: 4 % (ref 0–6)
ERYTHROCYTE [DISTWIDTH] IN BLOOD BY AUTOMATED COUNT: 12.9 %
GFR SERPL CREATININE-BSD FRML MDRD: 76 ML/MIN/1.73SQ M
GLUCOSE P FAST SERPL-MCNC: 87 MG/DL (ref 70–99)
HCT VFR BLD AUTO: 38.9 % (ref 36–46)
HGB BLD-MCNC: 13.3 G/DL (ref 12–16)
LYMPHOCYTES # BLD AUTO: 0.7 THOUSANDS/ΜL (ref 0.5–4)
LYMPHOCYTES NFR BLD AUTO: 14 % (ref 25–45)
MAGNESIUM SERPL-MCNC: 2 MG/DL (ref 1.6–2.3)
MCH RBC QN AUTO: 33.3 PG (ref 26–34)
MCHC RBC AUTO-ENTMCNC: 34.2 G/DL (ref 31–36)
MCV RBC AUTO: 97 FL (ref 80–100)
MONOCYTES # BLD AUTO: 0.5 THOUSAND/ΜL (ref 0.2–0.9)
MONOCYTES NFR BLD AUTO: 11 % (ref 1–10)
NEUTROPHILS # BLD AUTO: 3.4 THOUSANDS/ΜL (ref 1.8–7.8)
NEUTS SEG NFR BLD AUTO: 70 % (ref 45–65)
PLATELET # BLD AUTO: 235 THOUSANDS/UL (ref 150–450)
PMV BLD AUTO: 7.1 FL (ref 8.9–12.7)
POTASSIUM SERPL-SCNC: 4 MMOL/L (ref 3.6–5)
PROT SERPL-MCNC: 7.4 G/DL (ref 5.9–8.4)
RBC # BLD AUTO: 3.99 MILLION/UL (ref 4–5.2)
SODIUM SERPL-SCNC: 139 MMOL/L (ref 137–147)
T4 FREE SERPL-MCNC: 1.08 NG/DL (ref 0.76–1.46)
TSH SERPL DL<=0.05 MIU/L-ACNC: 5.61 UIU/ML (ref 0.47–4.68)
WBC # BLD AUTO: 4.8 THOUSAND/UL (ref 4.5–11)

## 2020-02-11 PROCEDURE — 80053 COMPREHEN METABOLIC PANEL: CPT

## 2020-02-11 PROCEDURE — 84443 ASSAY THYROID STIM HORMONE: CPT

## 2020-02-11 PROCEDURE — 84439 ASSAY OF FREE THYROXINE: CPT

## 2020-02-11 PROCEDURE — 85025 COMPLETE CBC W/AUTO DIFF WBC: CPT

## 2020-02-11 PROCEDURE — 83735 ASSAY OF MAGNESIUM: CPT

## 2020-02-11 PROCEDURE — 36415 COLL VENOUS BLD VENIPUNCTURE: CPT

## 2020-02-14 ENCOUNTER — OFFICE VISIT (OUTPATIENT)
Dept: HEMATOLOGY ONCOLOGY | Facility: CLINIC | Age: 70
End: 2020-02-14
Payer: COMMERCIAL

## 2020-02-14 VITALS
RESPIRATION RATE: 16 BRPM | SYSTOLIC BLOOD PRESSURE: 142 MMHG | BODY MASS INDEX: 18.89 KG/M2 | DIASTOLIC BLOOD PRESSURE: 86 MMHG | HEART RATE: 80 BPM | HEIGHT: 65 IN | OXYGEN SATURATION: 97 % | WEIGHT: 113.4 LBS | TEMPERATURE: 98.3 F

## 2020-02-14 DIAGNOSIS — C01 CANCER OF BASE OF TONGUE (HCC): Primary | ICD-10-CM

## 2020-02-14 PROCEDURE — 1160F RVW MEDS BY RX/DR IN RCRD: CPT | Performed by: INTERNAL MEDICINE

## 2020-02-14 PROCEDURE — 1036F TOBACCO NON-USER: CPT | Performed by: INTERNAL MEDICINE

## 2020-02-14 PROCEDURE — 3008F BODY MASS INDEX DOCD: CPT | Performed by: INTERNAL MEDICINE

## 2020-02-14 PROCEDURE — 99214 OFFICE O/P EST MOD 30 MIN: CPT | Performed by: INTERNAL MEDICINE

## 2020-02-14 NOTE — PROGRESS NOTES
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  We will continue to monitor her closely will see her again in about 4 months from from now for close surveillance  I did encourage her also to follow-up with the ENT team as it was schedule  - CBC and differential; Future  - Comprehensive metabolic panel; Future  - Magnesium; Future        HPI:  The patient came today for a follow-up visit  She was recently seen by the ENT team in the middle of January  She had normal scoping  She is currently taking Synthroid 50 mcg on a daily basis  She did complain about postnasal drip and a runny nose occasionally  Otherwise, she has good energy  She continues to work mainly on the weekends     Her most recent blood work on 02/11 showed normal white cell count of 4 8 with normal hemoglobin of 13 3 and platelet of 229 her CMP is within normal range  Her TSH was slightly above 5  Cancer of base of tongue ([facility] [address] )    5/3/2019 Biopsy     Right BOT biopsy  Biopsy + for SCC moderately differentiated,  Incompletely excised           6/10/2019 - 6/30/2019 Chemotherapy     CISplatin (PLATINOL) 174 mg, mannitol 12 5 g in sodium chloride 0 9 % 500 mL IVPB, 100 mg/m2 = 174 mg, Intravenous, Once, 1 of 3 cycles  Administration: 174 mg (6/10/2019)  potassium chloride 20 mEq, magnesium sulfate 1 g in sodium chloride 0 9 % 1,000 mL IVPB, , Intravenous, Once, 1 of 3 cycles  Administration:  (6/10/2019),  (6/10/2019)  (1 cycle only d/c'd due to significant hearing loss after 1 treatment)        6/10/2019 - 7/29/2019 Radiation     Plan ID Energy Fractions Dose per Fraction (cGy) Dose Correction (cGy) Total Dose Delivered (cGy) Elapsed Days   Head and Neck 6X 35 / 35 200 0 7,000 52      Cardiges      7/1/2019 - 8/3/2019 Chemotherapy     CARBOplatin (PARAPLATIN) 115 5 mg in sodium chloride 0 9 % 250 mL IVPB, 70 mg/m2 = 115 5 mg (100 % of original dose 70 mg/m2), Intravenous, Once, 2 of 2 cycles  Dose modification: 70 mg/m2 (original dose 70 mg/m2, Cycle 1)  Administration: 115 5 mg (7/1/2019), 115 5 mg (7/2/2019), 115 5 mg (7/3/2019), 115 5 mg (7/5/2019), 115 5 mg (7/31/2019), 115 5 mg (8/1/2019), 115 5 mg (8/2/2019), 115 5 mg (7/30/2019)  (completed 2 cycles)           Interval history:    ROS: Review of Systems   Constitutional: Negative for activity change, appetite change, chills, diaphoresis, fatigue, fever and unexpected weight change  HENT: Negative for congestion, dental problem, ear discharge, ear pain, facial swelling, hearing loss, mouth sores, nosebleeds, postnasal drip, sore throat, tinnitus and trouble swallowing  Eyes: Negative for discharge, redness, itching and visual disturbance  Respiratory: Negative for cough, chest tightness, shortness of breath and wheezing  Cardiovascular: Negative for chest pain, palpitations and leg swelling  Gastrointestinal: Negative for abdominal distention, abdominal pain, anal bleeding, blood in stool, constipation, diarrhea, nausea and vomiting  Genitourinary: Negative for difficulty urinating, dysuria, flank pain, frequency, hematuria and urgency  Musculoskeletal: Negative for arthralgias, back pain, gait problem, joint swelling, myalgias and neck pain  Skin: Negative for color change, pallor, rash and wound  Neurological: Negative for dizziness, syncope, speech difficulty, weakness, light-headedness, numbness and headaches  Hematological: Negative for adenopathy  Does not bruise/bleed easily  Psychiatric/Behavioral: Negative for agitation, behavioral problems, confusion and sleep disturbance         Past Medical History:   Diagnosis Date    Tongue cancer ([facility] [address] )     Squamous cell       Past Surgical History:   Procedure Laterality Date    APPENDECTOMY      CATARACT EXTRACTION Left     HYSTERECTOMY  2012    total    IR PICC LINE  2019    OOPHORECTOMY Bilateral 2012       Social History     Socioeconomic History    Marital status:      Spouse name: None    Number of children: None    Years of education: None    Highest education level: None   Occupational History    None   Social Needs    Financial resource strain: None    Food insecurity:     Worry: None     Inability: None    Transportation needs:     Medical: None     Non-medical: None   Tobacco Use    Smoking status: Former Smoker     Packs/day: 0 50     Years: 5 00     Pack years: 2 50     Types: Cigarettes     Last attempt to quit: 1978     Years since quittin 1    Smokeless tobacco: Never Used    Tobacco comment: no passive smoke exposure   Substance and Sexual Activity    Alcohol use:  Yes     Alcohol/week: 1 0 standard drinks     Types: 1 Glasses of wine per week     Frequency: 2-4 times a month     Drinks per session: 1 or 2     Binge frequency: Never     Comment: occasional    Drug use: Never    Sexual activity: None   Lifestyle    Physical activity:     Days per week: None     Minutes per session: None    Stress: None   Relationships    Social connections:     Talks on phone: None     Gets together: None     Attends [employer] service: None     Active member of club or organization: None     Attends meetings of clubs or organizations: None     Relationship status: None    Intimate partner violence:     Fear of current or ex partner: None     Emotionally abused: None     Physically abused: None     Forced sexual activity: None   Other Topics Concern    None   Social History Narrative    None       Family History   Problem Relation Age of Onset    Rheum arthritis Sister     Hypothyroidism Sister     Hashimoto's thyroiditis Family     Prostate cancer Father     Parkinsonism Mother         Cardiac abnormality [facility]) Allergies   Allergen Reactions    Codeine Anaphylaxis         Current Outpatient Medications:     DENTAGEL 1 1 % GEL, AT BED AFTER BRUSHING AND FLOSSING APPLY 1 DROP OF GEL PER TOOTH LEAVE TRAYS IN MOUTH FOR 10 MINUTES, Disp: , Rfl: 11    levothyroxine 50 mcg tablet, Take 1 tablet (50 mcg total) by mouth daily, Disp: 30 tablet, Rfl: 5    al mag oxide-diphenhydramine-lidocaine viscous (MAGIC MOUTHWASH) 1:1:1 suspension, Swish and swallow 10 mL every 4 (four) hours as needed for mouth pain or discomfort (Patient not taking: Reported on 2/14/2020), Disp: 480 mL, Rfl: 4    Magnesium Cl-Calcium Carbonate (SLOW-MAG PO), Take by mouth as needed, Disp: , Rfl:     Melatonin 10 MG TABS, Take 1 tablet by mouth daily at bedtime as needed, Disp: , Rfl:     Multiple Vitamins-Minerals (PRESERVISION AREDS 2+MULTI VIT PO), Take by mouth, Disp: , Rfl:     OLANZapine (ZyPREXA) 10 mg tablet, Take 1 tablet (10 mg total) by mouth daily at bedtime Take for 3 days with chemotherapy/or as directed to prevent n/v (Patient not taking: Reported on 2/14/2020), Disp: 12 tablet, Rfl: 3    omeprazole (PriLOSEC) 20 mg delayed release capsule, Take 1 capsule (20 mg total) by mouth daily (Patient not taking: Reported on 2/14/2020), Disp: 30 capsule, Rfl: 3    Probiotic Product (PROBIOTIC DAILY PO), Take by mouth, Disp: , Rfl:     prochlorperazine (COMPAZINE) 10 mg tablet, Take 1 tablet (10 mg total) by mouth every 6 (six) hours as needed for nausea or vomiting (Patient not taking: Reported on 2/14/2020), Disp: 60 tablet, Rfl: 0      Physical Exam:  /86 (BP Location: Left arm, Cuff Size: Adult)   Pulse 80   Temp 98 3 °F (36 8 °C) (Tympanic)   Resp 16   Ht 5' 5" (1 651 m)   Wt 51 4 kg (113 lb 6 4 oz)   SpO2 97%   BMI 18 87 kg/m²     Physical Exam   Constitutional: She is oriented to person, place, and time  She appears well-developed and well-nourished  No distress  HENT:   Head: Normocephalic and atraumatic     Nose: Nose normal    Mouth/Throat: Oropharynx is clear and moist    Eyes: Pupils are equal, round, and reactive to light  Conjunctivae and EOM are normal  Right eye exhibits no discharge  Left eye exhibits no discharge  No scleral icterus  Neck: Normal range of motion  Neck supple  No JVD present  No tracheal deviation present  No thyromegaly present  Cardiovascular: Normal rate, regular rhythm and normal heart sounds  Exam reveals no friction rub  No murmur heard  Pulmonary/Chest: Effort normal and breath sounds normal  No stridor  No respiratory distress  She has no wheezes  She has no rales  She exhibits no tenderness  Abdominal: Soft  Bowel sounds are normal  She exhibits no distension and no mass  There is no hepatosplenomegaly, splenomegaly or hepatomegaly  There is no tenderness  There is no rebound and no guarding  Musculoskeletal: Normal range of motion  She exhibits no edema, tenderness or deformity  Lymphadenopathy:     She has no cervical adenopathy  Neurological: She is alert and oriented to person, place, and time  She has normal reflexes  No cranial nerve deficit  Coordination normal    Skin: Skin is warm and dry  No rash noted  She is not diaphoretic  No erythema  No pallor  Psychiatric: She has a normal mood and affect   Her behavior is normal  Judgment and thought content normal          Labs:  Lab Results   Component Value Date    WBC 4 80 02/11/2020    HGB 13 3 02/11/2020    HCT 38 9 02/11/2020    MCV 97 02/11/2020     02/11/2020     Lab Results   Component Value Date     04/24/2018    K 4 0 02/11/2020     02/11/2020    CO2 32 (H) 02/11/2020    ANIONGAP 11 04/24/2018    BUN 11 02/11/2020    CREATININE 0 79 02/11/2020    GLUCOSE 85 04/24/2018    GLUF 87 02/11/2020    CALCIUM 10 0 02/11/2020    AST 25 02/11/2020    ALT 17 02/11/2020    ALKPHOS 63 02/11/2020    EGFR 76 02/11/2020     No results found for: TSH    Patient voiced understanding and agreement in the above discussion  Aware to contact our office with questions/symptoms in the interim

## 2020-06-09 ENCOUNTER — TELEPHONE (OUTPATIENT)
Dept: SURGICAL ONCOLOGY | Facility: CLINIC | Age: 70
End: 2020-06-09

## 2020-06-10 ENCOUNTER — APPOINTMENT (OUTPATIENT)
Dept: LAB | Facility: HOSPITAL | Age: 70
End: 2020-06-10
Attending: INTERNAL MEDICINE
Payer: COMMERCIAL

## 2020-06-10 DIAGNOSIS — C01 CANCER OF BASE OF TONGUE (HCC): ICD-10-CM

## 2020-06-10 LAB
ALBUMIN SERPL BCP-MCNC: 4.2 G/DL (ref 3–5.2)
ALP SERPL-CCNC: 63 U/L (ref 43–122)
ALT SERPL W P-5'-P-CCNC: 31 U/L (ref 9–52)
ANION GAP SERPL CALCULATED.3IONS-SCNC: 6 MMOL/L (ref 5–14)
AST SERPL W P-5'-P-CCNC: 32 U/L (ref 14–36)
BASOPHILS # BLD AUTO: 0 THOUSANDS/ΜL (ref 0–0.1)
BASOPHILS NFR BLD AUTO: 1 % (ref 0–1)
BILIRUB SERPL-MCNC: 0.5 MG/DL
BUN SERPL-MCNC: 17 MG/DL (ref 5–25)
CALCIUM SERPL-MCNC: 9.9 MG/DL (ref 8.4–10.2)
CHLORIDE SERPL-SCNC: 102 MMOL/L (ref 97–108)
CO2 SERPL-SCNC: 31 MMOL/L (ref 22–30)
CREAT SERPL-MCNC: 0.73 MG/DL (ref 0.6–1.2)
EOSINOPHIL # BLD AUTO: 0.1 THOUSAND/ΜL (ref 0–0.4)
EOSINOPHIL NFR BLD AUTO: 4 % (ref 0–6)
ERYTHROCYTE [DISTWIDTH] IN BLOOD BY AUTOMATED COUNT: 13 %
GFR SERPL CREATININE-BSD FRML MDRD: 83 ML/MIN/1.73SQ M
GLUCOSE P FAST SERPL-MCNC: 94 MG/DL (ref 70–99)
HCT VFR BLD AUTO: 37.1 % (ref 36–46)
HGB BLD-MCNC: 12.7 G/DL (ref 12–16)
LYMPHOCYTES # BLD AUTO: 0.8 THOUSANDS/ΜL (ref 0.5–4)
LYMPHOCYTES NFR BLD AUTO: 19 % (ref 25–45)
MAGNESIUM SERPL-MCNC: 2 MG/DL (ref 1.6–2.3)
MCH RBC QN AUTO: 32.5 PG (ref 26–34)
MCHC RBC AUTO-ENTMCNC: 34.2 G/DL (ref 31–36)
MCV RBC AUTO: 95 FL (ref 80–100)
MONOCYTES # BLD AUTO: 0.4 THOUSAND/ΜL (ref 0.2–0.9)
MONOCYTES NFR BLD AUTO: 11 % (ref 1–10)
NEUTROPHILS # BLD AUTO: 2.7 THOUSANDS/ΜL (ref 1.8–7.8)
NEUTS SEG NFR BLD AUTO: 66 % (ref 45–65)
PLATELET # BLD AUTO: 247 THOUSANDS/UL (ref 150–450)
PMV BLD AUTO: 6.9 FL (ref 8.9–12.7)
POTASSIUM SERPL-SCNC: 3.9 MMOL/L (ref 3.6–5)
PROT SERPL-MCNC: 6.9 G/DL (ref 5.9–8.4)
RBC # BLD AUTO: 3.89 MILLION/UL (ref 4–5.2)
SODIUM SERPL-SCNC: 139 MMOL/L (ref 137–147)
WBC # BLD AUTO: 4.1 THOUSAND/UL (ref 4.5–11)

## 2020-06-10 PROCEDURE — 83735 ASSAY OF MAGNESIUM: CPT

## 2020-06-10 PROCEDURE — 85025 COMPLETE CBC W/AUTO DIFF WBC: CPT

## 2020-06-10 PROCEDURE — 36415 COLL VENOUS BLD VENIPUNCTURE: CPT

## 2020-06-10 PROCEDURE — 80053 COMPREHEN METABOLIC PANEL: CPT

## 2020-06-11 ENCOUNTER — TELEPHONE (OUTPATIENT)
Dept: HEMATOLOGY ONCOLOGY | Facility: CLINIC | Age: 70
End: 2020-06-11

## 2020-06-12 ENCOUNTER — OFFICE VISIT (OUTPATIENT)
Dept: HEMATOLOGY ONCOLOGY | Facility: CLINIC | Age: 70
End: 2020-06-12
Payer: COMMERCIAL

## 2020-06-12 VITALS
OXYGEN SATURATION: 97 % | HEIGHT: 65 IN | DIASTOLIC BLOOD PRESSURE: 84 MMHG | BODY MASS INDEX: 19.49 KG/M2 | WEIGHT: 117 LBS | HEART RATE: 65 BPM | RESPIRATION RATE: 16 BRPM | SYSTOLIC BLOOD PRESSURE: 138 MMHG | TEMPERATURE: 96.4 F

## 2020-06-12 DIAGNOSIS — E03.9 HYPOTHYROIDISM, UNSPECIFIED TYPE: ICD-10-CM

## 2020-06-12 DIAGNOSIS — C01 CANCER OF BASE OF TONGUE (HCC): Primary | ICD-10-CM

## 2020-06-12 PROCEDURE — 1160F RVW MEDS BY RX/DR IN RCRD: CPT | Performed by: INTERNAL MEDICINE

## 2020-06-12 PROCEDURE — 99214 OFFICE O/P EST MOD 30 MIN: CPT | Performed by: INTERNAL MEDICINE

## 2020-06-12 PROCEDURE — 1036F TOBACCO NON-USER: CPT | Performed by: INTERNAL MEDICINE

## 2020-06-12 PROCEDURE — 3008F BODY MASS INDEX DOCD: CPT | Performed by: INTERNAL MEDICINE

## 2020-06-15 ENCOUNTER — TELEMEDICINE (OUTPATIENT)
Dept: RADIATION ONCOLOGY | Facility: CLINIC | Age: 70
End: 2020-06-15
Attending: RADIOLOGY

## 2020-06-15 ENCOUNTER — CLINICAL SUPPORT (OUTPATIENT)
Dept: RADIATION ONCOLOGY | Facility: CLINIC | Age: 70
End: 2020-06-15
Attending: RADIOLOGY

## 2020-06-15 VITALS — HEIGHT: 65 IN | WEIGHT: 117 LBS | BODY MASS INDEX: 19.49 KG/M2

## 2020-06-15 DIAGNOSIS — C01 CANCER OF BASE OF TONGUE (HCC): Primary | ICD-10-CM

## 2020-06-15 NOTE — PROGRESS NOTES
Virtual Brief Visit    Problem List Items Addressed This Visit     None                Reason for visit is radiation oncology follow-up    Encounter provider Prema Harris MD    Provider located at [id number] [place] [id number]      Recent Visits  No visits were found meeting these conditions  Showing recent visits within past 7 days and meeting all other requirements     Today's Visits  Date Type Provider Dept   06/15/20 Telemedicine Prema Harris MD [facility]   Showing today's visits and meeting all other requirements     Future Appointments  Date Type Provider Dept   06/15/20 Telemedicine Prema Harris MD [facility]   Showing future appointments within next 150 days and meeting all other requirements        After connecting through telephone, the patient was identified by name and date of birth  Maite Parra was informed that this is a telemedicine visit and that the visit is being conducted through telephone  My office door was closed  No one else was in the room  She acknowledged consent and understanding of privacy and security of the video platform  The patient has agreed to participate and understands they can discontinue the visit at any time  Patient is aware this is a billable service  Subjective  Maite Parra is a 65 y o  female with a locally advanced stage DEON, T2-T3, N2b, 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 extending into the right tonsillar region and toward the right soft palate  Staging PET-CT confirmed disease in the right oral cavity, tonsillar region and right tongue base approaching the level of the vallecula compatible with malignancy   There were multiple right cervical lymph nodes consistent with metastatic disease   There were no metastasis in the chest, abdomen, or pelvis   We recommended definitive radiation therapy with concurrent chemotherapy   She completed treatment on 7/29/19  She was last seen 12/18/19 2/14/20 Dr Zeeshan Silva- She saw ENT in middle of January  She had normal scoping  Does not seem to have any hint of recurrence  F/u in 4 months     6/11/20 Dr Gianna Brown- continues to complain of mild throat discomfort but velopharyngeal incompetence has resolved with swallowing  Drinking boost 3x/week  Lost about 28 pounds over her treatment which has remained stable  Some xerostomia and taste and smell significantly improved  She is able to swallow liquids now without adding any type of thickener  She is back to work 3 days/week  Xerostomia along with dry environment of workplace  Radiation thyroiditis, last blood study February recommend repeat  Mild laryngeal edema right arytenoid and aryepiglottic fold otherwise no evidence of any significant findings  Doing well continue to monitor at 2 month intervals  6/12/20 Dr Zeeshan Silva- does not seem to have any hint of recurrence of the base of the tongue malignancy according to the most recent examination by the ENT team  Continue to monitor every 4 months  Hypothyroidism on 50 mcg daily, will check thyroid function prior to her next visit  8/13/20 Dr Gianna Brown  10/9/20 Isela Mean      Past Medical History:   Diagnosis Date    Disease of thyroid gland     Tongue cancer ([facility] [address] )     Squamous cell       Past Surgical History:   Procedure Laterality Date    ADENOIDECTOMY      APPENDECTOMY  1990    CATARACT EXTRACTION Left     HYSTERECTOMY  2012    total    IR PICC LINE  6/5/2019    OOPHORECTOMY Bilateral 2012    TONSILLECTOMY         Current Outpatient Medications   Medication Sig Dispense Refill    DENTAGEL 1 1 % GEL AT BED AFTER BRUSHING AND FLOSSING APPLY 1 DROP OF GEL PER TOOTH LEAVE TRAYS IN MOUTH FOR 10 MINUTES  11    levothyroxine 50 mcg tablet Take 1 tablet (50 mcg total) by mouth daily 30 tablet 5     No current facility-administered medications for this visit           Allergies   Allergen Reactions    Codeine Anaphylaxis         I spent 30 minutes with the patient during this visit  Follow up visit          Cancer of base of tongue ([facility] [address] )    5/3/2019 Biopsy     Right BOT biopsy  Biopsy + for SCC moderately differentiated,  Incompletely excised           6/10/2019 - 6/30/2019 Chemotherapy     CISplatin (PLATINOL) 174 mg, mannitol 12 5 g in sodium chloride 0 9 % 500 mL IVPB, 100 mg/m2 = 174 mg, Intravenous, Once, 1 of 3 cycles  Administration: 174 mg (6/10/2019)  potassium chloride 20 mEq, magnesium sulfate 1 g in sodium chloride 0 9 % 1,000 mL IVPB, , Intravenous, Once, 1 of 3 cycles  Administration:  (6/10/2019),  (6/10/2019)  (1 cycle only d/c'd due to significant hearing loss after 1 treatment)        6/10/2019 - 7/29/2019 Radiation     Plan ID Energy Fractions Dose per Fraction (cGy) Dose Correction (cGy) Total Dose Delivered (cGy) Elapsed Days   Head and Neck 6X 35 / 35 200 0 7,000 52     Dr Candy Majano      7/1/2019 - 8/3/2019 Chemotherapy     CARBOplatin (PARAPLATIN) 115 5 mg in sodium chloride 0 9 % 250 mL IVPB, 70 mg/m2 = 115 5 mg (100 % of original dose 70 mg/m2), Intravenous, Once, 2 of 2 cycles  Dose modification: 70 mg/m2 (original dose 70 mg/m2, Cycle 1)  Administration: 115 5 mg (7/1/2019), 115 5 mg (7/2/2019), 115 5 mg (7/3/2019), 115 5 mg (7/5/2019), 115 5 mg (7/31/2019), 115 5 mg (8/1/2019), 115 5 mg (8/2/2019), 115 5 mg (7/30/2019)  (completed 2 cycles)           Clinical Trial: no      Health Maintenance   Topic Date Due    Hepatitis C Screening  1950    CRC Screening: Colonoscopy  1950    Annual Physical  05/22/1966    Pneumococcal Vaccine: 65+ Years (1 of 2 - PCV13) 05/22/2013    Fall Risk  05/20/2020    MAMMOGRAM  05/28/2020    Influenza Vaccine  07/01/2020    Depression Screening PHQ  12/18/2020    BMI: Adult  06/15/2021    DTaP,Tdap,and Td Vaccines (3 - Td) 01/17/2029    Pneumococcal Vaccine: Pediatrics (0 to 5 Years) and At-Risk Patients (6 to 59 Years)  Aged Out    HIB Vaccine  Aged Out    Hepatitis B Vaccine  Aged Out    IPV Vaccine  Aged Out    Hepatitis A Vaccine  Aged Out    Meningococcal ACWY Vaccine  Aged Out    HPV Vaccine  Aged Out       Patient Active Problem List   Diagnosis    Multiple thyroid nodules    Fatigue    [facility] [address] [facility]    Thoracic aortic aneurysm (HCC)    Dehydration    Anemia    Encounter for chemotherapy management    Hypokalemia    Hypothyroid    Oral thrush     Past Medical History:   Diagnosis Date    Disease of thyroid gland     Tongue cancer ([facility] [address] )     Squamous cell     Past Surgical History:   Procedure Laterality Date    ADENOIDECTOMY      APPENDECTOMY      CATARACT EXTRACTION Left     HYSTERECTOMY  2012    total    IR PICC LINE  2019    OOPHORECTOMY Bilateral 2012    TONSILLECTOMY       Family History   Problem Relation Age of Onset    Rheum arthritis Sister     Hypothyroidism Sister     Hashimoto's thyroiditis Family     Prostate cancer Father     Parkinsonism Mother         Cardiac abnormality ([place])     Social History     Socioeconomic History    Marital status:      Spouse name: Not on file    Number of children: Not on file    Years of education: Not on file    Highest education level: Not on file   Occupational History    Not on file   Social Needs    Financial resource strain: Not on file    Food insecurity:     Worry: Not on file     Inability: Not on file    Transportation needs:     Medical: Not on file     Non-medical: Not on file   Tobacco Use    Smoking status: Former Smoker     Packs/day: 0 50     Years: 5 00     Pack years: 2 50     Types: Cigarettes     Last attempt to quit: 1978     Years since quittin 4    Smokeless tobacco: Never Used    Tobacco comment: no passive smoke exposure   Substance and Sexual Activity    Alcohol use:  Yes     Alcohol/week: 1 0 standard drinks     Types: 1 Glasses of wine per week     Frequency: 2-4 times a month     Drinks per session: 1 or 2     Binge frequency: Never     Comment: occasional    Drug use: Never    Sexual activity: Not on file   Lifestyle    Physical activity:     Days per week: Not on file     Minutes per session: Not on file    Stress: Not on file   Relationships    Social connections:     Talks on phone: Not on file     Gets together: Not on file     Attends [employer] service: Not on file     Active member of club or organization: Not on file     Attends meetings of clubs or organizations: Not on file     Relationship status: Not on file    Intimate partner violence:     Fear of current or ex partner: Not on file     Emotionally abused: Not on file     Physically abused: Not on file     Forced sexual activity: Not on file   Other Topics Concern    Not on file   Social History Narrative    Not on file       Current Outpatient Medications:     DENTAGEL 1 1 % GEL, AT BED AFTER BRUSHING AND FLOSSING APPLY 1 DROP OF GEL PER TOOTH LEAVE TRAYS IN MOUTH FOR 10 MINUTES, Disp: , Rfl: 11    levothyroxine 50 mcg tablet, Take 1 tablet (50 mcg total) by mouth daily, Disp: 30 tablet, Rfl: 5  Allergies   Allergen Reactions    Codeine Anaphylaxis       Review of Systems:  Review of Systems   Constitutional: Negative  HENT: Negative  Negative for trouble swallowing  Xerostomia   Eyes: Negative  Cataracts   Respiratory: Negative  Cardiovascular: Negative  Gastrointestinal: Negative  Endocrine: Negative  Genitourinary: Negative  Musculoskeletal: Negative  Skin: Negative  Allergic/Immunologic: Negative  Neurological: Negative  Hematological: Negative  Psychiatric/Behavioral: Negative  Vitals:    06/15/20 1410   Weight: 53 1 kg (117 lb)   Height: 5' 5" (1 651 m)        Pain Score: 0-No pain      Imaging:No results found

## 2020-06-15 NOTE — PROGRESS NOTES
Follow-up - Radiation Oncology   Sunday Kim 1950 65 y o  female [id number]      History of Present Illness   Cancer Staging  Cancer of base of tongue ([facility] [address] )  Staging form: Pharynx - Oropharynx, AJCC 8th Edition  - Clinical: Stage I (cT2, cN1, cM0, p16-) - Unsigned  Laterality: Right      REQUIRED DOCUMENTATION:     1  This service was provided via Telemedicine  2  Provider located at [address] [place] [address] Way  3  TeleMed provider: John Ritter MD   4  Identify all parties in room with patient during tele follow-up:  The patient and myself  5  After connecting through [employer], patient was identified by name and date of birth and assistant checked wristband  Patient was then informed that this was a Telemedicine visit and that the exam was being conducted confidentially over secure lines  My office door was closed  No one else was in the room  Patient acknowledged consent and understanding of privacy and security of the Telemedicine visit, and gave us permission to have the assistant stay in the room in order to assist with the history and to conduct the exam   I informed the patient that I have reviewed their record in Epic and presented the opportunity for them to ask any questions regarding the visit today  The patient agreed to participate  It was my intent to perform this visit via video technology but the patient was not able to do a video connection so the visit was completed via audio telephone only  Sunday Kim is a 65y o  year old female with a history of a 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 extending into the right tonsillar region who is status post definitive radiation therapy with concurrent chemotherapy and returns today for follow-up      Reason for visit is radiation oncology follow-up     Encounter provider John Ritter MD     Provider located at [address] [place] [address] ONCOLOGY  [address] [facility] [id number]        Recent Visits  No visits were found meeting these conditions  Showing recent visits within past 7 days and meeting all other requirements      Today's Visits  Date Type Provider Dept   06/15/20 Telemedicine MD [facility]   Showing today's visits and meeting all other requirements      Future Appointments  Date Type Provider Dept   06/15/20 Telemedicine MD [facility]   Showing future appointments within next 150 days and meeting all other requirements         After connecting through telephone, the patient was identified by name and date of birth  Ashish Johnson was informed that this is a telemedicine visit and that the visit is being conducted through telephone  My office door was closed  No one else was in the room  She acknowledged consent and understanding of privacy and security of the video platform  The patient has agreed to participate and understands they can discontinue the visit at any time      Patient is aware this is a billable service       Subjective  Ashish Johnson is a 65 y o  female with a locally advanced stage DEON, T2-T3, N2b, 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 extending into the right tonsillar region and toward the right soft palate  Staging PET-CT confirmed disease in the right oral cavity, tonsillar region and right tongue base approaching the level of the vallecula compatible with malignancy   There were multiple right cervical lymph nodes consistent with metastatic disease   There were no metastasis in the chest, abdomen, or pelvis  We recommended definitive radiation therapy with concurrent chemotherapy   She completed treatment on 7/29/19  She was last seen 12/18/19       Interval History:   2/14/20 Dr Edwin Rock- She saw ENT in middle of January  She had normal scoping  Does not seem to have any hint of recurrence   F/u in 4 months      6/11/20 Dr Terra Gomes- continues to complain of mild throat discomfort but velopharyngeal incompetence has resolved with swallowing  Drinking boost 3x/week  Lost about 28 pounds over her treatment which has remained stable  Some xerostomia and taste and smell significantly improved  She is able to swallow liquids now without adding any type of thickener  She is back to work 3 days/week  Xerostomia along with dry environment of workplace  Radiation thyroiditis, last blood study February recommend repeat  Mild laryngeal edema right arytenoid and aryepiglottic fold otherwise no evidence of any significant findings  Doing well continue to monitor at 2 month intervals      6/12/20 Dr Rosana Williamson- does not seem to have any hint of recurrence of the base of the tongue malignancy according to the most recent examination by the ENT team  Continue to monitor every 4 months  Hypothyroidism on 50 mcg daily, will check thyroid function prior to her next visit  She reports she is feeling well  She is eating well and following a regular diet  She states she can eat everything and her taste is good  She denies any dysphagia  She has been working at [employer] for 16 years  She worked previously full-time as an  and then was off for about 6 months to receive her treatments  She has now resumed working part-time on the weekends/3 days per week  She is having no difficulty with her teeth and is seeing her dentist every 3-4 months  She does have fluoride trays      8/13/20 Dr Josi Cueva  10/9/20 [facility]    Historical Information      Cancer of base of tongue [facility])    5/3/2019 Biopsy     Right BOT biopsy  Biopsy + for SCC moderately differentiated,  Incompletely excised           6/10/2019 - 6/30/2019 Chemotherapy     CISplatin (PLATINOL) 174 mg, mannitol 12 5 g in sodium chloride 0 9 % 500 mL IVPB, 100 mg/m2 = 174 mg, Intravenous, Once, 1 of 3 cycles  Administration: 174 mg (6/10/2019)  potassium chloride 20 mEq, magnesium sulfate 1 g in sodium chloride 0 9 % 1,000 mL IVPB, , Intravenous, Once, 1 of 3 cycles  Administration:  (6/10/2019),  (6/10/2019)  (1 cycle only d/c'd due to significant hearing loss after 1 treatment)        6/10/2019 - 2019 Radiation     Plan ID Energy Fractions Dose per Fraction (cGy) Dose Correction (cGy) Total Dose Delivered (cGy) Elapsed Days   Head and Neck 6X 35 / 35 200 0 7,000 52     Dr Tray Torres      2019 - 8/3/2019 Chemotherapy     CARBOplatin (PARAPLATIN) 115 5 mg in sodium chloride 0 9 % 250 mL IVPB, 70 mg/m2 = 115 5 mg (100 % of original dose 70 mg/m2), Intravenous, Once, 2 of 2 cycles  Dose modification: 70 mg/m2 (original dose 70 mg/m2, Cycle 1)  Administration: 115 5 mg (2019), 115 5 mg (2019), 115 5 mg (7/3/2019), 115 5 mg (2019), 115 5 mg (2019), 115 5 mg (2019), 115 5 mg (2019), 115 5 mg (2019)  (completed 2 cycles)           Past Medical History:   Diagnosis Date    Disease of thyroid gland     Tongue cancer ([facility] [address] )     Squamous cell     Past Surgical History:   Procedure Laterality Date    ADENOIDECTOMY      APPENDECTOMY      CATARACT EXTRACTION Left     HYSTERECTOMY      total    IR PICC LINE  2019    OOPHORECTOMY Bilateral 2012    TONSILLECTOMY         Social History   Social History     Substance and Sexual Activity   Alcohol Use Yes    Alcohol/week: 1 0 standard drinks    Types: 1 Glasses of wine per week    Frequency: 2-4 times a month    Drinks per session: 1 or 2    Binge frequency: Never    Comment: occasional     Social History     Substance and Sexual Activity   Drug Use Never     Social History     Tobacco Use   Smoking Status Former Smoker    Packs/day: 0 50    Years: 5 00    Pack years: 2 50    Types: Cigarettes    Last attempt to quit: 1978    Years since quittin 4   Smokeless Tobacco Never Used   Tobacco Comment    no passive smoke exposure     Meds/Allergies     Current Outpatient Medications:     DENTAGEL 1 1 % GEL, AT BED AFTER BRUSHING AND FLOSSING APPLY 1 DROP OF GEL PER TOOTH LEAVE TRAYS IN MOUTH FOR 10 MINUTES, Disp: , Rfl: 11    levothyroxine 50 mcg tablet, Take 1 tablet (50 mcg total) by mouth daily, Disp: 30 tablet, Rfl: 5  Allergies   Allergen Reactions    Codeine Anaphylaxis     Review of Systems   Constitutional: Negative  HENT: Negative  Negative for trouble swallowing  Xerostomia   Eyes: Negative  Cataracts   Respiratory: Negative  Cardiovascular: Negative  Gastrointestinal: Negative  Endocrine: Negative  Genitourinary: Negative  Musculoskeletal: Negative  Skin: Negative  Allergic/Immunologic: Negative  Neurological: Negative  Hematological: Negative      Psychiatric/Behavioral: Negative        OBJECTIVE:   Ht 5' 5" (1 651 m)   Wt 53 1 kg (117 lb)   BMI 19 47 kg/m²   Pain Assessment:  0  ECOG/Zubrod/WHO: 1 - Symptomatic but completely ambulatory    Physical Exam   Telemedicine phone visit, so no PE     RESULTS    Lab Results:   Recent Results (from the past 672 hour(s))   CBC and differential    Collection Time: 06/10/20  9:57 AM   Result Value Ref Range    WBC 4 10 (L) 4 50 - 11 00 Thousand/uL    RBC 3 89 (L) 4 00 - 5 20 Million/uL    Hemoglobin 12 7 12 0 - 16 0 g/dL    Hematocrit 37 1 36 0 - 46 0 %    MCV 95 80 - 100 fL    MCH 32 5 26 0 - 34 0 pg    MCHC 34 2 31 0 - 36 0 g/dL    RDW 13 0 <15 3 %    MPV 6 9 (L) 8 9 - 12 7 fL    Platelets 364 019 - 976 Thousands/uL    Neutrophils Relative 66 (H) 45 - 65 %    Lymphocytes Relative 19 (L) 25 - 45 %    Monocytes Relative 11 (H) 1 - 10 %    Eosinophils Relative 4 0 - 6 %    Basophils Relative 1 0 - 1 %    Neutrophils Absolute 2 70 1 80 - 7 80 Thousands/µL    Lymphocytes Absolute 0 80 0 50 - 4 00 Thousands/µL    Monocytes Absolute 0 40 0 20 - 0 90 Thousand/µL    Eosinophils Absolute 0 10 0 00 - 0 40 Thousand/µL    Basophils Absolute 0 00 0 00 - 0 10 Thousands/µL   Comprehensive metabolic panel    Collection Time: 06/10/20  9:57 AM Result Value Ref Range    Sodium 139 137 - 147 mmol/L    Potassium 3 9 3 6 - 5 0 mmol/L    Chloride 102 97 - 108 mmol/L    CO2 31 (H) 22 - 30 mmol/L    ANION GAP 6 5 - 14 mmol/L    BUN 17 5 - 25 mg/dL    Creatinine 0 73 0 60 - 1 20 mg/dL    Glucose, Fasting 94 70 - 99 mg/dL    Calcium 9 9 8 4 - 10 2 mg/dL    AST 32 14 - 36 U/L    ALT 31 9 - 52 U/L    Alkaline Phosphatase 63 43 - 122 U/L    Total Protein 6 9 5 9 - 8 4 g/dL    Albumin 4 2 3 0 - 5 2 g/dL    Total Bilirubin 0 50 <1 30 mg/dL    eGFR 83 >60 ml/min/1 73sq m   Magnesium    Collection Time: 06/10/20  9:57 AM   Result Value Ref Range    Magnesium 2 0 1 6 - 2 3 mg/dL     Imaging Studies:No results found  Assessment/Plan:  No orders of the defined types were placed in this encounter  Jose Coleman is a 65y o  year old female with a locally advanced stage DEON, T2-T3, N2b, 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 extending into the right tonsillar region and toward the right soft palate   Staging PET-CT confirmed disease in the right oral cavity, tonsillar region and right tongue base approaching the level of the vallecula compatible with malignancy  There were multiple right cervical lymph nodes consistent with metastatic disease   There were no metastasis in the chest, abdomen, or pelvis   She understood that due to the extent of her disease, surgical resection was not possible   We recommended definitive radiation therapy with concurrent chemotherapy   She completed treatment on August 3, 2019 and returns today for follow-up      She is doing well  She has no significant dysphagia and minimal dryness in the mouth  Her taste is good and she is able to eat everything  She has gained weight  She has no clinical evidence of any recurrent disease   She stop smoking tobacco in 1978    She had repeat PET-CT study October 28, 2019 that showed a significant improvement with good response in the base of tongue region    There was some low-level residual activity that is likely post treatment changes and will be followed  She had follow-up appointment November 4, 2019 and June 11, 2020 with Dr Kj Roman and examination revealed no evidence of disease  She has follow-up appointment August 13, 2020 with Dr Kj Roman and in 4 months with Dr Yanes   She will return here for follow-up in 6 months  Harrison Sin MD  4/87/1529,5:17 PM    Portions of the record may have been created with voice recognition software   Occasional wrong word or "sound a like" substitutions may have occurred due to the inherent limitations of voice recognition software   Read the chart carefully and recognize, using context, where substitutions have occurred

## 2020-06-19 ENCOUNTER — APPOINTMENT (OUTPATIENT)
Dept: LAB | Facility: HOSPITAL | Age: 70
End: 2020-06-19
Attending: INTERNAL MEDICINE
Payer: COMMERCIAL

## 2020-06-19 ENCOUNTER — TRANSCRIBE ORDERS (OUTPATIENT)
Dept: LAB | Facility: HOSPITAL | Age: 70
End: 2020-06-19

## 2020-06-19 DIAGNOSIS — C01 CANCER OF BASE OF TONGUE (HCC): ICD-10-CM

## 2020-06-19 DIAGNOSIS — E06.4 IATROGENIC THYROIDITIS: Primary | ICD-10-CM

## 2020-06-19 LAB — TSH SERPL DL<=0.05 MIU/L-ACNC: 4.05 UIU/ML (ref 0.47–4.68)

## 2020-06-19 PROCEDURE — 84443 ASSAY THYROID STIM HORMONE: CPT

## 2020-06-19 PROCEDURE — 36415 COLL VENOUS BLD VENIPUNCTURE: CPT

## 2020-08-20 PROBLEM — E06.4 RADIATION THYROIDITIS: Status: ACTIVE | Noted: 2020-08-20

## 2020-09-23 ENCOUNTER — TELEPHONE (OUTPATIENT)
Dept: HEMATOLOGY ONCOLOGY | Facility: CLINIC | Age: 70
End: 2020-09-23

## 2020-10-01 ENCOUNTER — APPOINTMENT (OUTPATIENT)
Dept: LAB | Facility: CLINIC | Age: 70
End: 2020-10-01
Payer: COMMERCIAL

## 2020-10-01 DIAGNOSIS — E06.4 RADIATION THYROIDITIS: ICD-10-CM

## 2020-10-01 DIAGNOSIS — C01 CANCER OF BASE OF TONGUE (HCC): ICD-10-CM

## 2020-10-01 LAB
ALBUMIN SERPL BCP-MCNC: 4.1 G/DL (ref 3.5–5)
ALP SERPL-CCNC: 74 U/L (ref 46–116)
ALT SERPL W P-5'-P-CCNC: 23 U/L (ref 12–78)
ANION GAP SERPL CALCULATED.3IONS-SCNC: 4 MMOL/L (ref 4–13)
AST SERPL W P-5'-P-CCNC: 19 U/L (ref 5–45)
BASOPHILS # BLD AUTO: 0.06 THOUSANDS/ΜL (ref 0–0.1)
BASOPHILS NFR BLD AUTO: 1 % (ref 0–1)
BILIRUB SERPL-MCNC: 0.66 MG/DL (ref 0.2–1)
BUN SERPL-MCNC: 15 MG/DL (ref 5–25)
CALCIUM SERPL-MCNC: 9.6 MG/DL (ref 8.3–10.1)
CHLORIDE SERPL-SCNC: 107 MMOL/L (ref 100–108)
CO2 SERPL-SCNC: 30 MMOL/L (ref 21–32)
CREAT SERPL-MCNC: 0.83 MG/DL (ref 0.6–1.3)
EOSINOPHIL # BLD AUTO: 0.29 THOUSAND/ΜL (ref 0–0.61)
EOSINOPHIL NFR BLD AUTO: 4 % (ref 0–6)
ERYTHROCYTE [DISTWIDTH] IN BLOOD BY AUTOMATED COUNT: 12 % (ref 11.6–15.1)
GFR SERPL CREATININE-BSD FRML MDRD: 71 ML/MIN/1.73SQ M
GLUCOSE SERPL-MCNC: 98 MG/DL (ref 65–140)
HCT VFR BLD AUTO: 39.2 % (ref 34.8–46.1)
HGB BLD-MCNC: 12.8 G/DL (ref 11.5–15.4)
IMM GRANULOCYTES # BLD AUTO: 0.02 THOUSAND/UL (ref 0–0.2)
IMM GRANULOCYTES NFR BLD AUTO: 0 % (ref 0–2)
LYMPHOCYTES # BLD AUTO: 0.94 THOUSANDS/ΜL (ref 0.6–4.47)
LYMPHOCYTES NFR BLD AUTO: 12 % (ref 14–44)
MAGNESIUM SERPL-MCNC: 2.1 MG/DL (ref 1.6–2.6)
MCH RBC QN AUTO: 32.4 PG (ref 26.8–34.3)
MCHC RBC AUTO-ENTMCNC: 32.7 G/DL (ref 31.4–37.4)
MCV RBC AUTO: 99 FL (ref 82–98)
MONOCYTES # BLD AUTO: 0.54 THOUSAND/ΜL (ref 0.17–1.22)
MONOCYTES NFR BLD AUTO: 7 % (ref 4–12)
NEUTROPHILS # BLD AUTO: 6.01 THOUSANDS/ΜL (ref 1.85–7.62)
NEUTS SEG NFR BLD AUTO: 76 % (ref 43–75)
NRBC BLD AUTO-RTO: 0 /100 WBCS
PLATELET # BLD AUTO: 231 THOUSANDS/UL (ref 149–390)
PMV BLD AUTO: 9.2 FL (ref 8.9–12.7)
POTASSIUM SERPL-SCNC: 3.5 MMOL/L (ref 3.5–5.3)
PROT SERPL-MCNC: 6.9 G/DL (ref 6.4–8.2)
RBC # BLD AUTO: 3.95 MILLION/UL (ref 3.81–5.12)
SODIUM SERPL-SCNC: 141 MMOL/L (ref 136–145)
TSH SERPL DL<=0.05 MIU/L-ACNC: 2.79 UIU/ML (ref 0.36–3.74)
WBC # BLD AUTO: 7.86 THOUSAND/UL (ref 4.31–10.16)

## 2020-10-01 PROCEDURE — 83735 ASSAY OF MAGNESIUM: CPT

## 2020-10-01 PROCEDURE — 85025 COMPLETE CBC W/AUTO DIFF WBC: CPT

## 2020-10-01 PROCEDURE — 84443 ASSAY THYROID STIM HORMONE: CPT

## 2020-10-01 PROCEDURE — 80053 COMPREHEN METABOLIC PANEL: CPT

## 2020-10-01 PROCEDURE — 36415 COLL VENOUS BLD VENIPUNCTURE: CPT

## 2020-10-08 ENCOUNTER — TELEPHONE (OUTPATIENT)
Dept: HEMATOLOGY ONCOLOGY | Facility: CLINIC | Age: 70
End: 2020-10-08

## 2020-10-09 ENCOUNTER — OFFICE VISIT (OUTPATIENT)
Dept: HEMATOLOGY ONCOLOGY | Facility: CLINIC | Age: 70
End: 2020-10-09
Payer: COMMERCIAL

## 2020-10-09 VITALS
HEART RATE: 61 BPM | RESPIRATION RATE: 16 BRPM | WEIGHT: 118.2 LBS | DIASTOLIC BLOOD PRESSURE: 74 MMHG | SYSTOLIC BLOOD PRESSURE: 122 MMHG | TEMPERATURE: 97.6 F | BODY MASS INDEX: 19.69 KG/M2 | HEIGHT: 65 IN | OXYGEN SATURATION: 96 %

## 2020-10-09 DIAGNOSIS — Z12.31 ENCOUNTER FOR SCREENING MAMMOGRAM FOR BREAST CANCER: ICD-10-CM

## 2020-10-09 DIAGNOSIS — C01 CANCER OF BASE OF TONGUE (HCC): Primary | ICD-10-CM

## 2020-10-09 DIAGNOSIS — D75.89 MACROCYTOSIS: ICD-10-CM

## 2020-10-09 PROCEDURE — 99215 OFFICE O/P EST HI 40 MIN: CPT | Performed by: NURSE PRACTITIONER

## 2020-10-27 ENCOUNTER — DOCUMENTATION (OUTPATIENT)
Dept: HEMATOLOGY ONCOLOGY | Facility: CLINIC | Age: 70
End: 2020-10-27

## 2020-12-10 ENCOUNTER — CLINICAL SUPPORT (OUTPATIENT)
Dept: RADIATION ONCOLOGY | Facility: CLINIC | Age: 70
End: 2020-12-10
Attending: RADIOLOGY

## 2020-12-10 ENCOUNTER — APPOINTMENT (OUTPATIENT)
Dept: RADIATION ONCOLOGY | Facility: CLINIC | Age: 70
End: 2020-12-10
Attending: RADIOLOGY
Payer: COMMERCIAL

## 2020-12-10 VITALS
WEIGHT: 121.25 LBS | TEMPERATURE: 97.4 F | HEART RATE: 75 BPM | OXYGEN SATURATION: 100 % | SYSTOLIC BLOOD PRESSURE: 137 MMHG | HEIGHT: 65 IN | RESPIRATION RATE: 18 BRPM | DIASTOLIC BLOOD PRESSURE: 81 MMHG | BODY MASS INDEX: 20.2 KG/M2

## 2020-12-10 DIAGNOSIS — C01 CANCER OF BASE OF TONGUE (HCC): Primary | ICD-10-CM

## 2020-12-10 PROCEDURE — 99211 OFF/OP EST MAY X REQ PHY/QHP: CPT | Performed by: RADIOLOGY

## 2021-01-06 ENCOUNTER — HOSPITAL ENCOUNTER (OUTPATIENT)
Dept: MAMMOGRAPHY | Facility: CLINIC | Age: 71
Discharge: HOME | End: 2021-01-06
Payer: COMMERCIAL

## 2021-01-06 DIAGNOSIS — Z12.31 ENCOUNTER FOR SCREENING MAMMOGRAM FOR BREAST CANCER: ICD-10-CM

## 2021-01-06 PROCEDURE — 77067 SCR MAMMO BI INCL CAD: CPT

## 2021-01-06 PROCEDURE — 77063 BREAST TOMOSYNTHESIS BI: CPT

## 2021-01-13 ENCOUNTER — IMMUNIZATIONS (OUTPATIENT)
Dept: FAMILY MEDICINE CLINIC | Facility: HOSPITAL | Age: 71
End: 2021-01-13

## 2021-01-13 DIAGNOSIS — Z23 ENCOUNTER FOR IMMUNIZATION: ICD-10-CM

## 2021-01-13 PROCEDURE — 0001A SARS-COV-2 / COVID-19 MRNA VACCINE (PFIZER-BIONTECH) 30 MCG: CPT

## 2021-01-13 PROCEDURE — 91300 SARS-COV-2 / COVID-19 MRNA VACCINE (PFIZER-BIONTECH) 30 MCG: CPT

## 2021-02-03 ENCOUNTER — IMMUNIZATIONS (OUTPATIENT)
Dept: FAMILY MEDICINE CLINIC | Facility: HOSPITAL | Age: 71
End: 2021-02-03

## 2021-02-03 DIAGNOSIS — Z23 ENCOUNTER FOR IMMUNIZATION: Primary | ICD-10-CM

## 2021-02-03 PROCEDURE — 0002A SARS-COV-2 / COVID-19 MRNA VACCINE (PFIZER-BIONTECH) 30 MCG: CPT

## 2021-02-03 PROCEDURE — 91300 SARS-COV-2 / COVID-19 MRNA VACCINE (PFIZER-BIONTECH) 30 MCG: CPT

## 2021-02-09 ENCOUNTER — TELEPHONE (OUTPATIENT)
Dept: HEMATOLOGY ONCOLOGY | Facility: MEDICAL CENTER | Age: 71
End: 2021-02-09

## 2021-02-09 ENCOUNTER — TELEPHONE (OUTPATIENT)
Dept: HEMATOLOGY ONCOLOGY | Facility: CLINIC | Age: 71
End: 2021-02-09

## 2021-02-09 NOTE — TELEPHONE ENCOUNTER
Confirmed with patient that she should have labs drawn aprox 7 days prior to her appointment  Confirmed labs are in the system

## 2021-02-09 NOTE — TELEPHONE ENCOUNTER
Appointment Cancellation Or Reschedule     Person calling in Patient    Provider [facility] Visit Date and Time 03/12/2012 from 02/12/2021    Office Visit Location    Did patient reschedule their appointment? Yes    Is this patient a treatment patient? No    When is their next infusion visit? No    Reason for Cancellation or Reschedule Car issues      If the patient is a treatment patient, please route this to the office nurse

## 2021-03-05 ENCOUNTER — LAB (OUTPATIENT)
Dept: LAB | Facility: CLINIC | Age: 71
End: 2021-03-05
Payer: COMMERCIAL

## 2021-03-05 DIAGNOSIS — C01 CANCER OF BASE OF TONGUE (HCC): ICD-10-CM

## 2021-03-05 DIAGNOSIS — D75.89 MACROCYTOSIS: ICD-10-CM

## 2021-03-05 LAB
25(OH)D3 SERPL-MCNC: 24.6 NG/ML (ref 30–100)
ALBUMIN SERPL BCP-MCNC: 4 G/DL (ref 3.5–5)
ALP SERPL-CCNC: 84 U/L (ref 46–116)
ALT SERPL W P-5'-P-CCNC: 24 U/L (ref 12–78)
ANION GAP SERPL CALCULATED.3IONS-SCNC: 6 MMOL/L (ref 4–13)
AST SERPL W P-5'-P-CCNC: 22 U/L (ref 5–45)
BASOPHILS # BLD AUTO: 0.05 THOUSANDS/ΜL (ref 0–0.1)
BASOPHILS NFR BLD AUTO: 1 % (ref 0–1)
BILIRUB SERPL-MCNC: 0.63 MG/DL (ref 0.2–1)
BUN SERPL-MCNC: 15 MG/DL (ref 5–25)
CALCIUM SERPL-MCNC: 9.5 MG/DL (ref 8.3–10.1)
CHLORIDE SERPL-SCNC: 106 MMOL/L (ref 100–108)
CO2 SERPL-SCNC: 30 MMOL/L (ref 21–32)
CREAT SERPL-MCNC: 0.79 MG/DL (ref 0.6–1.3)
EOSINOPHIL # BLD AUTO: 0.32 THOUSAND/ΜL (ref 0–0.61)
EOSINOPHIL NFR BLD AUTO: 6 % (ref 0–6)
ERYTHROCYTE [DISTWIDTH] IN BLOOD BY AUTOMATED COUNT: 11.9 % (ref 11.6–15.1)
FOLATE SERPL-MCNC: >20 NG/ML (ref 3.1–17.5)
GFR SERPL CREATININE-BSD FRML MDRD: 75 ML/MIN/1.73SQ M
GLUCOSE P FAST SERPL-MCNC: 99 MG/DL (ref 65–99)
HCT VFR BLD AUTO: 40.5 % (ref 34.8–46.1)
HGB BLD-MCNC: 13.2 G/DL (ref 11.5–15.4)
IMM GRANULOCYTES # BLD AUTO: 0.01 THOUSAND/UL (ref 0–0.2)
IMM GRANULOCYTES NFR BLD AUTO: 0 % (ref 0–2)
LYMPHOCYTES # BLD AUTO: 0.94 THOUSANDS/ΜL (ref 0.6–4.47)
LYMPHOCYTES NFR BLD AUTO: 16 % (ref 14–44)
MCH RBC QN AUTO: 32.8 PG (ref 26.8–34.3)
MCHC RBC AUTO-ENTMCNC: 32.6 G/DL (ref 31.4–37.4)
MCV RBC AUTO: 101 FL (ref 82–98)
MONOCYTES # BLD AUTO: 0.52 THOUSAND/ΜL (ref 0.17–1.22)
MONOCYTES NFR BLD AUTO: 9 % (ref 4–12)
NEUTROPHILS # BLD AUTO: 3.88 THOUSANDS/ΜL (ref 1.85–7.62)
NEUTS SEG NFR BLD AUTO: 68 % (ref 43–75)
NRBC BLD AUTO-RTO: 0 /100 WBCS
PLATELET # BLD AUTO: 237 THOUSANDS/UL (ref 149–390)
PMV BLD AUTO: 9 FL (ref 8.9–12.7)
POTASSIUM SERPL-SCNC: 3.5 MMOL/L (ref 3.5–5.3)
PROT SERPL-MCNC: 7.2 G/DL (ref 6.4–8.2)
RBC # BLD AUTO: 4.02 MILLION/UL (ref 3.81–5.12)
SODIUM SERPL-SCNC: 142 MMOL/L (ref 136–145)
T4 FREE SERPL-MCNC: 1.01 NG/DL (ref 0.76–1.46)
TSH SERPL DL<=0.05 MIU/L-ACNC: 4.47 UIU/ML (ref 0.36–3.74)
VIT B12 SERPL-MCNC: 1181 PG/ML (ref 100–900)
WBC # BLD AUTO: 5.72 THOUSAND/UL (ref 4.31–10.16)

## 2021-03-05 PROCEDURE — 84439 ASSAY OF FREE THYROXINE: CPT

## 2021-03-05 PROCEDURE — 82746 ASSAY OF FOLIC ACID SERUM: CPT

## 2021-03-05 PROCEDURE — 82607 VITAMIN B-12: CPT

## 2021-03-05 PROCEDURE — 80053 COMPREHEN METABOLIC PANEL: CPT

## 2021-03-05 PROCEDURE — 82306 VITAMIN D 25 HYDROXY: CPT

## 2021-03-05 PROCEDURE — 85025 COMPLETE CBC W/AUTO DIFF WBC: CPT

## 2021-03-05 PROCEDURE — 36415 COLL VENOUS BLD VENIPUNCTURE: CPT

## 2021-03-05 PROCEDURE — 84443 ASSAY THYROID STIM HORMONE: CPT

## 2021-03-11 ENCOUNTER — TELEPHONE (OUTPATIENT)
Dept: HEMATOLOGY ONCOLOGY | Facility: CLINIC | Age: 71
End: 2021-03-11

## 2021-03-12 ENCOUNTER — OFFICE VISIT (OUTPATIENT)
Dept: HEMATOLOGY ONCOLOGY | Facility: CLINIC | Age: 71
End: 2021-03-12
Payer: COMMERCIAL

## 2021-03-12 VITALS
BODY MASS INDEX: 19.73 KG/M2 | HEART RATE: 75 BPM | RESPIRATION RATE: 16 BRPM | OXYGEN SATURATION: 96 % | HEIGHT: 65 IN | WEIGHT: 118.4 LBS | DIASTOLIC BLOOD PRESSURE: 70 MMHG | TEMPERATURE: 96.7 F | SYSTOLIC BLOOD PRESSURE: 126 MMHG

## 2021-03-12 DIAGNOSIS — E55.9 VITAMIN D DEFICIENCY: ICD-10-CM

## 2021-03-12 DIAGNOSIS — E03.9 HYPOTHYROIDISM, UNSPECIFIED TYPE: ICD-10-CM

## 2021-03-12 DIAGNOSIS — C01 CANCER OF BASE OF TONGUE (HCC): Primary | ICD-10-CM

## 2021-03-12 PROCEDURE — 99213 OFFICE O/P EST LOW 20 MIN: CPT | Performed by: INTERNAL MEDICINE

## 2021-03-12 RX ORDER — ERGOCALCIFEROL 1.25 MG/1
50000 CAPSULE ORAL WEEKLY
Qty: 8 CAPSULE | Refills: 0 | Status: SHIPPED | OUTPATIENT
Start: 2021-03-12 | End: 2021-08-09 | Stop reason: ALTCHOICE

## 2021-03-12 NOTE — PROGRESS NOTES
Hematology/Oncology Outpatient Follow-up  Clay Bashir 65 y o  female 1950 [id number]    Date:  3/12/2021        Assessment and Plan:  1  Cancer of base of tongue ([facility] [address] )    The patient does not seem to have any obvious hint of local recurrence according to the recent ENT evaluation  We will continue to monitor her on every 4 months basis and less frequently in the near future  - CBC and differential; Future  - Comprehensive metabolic panel; Future  - Magnesium; Future  - TSH, 3rd generation with Free T4 reflex; Future    2  Hypothyroidism, unspecified type  The TSH was slightly above normal   The Synthroid dose was recently adjusted  - TSH, 3rd generation with Free T4 reflex; Future    3  Vitamin D deficiency    She will be started on high-dose vitamin-D once a week for 8 weeks  I did also encouraged her to take vitamin-D supplements daily  - ergocalciferol (VITAMIN D2) 50,000 units; Take 1 capsule (50,000 Units total) by mouth once a week for 8 doses  Dispense: 8 capsule; Refill: 0      HPI:    The patient came today for a follow-up visit  She was recently evaluated by the ENT team on 03/08/2021 and had laryngoscopy which was negative for any hint of local recurrence of her head neck malignancy  Her most recent blood work on 03/05/2021 showed the hemoglobin of 13 2 with a platelet count of 076 and white cell count of 5 7  Her CMP is entirely normal   TSH 4 470, vitamin D was 24 6  She denied dysphagia or bleeding from any sites  Oncology History   Cancer of base of tongue ([facility] [address] )   5/3/2019 Biopsy    Right BOT biopsy  Biopsy + for SCC moderately differentiated,  Incompletely excised          6/10/2019 - 6/30/2019 Chemotherapy    CISplatin (PLATINOL) 174 mg, mannitol 12 5 g in sodium chloride 0 9 % 500 mL IVPB, 100 mg/m2 = 174 mg, Intravenous, Once, 1 of 3 cycles  Administration: 174 mg (6/10/2019)  potassium chloride 20 mEq, magnesium sulfate 1 g in sodium chloride 0 9 % 1,000 mL IVPB, , Intravenous, Once, 1 of 3 cycles  Administration:  (6/10/2019),  (6/10/2019)  (1 cycle only d/c'd due to significant hearing loss after 1 treatment)       6/10/2019 - 7/29/2019 Radiation    Plan ID Energy Fractions Dose per Fraction (cGy) Dose Correction (cGy) Total Dose Delivered (cGy) Elapsed Days   Head and Neck 6X 35 / 35 200 0 7,000 52     Dr Serene Gavin     7/1/2019 - 8/19/2019 Chemotherapy    CARBOplatin (PARAPLATIN) 115 5 mg in sodium chloride 0 9 % 250 mL IVPB, 70 mg/m2 = 115 5 mg (100 % of original dose 70 mg/m2), Intravenous, Once, 2 of 2 cycles  Dose modification: 70 mg/m2 (original dose 70 mg/m2, Cycle 1)  Administration: 115 5 mg (7/1/2019), 115 5 mg (7/2/2019), 115 5 mg (7/3/2019), 115 5 mg (7/5/2019), 115 5 mg (7/31/2019), 115 5 mg (8/1/2019), 115 5 mg (8/2/2019), 115 5 mg (7/30/2019)  (completed 2 cycles)           Interval history:    ROS: Review of Systems   Constitutional: Negative for chills and fever  HENT: Negative for ear pain and sore throat  Eyes: Negative for pain and visual disturbance  Respiratory: Negative for cough and shortness of breath  Cardiovascular: Negative for chest pain and palpitations  Gastrointestinal: Negative for abdominal pain and vomiting  Genitourinary: Negative for dysuria and hematuria  Musculoskeletal: Negative for arthralgias and back pain  Skin: Negative for color change and rash  Neurological: Negative for seizures and syncope  All other systems reviewed and are negative        Past Medical History:   Diagnosis Date    Disease of thyroid gland     Tongue cancer ([address] )     Squamous cell       Past Surgical History:   Procedure Laterality Date    ADENOIDECTOMY      APPENDECTOMY  1990    CATARACT EXTRACTION Left     HYSTERECTOMY  2012    total    IR PICC LINE  6/5/2019    OOPHORECTOMY Bilateral 2012    TONSILLECTOMY         Social History     Socioeconomic History    Marital status:      Spouse name: None    Number of children: None    Years of education: None    Highest education level: None   Occupational History    None   Social Needs    Financial resource strain: None    Food insecurity     Worry: None     Inability: None    Transportation needs     Medical: None     Non-medical: None   Tobacco Use    Smoking status: Former Smoker     Packs/day: 0 50     Years: 5 00     Pack years: 2 50     Types: Cigarettes     Quit date: 1978     Years since quittin 2    Smokeless tobacco: Never Used    Tobacco comment: no passive smoke exposure   Substance and Sexual Activity    Alcohol use:  Yes     Alcohol/week: 1 0 standard drinks     Types: 1 Glasses of wine per week     Frequency: 2-4 times a month     Drinks per session: 1 or 2     Binge frequency: Never     Comment: occasional    Drug use: Never    Sexual activity: None   Lifestyle    Physical activity     Days per week: None     Minutes per session: None    Stress: None   Relationships    Social connections     Talks on phone: None     Gets together: None     Attends Mos[employer] service: None     Active member of club or organization: None     Attends meetings of clubs or organizations: None     Relationship status: None    Intimate partner violence     Fear of current or ex partner: None     Emotionally abused: None     Physically abused: None     Forced sexual activity: None   Other Topics Concern    None   Social History Narrative    None       Family History   Problem Relation Age of Onset    Rheum arthritis Sister     Hypothyroidism Sister     Hashimoto's thyroiditis Family     Prostate cancer Father     Parkinsonism Mother         Cardiac abnormality ([place])    No Known Problems Maternal Grandmother     No Known Problems Maternal Grandfather     No Known Problems Paternal Grandmother     No Known Problems Paternal Grandfather     No Known Problems Paternal Aunt     No Known Problems Paternal Aunt     No Known Problems Paternal Aunt     No Known Problems Paternal Aunt        Allergies   Allergen Reactions    Codeine Anaphylaxis         Current Outpatient Medications:     DENTAGEL 1 1 % GEL, AT BED AFTER BRUSHING AND FLOSSING APPLY 1 DROP OF GEL PER TOOTH LEAVE TRAYS IN MOUTH FOR 10 MINUTES, Disp: , Rfl: 11    levothyroxine 25 mcg tablet, Add 25 ug to 50 ug M, W, F (Patient taking differently: 75mcg on T-Su, Monday 50 mcg), Disp: 30 tablet, Rfl: 5    levothyroxine 50 mcg tablet, Take 1 tablet (50 mcg total) by mouth daily, Disp: 30 tablet, Rfl: 5    ergocalciferol (VITAMIN D2) 50,000 units, Take 1 capsule (50,000 Units total) by mouth once a week for 8 doses, Disp: 8 capsule, Rfl: 0      Physical Exam:  /70 (BP Location: Left arm, Patient Position: Sitting, Cuff Size: Standard)   Pulse 75   Temp (!) 96 7 °F (35 9 °C) (Tympanic)   Resp 16   Ht 5' 5" (1 651 m)   Wt 53 7 kg (118 lb 6 4 oz)   SpO2 96%   BMI 19 70 kg/m²     Physical Exam  Constitutional:       General: She is not in acute distress  Appearance: She is well-developed  She is not diaphoretic  HENT:      Head: Normocephalic and atraumatic  Eyes:      General: No scleral icterus  Right eye: No discharge  Left eye: No discharge  Conjunctiva/sclera: Conjunctivae normal       Pupils: Pupils are equal, round, and reactive to light  Neck:      Musculoskeletal: Normal range of motion and neck supple  Thyroid: No thyromegaly  Vascular: No JVD  Trachea: No tracheal deviation  Cardiovascular:      Rate and Rhythm: Normal rate and regular rhythm  Heart sounds: Normal heart sounds  No murmur  No friction rub  Pulmonary:      Effort: Pulmonary effort is normal  No respiratory distress  Breath sounds: Normal breath sounds  No stridor  No wheezing or rales  Chest:      Chest wall: No tenderness  Abdominal:      General: There is no distension  Palpations: Abdomen is soft  There is no hepatomegaly or splenomegaly        Tenderness: There is no abdominal tenderness  There is no guarding or rebound  Musculoskeletal: Normal range of motion  General: No tenderness or deformity  Lymphadenopathy:      Cervical: No cervical adenopathy  Skin:     General: Skin is warm and dry  Coloration: Skin is not pale  Findings: No erythema or rash  Neurological:      Mental Status: She is alert and oriented to person, place, and time  Cranial Nerves: No cranial nerve deficit  Coordination: Coordination normal       Deep Tendon Reflexes: Reflexes are normal and symmetric  Psychiatric:         Behavior: Behavior normal          Thought Content: Thought content normal          Judgment: Judgment normal            Labs:  Lab Results   Component Value Date    WBC 5 72 03/05/2021    HGB 13 2 03/05/2021    HCT 40 5 03/05/2021     (H) 03/05/2021     03/05/2021     Lab Results   Component Value Date     04/24/2018    K 3 5 03/05/2021     03/05/2021    CO2 30 03/05/2021    ANIONGAP 11 04/24/2018    BUN 15 03/05/2021    CREATININE 0 79 03/05/2021    GLUCOSE 85 04/24/2018    GLUF 99 03/05/2021    CALCIUM 9 5 03/05/2021    AST 22 03/05/2021    ALT 24 03/05/2021    ALKPHOS 84 03/05/2021    EGFR 75 03/05/2021     No results found for: TSH    Patient voiced understanding and agreement in the above discussion  Aware to contact our office with questions/symptoms in the interim

## 2021-04-15 ENCOUNTER — HOSPITAL ENCOUNTER (OUTPATIENT)
Dept: RADIOLOGY | Facility: HOSPITAL | Age: 71
Discharge: HOME | End: 2021-04-15
Payer: COMMERCIAL

## 2021-04-15 DIAGNOSIS — C77.0 SECONDARY MALIGNANT NEOPLASM OF CERVICAL LYMPH NODE (HCC): ICD-10-CM

## 2021-04-15 PROCEDURE — 71046 X-RAY EXAM CHEST 2 VIEWS: CPT

## 2021-06-10 ENCOUNTER — APPOINTMENT (OUTPATIENT)
Dept: LAB | Facility: CLINIC | Age: 71
End: 2021-06-10
Payer: COMMERCIAL

## 2021-06-10 DIAGNOSIS — C01 CANCER OF BASE OF TONGUE (HCC): ICD-10-CM

## 2021-06-10 DIAGNOSIS — E03.9 HYPOTHYROIDISM, UNSPECIFIED TYPE: ICD-10-CM

## 2021-06-10 LAB
ALBUMIN SERPL BCP-MCNC: 3.8 G/DL (ref 3.5–5)
ALP SERPL-CCNC: 68 U/L (ref 46–116)
ALT SERPL W P-5'-P-CCNC: 28 U/L (ref 12–78)
ANION GAP SERPL CALCULATED.3IONS-SCNC: 4 MMOL/L (ref 4–13)
AST SERPL W P-5'-P-CCNC: 23 U/L (ref 5–45)
BASOPHILS # BLD AUTO: 0.07 THOUSANDS/ΜL (ref 0–0.1)
BASOPHILS NFR BLD AUTO: 1 % (ref 0–1)
BILIRUB SERPL-MCNC: 0.75 MG/DL (ref 0.2–1)
BUN SERPL-MCNC: 10 MG/DL (ref 5–25)
CALCIUM SERPL-MCNC: 9.9 MG/DL (ref 8.3–10.1)
CHLORIDE SERPL-SCNC: 105 MMOL/L (ref 100–108)
CO2 SERPL-SCNC: 30 MMOL/L (ref 21–32)
CREAT SERPL-MCNC: 0.76 MG/DL (ref 0.6–1.3)
EOSINOPHIL # BLD AUTO: 0.24 THOUSAND/ΜL (ref 0–0.61)
EOSINOPHIL NFR BLD AUTO: 5 % (ref 0–6)
ERYTHROCYTE [DISTWIDTH] IN BLOOD BY AUTOMATED COUNT: 12.1 % (ref 11.6–15.1)
GFR SERPL CREATININE-BSD FRML MDRD: 78 ML/MIN/1.73SQ M
GLUCOSE P FAST SERPL-MCNC: 95 MG/DL (ref 65–99)
HCT VFR BLD AUTO: 41.5 % (ref 34.8–46.1)
HGB BLD-MCNC: 13.4 G/DL (ref 11.5–15.4)
IMM GRANULOCYTES # BLD AUTO: 0.01 THOUSAND/UL (ref 0–0.2)
IMM GRANULOCYTES NFR BLD AUTO: 0 % (ref 0–2)
LYMPHOCYTES # BLD AUTO: 0.89 THOUSANDS/ΜL (ref 0.6–4.47)
LYMPHOCYTES NFR BLD AUTO: 17 % (ref 14–44)
MAGNESIUM SERPL-MCNC: 2.2 MG/DL (ref 1.6–2.6)
MCH RBC QN AUTO: 32.1 PG (ref 26.8–34.3)
MCHC RBC AUTO-ENTMCNC: 32.3 G/DL (ref 31.4–37.4)
MCV RBC AUTO: 100 FL (ref 82–98)
MONOCYTES # BLD AUTO: 0.62 THOUSAND/ΜL (ref 0.17–1.22)
MONOCYTES NFR BLD AUTO: 12 % (ref 4–12)
NEUTROPHILS # BLD AUTO: 3.5 THOUSANDS/ΜL (ref 1.85–7.62)
NEUTS SEG NFR BLD AUTO: 65 % (ref 43–75)
NRBC BLD AUTO-RTO: 0 /100 WBCS
PLATELET # BLD AUTO: 250 THOUSANDS/UL (ref 149–390)
PMV BLD AUTO: 9.1 FL (ref 8.9–12.7)
POTASSIUM SERPL-SCNC: 3.8 MMOL/L (ref 3.5–5.3)
PROT SERPL-MCNC: 7 G/DL (ref 6.4–8.2)
RBC # BLD AUTO: 4.17 MILLION/UL (ref 3.81–5.12)
SODIUM SERPL-SCNC: 139 MMOL/L (ref 136–145)
TSH SERPL DL<=0.05 MIU/L-ACNC: 3.74 UIU/ML (ref 0.36–3.74)
WBC # BLD AUTO: 5.33 THOUSAND/UL (ref 4.31–10.16)

## 2021-06-10 PROCEDURE — 83735 ASSAY OF MAGNESIUM: CPT

## 2021-06-10 PROCEDURE — 84443 ASSAY THYROID STIM HORMONE: CPT

## 2021-06-10 PROCEDURE — 85025 COMPLETE CBC W/AUTO DIFF WBC: CPT

## 2021-06-10 PROCEDURE — 80053 COMPREHEN METABOLIC PANEL: CPT

## 2021-06-10 PROCEDURE — 36415 COLL VENOUS BLD VENIPUNCTURE: CPT

## 2021-06-11 ENCOUNTER — TELEPHONE (OUTPATIENT)
Dept: HEMATOLOGY ONCOLOGY | Facility: CLINIC | Age: 71
End: 2021-06-11

## 2021-06-11 ENCOUNTER — OFFICE VISIT (OUTPATIENT)
Dept: URGENT CARE | Facility: CLINIC | Age: 71
End: 2021-06-11
Payer: COMMERCIAL

## 2021-06-11 VITALS
OXYGEN SATURATION: 98 % | TEMPERATURE: 96.4 F | SYSTOLIC BLOOD PRESSURE: 138 MMHG | RESPIRATION RATE: 16 BRPM | HEART RATE: 77 BPM | DIASTOLIC BLOOD PRESSURE: 78 MMHG | WEIGHT: 118 LBS | BODY MASS INDEX: 18.96 KG/M2 | HEIGHT: 66 IN

## 2021-06-11 DIAGNOSIS — N39.0 URINARY TRACT INFECTION WITH HEMATURIA, SITE UNSPECIFIED: Primary | ICD-10-CM

## 2021-06-11 DIAGNOSIS — R31.9 URINARY TRACT INFECTION WITH HEMATURIA, SITE UNSPECIFIED: Primary | ICD-10-CM

## 2021-06-11 LAB
SL AMB  POCT GLUCOSE, UA: NEGATIVE
SL AMB LEUKOCYTE ESTERASE,UA: ABNORMAL
SL AMB POCT BILIRUBIN,UA: NEGATIVE
SL AMB POCT BLOOD,UA: ABNORMAL
SL AMB POCT CLARITY,UA: CLEAR
SL AMB POCT COLOR,UA: YELLOW
SL AMB POCT KETONES,UA: NEGATIVE
SL AMB POCT NITRITE,UA: NEGATIVE
SL AMB POCT PH,UA: 5
SL AMB POCT SPECIFIC GRAVITY,UA: 1.01
SL AMB POCT URINE PROTEIN: NEGATIVE
SL AMB POCT UROBILINOGEN: 0.2

## 2021-06-11 PROCEDURE — 81002 URINALYSIS NONAUTO W/O SCOPE: CPT | Performed by: PHYSICIAN ASSISTANT

## 2021-06-11 PROCEDURE — 87086 URINE CULTURE/COLONY COUNT: CPT | Performed by: PHYSICIAN ASSISTANT

## 2021-06-11 PROCEDURE — G0382 LEV 3 HOSP TYPE B ED VISIT: HCPCS | Performed by: PHYSICIAN ASSISTANT

## 2021-06-11 RX ORDER — NITROFURANTOIN 25; 75 MG/1; MG/1
100 CAPSULE ORAL 2 TIMES DAILY
Qty: 14 CAPSULE | Refills: 0 | Status: SHIPPED | OUTPATIENT
Start: 2021-06-11 | End: 2021-06-18

## 2021-06-11 NOTE — TELEPHONE ENCOUNTER
Appointment Confirmation     Appointment with  Saray Head   Appointment date & time  07/16 at 10:30am   Location [facility]   Patient verbilized Understanding  yes

## 2021-06-11 NOTE — PROGRESS NOTES
[address] [employer] [facility]    NAME: Camilo Echavarria is a 66 y o  female  : 1950    MRN: [id number]  DATE: 2021  TIME: 5:34 PM    Assessment and Plan   Urinary tract infection with hematuria, site unspecified [N39 0, R31 9]  1  Urinary tract infection with hematuria, site unspecified  POCT urine dip    Urine culture    nitrofurantoin (MACROBID) 100 mg capsule       Patient Instructions     Patient Instructions   1  Start antibiotic as directed  2   Push fluids  3   If burning on urination, you may try over the counter Azo Urinary Pain Relief or comparable product  Please note that this type of product may cause your urine to become bright orange and it may stain your undergarments if you leak  Please wear protection as needed  4   Your urine will be sent for culture and sensitivity to try and make sure that the medicine prescribed is appropriate for your infection  Results take approximately 72 hours to return  If you have not heard from your [facility] provider by 4 days after your office visit, please call phone number at the top of your clinical summary for your results  5   If develop worsening symptoms with associated fever, back pain, nausea with vomiting, please proceed to emergency room for further evaluation  6   If you have recurrent urinary tract problems, please follow up with your PCP and / or urologist for further evaluation  Chief Complaint     Chief Complaint   Patient presents with    Possible UTI     Pt c/o urinary frequency, urgency, and inability to empty bladder, and nocturia for the past week  Denies fever  History of Present Illness   Camilo Echavarria presents to the clinic c/o    76-year-old female that comes in with increased urination frequency, hesitancy and some mild itchy discomfort near her urethra  No blood in urine  Getting up 3-4 times a night or every 2 hours which is not like her  Not sexually active    No fever, chills, nausea, vomiting  Good energy  Hysterectomy 2013 due to pelvic mass  Noncancerous  2019 had  Chemo and radiation for a cancer under her tongue  Follows with Oncology routinely  Review of Systems   Review of Systems   Constitutional: Negative  Gastrointestinal:        Lower abdominal pressure   Genitourinary: Positive for difficulty urinating, frequency and urgency  Negative for decreased urine volume, enuresis, flank pain, hematuria, pelvic pain, vaginal bleeding, vaginal discharge and vaginal pain          Nocturia that is rare for her       Current Medications     Long-Term Medications   Medication Sig Dispense Refill    DENTAGEL 1 1 % GEL AT BED AFTER BRUSHING AND FLOSSING APPLY 1 DROP OF GEL PER TOOTH LEAVE TRAYS IN MOUTH FOR 10 MINUTES  11    ergocalciferol (VITAMIN D2) 50,000 units Take 1 capsule (50,000 Units total) by mouth once a week for 8 doses 8 capsule 0    levothyroxine 25 mcg tablet Add 25 ug to 50 ug M, W, F 30 tablet 5    levothyroxine 50 mcg tablet Take 1 tablet (50 mcg total) by mouth daily 30 tablet 5       Current Allergies     Allergies as of 06/11/2021 - Reviewed 06/11/2021   Allergen Reaction Noted    Codeine Anaphylaxis 03/27/2019          The following portions of the patient's history were reviewed and updated as appropriate: allergies, current medications, past family history, past medical history, past social history, past surgical history and problem list   Past Medical History:   Diagnosis Date    Disease of thyroid gland     Tongue cancer ([address] )     Squamous cell     Past Surgical History:   Procedure Laterality Date    ADENOIDECTOMY      APPENDECTOMY  1990    CATARACT EXTRACTION Left     HYSTERECTOMY  2012    total    IR PICC LINE  6/5/2019    OOPHORECTOMY Bilateral 2012    TONSILLECTOMY       Family History   Problem Relation Age of Onset    Rheum arthritis Sister     Hypothyroidism Sister     Hashimoto's thyroiditis Family     Prostate cancer Father     Parkinsonism Mother         Cardiac abnormality [facility]    No Known Problems Maternal Grandmother     No Known Problems Maternal Grandfather     No Known Problems Paternal Grandmother     No Known Problems Paternal Grandfather     No Known Problems Paternal Aunt     No Known Problems Paternal Aunt     No Known Problems Paternal Aunt     No Known Problems Paternal Aunt        Objective   /78   Pulse 77   Temp (!) 96 4 °F (35 8 °C) (Tympanic)   Resp 16   Ht 5' 6" (1 676 m)   Wt 53 5 kg (118 lb)   SpO2 98%   BMI 19 05 kg/m²   No LMP recorded  Patient is postmenopausal        Physical Exam     Physical Exam  Vitals signs and nursing note reviewed  Constitutional:       General: She is not in acute distress  Appearance: Normal appearance  She is well-developed  She is not ill-appearing, toxic-appearing or diaphoretic  Cardiovascular:      Rate and Rhythm: Normal rate and regular rhythm  Heart sounds: Normal heart sounds  No murmur  No friction rub  No gallop  Pulmonary:      Effort: Pulmonary effort is normal  No respiratory distress  Breath sounds: Normal breath sounds  No stridor  No wheezing, rhonchi or rales  Abdominal:      Palpations: Abdomen is soft  Tenderness: There is no abdominal tenderness  There is no right CVA tenderness, left CVA tenderness or guarding  Skin:     General: Skin is warm and dry  Neurological:      Mental Status: She is alert and oriented to person, place, and time     Psychiatric:         Mood and Affect: Mood normal          Behavior: Behavior normal

## 2021-06-12 LAB — BACTERIA UR CULT: NORMAL

## 2021-06-13 ENCOUNTER — TELEPHONE (OUTPATIENT)
Dept: URGENT CARE | Facility: CLINIC | Age: 71
End: 2021-06-13

## 2021-06-13 NOTE — TELEPHONE ENCOUNTER
Message left that urine culture did not grow out any specific bacterial infection  If still having symptoms would recommend follow-up with primary care provider  If having increased vaginal itching may want to try over-the-counter antifungal cream but again would recommend follow-up with family doctor  Push fluids

## 2021-06-16 ENCOUNTER — TELEPHONE (OUTPATIENT)
Dept: RADIATION ONCOLOGY | Facility: CLINIC | Age: 71
End: 2021-06-16

## 2021-06-16 NOTE — TELEPHONE ENCOUNTER
Called and left message that her appt for 6/17/21 with Dr Faith is cancelled and to call us back to reschedule

## 2021-07-13 ENCOUNTER — TELEPHONE (OUTPATIENT)
Dept: HEMATOLOGY ONCOLOGY | Facility: CLINIC | Age: 71
End: 2021-07-13

## 2021-07-13 NOTE — TELEPHONE ENCOUNTER
Appointment Confirmation     Appointment with  Maninder Lock   Appointment date & time 07/16/2021   Location [place]   Patient verbilized Understanding  yes

## 2021-07-16 ENCOUNTER — OFFICE VISIT (OUTPATIENT)
Dept: HEMATOLOGY ONCOLOGY | Facility: CLINIC | Age: 71
End: 2021-07-16
Payer: COMMERCIAL

## 2021-07-16 VITALS
OXYGEN SATURATION: 97 % | BODY MASS INDEX: 18.8 KG/M2 | TEMPERATURE: 97.2 F | SYSTOLIC BLOOD PRESSURE: 132 MMHG | HEART RATE: 70 BPM | WEIGHT: 117 LBS | HEIGHT: 66 IN | DIASTOLIC BLOOD PRESSURE: 78 MMHG | RESPIRATION RATE: 16 BRPM

## 2021-07-16 DIAGNOSIS — E55.9 VITAMIN D DEFICIENCY: ICD-10-CM

## 2021-07-16 DIAGNOSIS — E03.9 HYPOTHYROIDISM, UNSPECIFIED TYPE: ICD-10-CM

## 2021-07-16 DIAGNOSIS — D75.89 MACROCYTOSIS: ICD-10-CM

## 2021-07-16 DIAGNOSIS — B37.0 THRUSH: ICD-10-CM

## 2021-07-16 DIAGNOSIS — C01 CANCER OF BASE OF TONGUE (HCC): Primary | ICD-10-CM

## 2021-07-16 PROCEDURE — 99214 OFFICE O/P EST MOD 30 MIN: CPT | Performed by: NURSE PRACTITIONER

## 2021-07-16 RX ORDER — CLOTRIMAZOLE 10 MG/1
10 LOZENGE ORAL; TOPICAL
Qty: 70 TABLET | Refills: 1 | Status: SHIPPED | OUTPATIENT
Start: 2021-07-16 | End: 2021-08-30 | Stop reason: ALTCHOICE

## 2021-07-16 NOTE — PROGRESS NOTES
Hematology/Oncology Outpatient Follow-up  Shwetha Tam 66 y o  female 1950 [id number]    Date:  7/16/2021      Assessment and Plan:  1  Cancer of base of tongue [facility])  Patient completed her concurrent chemo radiation August 2019  Does not seem to have any obvious hint of recurrence at this time  We will continue to monitor her closely according to the NCCN guidelines  She seems to be up-to-date with her mammography and colonoscopies  She will be back for follow-up again in 4 months with repeat labs prior     - CBC and differential; Future  - Comprehensive metabolic panel; Future  - Folate; Future  - Vitamin B12; Future    2  Macrocytosis  Patient continues to have mild macrocytosis  Her TSH is normal and her Q21 folic acid were normal in March  She does admit to drinking alcohol/wine few times per week which may be the reason  Continue to monitor     - CBC and differential; Future  - Folate; Future  - Vitamin B12; Future  - Iron Panel (Includes Ferritin, Iron Sat%, Iron, and TIBC); Future    3  Thrush  Patient was noted to have some white lesions on her tongue today which may or may not be thrush versus changes due to dry mouth  Will trial Mycelex Troches for 1-2 weeks to see if it improves her symptom  - clotrimazole (MYCELEX) 10 mg samantha; Take 1 tablet (10 mg total) by mouth 5 (five) times a day  Dispense: 70 tablet; Refill: 1    4  Hypothyroidism, unspecified type  Patient is on levothyroxine 50 mcg daily  Being monitored by her ENT provider; they have already ordered repeat TSH before her next office visit with them  5  Vitamin D deficiency  Patient admits that she only took 2 or 3 of the high-dose vitamin-D capsules that was prescribed as she could not tolerate  Is currently taking 4000 units over-the-counter daily and is spending more time outside in the sun  Will repeat her vitamin-D level before her next office visit  - Vitamin D 25 hydroxy;  Future    HPI:  Patient presents today for a follow-up visit  She was just seen by her ENT physician Monday 07/12/2021; reports that she had a scope done in office which was fine  Continues to work 3 days a week part-time but is considering snf in the near future  She continues to have chronic dry mouth since her treatment which improves with [employer] products  Is going to be having cataract surgery in August     Her most recent laboratory studies from 06/10/2021 showed normal white cells and platelets, she is not anemic H&H 13 4/41 5, MCV continues to be slightly higher than average 100  CMP normal   TSH is in the normal range 3 74; is taking levothyroxine 50 mcg daily  Her mammogram 01/06/2021 was read as biRADS2  Oncology History Overview Note   Patient without significant past medical history  She stated that she had a history of tobacco use and quit many years ago  She also admitted the consuming alcohol around 6 oz per week  Her father had a history of prostate cancer  The patient complained about right-sided throat pain in abnormal feeling with movement of the tongue  She also had right-sided ear pain for about 6 months which was treated with 2 courses of antibiotics without any improvement  She eventually was seen by Dr Andres Serrano her ENT doctor who performed extensive evaluation including endoscopic evaluation which revealed a mass in the base of the tongue on the right side  A biopsy from the base of the tongue mass came back 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  The HPV status was negative  The patient then had a PET-CT scan on the 14th May 2019 which showed:  IMPRESSION:  1  Large hypermetabolic mass in the right oral cavity, right tonsillar region and right tongue base, approaching the level of the vallecula compatible with known malignancy  Involvement of the right soft palate is not excluded  2   Multiple hypermetabolic right cervical nodes as above concerning for metastases    3   Asymmetric enlargement of the right thyroid with associated FDG uptake  Further evaluation with ultrasound recommended  4   No hypermetabolic metastases in the chest abdomen pelvis  5   Aneurysmal ascending thoracic aorta measuring 4 2 x 4 3 cm  The estimated clinical stage is T2 N2 M0  Postreatment PET/CT 10/28/2019:  IMPRESSION:  1  Significantly decreased FDG uptake at the right tongue base and right oral cavity compatible with response to treatment  Mild residual FDG uptake remaining at the right tongue base, residual disease is not excluded  2   No FDG avid lymph nodes remaining  3   Persistent asymmetry in the right thyroid gland of uncertain significance, question inflammatory  No underlying nodule seen here on prior anatomic imaging  New FDG uptake in what is likely pyramidal lobe activity just superior to the thyroid   cartilage  Correlate with thyroid function studies for thyroiditis  4  No findings for hypermetabolic metastasis in the chest, abdomen or pelvis  Cancer of base of tongue ([facility] [address] )   5/3/2019 Biopsy    Right BOT biopsy  Biopsy + for SCC moderately differentiated,  Incompletely excised          6/10/2019 - 6/30/2019 Chemotherapy    CISplatin (PLATINOL) 174 mg, mannitol 12 5 g in sodium chloride 0 9 % 500 mL IVPB, 100 mg/m2 = 174 mg, Intravenous, Once, 1 of 3 cycles  Administration: 174 mg (6/10/2019)  potassium chloride 20 mEq, magnesium sulfate 1 g in sodium chloride 0 9 % 1,000 mL IVPB, , Intravenous, Once, 1 of 3 cycles  Administration:  (6/10/2019),  (6/10/2019)  (1 cycle only d/c'd due to significant hearing loss after 1 treatment)       6/10/2019 - 7/29/2019 Radiation    Plan ID Energy Fractions Dose per Fraction (cGy) Dose Correction (cGy) Total Dose Delivered (cGy) Elapsed Days   Head and Neck 6X 35 / 35 200 0 7,000 52     Dr Eren Ellison     7/1/2019 - 8/19/2019 Chemotherapy    CARBOplatin (PARAPLATIN) 115 5 mg in sodium chloride 0 9 % 250 mL IVPB, 70 mg/m2 = 115 5 mg (100 % of original dose 70 mg/m2), Intravenous, Once, 2 of 2 cycles  Dose modification: 70 mg/m2 (original dose 70 mg/m2, Cycle 1)  Administration: 115 5 mg (7/1/2019), 115 5 mg (7/2/2019), 115 5 mg (7/3/2019), 115 5 mg (7/5/2019), 115 5 mg (7/31/2019), 115 5 mg (8/1/2019), 115 5 mg (8/2/2019), 115 5 mg (7/30/2019)  (completed 2 cycles)           Interval history:    ROS: Review of Systems   Constitutional: Negative for activity change, appetite change, chills, fatigue, fever and unexpected weight change  HENT: Negative for congestion, mouth sores, nosebleeds, sore throat and trouble swallowing  Eyes: Negative  Respiratory: Negative for cough, chest tightness and shortness of breath  Cardiovascular: Negative for chest pain, palpitations and leg swelling  Gastrointestinal: Negative for abdominal distention, abdominal pain, blood in stool, constipation, diarrhea, nausea and vomiting  Genitourinary: Negative for difficulty urinating, dysuria, frequency, hematuria and urgency  Musculoskeletal: Negative for arthralgias, back pain, gait problem, joint swelling and myalgias  Skin: Negative for color change, pallor and rash  Neurological: Positive for numbness (toes- uses low dose gabapentin at times which is helpful prescribed by her Podiatrist)  Negative for dizziness, weakness, light-headedness and headaches  Reports restless legs at times   Hematological: Negative for adenopathy  Does not bruise/bleed easily  Psychiatric/Behavioral: Negative for dysphoric mood and sleep disturbance  The patient is not nervous/anxious          Past Medical History:   Diagnosis Date    Disease of thyroid gland     Tongue cancer ([facility] [address] )     Squamous cell       Past Surgical History:   Procedure Laterality Date    ADENOIDECTOMY      APPENDECTOMY  1990    CATARACT EXTRACTION Left     HYSTERECTOMY  2012    total    IR PICC LINE  6/5/2019    OOPHORECTOMY Bilateral 2012    TONSILLECTOMY         Social History Socioeconomic History    Marital status:      Spouse name: None    Number of children: None    Years of education: None    Highest education level: None   Occupational History    None   Tobacco Use    Smoking status: Former Smoker     Packs/day: 0 50     Years: 5 00     Pack years: 2 50     Types: Cigarettes     Quit date: 1978     Years since quittin 5    Smokeless tobacco: Never Used    Tobacco comment: no passive smoke exposure   Vaping Use    Vaping Use: Never used   Substance and Sexual Activity    Alcohol use: Yes     Alcohol/week: 1 0 standard drinks     Types: 1 Glasses of wine per week     Comment: occasional    Drug use: Never    Sexual activity: None   Other Topics Concern    None   Social History Narrative    None     Social Determinants of Health     Financial Resource Strain:     Difficulty of Paying Living Expenses:    Food Insecurity:     Worried About Running Out of Food in the Last Year:     Ran Out of Food in the Last Year:    Transportation Needs:     Lack of Transportation (Medical):      Lack of Transportation (Non-Medical):    Physical Activity:     Days of Exercise per Week:     Minutes of Exercise per Session:    Stress:     Feeling of Stress :    Social Connections:     Frequency of Communication with Friends and Family:     Frequency of Social Gatherings with Friends and Family:     Attends [employer] Services:     Active Member of Clubs or Organizations:     Attends Club or Organization Meetings:     Marital Status:    Intimate Partner Violence:     Fear of Current or Ex-Partner:     Emotionally Abused:     Physically Abused:     Sexually Abused:        Family History   Problem Relation Age of Onset    Rheum arthritis Sister     Hypothyroidism Sister     Hashimoto's thyroiditis Family     Prostate cancer Father     Parkinsonism Mother         Cardiac abnormality [facility]    No Known Problems Maternal Grandmother     No Known Problems Maternal Grandfather     No Known Problems Paternal Grandmother     No Known Problems Paternal Grandfather     No Known Problems Paternal Aunt     No Known Problems Paternal Aunt     No Known Problems Paternal Aunt     No Known Problems Paternal Aunt        Allergies   Allergen Reactions    Codeine Anaphylaxis         Current Outpatient Medications:     levothyroxine 50 mcg tablet, Take 1 tablet (50 mcg total) by mouth daily, Disp: 30 tablet, Rfl: 5    DENTAGEL 1 1 % GEL, AT BED AFTER BRUSHING AND FLOSSING APPLY 1 DROP OF GEL PER TOOTH LEAVE TRAYS IN MOUTH FOR 10 MINUTES (Patient not taking: Reported on 7/16/2021), Disp: , Rfl: 11    ergocalciferol (VITAMIN D2) 50,000 units, Take 1 capsule (50,000 Units total) by mouth once a week for 8 doses, Disp: 8 capsule, Rfl: 0    levothyroxine 25 mcg tablet, Add 25 ug to 50 ug M, W, F (Patient not taking: Reported on 7/12/2021), Disp: 30 tablet, Rfl: 5      Physical Exam:  /78 (BP Location: Left arm, Patient Position: Sitting, Cuff Size: Adult)   Pulse 70   Temp (!) 97 2 °F (36 2 °C) (Tympanic)   Resp 16   Ht 5' 6" (1 676 m)   Wt 53 1 kg (117 lb)   SpO2 97%   BMI 18 88 kg/m²     Physical Exam  Vitals reviewed  Constitutional:       General: She is not in acute distress  Appearance: She is well-developed  She is not diaphoretic  HENT:      Head: Normocephalic and atraumatic  Mouth/Throat:      Mouth: Mucous membranes are dry  Tongue: Lesions (white lesions noted on tongue) present  Palate: No mass  Pharynx: Oropharynx is clear  No posterior oropharyngeal erythema  Tonsils: No tonsillar exudate  Eyes:      General: No scleral icterus  Conjunctiva/sclera: Conjunctivae normal       Pupils: Pupils are equal, round, and reactive to light  Neck:      Thyroid: No thyromegaly  Cardiovascular:      Rate and Rhythm: Normal rate and regular rhythm  Heart sounds: Normal heart sounds  No murmur heard       Pulmonary: Effort: Pulmonary effort is normal  No respiratory distress  Breath sounds: Normal breath sounds  Abdominal:      General: Abdomen is flat  There is no distension  Palpations: Abdomen is soft  There is no hepatomegaly or splenomegaly  Tenderness: There is no abdominal tenderness  Musculoskeletal:         General: No swelling  Normal range of motion  Cervical back: Normal range of motion and neck supple  Lymphadenopathy:      Cervical: No cervical adenopathy  Upper Body:      Right upper body: No axillary adenopathy  Left upper body: No axillary adenopathy  Skin:     General: Skin is warm and dry  Findings: No erythema or rash  Comments: Pigment changes/ fibrosis neck due to prior radiation treatment   Neurological:      General: No focal deficit present  Mental Status: She is alert and oriented to person, place, and time  Psychiatric:         Mood and Affect: Affect normal  Mood is anxious  Behavior: Behavior normal  Behavior is cooperative  Thought Content: Thought content normal          Judgment: Judgment normal            Labs:  Lab Results   Component Value Date    WBC 5 33 06/10/2021    HGB 13 4 06/10/2021    HCT 41 5 06/10/2021     (H) 06/10/2021     06/10/2021     Lab Results   Component Value Date     04/24/2018    K 3 8 06/10/2021     06/10/2021    CO2 30 06/10/2021    ANIONGAP 11 04/24/2018    BUN 10 06/10/2021    CREATININE 0 76 06/10/2021    GLUCOSE 85 04/24/2018    GLUF 95 06/10/2021    CALCIUM 9 9 06/10/2021    AST 23 06/10/2021    ALT 28 06/10/2021    ALKPHOS 68 06/10/2021    EGFR 78 06/10/2021       Patient voiced understanding and agreement in the above discussion  Aware to contact our office with questions/symptoms in the interim  This note has been generated by voice recognition software system  Therefore, there may be spelling, grammar, and or syntax errors   Please contact if questions arise

## 2021-08-09 ENCOUNTER — OFFICE VISIT (OUTPATIENT)
Dept: FAMILY MEDICINE CLINIC | Facility: CLINIC | Age: 71
End: 2021-08-09
Payer: COMMERCIAL

## 2021-08-09 VITALS
HEIGHT: 66 IN | SYSTOLIC BLOOD PRESSURE: 120 MMHG | TEMPERATURE: 96.6 F | HEART RATE: 68 BPM | WEIGHT: 118 LBS | OXYGEN SATURATION: 99 % | BODY MASS INDEX: 18.96 KG/M2 | DIASTOLIC BLOOD PRESSURE: 78 MMHG

## 2021-08-09 DIAGNOSIS — Z13.820 SCREENING FOR OSTEOPOROSIS: ICD-10-CM

## 2021-08-09 DIAGNOSIS — E55.9 VITAMIN D DEFICIENCY: ICD-10-CM

## 2021-08-09 DIAGNOSIS — Z01.818 PRE-OP EXAMINATION: Primary | ICD-10-CM

## 2021-08-09 DIAGNOSIS — Z78.0 POST-MENOPAUSE: ICD-10-CM

## 2021-08-09 DIAGNOSIS — H26.8 OTHER CATARACT OF RIGHT EYE: ICD-10-CM

## 2021-08-09 PROBLEM — D64.9 ANEMIA: Status: RESOLVED | Noted: 2019-07-15 | Resolved: 2021-08-09

## 2021-08-09 PROBLEM — L84 FOOT CALLUS: Status: ACTIVE | Noted: 2020-09-14

## 2021-08-09 PROBLEM — E86.0 DEHYDRATION: Status: RESOLVED | Noted: 2019-06-17 | Resolved: 2021-08-09

## 2021-08-09 PROBLEM — R53.83 FATIGUE: Status: RESOLVED | Noted: 2019-03-27 | Resolved: 2021-08-09

## 2021-08-09 PROBLEM — E87.6 HYPOKALEMIA: Status: RESOLVED | Noted: 2019-08-09 | Resolved: 2021-08-09

## 2021-08-09 PROCEDURE — 99214 OFFICE O/P EST MOD 30 MIN: CPT | Performed by: FAMILY MEDICINE

## 2021-08-09 RX ORDER — MULTIVIT-MIN/IRON/FOLIC ACID/K 18-600-40
2000 CAPSULE ORAL DAILY
Qty: 30 CAPSULE | Refills: 3 | Status: SHIPPED | OUTPATIENT
Start: 2021-08-09 | End: 2021-08-30 | Stop reason: ALTCHOICE

## 2021-08-09 NOTE — PROGRESS NOTES
Subjective:   Chief Complaint   Patient presents with   [facility] Pre-op Exam     08/25/2021 CATARACT SURGERY W/INTRAOCULAR LENS (Right Eye)        Patient ID: Nallely Krause is a 66 y o  female  A patient here new my office and she has for preop clearance she is going to have a cataract surgery on her right eye on August/25  Patient deny any chest pain short of breath no palpitation no dizzy no cough no wheezing no hematosis no abdomen pain nausea vomiting or diarrhea no lower extremity edema a patient does have history of smoking she quit long time ago a past medical surgical social history review with the patient she does not have any personal history of hyper coagulation or bleeding disorder she is not on any anticoagulation      The following portions of the patient's history were reviewed and updated as appropriate: allergies, current medications, past family history, past medical history, past social history, past surgical history and problem list     Review of Systems   Constitutional: Negative for activity change, appetite change, fatigue and fever  HENT: Negative for congestion, ear pain, sinus pressure, sinus pain and sore throat  Eyes: Negative for pain, discharge, redness and itching  Respiratory: Negative for cough, chest tightness, shortness of breath and stridor  Cardiovascular: Negative for chest pain, palpitations and leg swelling  Gastrointestinal: Negative for abdominal pain, blood in stool, constipation, diarrhea and nausea  Endocrine: Negative for heat intolerance and polydipsia  Genitourinary: Negative for dysuria, flank pain, frequency and hematuria  Musculoskeletal: Negative for back pain, joint swelling and neck pain  Skin: Negative for pallor and rash  Neurological: Negative for dizziness, tremors, weakness, numbness and headaches  Hematological: Does not bruise/bleed easily               Objective:  Vitals:    08/09/21 0929   BP: 120/78   BP Location: Left arm   Patient Position: Sitting   Cuff Size: Standard   Pulse: 68   Temp: (!) 96 6 °F (35 9 °C)   TempSrc: Tympanic   SpO2: 99%   Weight: 53 5 kg (118 lb)   Height: 5' 6" (1 676 m)      Physical Exam  Vitals and nursing note reviewed  Constitutional:       General: She is not in acute distress  Appearance: She is well-developed  She is not diaphoretic  HENT:      Head: Normocephalic  Right Ear: Tympanic membrane, ear canal and external ear normal       Left Ear: Tympanic membrane, ear canal and external ear normal       Nose: Nose normal  No congestion or rhinorrhea  Mouth/Throat:      Mouth: Mucous membranes are moist       Pharynx: Oropharynx is clear  No oropharyngeal exudate or posterior oropharyngeal erythema  Eyes:      General:         Right eye: No discharge  Left eye: No discharge  Conjunctiva/sclera: Conjunctivae normal       Pupils: Pupils are equal, round, and reactive to light  Neck:      Vascular: No JVD  Cardiovascular:      Rate and Rhythm: Normal rate and regular rhythm  Heart sounds: Normal heart sounds  No murmur heard  No gallop  Pulmonary:      Effort: Pulmonary effort is normal  No respiratory distress  Breath sounds: Normal breath sounds  No stridor  No wheezing or rales  Chest:      Chest wall: No tenderness  Abdominal:      General: There is no distension  Palpations: Abdomen is soft  There is no mass  Tenderness: There is no abdominal tenderness  There is no rebound  Musculoskeletal:         General: No tenderness  Cervical back: Normal range of motion and neck supple  Lymphadenopathy:      Cervical: No cervical adenopathy  Skin:     General: Skin is warm  Findings: No erythema or rash  Neurological:      Mental Status: She is alert and oriented to person, place, and time  Motor: No weakness        Gait: Gait normal            Assessment/Plan:    Pre-op examination   The patient is having minor surgery and she is low risk patient the patient will be cleared for the surgery recommend no aspirin or nonsteroidal for 7 days before the surgery    Cataracta   A chronic symptomatic patient does follow up with the Ophthalmology plan for cataract surgery of the right eye on August 25, 2021    Post-menopause   Patient is post menopause with history of vitamin-D deficiency the patient at risk for osteoporosis we discussed the patient screening and she agree will order DEXA scan today recommend to take calcium 1200 mg/day Vit D 800-1000 iu/day   Adequate exercise also recomended       Diagnoses and all orders for this visit:    Pre-op examination    Post-menopause  -     DXA bone density spine hip and pelvis; Future    Other cataract of right eye    Vitamin D deficiency  -     Vitamin D, Cholecalciferol, 50 MCG (2000 UT) CAPS; Take 2,000 Int'l Units by mouth daily    Screening for osteoporosis  -     DXA bone density spine hip and pelvis;  Future

## 2021-08-09 NOTE — PATIENT INSTRUCTIONS

## 2021-08-09 NOTE — H&P (VIEW-ONLY)
Subjective:   Chief Complaint   Patient presents with   [address] [facility] [address] Pre-op Exam     08/25/2021 CATARACT SURGERY W/INTRAOCULAR LENS (Right Eye)        Patient ID: Wilda Arnold is a 66 y o  female  A patient here new my office and she has for preop clearance she is going to have a cataract surgery on her right eye on August/25  Patient deny any chest pain short of breath no palpitation no dizzy no cough no wheezing no hematosis no abdomen pain nausea vomiting or diarrhea no lower extremity edema a patient does have history of smoking she quit long time ago a past medical surgical social history review with the patient she does not have any personal history of hyper coagulation or bleeding disorder she is not on any anticoagulation      The following portions of the patient's history were reviewed and updated as appropriate: allergies, current medications, past family history, past medical history, past social history, past surgical history and problem list     Review of Systems   Constitutional: Negative for activity change, appetite change, fatigue and fever  HENT: Negative for congestion, ear pain, sinus pressure, sinus pain and sore throat  Eyes: Negative for pain, discharge, redness and itching  Respiratory: Negative for cough, chest tightness, shortness of breath and stridor  Cardiovascular: Negative for chest pain, palpitations and leg swelling  Gastrointestinal: Negative for abdominal pain, blood in stool, constipation, diarrhea and nausea  Endocrine: Negative for heat intolerance and polydipsia  Genitourinary: Negative for dysuria, flank pain, frequency and hematuria  Musculoskeletal: Negative for back pain, joint swelling and neck pain  Skin: Negative for pallor and rash  Neurological: Negative for dizziness, tremors, weakness, numbness and headaches  Hematological: Does not bruise/bleed easily               Objective:  Vitals:    08/09/21 0929   BP: 120/78   BP Location: Left arm   Patient Position: Sitting   Cuff Size: Standard   Pulse: 68   Temp: (!) 96 6 °F (35 9 °C)   TempSrc: Tympanic   SpO2: 99%   Weight: 53 5 kg (118 lb)   Height: 5' 6" (1 676 m)      Physical Exam  Vitals and nursing note reviewed  Constitutional:       General: She is not in acute distress  Appearance: She is well-developed  She is not diaphoretic  HENT:      Head: Normocephalic  Right Ear: Tympanic membrane, ear canal and external ear normal       Left Ear: Tympanic membrane, ear canal and external ear normal       Nose: Nose normal  No congestion or rhinorrhea  Mouth/Throat:      Mouth: Mucous membranes are moist       Pharynx: Oropharynx is clear  No oropharyngeal exudate or posterior oropharyngeal erythema  Eyes:      General:         Right eye: No discharge  Left eye: No discharge  Conjunctiva/sclera: Conjunctivae normal       Pupils: Pupils are equal, round, and reactive to light  Neck:      Vascular: No JVD  Cardiovascular:      Rate and Rhythm: Normal rate and regular rhythm  Heart sounds: Normal heart sounds  No murmur heard  No gallop  Pulmonary:      Effort: Pulmonary effort is normal  No respiratory distress  Breath sounds: Normal breath sounds  No stridor  No wheezing or rales  Chest:      Chest wall: No tenderness  Abdominal:      General: There is no distension  Palpations: Abdomen is soft  There is no mass  Tenderness: There is no abdominal tenderness  There is no rebound  Musculoskeletal:         General: No tenderness  Cervical back: Normal range of motion and neck supple  Lymphadenopathy:      Cervical: No cervical adenopathy  Skin:     General: Skin is warm  Findings: No erythema or rash  Neurological:      Mental Status: She is alert and oriented to person, place, and time  Motor: No weakness        Gait: Gait normal            Assessment/Plan:    Pre-op examination   The patient is having minor surgery and she is low risk patient the patient will be cleared for the surgery recommend no aspirin or nonsteroidal for 7 days before the surgery    Cataracta   A chronic symptomatic patient does follow up with the Ophthalmology plan for cataract surgery of the right eye on August 25, 2021    Post-menopause   Patient is post menopause with history of vitamin-D deficiency the patient at risk for osteoporosis we discussed the patient screening and she agree will order DEXA scan today recommend to take calcium 1200 mg/day Vit D 800-1000 iu/day   Adequate exercise also recomended       Diagnoses and all orders for this visit:    Pre-op examination    Post-menopause  -     DXA bone density spine hip and pelvis; Future    Other cataract of right eye    Vitamin D deficiency  -     Vitamin D, Cholecalciferol, 50 MCG (2000 UT) CAPS; Take 2,000 Int'l Units by mouth daily    Screening for osteoporosis  -     DXA bone density spine hip and pelvis;  Future

## 2021-08-11 ENCOUNTER — HOSPITAL ENCOUNTER (OUTPATIENT)
Dept: BONE DENSITY | Facility: CLINIC | Age: 71
Discharge: HOME | End: 2021-08-11
Payer: COMMERCIAL

## 2021-08-11 DIAGNOSIS — Z13.820 SCREENING FOR OSTEOPOROSIS: ICD-10-CM

## 2021-08-11 DIAGNOSIS — Z78.0 POST-MENOPAUSE: ICD-10-CM

## 2021-08-11 PROBLEM — H26.9 CATARACTA: Status: ACTIVE | Noted: 2021-08-09

## 2021-08-11 PROBLEM — Z01.818 PRE-OP EXAMINATION: Status: ACTIVE | Noted: 2021-08-09

## 2021-08-11 PROBLEM — Z01.818 PRE-OP EXAMINATION: Status: ACTIVE | Noted: 2021-08-11

## 2021-08-11 PROBLEM — H26.9 CATARACTA: Status: ACTIVE | Noted: 2021-08-11

## 2021-08-11 PROCEDURE — 77080 DXA BONE DENSITY AXIAL: CPT

## 2021-08-11 NOTE — ASSESSMENT & PLAN NOTE
The patient is having minor surgery and she is low risk patient the patient will be cleared for the surgery recommend no aspirin or nonsteroidal for 7 days before the surgery

## 2021-08-11 NOTE — ASSESSMENT & PLAN NOTE
Patient is post menopause with history of vitamin-D deficiency the patient at risk for osteoporosis we discussed the patient screening and she agree will order DEXA scan today recommend to take calcium 1200 mg/day Vit D 800-1000 iu/day   Adequate exercise also recomended

## 2021-08-11 NOTE — ASSESSMENT & PLAN NOTE
A chronic symptomatic patient does follow up with the Ophthalmology plan for cataract surgery of the right eye on August 25, 2021

## 2021-08-18 ENCOUNTER — CLINICAL SUPPORT (OUTPATIENT)
Dept: RADIATION ONCOLOGY | Facility: CLINIC | Age: 71
End: 2021-08-18
Attending: RADIOLOGY
Payer: COMMERCIAL

## 2021-08-18 VITALS
OXYGEN SATURATION: 98 % | TEMPERATURE: 98 F | RESPIRATION RATE: 20 BRPM | HEART RATE: 72 BPM | DIASTOLIC BLOOD PRESSURE: 68 MMHG | BODY MASS INDEX: 19.14 KG/M2 | WEIGHT: 119.1 LBS | SYSTOLIC BLOOD PRESSURE: 114 MMHG | HEIGHT: 66 IN

## 2021-08-18 DIAGNOSIS — C01 CANCER OF BASE OF TONGUE (HCC): Primary | ICD-10-CM

## 2021-08-18 PROCEDURE — 99213 OFFICE O/P EST LOW 20 MIN: CPT | Performed by: RADIOLOGY

## 2021-08-18 PROCEDURE — 99211 OFF/OP EST MAY X REQ PHY/QHP: CPT | Performed by: RADIOLOGY

## 2021-08-18 NOTE — PROGRESS NOTES
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 extending into the right tonsillar region who is status post definitive radiation therapy with concurrent chemotherapy which completed 7/29/19  She was last seen in radiation on 12/10/20  She returns today for her follow up  Interval History:  01/04/21 - ENT, [facility]e [facility]ise  ANIBAL  Neck pain improved  Continue with PT  Impacted cerument left ear cleared     03/08/21 - ENT, [facility]  ANIBAL  Synthroid increased to 25mcg  HX of ototoxicity hearing stable  Some neuropathy stable, not worsening, recently started Gabapentin 25mg which improved neuropathy for nighttime       03/12/21 - Hem Onc [facility]  ANIBAL     04/15/21 - XR chest pa & lateral  IMPRESSION:  1   Chronic subpleural reticular opacities, most likely due to pulmonary fibrosis  2   No acute abnormality in the chest      05/10/21 - ENT, [facility]  ANIBAL  Xray normal    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 recently working from home  She  Is considering changing to a   part-time  job where she has more contact with people  She remains active and exercises 2-3 days per week  She lives in her daughter's [place] which has been converted to an apartment for her since   She is having no difficulty with her teeth and is seeing her dentist every 3-4 months   She does have fluoride trays   She has not smoked cigarettes since   She has received COVID vaccination  She is having right eye cataract surgery next week  Left eye was done in 2018  [facility] - Dr Paz Cassette surgery  10/11/21 - ENT, Anitha Davenport  21 - Hem Onc, Habib     Historical Information   Oncology History   Cancer of base of tongue ([facility] [address] )   5/3/2019 Biopsy    Right BOT biopsy  Biopsy + for SCC moderately differentiated,  Incompletely excised          6/10/2019 - 2019 Chemotherapy    CISplatin (PLATINOL) 174 mg, mannitol 12 5 g in sodium chloride 0 9 % 500 mL IVPB, 100 mg/m2 = 174 mg, Intravenous, Once, 1 of 3 cycles  Administration: 174 mg (6/10/2019)  potassium chloride 20 mEq, magnesium sulfate 1 g in sodium chloride 0 9 % 1,000 mL IVPB, , Intravenous, Once, 1 of 3 cycles  Administration:  (6/10/2019),  (6/10/2019)  (1 cycle only d/c'd due to significant hearing loss after 1 treatment)       6/10/2019 - 2019 Radiation    Plan ID Energy Fractions Dose per Fraction (cGy) Dose Correction (cGy) Total Dose Delivered (cGy) Elapsed Days   Head and Neck 6X 35 / 35 200 0 7,000 52     Dr Michelle Andujar     2019 - 2019 Chemotherapy    CARBOplatin (PARAPLATIN) 115 5 mg in sodium chloride 0 9 % 250 mL IVPB, 70 mg/m2 = 115 5 mg (100 % of original dose 70 mg/m2), Intravenous, Once, 2 of 2 cycles  Dose modification: 70 mg/m2 (original dose 70 mg/m2, Cycle 1)  Administration: 115 5 mg (2019), 115 5 mg (2019), 115 5 mg (7/3/2019), 115 5 mg (2019), 115 5 mg (2019), 115 5 mg (2019), 115 5 mg (2019), 115 5 mg (2019)  (completed 2 cycles)           Past Medical History:   Diagnosis Date    Anemia 7/15/2019    Dehydration 2019    Disease of thyroid gland     Fatigue 3/27/2019    Hypokalemia 2019    Tongue cancer ([facility] [place] [address] ) 2019    Squamous cell     Past Surgical History:   Procedure Laterality Date    ADENOIDECTOMY      APPENDECTOMY      CATARACT EXTRACTION Left     HYSTERECTOMY      total    IR PICC LINE  2019    OOPHORECTOMY Bilateral 2012    TONSILLECTOMY         Social History   Social History     Substance and Sexual Activity   Alcohol Use Yes    Alcohol/week: 1 0 standard drinks    Types: 1 Glasses of wine per week    Comment: - 21 Pt reports did stop for surgery coming     Social History     Substance and Sexual Activity   Drug Use Never     Social History     Tobacco Use   Smoking Status Former Smoker    Packs/day: 0 50    Years: 5 00    Pack years: 2 50    Types: Cigarettes    Quit date: 1978    Years since quittin 6   Smokeless Tobacco Never Used   Tobacco Comment    no passive smoke exposure     Meds/Allergies     Current Outpatient Medications:     DENTAGEL 1 1 % GEL, AT BED AFTER BRUSHING AND FLOSSING APPLY 1 DROP OF GEL PER TOOTH LEAVE TRAYS IN MOUTH FOR 10 MINUTES, Disp: , Rfl: 11    levothyroxine 50 mcg tablet, Take 1 tablet (50 mcg total) by mouth daily (Patient taking differently: Take 50 mcg by mouth daily Takes in the am), Disp: 30 tablet, Rfl: 5    clotrimazole (MYCELEX) 10 mg samantha, Take 1 tablet (10 mg total) by mouth 5 (five) times a day, Disp: 70 tablet, Rfl: 1    Multiple Vitamins-Minerals (PRESERVISION AREDS 2 PO), Take by mouth 2 (two) times a day (Patient not taking: Reported on 2021), Disp: , Rfl:     Vitamin D, Cholecalciferol, 50 MCG (2000 UT) CAPS, Take 2,000 Int'l Units by mouth daily (Patient not taking: Reported on 8/18/2021), Disp: 30 capsule, Rfl: 3  Allergies   Allergen Reactions    Codeine Anaphylaxis     Difficulty breathing     Review of Systems   Constitutional: Negative  HENT: Negative  Eyes:        Cataract sx planned 8 25 21  Right  Cataract-Left eye sx 2018   Respiratory: Negative  Cardiovascular: Negative  Gastrointestinal: Negative  Endocrine: Positive for cold intolerance (Sensitive to the cold)  Genitourinary: Negative  Musculoskeletal: Positive for neck pain (Arthritis)  Skin: Negative  Allergic/Immunologic: Negative  Neurological: Negative  Hematological: Negative  Psychiatric/Behavioral: Negative        OBJECTIVE:   /68 (BP Location: Left arm, Patient Position: Sitting)   Pulse 72   Temp 98 °F (36 7 °C)   Resp 20   Ht 5' 6" (1 676 m)   Wt 54 kg (119 lb 1 6 oz)   SpO2 98%   BMI 19 22 kg/m²   Pain Assessment:  0  ECOG/Zubrod/WHO: 0 - Asymptomatic    Physical Exam   Constitutional: She is oriented to person, place, and time  She appears well-developed and well-nourished  No distress  HENT:   Head: Normocephalic and atraumatic  Mouth/Throat: No oropharyngeal exudate    Right base of tongue mass remains resolved without any tongue deviation   Her teeth are in good repair    Eyes: Pupils are equal, round, and reactive to light  Conjunctivae and EOM are normal  No scleral icterus  Neck: Normal range of motion  Neck supple  No tracheal deviation present  No thyromegaly present  Cardiovascular: Normal rate, regular rhythm and normal heart sounds  Pulmonary/Chest: Effort normal and breath sounds normal  No respiratory distress  She has no wheezes  She has no rales  Abdominal: Soft  Bowel sounds are normal  She exhibits no distension and no mass  There is no tenderness  Musculoskeletal: Normal range of motion  She exhibits no edema or tenderness     Lymphadenopathy:     She has no cervical adenopathy  She has no supraclavicular lymphadenopathy  Neurological: She is alert and oriented to person, place, and time  No cranial nerve deficit  Coordination normal    Skin: Skin is warm and dry  No rash noted  She is not diaphoretic  No erythema  No pallor  Psychiatric: She has a normal mood and affect  Her behavior is normal  Judgment and thought content normal    Nursing note and vitals reviewed      RESULTS    Lab Results: No results found for this or any previous visit (from the past 672 hour(s))  Imaging Studies:DXA bone density spine hip and pelvis    Result Date: 8/11/2021  Narrative: CENTRAL DXA SCAN CLINICAL HISTORY:  66year-old postmenopausal  female with no significant past medical history  Osteoporosis screening  OTHER RISK FACTORS:  None  PHARMACOLOGIC THERAPY FOR OSTEOPOROSIS:  None  TECHNIQUE: Bone densitometry was performed using a HoloEverTrue Discovery C   bone densitometer  Regions of interest appear properly placed  COMPARISON: There are no prior DXA studies performed on this unit for comparison  RESULTS: LUMBAR SPINE L1-L4 : BMD  0 929  gm/cm2 T-score -1 1  These values are artifactually elevated due to the presence of scoliosis with spondylosis  LEFT  TOTAL HIP: BMD:  0 845  gm/cm2 T-score:  -0 8 LEFT  FEMORAL NECK: BMD:  0 717  gm/cm2 T score: -1 2     Impression: 1  Low bone mass (OSTEOPENIA)  [Based on the left femoral neck] 2  The 10 year risk of hip fracture is 1 with the 10 year risk of major osteoporotic fracture being 8 as calculated by the [facility]/WHO fracture risk assessment tool (FRAX)  3   The current NOF guidelines recommend treating patients with a T-score of -2 5 or less in the lumbar spine or hips, or in post-menopausal women and men over the age of 48 with low bone mass (osteopenia) and a FRAX 10 year risk score of >3% for hip fracture and/or >20% for major osteoporotic fracture   4   The NOF recommends follow-up DXA in 1-2 years after initiating therapy for osteoporosis and every 2 years thereafter  More frequent evaluation is appropriate for patients with conditions associated with rapid bone loss, such as glucocorticoid therapy  The interval between DXA screenings may be longer for individuals without major risk factors and initial T-score in the normal or upper low bone mass range  The FRAX algorithm has certain limitations: -FRAX has not been validated in patients currently or previously treated with pharmacotherapy for osteoporosis  In such patients, clinical judgment must be exercised in interpreting FRAX scores  -Prior hip, vertebral and humeral fragility fractures appear to confer greater risk of subsequent fracture than fractures at other sites (this is especially true for individuals with severe vertebral fractures), but quantification of this incremental risk is not possible with FRAX  -FRAX underestimates fracture risk in patients with history of multiple fragility fractures  -FRAX may underestimate fracture risk in patients with history of frequent falls  -It is not appropriate to use FRAX to monitor treatment response  WHO CLASSIFICATION: Normal (a T-score of -1 0 or higher) Low bone mineral density (a T-score of less than -1 0 but higher than -2 5) Osteoporosis (a T-score of -2 5 or less) Severe osteoporosis (a T-score of -2 5 or less with a fragility fracture) Workstation performed: [id number]     Assessment/Plan:  No orders of the defined types were placed in this encounter  Zita London is a 66y o  year old female with a locally advanced stage DEON, T2-T3, N2b, 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 extending into the right tonsillar region and toward the right soft palate   Staging PET-CT confirmed disease in the right oral cavity, tonsillar region and right tongue base approaching the level of the vallecula compatible with malignancy   There were multiple right cervical lymph nodes consistent with metastatic disease  Angel Ashraf were no metastasis in the chest, abdomen, or pelvis   She understood that due to the extent of her disease, surgical resection was not possible   We recommended definitive radiation therapy with concurrent chemotherapy   She completed treatment on August 3, 2019 and returns today for follow-up      She is doing well  Juan Romero has no significant dysphagia and had minimal mouth dryness   Her taste is good and she is able to eat everything  Juan Romero continues to gain weight  Juan Romero has no clinical evidence of any recurrent disease   She stop smoking tobacco in 1978    She had repeat PET-CT study October 28, 2019 that showed a significant improvement with good response in the base of tongue region  Angel Ashraf was some low-level residual activity that is likely post treatment changes  She has regular follow-ups with Dr Felicitas Quiroz examination  July 12, 2021 revealed no evidence of disease  She has follow-up appointment   October 11, 2021 with Dr Gabriel Stanford  November 22, 2021 with Dr Yanes  Juan Romero will return here for follow-up in 8 months        Yue Briceño MD  5/30/8792,0:11 PM    Portions of the record may have been created with voice recognition software   Occasional wrong word or "sound a like" substitutions may have occurred due to the inherent limitations of voice recognition software   Read the chart carefully and recognize, using context, where substitutions have occurred

## 2021-08-18 NOTE — PROGRESS NOTES
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 extending into the right tonsillar region who is status post definitive radiation therapy with concurrent chemotherapy which completed 7/29/19  She was last seen in radiation on 12/10/20  She returns today for her follow up     01/04/21 - ENT, Chun Pair  ANIBAL  Neck pain improved  Continue with PT  Impacted cerument left ear cleared      03/08/21 - ENT, Chun Pair  ANIBAL  Synthroid increased to 25mcg  HX of ototoxicity hearing stable  Some neuropathy stable, not worsening, recently started Gabapentin 25mg which improved neuropathy for nighttime  03/12/21 - Hem Onc, Habib  ANIBAL    04/15/21 - XR chest pa & lateral  IMPRESSION:  1  Chronic subpleural reticular opacities, most likely due to pulmonary fibrosis  2   No acute abnormality in the chest     05/10/21 - ENT, Chun Pair  ANIBAL  Xray normal    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  07/12/21 - ENT, Chun Pair  ANIBAL  Ototoxicity and hearing loss has resolved  Continues with some neuropathy in feet     07/16/21 - Hem OncNeisha  Pt has some white lesions on tongue - may or may not be thrush  Pt to try Mycelex Troches for 1-2 weeks to see if it improves her symptom  Upcoming:  10/11/21 - ENTChun Pair  11/22/21 - Hem Onc, Habib          Follow up visit       Oncology History   Cancer of base of tongue ([facility] [address] )   5/3/2019 Biopsy    Right BOT biopsy  Biopsy + for SCC moderately differentiated,  Incompletely excised          6/10/2019 - 6/30/2019 Chemotherapy    CISplatin (PLATINOL) 174 mg, mannitol 12 5 g in sodium chloride 0 9 % 500 mL IVPB, 100 mg/m2 = 174 mg, Intravenous, Once, 1 of 3 cycles  Administration: 174 mg (6/10/2019)  potassium chloride 20 mEq, magnesium sulfate 1 g in sodium chloride 0 9 % 1,000 mL IVPB, , Intravenous, Once, 1 of 3 cycles  Administration:  (6/10/2019),  (6/10/2019)  (1 cycle only d/c'd due to significant hearing loss after 1 treatment)       6/10/2019 - 7/29/2019 Radiation    Plan ID Energy Fractions Dose per Fraction (cGy) Dose Correction (cGy) Total Dose Delivered (cGy) Elapsed Days   Head and Neck 6X 35 / 35 200 0 7,000 52     Dr Afua Gordon     7/1/2019 - 8/19/2019 Chemotherapy    CARBOplatin (PARAPLATIN) 115 5 mg in sodium chloride 0 9 % 250 mL IVPB, 70 mg/m2 = 115 5 mg (100 % of original dose 70 mg/m2), Intravenous, Once, 2 of 2 cycles  Dose modification: 70 mg/m2 (original dose 70 mg/m2, Cycle 1)  Administration: 115 5 mg (7/1/2019), 115 5 mg (7/2/2019), 115 5 mg (7/3/2019), 115 5 mg (7/5/2019), 115 5 mg (7/31/2019), 115 5 mg (8/1/2019), 115 5 mg (8/2/2019), 115 5 mg (7/30/2019)  (completed 2 cycles)           Clinical Trial: no      Health Maintenance   Topic Date Due    Hepatitis C Screening  Never done    Pneumococcal Vaccine: 65+ Years (1 of 4 - PCV13) Never done    Annual Physical  Never done    Colorectal Cancer Screening  Never done    Influenza Vaccine (1) 09/01/2021    Breast Cancer Screening: Mammogram  01/06/2022    Fall Risk  08/09/2022    BMI: Adult  08/09/2022    Depression Screening PHQ  08/18/2022    DTaP,Tdap,and Td Vaccines (3 - Td or Tdap) 01/17/2029    COVID-19 Vaccine  Completed    HIB Vaccine  Aged Out    Hepatitis B Vaccine  Aged Out    IPV Vaccine  Aged Out    Hepatitis A Vaccine  Aged Out    Meningococcal ACWY Vaccine  Aged Out    HPV Vaccine  Aged Out       Patient Active Problem List   Diagnosis    Multiple thyroid nodules    Cancer of base of tongue ([address]    Thoracic aortic aneurysm ([address]    Encounter for chemotherapy management    Oral thrush    Radiation thyroiditis    Hypothyroidism    Vitamin D deficiency    Foot callus    Pre-op examination    Post-menopause    Cataracta     Past Medical History:   Diagnosis Date    Anemia 7/15/2019    Dehydration 6/17/2019    Disease of thyroid gland     Fatigue 3/27/2019    Hypokalemia 8/9/2019    Tongue cancer ([facility] [address] ) 2019    Squamous cell     Past Surgical History:   Procedure Laterality Date    ADENOIDECTOMY      APPENDECTOMY      CATARACT EXTRACTION Left     HYSTERECTOMY  2012    total    IR PICC LINE  2019    OOPHORECTOMY Bilateral 2012    TONSILLECTOMY       Family History   Problem Relation Age of Onset    Rheum arthritis Sister     Hypothyroidism Sister     Hashimoto's thyroiditis Family     Prostate cancer Father     Parkinsonism Mother         Cardiac abnormality ([place])    No Known Problems Maternal Grandmother     No Known Problems Maternal Grandfather     No Known Problems Paternal Grandmother     No Known Problems Paternal Grandfather     No Known Problems Paternal Aunt     No Known Problems Paternal Aunt     No Known Problems Paternal Aunt     No Known Problems Paternal Aunt      Social History     Socioeconomic History    Marital status:      Spouse name: Not on file    Number of children: Not on file    Years of education: Not on file    Highest education level: Not on file   Occupational History     Employer: [employer]   Tobacco Use    Smoking status: Former Smoker     Packs/day: 0 50     Years: 5 00     Pack years: 2 50     Types: Cigarettes     Quit date: 1978     Years since quittin 6    Smokeless tobacco: Never Used    Tobacco comment: no passive smoke exposure   Vaping Use    Vaping Use: Never used   Substance and Sexual Activity    Alcohol use:  Yes     Alcohol/week: 1 0 standard drinks     Types: 1 Glasses of wine per week     Comment: - 21 Pt reports did stop for surgery coming    Drug use: Never    Sexual activity: Not Currently   Other Topics Concern    Not on file   Social History Narrative    Not on file     Social Determinants of Health     Financial Resource Strain: Low Risk     Difficulty of Paying Living Expenses: Not hard at all   Food Insecurity: No Food Insecurity    Worried About Running Out of Food in the Last Year: Never true    [address] in the Last Year: Never true   Transportation Needs: No Transportation Needs    Lack of Transportation (Medical): No    Lack of Transportation (Non-Medical): No   Physical Activity: Sufficiently Active    Days of Exercise per Week: 7 days    Minutes of Exercise per Session: 60 min   Stress: No Stress Concern Present    Feeling of Stress : Not at all   Social Connections: Moderately Integrated    Frequency of Communication with Friends and Family: More than three times a week    Frequency of Social Gatherings with Friends and Family: More than three times a week    Attends [employer] Services: 1 to 4 times per year   [employer] or Organizations: Yes    Attends Club or Organization Meetings: 1 to 4 times per year    Marital Status: Never    Intimate Partner Violence: Not At Risk    Fear of Current or Ex-Partner: No    Emotionally Abused: No    Physically Abused: No    Sexually Abused: No       Current Outpatient Medications:     DENTAGEL 1 1 % GEL, AT BED AFTER BRUSHING AND FLOSSING APPLY 1 DROP OF GEL PER TOOTH LEAVE TRAYS IN MOUTH FOR 10 MINUTES, Disp: , Rfl: 11    levothyroxine 50 mcg tablet, Take 1 tablet (50 mcg total) by mouth daily (Patient taking differently: Take 50 mcg by mouth daily Takes in the am), Disp: 30 tablet, Rfl: 5    clotrimazole (MYCELEX) 10 mg samantha, Take 1 tablet (10 mg total) by mouth 5 (five) times a day, Disp: 70 tablet, Rfl: 1    Multiple Vitamins-Minerals (PRESERVISION AREDS 2 PO), Take by mouth 2 (two) times a day (Patient not taking: Reported on 8/18/2021), Disp: , Rfl:     Vitamin D, Cholecalciferol, 50 MCG (2000 UT) CAPS, Take 2,000 Int'l Units by mouth daily (Patient not taking: Reported on 8/18/2021), Disp: 30 capsule, Rfl: 3  Allergies   Allergen Reactions    Codeine Anaphylaxis     Difficulty breathing       Review of Systems:  Review of Systems   Constitutional: Negative      HENT: Negative  Eyes:        Cataract sx planned 8 25 21  Right  Cataract-Left eye sx 2018   Respiratory: Negative  Cardiovascular: Negative  Gastrointestinal: Negative  Endocrine: Positive for cold intolerance (Sensitive to the cold)  Genitourinary: Negative  Musculoskeletal: Positive for neck pain (Arthritis)  Skin: Negative  Allergic/Immunologic: Negative  Neurological: Negative  Hematological: Negative  Psychiatric/Behavioral: Negative  Vitals:    08/18/21 1422   BP: 114/68   BP Location: Left arm   Patient Position: Sitting   Pulse: 72   Resp: 20   Temp: 98 °F (36 7 °C)   SpO2: 98%   Weight: 54 kg (119 lb 1 6 oz)   Height: 5' 6" (1 676 m)        Pain Score: 0-No pain        Imaging:DXA bone density spine hip and pelvis    Result Date: 8/11/2021  Narrative: CENTRAL DXA SCAN CLINICAL HISTORY:  66year-old postmenopausal  female with no significant past medical history  Osteoporosis screening  OTHER RISK FACTORS:  None  PHARMACOLOGIC THERAPY FOR OSTEOPOROSIS:  None  TECHNIQUE: Bone densitometry was performed using a HoloNudge Discovery C   bone densitometer  Regions of interest appear properly placed  COMPARISON: There are no prior DXA studies performed on this unit for comparison  RESULTS: LUMBAR SPINE L1-L4 : BMD  0 929  gm/cm2 T-score -1 1  These values are artifactually elevated due to the presence of scoliosis with spondylosis  LEFT  TOTAL HIP: BMD:  0 845  gm/cm2 T-score:  -0 8 LEFT  FEMORAL NECK: BMD:  0 717  gm/cm2 T score: -1 2     Impression: 1  Low bone mass (OSTEOPENIA)  [Based on the left femoral neck] 2  The 10 year risk of hip fracture is 1 with the 10 year risk of major osteoporotic fracture being 8 as calculated by the [facility]/WHO fracture risk assessment tool (FRAX)    3   The current NOF guidelines recommend treating patients with a T-score of -2 5 or less in the lumbar spine or hips, or in post-menopausal women and men over the age of 48 with low bone mass (osteopenia) and a FRAX 10 year risk score of >3% for hip fracture and/or >20% for major osteoporotic fracture  4   The NOF recommends follow-up DXA in 1-2 years after initiating therapy for osteoporosis and every 2 years thereafter  More frequent evaluation is appropriate for patients with conditions associated with rapid bone loss, such as glucocorticoid therapy  The interval between DXA screenings may be longer for individuals without major risk factors and initial T-score in the normal or upper low bone mass range  The FRAX algorithm has certain limitations: -FRAX has not been validated in patients currently or previously treated with pharmacotherapy for osteoporosis  In such patients, clinical judgment must be exercised in interpreting FRAX scores  -Prior hip, vertebral and humeral fragility fractures appear to confer greater risk of subsequent fracture than fractures at other sites (this is especially true for individuals with severe vertebral fractures), but quantification of this incremental risk is not possible with FRAX  -FRAX underestimates fracture risk in patients with history of multiple fragility fractures  -FRAX may underestimate fracture risk in patients with history of frequent falls  -It is not appropriate to use FRAX to monitor treatment response   WHO CLASSIFICATION: Normal (a T-score of -1 0 or higher) Low bone mineral density (a T-score of less than -1 0 but higher than -2 5) Osteoporosis (a T-score of -2 5 or less) Severe osteoporosis (a T-score of -2 5 or less with a fragility fracture) Workstation performed: [id number]       Teaching

## 2021-08-18 NOTE — H&P (VIEW-ONLY)
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 extending into the right tonsillar region who is status post definitive radiation therapy with concurrent chemotherapy which completed 7/29/19  She was last seen in radiation on 12/10/20  She returns today for her follow up  Interval History:  01/04/21 - ENT, Nikos BARNETT  Neck pain improved  Continue with PT  Impacted cerument left ear cleared     03/08/21 - ENT, Nikos BARNETT  Synthroid increased to 25mcg  HX of ototoxicity hearing stable  Some neuropathy stable, not worsening, recently started Gabapentin 25mg which improved neuropathy for nighttime       03/12/21 - Hem Onc Habib  ANIBAL     04/15/21 - XR chest pa & lateral  IMPRESSION:  1   Chronic subpleural reticular opacities, most likely due to pulmonary fibrosis  2   No acute abnormality in the chest      05/10/21 - ENT, Nikos Ramos  ANIBAL  Xray normal    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      07/12/21 - ENT, Nikos Ramos  ANIBAL  Ototoxicity and hearing loss has resolved  Continues with some neuropathy in feet      07/16/21 - Hem OncNeisha  Pt has some white lesions on tongue - may or may not be thrush  Pt to try Mycelex Troches for 1-2 weeks to see if it improves her symptom      She reports she is feeling well  Sairaeduardo Poe is eating well and following a regular diet  She states she can eat everything and her taste is back to normal   She denies any dysphagia    She has no nausea and no vomiting   She has been working at [employer] for [employer]  Jennifer Poe worked previously full-time as an  and then was off for about 6 months to receive her treatments   She resumed working part-time on the weekends/3 days per week in the spring of 2020     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 and then she has been more recently working from home  She  Is considering changing to a   part-time  job where she has more contact with people  She remains active and exercises 2-3 days per week  She lives in her daughter's [address] which has been converted to an apartment for her since   She is having no difficulty with her teeth and is seeing her dentist every 3-4 months   She does have fluoride trays   She has not smoked cigarettes since   She has received COVID vaccination  She is having right eye cataract surgery next week  Left eye was done in 2018  Upcomin21 - Dr Milvia Shi surgery  10/11/21 - ENTTim  21 - Hem Onc, Habib     Historical Information   Oncology History   Cancer of base of tongue ([facility] [address] )   5/3/2019 Biopsy    Right BOT biopsy  Biopsy + for SCC moderately differentiated,  Incompletely excised          6/10/2019 - 2019 Chemotherapy    CISplatin (PLATINOL) 174 mg, mannitol 12 5 g in sodium chloride 0 9 % 500 mL IVPB, 100 mg/m2 = 174 mg, Intravenous, Once, 1 of 3 cycles  Administration: 174 mg (6/10/2019)  potassium chloride 20 mEq, magnesium sulfate 1 g in sodium chloride 0 9 % 1,000 mL IVPB, , Intravenous, Once, 1 of 3 cycles  Administration:  (6/10/2019),  (6/10/2019)  (1 cycle only d/c'd due to significant hearing loss after 1 treatment)       6/10/2019 - 2019 Radiation    Plan ID Energy Fractions Dose per Fraction (cGy) Dose Correction (cGy) Total Dose Delivered (cGy) Elapsed Days   Head and Neck 6X 35 / 35 200 0 7,000 52     Dr Joaquim Connor     2019 - 2019 Chemotherapy    CARBOplatin (PARAPLATIN) 115 5 mg in sodium chloride 0 9 % 250 mL IVPB, 70 mg/m2 = 115 5 mg (100 % of original dose 70 mg/m2), Intravenous, Once, 2 of 2 cycles  Dose modification: 70 mg/m2 (original dose 70 mg/m2, Cycle 1)  Administration: 115 5 mg (2019), 115 5 mg (2019), 115 5 mg (7/3/2019), 115 5 mg (2019), 115 5 mg (2019), 115 5 mg (2019), 115 5 mg (2019), 115 5 mg (2019)  (completed 2 cycles)           Past Medical History:   Diagnosis Date    Anemia 7/15/2019    Dehydration 2019    Disease of thyroid gland     Fatigue 3/27/2019    Hypokalemia 2019    Tongue cancer ([facility] [address] ) 2019    Squamous cell     Past Surgical History:   Procedure Laterality Date    ADENOIDECTOMY      APPENDECTOMY      CATARACT EXTRACTION Left     HYSTERECTOMY      total    IR PICC LINE  2019    OOPHORECTOMY Bilateral 2012    TONSILLECTOMY         Social History   Social History     Substance and Sexual Activity   Alcohol Use Yes    Alcohol/week: 1 0 standard drinks    Types: 1 Glasses of wine per week    Comment: - 21 Pt reports did stop for surgery coming     Social History     Substance and Sexual Activity   Drug Use Never     Social History     Tobacco Use   Smoking Status Former Smoker    Packs/day: 0 50    Years: 5 00    Pack years: 2 50    Types: Cigarettes    Quit date: 1978    Years since quittin 6   Smokeless Tobacco Never Used   Tobacco Comment    no passive smoke exposure     Meds/Allergies     Current Outpatient Medications:     DENTAGEL 1 1 % GEL, AT BED AFTER BRUSHING AND FLOSSING APPLY 1 DROP OF GEL PER TOOTH LEAVE TRAYS IN MOUTH FOR 10 MINUTES, Disp: , Rfl: 11    levothyroxine 50 mcg tablet, Take 1 tablet (50 mcg total) by mouth daily (Patient taking differently: Take 50 mcg by mouth daily Takes in the am), Disp: 30 tablet, Rfl: 5    clotrimazole (MYCELEX) 10 mg samantha, Take 1 tablet (10 mg total) by mouth 5 (five) times a day, Disp: 70 tablet, Rfl: 1    Multiple Vitamins-Minerals (PRESERVISION AREDS 2 PO), Take by mouth 2 (two) times a day (Patient not taking: Reported on 2021), Disp: , Rfl:     Vitamin D, Cholecalciferol, 50 MCG (2000 UT) CAPS, Take 2,000 Int'l Units by mouth daily (Patient not taking: Reported on 8/18/2021), Disp: 30 capsule, Rfl: 3  Allergies   Allergen Reactions    Codeine Anaphylaxis     Difficulty breathing     Review of Systems   Constitutional: Negative  HENT: Negative  Eyes:        Cataract sx planned 8 25 21  Right  Cataract-Left eye sx 2018   Respiratory: Negative  Cardiovascular: Negative  Gastrointestinal: Negative  Endocrine: Positive for cold intolerance (Sensitive to the cold)  Genitourinary: Negative  Musculoskeletal: Positive for neck pain (Arthritis)  Skin: Negative  Allergic/Immunologic: Negative  Neurological: Negative  Hematological: Negative  Psychiatric/Behavioral: Negative        OBJECTIVE:   /68 (BP Location: Left arm, Patient Position: Sitting)   Pulse 72   Temp 98 °F (36 7 °C)   Resp 20   Ht 5' 6" (1 676 m)   Wt 54 kg (119 lb 1 6 oz)   SpO2 98%   BMI 19 22 kg/m²   Pain Assessment:  0  ECOG/Zubrod/WHO: 0 - Asymptomatic    Physical Exam   Constitutional: She is oriented to person, place, and time  She appears well-developed and well-nourished  No distress  HENT:   Head: Normocephalic and atraumatic  Mouth/Throat: No oropharyngeal exudate    Right base of tongue mass remains resolved without any tongue deviation   Her teeth are in good repair    Eyes: Pupils are equal, round, and reactive to light  Conjunctivae and EOM are normal  No scleral icterus  Neck: Normal range of motion  Neck supple  No tracheal deviation present  No thyromegaly present  Cardiovascular: Normal rate, regular rhythm and normal heart sounds  Pulmonary/Chest: Effort normal and breath sounds normal  No respiratory distress  She has no wheezes  She has no rales  Abdominal: Soft  Bowel sounds are normal  She exhibits no distension and no mass  There is no tenderness  Musculoskeletal: Normal range of motion  She exhibits no edema or tenderness     Lymphadenopathy:     She has no cervical adenopathy  She has no supraclavicular lymphadenopathy  Neurological: She is alert and oriented to person, place, and time  No cranial nerve deficit  Coordination normal    Skin: Skin is warm and dry  No rash noted  She is not diaphoretic  No erythema  No pallor  Psychiatric: She has a normal mood and affect  Her behavior is normal  Judgment and thought content normal    Nursing note and vitals reviewed      RESULTS    Lab Results: No results found for this or any previous visit (from the past 672 hour(s))  Imaging Studies:DXA bone density spine hip and pelvis    Result Date: 8/11/2021  Narrative: CENTRAL DXA SCAN CLINICAL HISTORY:  66year-old postmenopausal  female with no significant past medical history  Osteoporosis screening  OTHER RISK FACTORS:  None  PHARMACOLOGIC THERAPY FOR OSTEOPOROSIS:  None  TECHNIQUE: Bone densitometry was performed using a [employer] Discovery C   bone densitometer  Regions of interest appear properly placed  COMPARISON: There are no prior DXA studies performed on this unit for comparison  RESULTS: LUMBAR SPINE L1-L4 : BMD  0 929  gm/cm2 T-score -1 1  These values are artifactually elevated due to the presence of scoliosis with spondylosis  LEFT  TOTAL HIP: BMD:  0 845  gm/cm2 T-score:  -0 8 LEFT  FEMORAL NECK: BMD:  0 717  gm/cm2 T score: -1 2     Impression: 1  Low bone mass (OSTEOPENIA)  [Based on the left femoral neck] 2  The 10 year risk of hip fracture is 1 with the 10 year risk of major osteoporotic fracture being 8 as calculated by the [facility]/WHO fracture risk assessment tool (FRAX)  3   The current NOF guidelines recommend treating patients with a T-score of -2 5 or less in the lumbar spine or hips, or in post-menopausal women and men over the age of 48 with low bone mass (osteopenia) and a FRAX 10 year risk score of >3% for hip fracture and/or >20% for major osteoporotic fracture   4   The NOF recommends follow-up DXA in 1-2 years after initiating therapy for osteoporosis and every 2 years thereafter  More frequent evaluation is appropriate for patients with conditions associated with rapid bone loss, such as glucocorticoid therapy  The interval between DXA screenings may be longer for individuals without major risk factors and initial T-score in the normal or upper low bone mass range  The FRAX algorithm has certain limitations: -FRAX has not been validated in patients currently or previously treated with pharmacotherapy for osteoporosis  In such patients, clinical judgment must be exercised in interpreting FRAX scores  -Prior hip, vertebral and humeral fragility fractures appear to confer greater risk of subsequent fracture than fractures at other sites (this is especially true for individuals with severe vertebral fractures), but quantification of this incremental risk is not possible with FRAX  -FRAX underestimates fracture risk in patients with history of multiple fragility fractures  -FRAX may underestimate fracture risk in patients with history of frequent falls  -It is not appropriate to use FRAX to monitor treatment response  WHO CLASSIFICATION: Normal (a T-score of -1 0 or higher) Low bone mineral density (a T-score of less than -1 0 but higher than -2 5) Osteoporosis (a T-score of -2 5 or less) Severe osteoporosis (a T-score of -2 5 or less with a fragility fracture) Workstation performed: [id number]     Assessment/Plan:  No orders of the defined types were placed in this encounter  Jessica Xiao is a 66y o  year old female with a locally advanced stage DEON, T2-T3, N2b, 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 extending into the right tonsillar region and toward the right soft palate   Staging PET-CT confirmed disease in the right oral cavity, tonsillar region and right tongue base approaching the level of the vallecula compatible with malignancy   There were multiple right cervical lymph nodes consistent with metastatic disease  Carmen Mckeon were no metastasis in the chest, abdomen, or pelvis   She understood that due to the extent of her disease, surgical resection was not possible   We recommended definitive radiation therapy with concurrent chemotherapy   She completed treatment on August 3, 2019 and returns today for follow-up      She is doing well  Julissa Colon has no significant dysphagia and had minimal mouth dryness   Her taste is good and she is able to eat everything  Julissa Colon continues to gain weight  Julissa Colon has no clinical evidence of any recurrent disease   She stop smoking tobacco in 1978    She had repeat PET-CT study October 28, 2019 that showed a significant improvement with good response in the base of tongue region  Carmen Mckeon was some low-level residual activity that is likely post treatment changes  She has regular follow-ups with Dr Yfn Gaona examination  July 12, 2021 revealed no evidence of disease  She has follow-up appointment   October 11, 2021 with Dr Oren Segovia  November 22, 2021 with Dr Yanes  Julissa Virk will return here for follow-up in 8 months        Froy Leung MD  2/87/2764,2:71 PM    Portions of the record may have been created with voice recognition software   Occasional wrong word or "sound a like" substitutions may have occurred due to the inherent limitations of voice recognition software   Read the chart carefully and recognize, using context, where substitutions have occurred

## 2021-08-18 NOTE — PRE-PROCEDURE INSTRUCTIONS
Pre-Surgery Instructions:   Medication Instructions    DENTAGEL 1 1 % GEL Use per normal schedule -may use night before surgery    levothyroxine 50 mcg tablet Instructed to take per normal schedule including DOS with sips water    Multiple Vitamins-Minerals (PRESERVISION AREDS 2 PO) Patient was instructed to contact Physician for medication instruction  Reviewed all medications and instructions for DOS  Reviewed all showering instructions and COVID visitation policy  Pt aware that [address] [place] [address] is location for DOS, instructed that pt pre op nurse will call on 8/24/21 to give specific instructions for DOS  Pt instructed to bring photo ID and insurance card for DOS, remove all jewelry and  NO valuables for DOS  Pt instructed to use only Tylenol between now 8/18/21and DOS, NO NSAID products  Pt informed transport is needed for DOS due to receiving anesthesia  Instructed patient to minimize alcohol use prior to surgery  No alcohol 24 hours prior to surgery to reduce risk of dehydration  Pt verbalized understanding of all instructions given and reviewed for DOS  My Surgical Experience    The following information was developed to assist you to prepare for your operation  What do I need to do before coming to the hospital?   Arrange for a responsible person to drive you to and from the hospital    Arrange care for your children at home  Children are not allowed in the recovery areas of the hospital   Plan to wear clothing that is easy to put on and take off  If you are having shoulder surgery, wear a shirt that buttons or zippers in the front  Bathing  o Shower the evening before and the morning of your surgery with an antibacterial soap   Please refer to the Pre Op Showering Instructions for Surgery Patients Sheet   o Remove nail polish and all body piercing jewelry  o Do not shave any body part for at least 24 hours before surgery-this includes face, arms, legs and upper body  Food  o Nothing to eat or drink after midnight the night before your surgery  This includes candy and chewing gum  o Exception: If your surgery is after 12:00pm (noon), you may have clear liquids such as 7-Up®, ginger ale, apple or cranberry juice, Jell-O®, water, or clear broth until 8:00 am  o Do not drink milk or juice with pulp on the morning before surgery  o Do not drink alcohol 24 hours before surgery  Medicine  o Follow instructions you received from your surgeon about which medicines you may take on the day of surgery  o If instructed to take medicine on the morning of surgery, take pills with just a small sip of water  Call your prescribing doctor for specific infroamtion on what to do if you take insulin    What should I bring to the hospital?    Bring:  Chrystal  or a walker, if you have them, for foot or knee surgery   A list of the daily medicines, vitamins, minerals, herbals and nutritional supplements you take  Include the dosages of medicines and the time you take them each day   Glasses, dentures or hearing aids   Minimal clothing; you will be wearing hospital sleepwear   Photo ID; required to verify your identity   If you have a Living Will or Power of , bring a copy of the documents   If you have an ostomy, bring an extra pouch and any supplies you use    Do not bring   Medicines or inhalers   Money, valuables or jewelry    What other information should I know about the day of surgery?  Notify your surgeons if you develop a cold, sore throat, cough, fever, rash or any other illness   Report to the Ambulatory Surgical/Same Day Surgery Unit   You will be instructed to stop at Registration only if you have not been pre-registered   Inform your  fi they do not stay that they will be asked by the staff to leave a phone number where they can be reached   Be available to be reached before surgery   In the event the operating room schedule changes, you may be asked to come in earlier or later than expected    *It is important to tell your doctor and others involved in your health care if you are taking or have been taking any non-prescription drugs, vitamins, minerals, herbals or other nutritional supplements   Any of these may interact with some food or medicines and cause a reaction

## 2021-08-23 ENCOUNTER — ANESTHESIA EVENT (OUTPATIENT)
Dept: PERIOP | Facility: HOSPITAL | Age: 71
End: 2021-08-23
Payer: COMMERCIAL

## 2021-08-23 PROBLEM — Z90.710 HISTORY OF HYSTERECTOMY: Status: ACTIVE | Noted: 2021-08-23

## 2021-08-23 PROBLEM — D84.9 IMMUNOCOMPROMISED (HCC): Status: ACTIVE | Noted: 2021-08-23

## 2021-08-23 NOTE — ANESTHESIA PREPROCEDURE EVALUATION
Procedure:  CATARACT SURGERY W/INTRAOCULAR LENS (Right Eye)    Relevant Problems   ANESTHESIA  chart reviewed and notes read      CARDIO   (+) Thoracic aortic aneurysm ([address] )      ENDO   (+) Hypothyroidism      GYN   (+) History of hysterectomy      HEMATOLOGY  chemo for tongue CA '19  radiation  no sx needed  freq f/u without any sequele   no airway issues per pt and PE   (+) Immunocompromised (HCC)      PULMONARY  ex smoker   (-) Sleep apnea   (-) Smoking   (-) URI (upper respiratory infection)        Physical Exam    Airway       Dental       Cardiovascular  Cardiovascular exam normal    Pulmonary  Pulmonary exam normal     Other Findings        Anesthesia Plan  ASA Score- 3     Anesthesia Type- IV sedation with anesthesia with ASA Monitors  Additional Monitors:   Airway Plan:           Plan Factors-Exercise tolerance (METS): >4 METS  Chart reviewed  EKG reviewed  Imaging results reviewed  Existing labs reviewed  Patient summary reviewed  Patient is not a current smoker  Patient not instructed to abstain from smoking on day of procedure  Patient did not smoke on day of surgery  Obstructive sleep apnea risk education given perioperatively  Induction- intravenous  Postoperative Plan- Plan for postoperative opioid use       Informed Consent-           Procedure:  CATARACT SURGERY W/INTRAOCULAR LENS (Right Eye)    Relevant Problems   ANESTHESIA  chart reviewed and notes read      CARDIO   (+) Thoracic aortic aneurysm ([facility] [address] )      ENDO   (+) Hypothyroidism      GYN   (+) History of hysterectomy      HEMATOLOGY  chemo for tongue CA '19  radiation  no sx needed  freq f/u without any sequele   no airway issues per pt and PE   (+) Immunocompromised (HCC)      PULMONARY  ex smoker   (-) Sleep apnea   (-) Smoking   (-) URI (upper respiratory infection)        Physical Exam    Airway       Dental       Cardiovascular  Cardiovascular exam normal    Pulmonary  Pulmonary exam normal     Other Findings        Anesthesia Plan  ASA Score- 3     Anesthesia Type- IV sedation with anesthesia with ASA Monitors  Additional Monitors:   Airway Plan:           Plan Factors-Exercise tolerance (METS): >4 METS  Chart reviewed  EKG reviewed  Imaging results reviewed  Existing labs reviewed  Patient summary reviewed  Patient is not a current smoker  Patient not instructed to abstain from smoking on day of procedure  Patient did not smoke on day of surgery  Obstructive sleep apnea risk education given perioperatively  Induction- intravenous  Postoperative Plan- Plan for postoperative opioid use  Informed Consent-           Procedure:  CATARACT SURGERY W/INTRAOCULAR LENS (Right Eye)    Relevant Problems   ANESTHESIA  chart reviewed and notes read      CARDIO   (+) Thoracic aortic aneurysm ([address] )      ENDO   (+) Hypothyroidism      GYN   (+) History of hysterectomy      HEMATOLOGY  chemo for tongue CA '19  radiation  no sx needed  freq f/u without any sequele   no airway issues per pt and PE   (+) Immunocompromised (HCC)      PULMONARY  ex smoker   (-) Sleep apnea   (-) Smoking   (-) URI (upper respiratory infection)        Physical Exam    Airway    Mallampati score: II  TM Distance: >3 FB  Neck ROM: limited     Dental       Cardiovascular  Cardiovascular exam normal    Pulmonary  Pulmonary exam normal     Other Findings  Fixed upper and lower teeth and in good repair      Anesthesia Plan  ASA Score- 3     Anesthesia Type- IV sedation with anesthesia with ASA Monitors  Additional Monitors:   Airway Plan:           Plan Factors-Exercise tolerance (METS): >4 METS  Chart reviewed  EKG reviewed  Imaging results reviewed  Existing labs reviewed  Patient summary reviewed  Patient is not a current smoker  Patient not instructed to abstain from smoking on day of procedure  Patient did not smoke on day of surgery  Obstructive sleep apnea risk education given perioperatively      Induction- intravenous  Postoperative Plan- Plan for postoperative opioid use  Informed Consent- Anesthetic plan and risks discussed with patient  I personally reviewed this patient with the CRNA  Discussed and agreed on the Anesthesia Plan with the CRNA  Alicia Decker

## 2021-08-25 ENCOUNTER — HOSPITAL ENCOUNTER (OUTPATIENT)
Facility: HOSPITAL | Age: 71
Setting detail: OUTPATIENT SURGERY
Discharge: HOME | End: 2021-08-25
Attending: OPHTHALMOLOGY | Admitting: OPHTHALMOLOGY
Payer: COMMERCIAL

## 2021-08-25 ENCOUNTER — ANESTHESIA (OUTPATIENT)
Dept: PERIOP | Facility: HOSPITAL | Age: 71
End: 2021-08-25
Payer: COMMERCIAL

## 2021-08-25 VITALS
WEIGHT: 119 LBS | SYSTOLIC BLOOD PRESSURE: 96 MMHG | HEART RATE: 63 BPM | BODY MASS INDEX: 19.13 KG/M2 | RESPIRATION RATE: 18 BRPM | DIASTOLIC BLOOD PRESSURE: 59 MMHG | HEIGHT: 66 IN | OXYGEN SATURATION: 100 % | TEMPERATURE: 97.4 F

## 2021-08-25 PROCEDURE — V2632 POST CHMBR INTRAOCULAR LENS: HCPCS | Performed by: OPHTHALMOLOGY

## 2021-08-25 DEVICE — ACRYSOF(R) IQ ASPHERIC NATURAL IOL, SINGLE-PIECE ACRYLIC FOLDABLE PCL, UV WITH BLUE LIGHTFILTER, 13.0MM LENGTH, 6.0MM ANTERIORASYMMETRIC BICONVEX OPTIC, PLANAR HAPTICS.
Type: IMPLANTABLE DEVICE | Status: FUNCTIONAL
Brand: ACRYSOF®

## 2021-08-25 RX ORDER — DEXAMETHASONE SODIUM PHOSPHATE 10 MG/ML
INJECTION, SOLUTION INTRAMUSCULAR; INTRAVENOUS AS NEEDED
Status: DISCONTINUED | OUTPATIENT
Start: 2021-08-25 | End: 2021-08-25

## 2021-08-25 RX ORDER — DIPHENHYDRAMINE HYDROCHLORIDE 50 MG/ML
12.5 INJECTION INTRAMUSCULAR; INTRAVENOUS ONCE AS NEEDED
Status: DISCONTINUED | OUTPATIENT
Start: 2021-08-25 | End: 2021-08-25 | Stop reason: HOSPADM

## 2021-08-25 RX ORDER — KETOROLAC TROMETHAMINE 5 MG/ML
1 SOLUTION OPHTHALMIC
Status: DISPENSED | OUTPATIENT
Start: 2021-08-25 | End: 2021-08-25

## 2021-08-25 RX ORDER — FENTANYL CITRATE/PF 50 MCG/ML
12.5 SYRINGE (ML) INJECTION
Status: DISCONTINUED | OUTPATIENT
Start: 2021-08-25 | End: 2021-08-25 | Stop reason: HOSPADM

## 2021-08-25 RX ORDER — SODIUM CHLORIDE 9 MG/ML
125 INJECTION, SOLUTION INTRAVENOUS CONTINUOUS
Status: DISCONTINUED | OUTPATIENT
Start: 2021-08-25 | End: 2021-08-25 | Stop reason: HOSPADM

## 2021-08-25 RX ORDER — MIDAZOLAM HYDROCHLORIDE 2 MG/2ML
INJECTION, SOLUTION INTRAMUSCULAR; INTRAVENOUS AS NEEDED
Status: DISCONTINUED | OUTPATIENT
Start: 2021-08-25 | End: 2021-08-25

## 2021-08-25 RX ORDER — CYCLOPENTOLATE HYDROCHLORIDE 10 MG/ML
1 SOLUTION/ DROPS OPHTHALMIC
Status: COMPLETED | OUTPATIENT
Start: 2021-08-25 | End: 2021-08-25

## 2021-08-25 RX ORDER — ONDANSETRON 2 MG/ML
INJECTION INTRAMUSCULAR; INTRAVENOUS AS NEEDED
Status: DISCONTINUED | OUTPATIENT
Start: 2021-08-25 | End: 2021-08-25

## 2021-08-25 RX ORDER — SODIUM CHLORIDE 9 MG/ML
75 INJECTION, SOLUTION INTRAVENOUS CONTINUOUS
Status: DISCONTINUED | OUTPATIENT
Start: 2021-08-25 | End: 2021-08-25 | Stop reason: HOSPADM

## 2021-08-25 RX ORDER — PROMETHAZINE HYDROCHLORIDE 25 MG/ML
12.5 INJECTION, SOLUTION INTRAMUSCULAR; INTRAVENOUS ONCE AS NEEDED
Status: DISCONTINUED | OUTPATIENT
Start: 2021-08-25 | End: 2021-08-25 | Stop reason: HOSPADM

## 2021-08-25 RX ORDER — MANNITOL 250 MG/ML
INJECTION, SOLUTION INTRAVENOUS AS NEEDED
Status: DISCONTINUED | OUTPATIENT
Start: 2021-08-25 | End: 2021-08-25

## 2021-08-25 RX ORDER — PROPOFOL 10 MG/ML
INJECTION, EMULSION INTRAVENOUS AS NEEDED
Status: DISCONTINUED | OUTPATIENT
Start: 2021-08-25 | End: 2021-08-25

## 2021-08-25 RX ORDER — BALANCED SALT SOLUTION 6.4; .75; .48; .3; 3.9; 1.7 MG/ML; MG/ML; MG/ML; MG/ML; MG/ML; MG/ML
SOLUTION OPHTHALMIC AS NEEDED
Status: DISCONTINUED | OUTPATIENT
Start: 2021-08-25 | End: 2021-08-25 | Stop reason: HOSPADM

## 2021-08-25 RX ORDER — DEXAMETHASONE SODIUM PHOSPHATE 4 MG/ML
4 INJECTION, SOLUTION INTRA-ARTICULAR; INTRALESIONAL; INTRAMUSCULAR; INTRAVENOUS; SOFT TISSUE ONCE AS NEEDED
Status: DISCONTINUED | OUTPATIENT
Start: 2021-08-25 | End: 2021-08-25 | Stop reason: HOSPADM

## 2021-08-25 RX ORDER — OFLOXACIN 3 MG/ML
SOLUTION/ DROPS OPHTHALMIC AS NEEDED
Status: DISCONTINUED | OUTPATIENT
Start: 2021-08-25 | End: 2021-08-25 | Stop reason: HOSPADM

## 2021-08-25 RX ORDER — PHENYLEPHRINE HCL 2.5 %
1 DROPS OPHTHALMIC (EYE)
Status: COMPLETED | OUTPATIENT
Start: 2021-08-25 | End: 2021-08-25

## 2021-08-25 RX ORDER — NEOMYCIN SULFATE, POLYMYXIN B SULFATE, AND DEXAMETHASONE 3.5; 10000; 1 MG/G; [USP'U]/G; MG/G
OINTMENT OPHTHALMIC AS NEEDED
Status: DISCONTINUED | OUTPATIENT
Start: 2021-08-25 | End: 2021-08-25 | Stop reason: HOSPADM

## 2021-08-25 RX ORDER — FENTANYL CITRATE/PF 50 MCG/ML
25 SYRINGE (ML) INJECTION
Status: DISCONTINUED | OUTPATIENT
Start: 2021-08-25 | End: 2021-08-25 | Stop reason: HOSPADM

## 2021-08-25 RX ORDER — TETRACAINE HYDROCHLORIDE 5 MG/ML
SOLUTION OPHTHALMIC AS NEEDED
Status: DISCONTINUED | OUTPATIENT
Start: 2021-08-25 | End: 2021-08-25 | Stop reason: HOSPADM

## 2021-08-25 RX ORDER — TROPICAMIDE 10 MG/ML
1 SOLUTION/ DROPS OPHTHALMIC
Status: COMPLETED | OUTPATIENT
Start: 2021-08-25 | End: 2021-08-25

## 2021-08-25 RX ORDER — SODIUM CHLORIDE 9 MG/ML
INJECTION, SOLUTION INTRAVENOUS CONTINUOUS PRN
Status: DISCONTINUED | OUTPATIENT
Start: 2021-08-25 | End: 2021-08-25

## 2021-08-25 RX ORDER — MAGNESIUM HYDROXIDE 1200 MG/15ML
LIQUID ORAL AS NEEDED
Status: DISCONTINUED | OUTPATIENT
Start: 2021-08-25 | End: 2021-08-25 | Stop reason: HOSPADM

## 2021-08-25 RX ORDER — MEPERIDINE HYDROCHLORIDE 25 MG/ML
12.5 INJECTION INTRAMUSCULAR; INTRAVENOUS; SUBCUTANEOUS
Status: DISCONTINUED | OUTPATIENT
Start: 2021-08-25 | End: 2021-08-25 | Stop reason: HOSPADM

## 2021-08-25 RX ORDER — LIDOCAINE HYDROCHLORIDE 10 MG/ML
INJECTION, SOLUTION EPIDURAL; INFILTRATION; INTRACAUDAL; PERINEURAL AS NEEDED
Status: DISCONTINUED | OUTPATIENT
Start: 2021-08-25 | End: 2021-08-25

## 2021-08-25 RX ORDER — FENTANYL CITRATE 50 UG/ML
INJECTION, SOLUTION INTRAMUSCULAR; INTRAVENOUS AS NEEDED
Status: DISCONTINUED | OUTPATIENT
Start: 2021-08-25 | End: 2021-08-25

## 2021-08-25 RX ADMIN — SODIUM CHLORIDE: 0.9 INJECTION, SOLUTION INTRAVENOUS at 12:57

## 2021-08-25 RX ADMIN — KETOROLAC TROMETHAMINE 1 DROP: 5 SOLUTION OPHTHALMIC at 11:31

## 2021-08-25 RX ADMIN — MANNITOL 12.5 G: 12.5 INJECTION, SOLUTION INTRAVENOUS at 13:06

## 2021-08-25 RX ADMIN — CYCLOPENTOLATE HYDROCHLORIDE 1 DROP: 10 SOLUTION OPHTHALMIC at 11:38

## 2021-08-25 RX ADMIN — TROPICAMIDE 1 DROP: 10 SOLUTION/ DROPS OPHTHALMIC at 11:38

## 2021-08-25 RX ADMIN — FENTANYL CITRATE 50 MCG: 50 INJECTION, SOLUTION INTRAMUSCULAR; INTRAVENOUS at 13:06

## 2021-08-25 RX ADMIN — LIDOCAINE HYDROCHLORIDE 50 MG: 10 INJECTION, SOLUTION EPIDURAL; INFILTRATION; INTRACAUDAL; PERINEURAL at 13:05

## 2021-08-25 RX ADMIN — DEXAMETHASONE SODIUM PHOSPHATE 4 MG: 10 INJECTION, SOLUTION INTRAMUSCULAR; INTRAVENOUS at 13:08

## 2021-08-25 RX ADMIN — KETOROLAC TROMETHAMINE 1 DROP: 5 SOLUTION OPHTHALMIC at 12:27

## 2021-08-25 RX ADMIN — TROPICAMIDE 1 DROP: 10 SOLUTION/ DROPS OPHTHALMIC at 12:01

## 2021-08-25 RX ADMIN — CYCLOPENTOLATE HYDROCHLORIDE 1 DROP: 10 SOLUTION OPHTHALMIC at 11:31

## 2021-08-25 RX ADMIN — PROPOFOL 100 MG: 10 INJECTION, EMULSION INTRAVENOUS at 13:05

## 2021-08-25 RX ADMIN — MIDAZOLAM 2 MG: 1 INJECTION INTRAMUSCULAR; INTRAVENOUS at 12:59

## 2021-08-25 RX ADMIN — TROPICAMIDE 1 DROP: 10 SOLUTION/ DROPS OPHTHALMIC at 11:31

## 2021-08-25 RX ADMIN — KETOROLAC TROMETHAMINE 1 DROP: 5 SOLUTION OPHTHALMIC at 12:01

## 2021-08-25 RX ADMIN — PHENYLEPHRINE HYDROCHLORIDE 1 DROP: 25 SOLUTION/ DROPS OPHTHALMIC at 12:01

## 2021-08-25 RX ADMIN — PHENYLEPHRINE HYDROCHLORIDE 1 DROP: 25 SOLUTION/ DROPS OPHTHALMIC at 11:38

## 2021-08-25 RX ADMIN — ONDANSETRON 4 MG: 2 INJECTION INTRAMUSCULAR; INTRAVENOUS at 13:08

## 2021-08-25 RX ADMIN — PHENYLEPHRINE HYDROCHLORIDE 1 DROP: 25 SOLUTION/ DROPS OPHTHALMIC at 11:31

## 2021-08-25 RX ADMIN — CYCLOPENTOLATE HYDROCHLORIDE 1 DROP: 10 SOLUTION OPHTHALMIC at 12:02

## 2021-08-25 RX ADMIN — FENTANYL CITRATE 50 MCG: 50 INJECTION, SOLUTION INTRAMUSCULAR; INTRAVENOUS at 13:48

## 2021-08-25 NOTE — INTERVAL H&P NOTE
H&P reviewed  After examining the patient I find no changes in the patients condition since the H&P had been written      Vitals:    08/25/21 1126   BP: 144/74   Pulse: 82   Resp: 20   Temp: 97 7 °F (36 5 °C)   SpO2: 99%

## 2021-08-25 NOTE — ANESTHESIA POSTPROCEDURE EVALUATION
Post-Op Assessment Note    CV Status:  Stable  Pain Score: 1    Pain management: adequate     Mental Status:  Alert and awake   Hydration Status:  Euvolemic   PONV Controlled:  Controlled   Airway Patency:  Patent      Post Op Vitals Reviewed: Yes      Staff: Anesthesiologist, CRNA   Comments: LMA without any known complications        No complications documented      BP      Temp     Pulse     Resp      SpO2

## 2021-08-25 NOTE — INTERIM OP NOTE
CATARACT SURGERY W/INTRAOCULAR LENS  Postoperative Note  PATIENT NAME: Erik Jesus  : 1950  MRN: [id number]  [address] [facility] OR ROOM 01    Surgery Date: 2021    Preop Diagnosis:  Age-related nuclear cataract, right eye [H25 11]    Post-Op Diagnosis Codes:     * Age-related nuclear cataract, right eye [H25 11]    Procedure(s) (LRB):  CATARACT SURGERY W/INTRAOCULAR LENS (Right)    Surgeon(s) and Role:     * Dianna Johnson MD - Primary    Specimens:  * No specimens in log *    Estimated Blood Loss:   Minimal    Anesthesia Type:   IV Sedation with Anesthesia     Findings:    Cataract right eye  Complications:   None    SIGNATURE: Dianan Johnson MD   DATE: 2021   TIME: 2:05 PM

## 2021-08-25 NOTE — INTERIM OP NOTE
CATARACT SURGERY W/INTRAOCULAR LENS  Postoperative Note  PATIENT NAME: Rubina Upton  : 1950  MRN: [id number]  31 [facility] OR ROOM 01    Surgery Date: 2021    Preop Diagnosis:  Age-related nuclear cataract, right eye [H25 11]    Post-Op Diagnosis Codes:     * Age-related nuclear cataract, right eye [H25 11]    Procedure(s) (LRB):  CATARACT SURGERY W/INTRAOCULAR LENS (Right)    Surgeon(s) and Role:     * Kerri Valencia MD - Primary    Specimens:  * No specimens in log *    Estimated Blood Loss:   Minimal    Anesthesia Type:   IV Sedation with Anesthesia     Findings:    Cataract right eye  Complications:   None    SIGNATURE: Kerri Valencia MD   DATE: 2021   TIME: 2:07 PM

## 2021-08-25 NOTE — OP NOTE
OPERATIVE REPORT  PATIENT NAME: Aaron Beltran    :  1950  MRN: [id number]  Pt Location:  OR ROOM 01    SURGERY DATE: 2021    Surgeon(s) and Role:     * Nevin Deal MD - Primary    Preop Diagnosis:  Age-related nuclear cataract, right eye [H25 11]    Post-Op Diagnosis Codes:     * Age-related nuclear cataract, right eye [H25 11]    Procedure(s) (LRB):  CATARACT SURGERY W/INTRAOCULAR LENS (Right)    Specimen(s):  * No specimens in log *    Estimated Blood Loss:   Minimal    Drains:  * No LDAs found *    Anesthesia Type:   IV Sedation with Anesthesia    Operative Indications:  Age-related nuclear cataract, right eye [H25 11]      Operative Findings:  Cataract right eye    Complications:   None    Procedure and Technique:After routine prep and drape, a wire lid speculum was inserted to retract the lids of the eye  A fornix-based conjunctival flap was then dissected at the superior portion of the globe  Hemostasis was by wet field cautery  A [place] blade was used to create a scleral groove 1 mm posterior to the corneal limbus  A scleral tunnel blade was then used to create a tunnel to the limbal region  Viscoat was then instilled into the anterior chamber  A sideport incision at the 2 o'clock position was performed with a 15 degree blade at the corneolimbal junction  A cystotome was then used to initiate a capsulorrhexis by opening the anterior capsule  The anterior capsule was then grasped with capsulorrhexis forceps and a circular capsulorrhexis was performed  This was followed by hydrodelineation and hydrodissection of the nucleus with irrigating cannula and balanced salt solution  The lens nucleus was then determined to by mobile  A phacoemulsification tip was then inserted and a groove performed in the nucleus  Four such grooves were performed  A nucleus rotator and phaco spatula were used to crack each of the four nuclear groove, creating four nuclear quadrant pieces  Phacoemulsification with aspiration was used to remove each nuclear quadrant  This was followd by irrigation aspiration of the remaining lens cortex  Provisc was then instilled into the anterior chamber and a  used to place the posterior chamber lens into the capsular bag  Irrigation aspiration of the remaining viscoelastic was ten performed to remove it  The intraocular lens was then determined to be centered and 0 1 ml of Miochol was instilled into the anterior chamber  The wound was then checked to ensure no leak  Topical Maxitrol ointment and ocufloxacin were applied and the eye patched  The patient tolerated the procedure well and left in good condition           I was present for the entire procedure    Patient Disposition:  PACU     SIGNATURE: Jayashree Garcia MD  DATE: August 25, 2021  TIME: 2:05 PM

## 2021-08-30 ENCOUNTER — OFFICE VISIT (OUTPATIENT)
Dept: FAMILY MEDICINE CLINIC | Facility: CLINIC | Age: 71
End: 2021-08-30
Payer: COMMERCIAL

## 2021-08-30 VITALS
HEART RATE: 70 BPM | BODY MASS INDEX: 19.16 KG/M2 | TEMPERATURE: 96.7 F | OXYGEN SATURATION: 99 % | SYSTOLIC BLOOD PRESSURE: 130 MMHG | DIASTOLIC BLOOD PRESSURE: 80 MMHG | HEIGHT: 65 IN | WEIGHT: 115 LBS

## 2021-08-30 DIAGNOSIS — Z23 NEED FOR PNEUMOCOCCAL VACCINATION: ICD-10-CM

## 2021-08-30 DIAGNOSIS — D63.8 ANEMIA IN OTHER CHRONIC DISEASES CLASSIFIED ELSEWHERE: ICD-10-CM

## 2021-08-30 DIAGNOSIS — M85.852 OSTEOPENIA OF NECK OF LEFT FEMUR: ICD-10-CM

## 2021-08-30 DIAGNOSIS — Z13.220 SCREENING, LIPID: ICD-10-CM

## 2021-08-30 DIAGNOSIS — Z00.01 ENCOUNTER FOR ROUTINE ADULT PHYSICAL EXAM WITH ABNORMAL FINDINGS: Primary | ICD-10-CM

## 2021-08-30 DIAGNOSIS — E03.9 ACQUIRED HYPOTHYROIDISM: ICD-10-CM

## 2021-08-30 PROCEDURE — 90471 IMMUNIZATION ADMIN: CPT

## 2021-08-30 PROCEDURE — 99397 PER PM REEVAL EST PAT 65+ YR: CPT | Performed by: FAMILY MEDICINE

## 2021-08-30 PROCEDURE — 90670 PCV13 VACCINE IM: CPT

## 2021-08-30 RX ORDER — B-COMPLEX WITH VITAMIN C
1 TABLET ORAL 2 TIMES DAILY WITH MEALS
Qty: 60 TABLET | Refills: 0
Start: 2021-08-30

## 2021-08-30 NOTE — ASSESSMENT & PLAN NOTE
A new diagnosis the asymptomatic no history of fracture no recent DEXA scan August 2021 result discussed with the patient recommend calcium 1200 mg po/day and 800 Iu of Vit D increase exercise

## 2021-08-30 NOTE — ASSESSMENT & PLAN NOTE
Advice and education were given regarding nutrition, aerobic exercises, weight bearing exercises, cardiovascular risk reduction, fall risk reduction, and age appropriate supplements  The patient was counseled regarding instructions for management, risk factor reductions, prognosis, risks and benefits of treatment options, patient and family education, and importance of compliance with treatment       Per patient she already had the colon cancer screening colonoscopy done at [facility] no record in the chart were requested record also discussed with the patient and immunization a she will  Get the Prevnar 13 today a possible side effect review with the patient

## 2021-08-30 NOTE — PROGRESS NOTES
[address] [facility]    NAME: Tootie Sherman  AGE: 66 y o  SEX: female  : 1950     DATE: 2021     Assessment and Plan:     Problem List Items Addressed This Visit        Endocrine    Hypothyroidism    Relevant Orders    TSH, 3rd generation with Free T4 reflex       Musculoskeletal and Integument    Osteopenia of neck of left femur      A new diagnosis the asymptomatic no history of fracture no recent DEXA scan 2021 result discussed with the patient recommend calcium 1200 mg po/day and 800 Iu of Vit D increase exercise          Relevant Medications    calcium carbonate-vitamin D (OSCAL-D) 500 mg-200 units per tablet       Other    Encounter for routine adult physical exam with abnormal findings - Primary     Advice and education were given regarding nutrition, aerobic exercises, weight bearing exercises, cardiovascular risk reduction, fall risk reduction, and age appropriate supplements  The patient was counseled regarding instructions for management, risk factor reductions, prognosis, risks and benefits of treatment options, patient and family education, and importance of compliance with treatment       Per patient she already had the colon cancer screening colonoscopy done at [facility] no record in the chart were requested record also discussed with the patient and immunization a she will  Get the [address] [place] 13 today a possible side effect review with the patient           Other Visit Diagnoses     Anemia in other chronic diseases classified elsewhere        Relevant Orders    CBC and differential    Basic metabolic panel    Screening, lipid        Relevant Orders    Lipid panel    Need for pneumococcal vaccination        Relevant Orders    PNEUMOCOCCAL CONJUGATE VACCINE 13-VALENT GREATER THAN 6 MONTHS (Completed)          Immunizations and preventive care screenings were discussed with patient today  Appropriate education was printed on patient's after visit summary  Counseling:  Alcohol/drug use: discussed moderation in alcohol intake, the recommendations for healthy alcohol use, and avoidance of illicit drug use  Dental Health: discussed importance of regular tooth brushing, flossing, and dental visits  Injury prevention: discussed safety/seat belts, safety helmets, smoke detectors, carbon dioxide detectors, and smoking near bedding or upholstery  Sexual health: discussed sexually transmitted diseases, partner selection, use of condoms, avoidance of unintended pregnancy, and contraceptive alternatives  · Exercise: the importance of regular exercise/physical activity was discussed  Recommend exercise 3-5 times per week for at least 30 minutes  No follow-ups on file  Chief Complaint:     Chief Complaint   Patient presents with    Physical Exam    Follow-up     after cataract surgery      History of Present Illness:     Adult Annual Physical   Patient here for a comprehensive physical exam  The patient reports no problems  Result of Dexa scan discuss with patient  Diet and Physical Activity  · Diet/Nutrition: no special diet  · Exercise: walking  Depression Screening  PHQ-9 Depression Screening    PHQ-9:   Frequency of the following problems over the past two weeks:      Little interest or pleasure in doing things: 0 - not at all  Feeling down, depressed, or hopeless: 0 - not at all  PHQ-2 Score: 0       General Health  · Sleep: sleeps well  · Hearing: normal - bilateral   · Vision: wears glasses  · Dental: regular dental visits  /GYN Health  · Patient is: postmenopausal     Review of Systems:     Review of Systems   Constitutional: Negative for activity change, appetite change, fatigue and fever  HENT: Negative for congestion, ear pain, sinus pressure, sinus pain and sore throat  Eyes: Negative for pain, discharge, redness and itching     Respiratory: Negative for cough, chest tightness, shortness of breath and stridor  Cardiovascular: Negative for chest pain, palpitations and leg swelling  Gastrointestinal: Negative for abdominal pain, blood in stool, constipation, diarrhea and nausea  Endocrine: Negative for heat intolerance and polydipsia  Genitourinary: Negative for dysuria, flank pain, frequency and hematuria  Musculoskeletal: Negative for back pain, joint swelling and neck pain  Skin: Negative for pallor and rash  Neurological: Negative for dizziness, tremors, weakness, numbness and headaches  Hematological: Does not bruise/bleed easily  Psychiatric/Behavioral: Negative for agitation and behavioral problems        Past Medical History:     Past Medical History:   Diagnosis Date    Anemia 7/15/2019    Dehydration 2019    Disease of thyroid gland     Fatigue 3/27/2019    Hypokalemia 2019    Tongue cancer ([facility] [address] ) 2019    Squamous cell      Past Surgical History:     Past Surgical History:   Procedure Laterality Date    ADENOIDECTOMY      APPENDECTOMY      CATARACT EXTRACTION Left     HYSTERECTOMY      total    IR PICC LINE  2019    OOPHORECTOMY Bilateral     LA XCAPSL CTRC RMVL INSJ IO LENS PROSTH W/O ECP Right 2021    Procedure: CATARACT SURGERY W/INTRAOCULAR LENS;  Surgeon: Merary Alcantara MD;  Location: [address] [place] [address];  Service: Ophthalmology    TONSILLECTOMY        Social History:     Social History     Socioeconomic History    Marital status:      Spouse name: None    Number of children: None    Years of education: None    Highest education level: None   Occupational History     Employer: [employer]   Tobacco Use    Smoking status: Former Smoker     Packs/day: 0 50     Years: 5 00     Pack years: 2 50     Types: Cigarettes     Quit date: 1978     Years since quittin 6    Smokeless tobacco: Former User    Tobacco comment: no passive smoke exposure   Vaping Use    Vaping Use: Never used   Substance and Sexual Activity    Alcohol use: Yes     Alcohol/week: 1 0 standard drinks     Types: 1 Glasses of wine per week     Comment: - 8/18/21 Pt reports did stop for surgery coming    Drug use: Never    Sexual activity: Not Currently   Other Topics Concern    None   Social History Narrative    None     Social Determinants of Health     Financial Resource Strain: Low Risk     Difficulty of Paying Living Expenses: Not hard at all   Food Insecurity: No Food Insecurity    Worried About Running Out of Food in the Last Year: Never true    Bronson of Food in the Last Year: Never true   Transportation Needs: No Transportation Needs    Lack of Transportation (Medical): No    Lack of Transportation (Non-Medical): No   Physical Activity: Sufficiently Active    Days of Exercise per Week: 7 days    Minutes of Exercise per Session: 60 min   Stress: No Stress Concern Present    Feeling of Stress : Not at all   Social Connections: Moderately Integrated    Frequency of Communication with Friends and Family: More than three times a week    Frequency of Social Gatherings with Friends and Family: More than three times a week    Attends Nondenominational Services: 1 to 4 times per year   [employer] or Organizations:  Yes    Attends Club or Organization Meetings: 1 to 4 times per year    Marital Status: Never    Intimate Partner Violence: Not At Risk    Fear of Current or Ex-Partner: No    Emotionally Abused: No    Physically Abused: No    Sexually Abused: No      Family History:     Family History   Problem Relation Age of Onset    Rheum arthritis Sister     Hypothyroidism Sister     Hashimoto's thyroiditis Family     Prostate cancer Father     Parkinsonism Mother         Cardiac abnormality ([place])    No Known Problems Maternal Grandmother     No Known Problems Maternal Grandfather     No Known Problems Paternal Grandmother     No Known Problems Paternal Grandfather  No Known Problems Paternal Aunt     No Known Problems Paternal Aunt     No Known Problems Paternal Aunt     No Known Problems Paternal Aunt       Current Medications:     Current Outpatient Medications   Medication Sig Dispense Refill    calcium carbonate-vitamin D (OSCAL-D) 500 mg-200 units per tablet Take 1 tablet by mouth 2 (two) times a day with meals 60 tablet 0    DENTAGEL 1 1 % GEL AT BED AFTER BRUSHING AND FLOSSING APPLY 1 DROP OF GEL PER TOOTH LEAVE TRAYS IN MOUTH FOR 10 MINUTES  11    levothyroxine 50 mcg tablet Take 1 tablet (50 mcg total) by mouth daily (Patient taking differently: Take 50 mcg by mouth daily Takes in the am) 30 tablet 5    Multiple Vitamins-Minerals (PRESERVISION AREDS 2 PO) Take by mouth 2 (two) times a day        No current facility-administered medications for this visit  Allergies:      Allergies   Allergen Reactions    Codeine Anaphylaxis     Difficulty breathing      Physical Exam:     /80   Pulse 70   Temp (!) 96 7 °F (35 9 °C) (Tympanic)   Ht 5' 5" (1 651 m)   Wt 52 2 kg (115 lb)   SpO2 99%   Breastfeeding No   BMI 19 14 kg/m²     Physical Exam     Roderick Phalen, MD  [address] [place] [address]

## 2021-08-30 NOTE — PATIENT INSTRUCTIONS

## 2021-11-17 ENCOUNTER — TELEPHONE (OUTPATIENT)
Dept: HEMATOLOGY ONCOLOGY | Facility: CLINIC | Age: 71
End: 2021-11-17

## 2022-02-08 ENCOUNTER — APPOINTMENT (OUTPATIENT)
Dept: LAB | Facility: CLINIC | Age: 72
End: 2022-02-08
Payer: MEDICARE

## 2022-02-08 DIAGNOSIS — D75.89 MACROCYTOSIS: ICD-10-CM

## 2022-02-08 DIAGNOSIS — E55.9 VITAMIN D DEFICIENCY: ICD-10-CM

## 2022-02-08 DIAGNOSIS — C01 CANCER OF BASE OF TONGUE (HCC): ICD-10-CM

## 2022-02-08 LAB
25(OH)D3 SERPL-MCNC: 41.9 NG/ML (ref 30–100)
ALBUMIN SERPL BCP-MCNC: 4.2 G/DL (ref 3.5–5)
ALP SERPL-CCNC: 81 U/L (ref 46–116)
ALT SERPL W P-5'-P-CCNC: 24 U/L (ref 12–78)
ANION GAP SERPL CALCULATED.3IONS-SCNC: 6 MMOL/L (ref 4–13)
AST SERPL W P-5'-P-CCNC: 22 U/L (ref 5–45)
BASOPHILS # BLD AUTO: 0.07 THOUSANDS/ΜL (ref 0–0.1)
BASOPHILS NFR BLD AUTO: 1 % (ref 0–1)
BILIRUB SERPL-MCNC: 0.7 MG/DL (ref 0.2–1)
BUN SERPL-MCNC: 15 MG/DL (ref 5–25)
CALCIUM SERPL-MCNC: 9.8 MG/DL (ref 8.3–10.1)
CHLORIDE SERPL-SCNC: 105 MMOL/L (ref 100–108)
CO2 SERPL-SCNC: 29 MMOL/L (ref 21–32)
CREAT SERPL-MCNC: 0.85 MG/DL (ref 0.6–1.3)
EOSINOPHIL # BLD AUTO: 0.13 THOUSAND/ΜL (ref 0–0.61)
EOSINOPHIL NFR BLD AUTO: 2 % (ref 0–6)
ERYTHROCYTE [DISTWIDTH] IN BLOOD BY AUTOMATED COUNT: 12.5 % (ref 11.6–15.1)
FERRITIN SERPL-MCNC: 75 NG/ML (ref 8–388)
FOLATE SERPL-MCNC: >20 NG/ML (ref 3.1–17.5)
GFR SERPL CREATININE-BSD FRML MDRD: 68 ML/MIN/1.73SQ M
GLUCOSE P FAST SERPL-MCNC: 106 MG/DL (ref 65–99)
HCT VFR BLD AUTO: 42.8 % (ref 34.8–46.1)
HGB BLD-MCNC: 13.8 G/DL (ref 11.5–15.4)
IMM GRANULOCYTES # BLD AUTO: 0.03 THOUSAND/UL (ref 0–0.2)
IMM GRANULOCYTES NFR BLD AUTO: 0 % (ref 0–2)
IRON SATN MFR SERPL: 49 % (ref 15–50)
IRON SERPL-MCNC: 142 UG/DL (ref 50–170)
LYMPHOCYTES # BLD AUTO: 0.96 THOUSANDS/ΜL (ref 0.6–4.47)
LYMPHOCYTES NFR BLD AUTO: 14 % (ref 14–44)
MCH RBC QN AUTO: 32.1 PG (ref 26.8–34.3)
MCHC RBC AUTO-ENTMCNC: 32.2 G/DL (ref 31.4–37.4)
MCV RBC AUTO: 100 FL (ref 82–98)
MONOCYTES # BLD AUTO: 0.63 THOUSAND/ΜL (ref 0.17–1.22)
MONOCYTES NFR BLD AUTO: 9 % (ref 4–12)
NEUTROPHILS # BLD AUTO: 5.18 THOUSANDS/ΜL (ref 1.85–7.62)
NEUTS SEG NFR BLD AUTO: 74 % (ref 43–75)
NRBC BLD AUTO-RTO: 0 /100 WBCS
PLATELET # BLD AUTO: 255 THOUSANDS/UL (ref 149–390)
PMV BLD AUTO: 9.1 FL (ref 8.9–12.7)
POTASSIUM SERPL-SCNC: 3.8 MMOL/L (ref 3.5–5.3)
PROT SERPL-MCNC: 7.7 G/DL (ref 6.4–8.2)
RBC # BLD AUTO: 4.3 MILLION/UL (ref 3.81–5.12)
SODIUM SERPL-SCNC: 140 MMOL/L (ref 136–145)
TIBC SERPL-MCNC: 288 UG/DL (ref 250–450)
VIT B12 SERPL-MCNC: 992 PG/ML (ref 100–900)
WBC # BLD AUTO: 7 THOUSAND/UL (ref 4.31–10.16)

## 2022-02-08 PROCEDURE — 83550 IRON BINDING TEST: CPT

## 2022-02-08 PROCEDURE — 82607 VITAMIN B-12: CPT

## 2022-02-08 PROCEDURE — 36415 COLL VENOUS BLD VENIPUNCTURE: CPT

## 2022-02-08 PROCEDURE — 83540 ASSAY OF IRON: CPT

## 2022-02-08 PROCEDURE — 80053 COMPREHEN METABOLIC PANEL: CPT

## 2022-02-08 PROCEDURE — 82746 ASSAY OF FOLIC ACID SERUM: CPT

## 2022-02-08 PROCEDURE — 82306 VITAMIN D 25 HYDROXY: CPT

## 2022-02-08 PROCEDURE — 82728 ASSAY OF FERRITIN: CPT

## 2022-02-08 PROCEDURE — 85025 COMPLETE CBC W/AUTO DIFF WBC: CPT

## 2022-02-11 ENCOUNTER — OFFICE VISIT (OUTPATIENT)
Dept: HEMATOLOGY ONCOLOGY | Facility: CLINIC | Age: 72
End: 2022-02-11
Payer: MEDICARE

## 2022-02-11 VITALS
OXYGEN SATURATION: 97 % | RESPIRATION RATE: 18 BRPM | HEIGHT: 66 IN | HEART RATE: 71 BPM | TEMPERATURE: 97.4 F | WEIGHT: 125.6 LBS | BODY MASS INDEX: 20.18 KG/M2 | SYSTOLIC BLOOD PRESSURE: 138 MMHG | DIASTOLIC BLOOD PRESSURE: 86 MMHG

## 2022-02-11 DIAGNOSIS — Z12.31 ENCOUNTER FOR SCREENING MAMMOGRAM FOR BREAST CANCER: ICD-10-CM

## 2022-02-11 DIAGNOSIS — C01 CANCER OF BASE OF TONGUE (HCC): Primary | ICD-10-CM

## 2022-02-11 DIAGNOSIS — E03.9 HYPOTHYROIDISM, UNSPECIFIED TYPE: ICD-10-CM

## 2022-02-11 PROCEDURE — 99214 OFFICE O/P EST MOD 30 MIN: CPT | Performed by: INTERNAL MEDICINE

## 2022-02-11 PROCEDURE — 1124F ACP DISCUSS-NO DSCNMKR DOCD: CPT | Performed by: INTERNAL MEDICINE

## 2022-02-11 NOTE — PROGRESS NOTES
Hematology/Oncology Outpatient Follow-up  Lobo Warren 66 y o  female 1950 [id number]    Date:  2/11/2022        Assessment and Plan:  1  Cancer of base of tongue ([facility] [address] )  Patient does not seem to have any obvious hint of recurrence of her squamous cell carcinoma of the tongue  I did ask her to follow-up with the ENT team regularly who is planning to pursue endoscopy at the next visit  - CBC and differential; Future  - Comprehensive metabolic panel; Future  - Magnesium; Future    2  Hypothyroidism, unspecified type  We will check her TSH level prior to her next visit  She is currently on 50 micro gram of levothyroxine daily       - TSH, 3rd generation with Free T4 reflex; Future    3  Encounter for screening mammogram for breast cancer    - Mammo screening bilateral w cad; Future        HPI:  The patient came today for follow-up visit  She stated that she is seeing her ENT team on regular basis  There is no obvious local recurrence of her head neck malignancy  Blood work on 02/08/2022 showed normal hemoglobin of 13 8 with normal white cells and platelets  Creatinine 0 8 with normal calcium and liver enzymes  Folate and vitamin B12 levels are within normal range  Vitamin D 41  Iron panel showed normal saturation and ferritin of 75  Oncology History   Cancer of base of tongue ([facility] [address] )   5/3/2019 Biopsy    Right BOT biopsy  Biopsy + for SCC moderately differentiated,  Incompletely excised          6/10/2019 - 6/30/2019 Chemotherapy    CISplatin (PLATINOL) 174 mg, mannitol 12 5 g in sodium chloride 0 9 % 500 mL IVPB, 100 mg/m2 = 174 mg, Intravenous, Once, 1 of 3 cycles  Administration: 174 mg (6/10/2019)  potassium chloride 20 mEq, magnesium sulfate 1 g in sodium chloride 0 9 % 1,000 mL IVPB, , Intravenous, Once, 1 of 3 cycles  Administration:  (6/10/2019),  (6/10/2019)  (1 cycle only d/c'd due to significant hearing loss after 1 treatment)       6/10/2019 - 7/29/2019 Radiation    Plan ID Energy Fractions Dose per Fraction (cGy) Dose Correction (cGy) Total Dose Delivered (cGy) Elapsed Days   Head and Neck 6X 35 / 35 200 0 7,000 52     Dr Carlo Ortiz     7/1/2019 - 8/19/2019 Chemotherapy    CARBOplatin (PARAPLATIN) 115 5 mg in sodium chloride 0 9 % 250 mL IVPB, 70 mg/m2 = 115 5 mg (100 % of original dose 70 mg/m2), Intravenous, Once, 2 of 2 cycles  Dose modification: 70 mg/m2 (original dose 70 mg/m2, Cycle 1)  Administration: 115 5 mg (7/1/2019), 115 5 mg (7/2/2019), 115 5 mg (7/3/2019), 115 5 mg (7/5/2019), 115 5 mg (7/31/2019), 115 5 mg (8/1/2019), 115 5 mg (8/2/2019), 115 5 mg (7/30/2019)  (completed 2 cycles)           Interval history:    ROS: Review of Systems   Constitutional: Negative for chills and fever  HENT: Negative for ear pain and sore throat  Eyes: Negative for pain and visual disturbance  Respiratory: Negative for cough and shortness of breath  Cardiovascular: Negative for chest pain and palpitations  Gastrointestinal: Negative for abdominal pain and vomiting  Genitourinary: Negative for dysuria and hematuria  Musculoskeletal: Negative for arthralgias and back pain  Skin: Negative for color change and rash  Neurological: Negative for seizures and syncope  All other systems reviewed and are negative        Past Medical History:   Diagnosis Date    Anemia 7/15/2019    Dehydration 6/17/2019    Disease of thyroid gland     Fatigue 3/27/2019    Hypokalemia 8/9/2019    Tongue cancer ([facility] [address] ) 2019    Squamous cell       Past Surgical History:   Procedure Laterality Date    ADENOIDECTOMY      APPENDECTOMY  1990    CATARACT EXTRACTION Left     HYSTERECTOMY  2012    total    IR PICC LINE  6/5/2019    OOPHORECTOMY Bilateral 2012    CO XCAPSL CTRC RMVL INSJ IO LENS PROSTH W/O ECP Right 8/25/2021    Procedure: CATARACT SURGERY W/INTRAOCULAR LENS;  Surgeon: Madhu Vaca MD;  [facility];  Service: Ophthalmology    TONSILLECTOMY         Social History     Socioeconomic History  Marital status:      Spouse name: None    Number of children: None    Years of education: None    Highest education level: None   Occupational History     Employer: [employer]   Tobacco Use    Smoking status: Former Smoker     Packs/day: 0 50     Years: 5 00     Pack years: 2 50     Types: Cigarettes     Quit date: 1978     Years since quittin 1    Smokeless tobacco: Former User    Tobacco comment: no passive smoke exposure   Vaping Use    Vaping Use: Never used   Substance and Sexual Activity    Alcohol use: Yes     Alcohol/week: 1 0 standard drink     Types: 1 Glasses of wine per week     Comment: - 21 Pt reports did stop for surgery coming    Drug use: Never    Sexual activity: Not Currently   Other Topics Concern    None   Social History Narrative    None     Social Determinants of Health     Financial Resource Strain: Low Risk     Difficulty of Paying Living Expenses: Not hard at all   Food Insecurity: No Food Insecurity    Worried About Running Out of Food in the Last Year: Never true    Bronson of Food in the Last Year: Never true   Transportation Needs: No Transportation Needs    Lack of Transportation (Medical): No    Lack of Transportation (Non-Medical): No   Physical Activity: Sufficiently Active    Days of Exercise per Week: 7 days    Minutes of Exercise per Session: 60 min   Stress: No Stress Concern Present    Feeling of Stress : Not at all   Social Connections: Moderately Integrated    Frequency of Communication with Friends and Family: More than three times a week    Frequency of Social Gatherings with Friends and Family: More than three times a week    Attends Mormonism Services: 1 to 4 times per year   [employer] or Organizations:  Yes    Attends Club or Organization Meetings: 1 to 4 times per year    Marital Status: Never    Intimate Partner Violence: Not At Risk    Fear of Current or Ex-Partner: No    Emotionally Abused: No    Physically Abused: No    Sexually Abused: No   Housing Stability: Unknown    Unable to Pay for Housing in the Last Year: No    Number of Places Lived in the Last Year: Not on file    Unstable Housing in the Last Year: No       Family History   Problem Relation Age of Onset    Rheum arthritis Sister     Hypothyroidism Sister     Hashimoto's thyroiditis Family     Prostate cancer Father     Parkinsonism Mother         Cardiac abnormality Karthik Silver)    No Known Problems Maternal Grandmother     No Known Problems Maternal Grandfather     No Known Problems Paternal Grandmother     No Known Problems Paternal Grandfather     No Known Problems Paternal Aunt     No Known Problems Paternal Aunt     No Known Problems Paternal Aunt     No Known Problems Paternal Aunt        Allergies   Allergen Reactions    Codeine Anaphylaxis     Difficulty breathing         Current Outpatient Medications:     DENTAGEL 1 1 % GEL, AT BED AFTER BRUSHING AND FLOSSING APPLY 1 DROP OF GEL PER TOOTH LEAVE TRAYS IN MOUTH FOR 10 MINUTES, Disp: , Rfl: 11    levothyroxine 50 mcg tablet, Take 1 tablet (50 mcg total) by mouth daily, Disp: 90 tablet, Rfl: 3    Multiple Vitamins-Minerals (PRESERVISION AREDS 2 PO), Take by mouth 2 (two) times a day , Disp: , Rfl:     calcium carbonate-vitamin D (OSCAL-D) 500 mg-200 units per tablet, Take 1 tablet by mouth 2 (two) times a day with meals (Patient not taking: Reported on 2/11/2022 ), Disp: 60 tablet, Rfl: 0      Physical Exam:  /86 (BP Location: Left arm, Patient Position: Sitting, Cuff Size: Large)   Pulse 71   Temp (!) 97 4 °F (36 3 °C) (Tympanic)   Resp 18   Ht 5' 6" (1 676 m)   Wt 57 kg (125 lb 9 6 oz)   SpO2 97%   BMI 20 27 kg/m²     Physical Exam  Constitutional:       General: She is not in acute distress  Appearance: She is well-developed  She is not diaphoretic  HENT:      Head: Normocephalic and atraumatic     Eyes:      General: No scleral icterus  Right eye: No discharge  Left eye: No discharge  Conjunctiva/sclera: Conjunctivae normal       Pupils: Pupils are equal, round, and reactive to light  Neck:      Thyroid: No thyromegaly  Vascular: No JVD  Trachea: No tracheal deviation  Cardiovascular:      Rate and Rhythm: Normal rate and regular rhythm  Heart sounds: Normal heart sounds  No murmur heard  No friction rub  Pulmonary:      Effort: Pulmonary effort is normal  No respiratory distress  Breath sounds: Normal breath sounds  No stridor  No wheezing or rales  Chest:      Chest wall: No tenderness  Abdominal:      General: There is no distension  Palpations: Abdomen is soft  There is no hepatomegaly or splenomegaly  Tenderness: There is no abdominal tenderness  There is no guarding or rebound  Musculoskeletal:         General: No tenderness or deformity  Normal range of motion  Cervical back: Normal range of motion and neck supple  Lymphadenopathy:      Cervical: No cervical adenopathy  Skin:     General: Skin is warm and dry  Coloration: Skin is not pale  Findings: No erythema or rash  Neurological:      Mental Status: She is alert and oriented to person, place, and time  Cranial Nerves: No cranial nerve deficit  Coordination: Coordination normal       Deep Tendon Reflexes: Reflexes are normal and symmetric  Psychiatric:         Behavior: Behavior normal          Thought Content:  Thought content normal          Judgment: Judgment normal            Labs:  Lab Results   Component Value Date    WBC 7 00 02/08/2022    HGB 13 8 02/08/2022    HCT 42 8 02/08/2022     (H) 02/08/2022     02/08/2022     Lab Results   Component Value Date     04/24/2018    K 3 8 02/08/2022     02/08/2022    CO2 29 02/08/2022    ANIONGAP 11 04/24/2018    BUN 15 02/08/2022    CREATININE 0 85 02/08/2022    GLUCOSE 85 04/24/2018    GLUF 106 (H) 02/08/2022    CALCIUM 9 8 02/08/2022    AST 22 02/08/2022    ALT 24 02/08/2022    ALKPHOS 81 02/08/2022    EGFR 68 02/08/2022     No results found for: TSH    Patient voiced understanding and agreement in the above discussion  Aware to contact our office with questions/symptoms in the interim

## 2022-04-18 ENCOUNTER — APPOINTMENT (OUTPATIENT)
Dept: RADIATION ONCOLOGY | Facility: CLINIC | Age: 72
End: 2022-04-18
Attending: RADIOLOGY
Payer: MEDICARE

## 2022-04-21 ENCOUNTER — APPOINTMENT (OUTPATIENT)
Dept: LAB | Facility: CLINIC | Age: 72
End: 2022-04-21
Payer: MEDICARE

## 2022-04-21 DIAGNOSIS — E06.4 RADIATION THYROIDITIS: ICD-10-CM

## 2022-04-21 LAB
T4 FREE SERPL-MCNC: 0.86 NG/DL (ref 0.76–1.46)
TSH SERPL DL<=0.05 MIU/L-ACNC: 7.52 UIU/ML (ref 0.45–4.5)

## 2022-04-21 PROCEDURE — 84439 ASSAY OF FREE THYROXINE: CPT

## 2022-04-21 PROCEDURE — 84443 ASSAY THYROID STIM HORMONE: CPT

## 2022-04-21 PROCEDURE — 36415 COLL VENOUS BLD VENIPUNCTURE: CPT

## 2022-04-25 ENCOUNTER — CLINICAL SUPPORT (OUTPATIENT)
Dept: RADIATION ONCOLOGY | Facility: CLINIC | Age: 72
End: 2022-04-25
Attending: RADIOLOGY
Payer: MEDICARE

## 2022-04-25 VITALS
HEIGHT: 66 IN | SYSTOLIC BLOOD PRESSURE: 166 MMHG | HEART RATE: 71 BPM | OXYGEN SATURATION: 98 % | TEMPERATURE: 97.9 F | DIASTOLIC BLOOD PRESSURE: 94 MMHG | WEIGHT: 121.25 LBS | BODY MASS INDEX: 19.49 KG/M2 | RESPIRATION RATE: 16 BRPM

## 2022-04-25 DIAGNOSIS — C01 CANCER OF BASE OF TONGUE (HCC): Primary | ICD-10-CM

## 2022-04-25 PROCEDURE — 99213 OFFICE O/P EST LOW 20 MIN: CPT | Performed by: RADIOLOGY

## 2022-04-25 PROCEDURE — 99211 OFF/OP EST MAY X REQ PHY/QHP: CPT | Performed by: RADIOLOGY

## 2022-04-25 NOTE — PROGRESS NOTES
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 extending into the right tonsillar region who is status post definitive radiation therapy with concurrent chemotherapy which completed 19  She was last seen in radiation on 2021  She returns today for her follow up      Interval History:  2021  Cataract surgery with intraocular lens to right eye     22  Dr Aurelia Shabazz  No obvious recurrence  Continue with current dose synthroid  TSH prior to next visit     22  Dr Rommel Ceballos with mild pain  Weight stable  Drinks boost intermittently  Direct laryngoscopy completed     She reports she is feeling well  Kyle Pederson is eating well and following a regular diet  She states she can eat everything and her taste is normal   She denies any dysphagia    She has no nausea and no vomiting  She has been working at [employer] for 17 years  Kyle Bg worked previously full-time as an  and then was off for about 6 months to receive her treatments   She resumed working part-time on the weekends/3 days per week in the spring of 2020     She retired in 2021  She remains active and exercises 2-3 days per week  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  She lives in her daughter's [address] has been converted to an apartment for her since   She is having no difficulty with her teeth and is seeing her dentist every 3-4 months   She does have fluoride trays   She has not smoked cigarettes since   She has received COVID vaccination       Upcomin22  Mammogram  22     ENT  22 HEM/ONC    Historical Information   Oncology History   Cancer of base of tongue ([facility] [address] )   5/3/2019 Biopsy    Right BOT biopsy  Biopsy + for SCC moderately differentiated,  Incompletely excised          6/10/2019 - 6/30/2019 Chemotherapy    CISplatin (PLATINOL) 174 mg, mannitol 12 5 g in sodium chloride 0 9 % 500 mL IVPB, 100 mg/m2 = 174 mg, Intravenous, Once, 1 of 3 cycles  Administration: 174 mg (6/10/2019)  potassium chloride 20 mEq, magnesium sulfate 1 g in sodium chloride 0 9 % 1,000 mL IVPB, , Intravenous, Once, 1 of 3 cycles  Administration:  (6/10/2019),  (6/10/2019)  (1 cycle only d/c'd due to significant hearing loss after 1 treatment)       6/10/2019 - 7/29/2019 Radiation    Plan ID Energy Fractions Dose per Fraction (cGy) Dose Correction (cGy) Total Dose Delivered (cGy) Elapsed Days   Head and Neck 6X 35 / 35 200 0 7,000 52     Dr Serene Gavin     7/1/2019 - 8/19/2019 Chemotherapy    CARBOplatin (PARAPLATIN) 115 5 mg in sodium chloride 0 9 % 250 mL IVPB, 70 mg/m2 = 115 5 mg (100 % of original dose 70 mg/m2), Intravenous, Once, 2 of 2 cycles  Dose modification: 70 mg/m2 (original dose 70 mg/m2, Cycle 1)  Administration: 115 5 mg (7/1/2019), 115 5 mg (7/2/2019), 115 5 mg (7/3/2019), 115 5 mg (7/5/2019), 115 5 mg (7/31/2019), 115 5 mg (8/1/2019), 115 5 mg (8/2/2019), 115 5 mg (7/30/2019)  (completed 2 cycles)           Past Medical History:   Diagnosis Date    Anemia 7/15/2019    Dehydration 6/17/2019    Disease of thyroid gland     Fatigue 3/27/2019    Hypokalemia 8/9/2019    Tongue cancer ([facility] [address] ) 2019    Squamous cell     Past Surgical History:   Procedure Laterality Date    ADENOIDECTOMY      APPENDECTOMY  1990    CATARACT EXTRACTION Left     HYSTERECTOMY  2012    total    IR PICC LINE  6/5/2019    OOPHORECTOMY Bilateral 2012    MI XCAPSL CTRC RMVL INSJ IO LENS PROSTH W/O ECP Right 8/25/2021    Procedure: CATARACT SURGERY W/INTRAOCULAR LENS;  Surgeon: Holly Sofia MD;  Location:  MAIN OR;  Service: Ophthalmology    TONSILLECTOMY         Social History   Social History     Substance and Sexual Activity   Alcohol Use Yes    Comment: Rarely     Social History     Substance and Sexual Activity   Drug Use Never     Social History     Tobacco Use   Smoking Status Former Smoker    Packs/day: 0 50    Years: 5 00    Pack years: 2 50    Types: Cigarettes    Quit date: 1978    Years since quittin 3   Smokeless Tobacco Former User   Tobacco Comment    no passive smoke exposure     Meds/Allergies     Current Outpatient Medications:     DENTAGEL 1 1 % GEL, AT BED AFTER BRUSHING AND FLOSSING APPLY 1 DROP OF GEL PER TOOTH LEAVE TRAYS IN MOUTH FOR 10 MINUTES, Disp: , Rfl: 11    levothyroxine (Levoxyl) 75 mcg tablet, Take 1 tablet (75 mcg total) by mouth daily, Disp: 30 tablet, Rfl: 5    calcium carbonate-vitamin D (OSCAL-D) 500 mg-200 units per tablet, Take 1 tablet by mouth 2 (two) times a day with meals (Patient not taking: Reported on 2022 ), Disp: 60 tablet, Rfl: 0    Multiple Vitamins-Minerals (PRESERVISION AREDS 2 PO), Take by mouth 2 (two) times a day  (Patient not taking: Reported on 2022 ), Disp: , Rfl:   Allergies   Allergen Reactions    Codeine Anaphylaxis     Difficulty breathing     Review of Systems   Constitutional: Negative  HENT: Negative  Mild dry mouth  Uses biotene   Eyes: Negative  Respiratory: Negative  Cardiovascular: Negative  Gastrointestinal: Negative  Endocrine: Negative  Negative for cold intolerance (Sensitive to the cold)  Genitourinary: Negative  Musculoskeletal: Negative  Negative for neck pain (Arthritis)  Skin: Negative  Allergic/Immunologic: Positive for environmental allergies  Neurological: Negative  Hematological: Negative      Psychiatric/Behavioral: Negative for sleep disturbance       OBJECTIVE:   /94 (BP Location: Left arm, Patient Position: Sitting, Cuff Size: Standard)   Pulse 71   Temp 97 9 °F (36 6 °C) (Temporal)   Resp 16   Ht 5' 6" (1 676 m)   Wt 55 kg (121 lb 4 1 oz)   SpO2 98%   BMI 19 57 kg/m²   Pain Assessment:  0  ECOG/Zubrod/WHO: 0 - Asymptomatic    Physical Exam  Constitutional: She is oriented to person, place, and time  She appears well-developed and well-nourished  No distress  HENT:   Head: Normocephalic and atraumatic  Mouth/Throat: No oropharyngeal exudate    Right base of tongue mass remains resolved without any tongue deviation   Her teeth are in good repair    Eyes: Pupils are equal, round, and reactive to light  Conjunctivae and EOM are normal  No scleral icterus  Neck: Normal range of motion  Neck supple  No tracheal deviation present  No thyromegaly present  Cardiovascular: Normal rate, regular rhythm and normal heart sounds  Pulmonary/Chest: Effort normal and breath sounds normal  No respiratory distress  She has no wheezes  She has no rales  Abdominal: Soft  Bowel sounds are normal  She exhibits no distension and no mass  There is no tenderness  Musculoskeletal: Normal range of motion  She exhibits no edema or tenderness  Lymphadenopathy:     She has no cervical adenopathy     She has no supraclavicular lymphadenopathy     Neurological: She is alert and oriented to person, place, and time  No cranial nerve deficit  Coordination normal    Skin: Skin is warm and dry  No rash noted  She is not diaphoretic  No erythema  No pallor  Psychiatric: She has a normal mood and affect  Her behavior is normal  Judgment and thought content normal    Nursing note and vitals reviewed  RESULTS    Lab Results:   Recent Results (from the past 672 hour(s))   TSH, 3rd generation with Free T4 reflex    Collection Time: 04/21/22 10:21 AM   Result Value Ref Range    TSH 3RD GENERATON 7 520 (H) 0 450 - 4 500 uIU/mL   T4, free    Collection Time: 04/21/22 10:21 AM   Result Value Ref Range    Free T4 0 86 0 76 - 1 46 ng/dL     Imaging Studies:No results found      Assessment/Plan:  No orders of the defined types were placed in this encounter  Leslie Xie is a 66y o  year old female with a locally advanced stage DEON, T2-T3, N2b, 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 extending into the right tonsillar region and toward the right soft palate   Staging PET-CT confirmed disease in the right oral cavity, tonsillar region and right tongue base approaching the level of the vallecula compatible with malignancy  There were multiple right cervical lymph nodes consistent with metastatic disease   There were no metastasis in the chest, abdomen, or pelvis   She understood that due to the extent of her disease, surgical resection was not possible   We recommended definitive radiation therapy with concurrent chemotherapy   She completed treatment on August 3, 2019 and returns today for follow-up      She is doing well  Fanny Hernandez has no significant dysphagia and had minimal mouth dryness   Her taste is good and she is able to eat everything   She continues to gain weight  Fanny Hernandez has no clinical evidence of any recurrent disease   She stop smoking tobacco in 1978    She had repeat PET-CT study October 28, 2019 that showed a significant improvement with good response in the base of tongue region  Natalie Richard was some low-level residual activity that is likely post treatment changes  She has regular follow-ups with Dr Nassar [facility] examination  April 8, 2022  revealed no evidence of disease  Fanny Hernandez has follow-up appointment July 8, 2022 with Dr Pablo Diaz   August 19, 2022 with Dr Farzana Hernandez will return here for follow-up in 12 months  Tyler Samson MD  4/25/2022,9:29 AM    Portions of the record may have been created with voice recognition software   Occasional wrong word or "sound a like" substitutions may have occurred due to the inherent limitations of voice recognition software   Read the chart carefully and recognize, using context, where substitutions have occurred

## 2022-04-25 NOTE — PROGRESS NOTES
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 extending into the right tonsillar region who is status post definitive radiation therapy with concurrent chemotherapy which completed 19  She was last seen in radiation on 2021  She returns today for her follow up     2021  Cataract surgery with intraocular lens to right eye    22  Dr Joel Menard  No obvious recurrence  Continue with current dose synthroid  TSH prior to next visit    22  Dr Ebony Uriarte with mild pain  Weight stable  Drinks boost intermittently  Direct laryngoscopy completed    Upcomin22  Mammogram  22     ENT  22   HEM/ONC            Follow up visit     Oncology History Overview Note   Sara Alvarado is a 66y o  year old female with a history of a 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 extending into the right tonsillar region who is status post definitive radiation therapy with concurrent chemotherapy which completed 19  She was last seen in radiation on 2021  She returns today for her follow up     2021  Cataract surgery with intraocular lens to right eye    22  Dr Joel Menard  No obvious recurrence  Continue with current dose synthroid  TSH prior to next visit    22  Dr Ebony Uriarte with mild pain  Weight stable  Drinks boost intermittently  Continue with synthroid 50 mcg daily d/t radiation thyroiditis  Direct laryngoscopy completed    Upcomin22  Mammogram  22     ENT  22   HEM/ONC           Cancer of base of tongue ([facility] [address] )   5/3/2019 Biopsy    Right BOT biopsy  Biopsy + for SCC moderately differentiated,  Incompletely excised          6/10/2019 - 2019 Chemotherapy    CISplatin (PLATINOL) 174 mg, mannitol 12 5 g in sodium chloride 0 9 % 500 mL IVPB, 100 mg/m2 = 174 mg, Intravenous, Once, 1 of 3 cycles  Administration: 174 mg (6/10/2019)  potassium chloride 20 mEq, magnesium sulfate 1 g in sodium chloride 0 9 % 1,000 mL IVPB, , Intravenous, Once, 1 of 3 cycles  Administration:  (6/10/2019),  (6/10/2019)  (1 cycle only d/c'd due to significant hearing loss after 1 treatment)       6/10/2019 - 7/29/2019 Radiation    Plan ID Energy Fractions Dose per Fraction (cGy) Dose Correction (cGy) Total Dose Delivered (cGy) Elapsed Days   Head and Neck 6X 35 / 35 200 0 7,000 52     Dr Patricia Beckwith     7/1/2019 - 8/19/2019 Chemotherapy    CARBOplatin (PARAPLATIN) 115 5 mg in sodium chloride 0 9 % 250 mL IVPB, 70 mg/m2 = 115 5 mg (100 % of original dose 70 mg/m2), Intravenous, Once, 2 of 2 cycles  Dose modification: 70 mg/m2 (original dose 70 mg/m2, Cycle 1)  Administration: 115 5 mg (7/1/2019), 115 5 mg (7/2/2019), 115 5 mg (7/3/2019), 115 5 mg (7/5/2019), 115 5 mg (7/31/2019), 115 5 mg (8/1/2019), 115 5 mg (8/2/2019), 115 5 mg (7/30/2019)  (completed 2 cycles)           Review of Systems:  Review of Systems   Constitutional: Negative  HENT: Negative  Mild dry mouth  Uses biotene   Eyes: Negative  Respiratory: Negative  Cardiovascular: Negative  Gastrointestinal: Negative  Endocrine: Negative  Negative for cold intolerance (Sensitive to the cold)  Genitourinary: Negative  Musculoskeletal: Negative  Negative for neck pain (Arthritis)  Skin: Negative  Allergic/Immunologic: Positive for environmental allergies  Neurological: Negative  Hematological: Negative  Psychiatric/Behavioral: Negative for sleep disturbance         Clinical Trial: no    Teaching    Covid Vaccine Status: vaccinated x2    Health Maintenance   Topic Date Due    Hepatitis C Screening  Never done   Rebecca Pena Medicare Annual Wellness Visit (AWV)  Never done    COVID-19 Vaccine (3 - Pfizer risk 4-dose series) 03/03/2021    Pneumococcal Vaccine: 65+ Years (2 of 4 - PPSV23) 10/25/2021    Breast Cancer Screening: Mammogram  01/06/2022    Fall Risk 08/30/2022    Colorectal Cancer Screening  09/19/2022    BMI: Adult  04/08/2023    Depression Screening  04/25/2023    DTaP,Tdap,and Td Vaccines (3 - Td or Tdap) 01/17/2029    Osteoporosis Screening  Completed    Influenza Vaccine  Completed    HIB Vaccine  Aged Out    Hepatitis B Vaccine  Aged Out    IPV Vaccine  Aged Out    Hepatitis A Vaccine  Aged Out    Meningococcal ACWY Vaccine  Aged Out    HPV Vaccine  Aged Out     Patient Active Problem List   Diagnosis    Multiple thyroid nodules    Cancer [facility] base of tongue ([facility] [address] )    Thoracic aortic aneurysm ([facility] [address] )    Encounter for chemotherapy management    Oral thrush    Radiation thyroiditis    Hypothyroidism    Vitamin D deficiency    Foot callus    Pre-op examination    Post-menopause    Cataracta    History of hysterectomy    Immunocompromised ([facility] [address] )    Encounter for routine adult physical exam with abnormal findings    Osteopenia of neck of left femur     Past Medical History:   Diagnosis Date    Anemia 7/15/2019    Dehydration 6/17/2019    Disease of thyroid gland     Fatigue 3/27/2019    Hypokalemia 8/9/2019    Tongue cancer ([facility] [address] ) 2019    Squamous cell     Past Surgical History:   Procedure Laterality Date    ADENOIDECTOMY      APPENDECTOMY  1990    CATARACT EXTRACTION Left     HYSTERECTOMY  2012    total    IR PICC LINE  6/5/2019    OOPHORECTOMY Bilateral 2012    OR XCAPSL CTRC RMVL INSJ IO LENS PROSTH W/O ECP Right 8/25/2021    Procedure: CATARACT SURGERY W/INTRAOCULAR LENS;  Surgeon: Nolberto Novoa MD;  Location: [address] [place] [address];  Service: Ophthalmology    TONSILLECTOMY       Family History   Problem Relation Age of Onset    Rheum arthritis Sister     Hypothyroidism Sister     Hashimoto's thyroiditis Family     Prostate cancer Father     Parkinsonism Mother         Cardiac abnormality [facility])    No Known Problems Maternal Grandmother     No Known Problems Maternal Grandfather     No Known Problems Paternal Grandmother  No Known Problems Paternal Grandfather     No Known Problems Paternal Aunt     No Known Problems Paternal Aunt     No Known Problems Paternal Aunt     No Known Problems Paternal Aunt      Social History     Socioeconomic History    Marital status:      Spouse name: Not on file    Number of children: Not on file    Years of education: Not on file    Highest education level: Not on file   Occupational History     Employer:   [employer]   Tobacco Use    Smoking status: Former Smoker     Packs/day: 0 50     Years: 5 00     Pack years: 2 50     Types: Cigarettes     Quit date: 1978     Years since quittin 3    Smokeless tobacco: Former User    Tobacco comment: no passive smoke exposure   Vaping Use    Vaping Use: Never used   Substance and Sexual Activity    Alcohol use: Yes     Alcohol/week: 1 0 standard drink     Types: 1 Glasses of wine per week     Comment: - 21 Pt reports did stop for surgery coming    Drug use: Never    Sexual activity: Not Currently   Other Topics Concern    Not on file   Social History Narrative    Not on file     Social Determinants of Health     Financial Resource Strain: Low Risk     Difficulty of Paying Living Expenses: Not hard at all   Food Insecurity: No Food Insecurity    Worried About Running Out of Food in the Last Year: Never true    [address] in the Last Year: Never true   Transportation Needs: No Transportation Needs    Lack of Transportation (Medical): No    Lack of Transportation (Non-Medical): No   Physical Activity: Sufficiently Active    Days of Exercise per Week: 7 days    Minutes of Exercise per Session: 60 min   Stress: No Stress Concern Present    Feeling of Stress : Not at all   Social Connections:  Moderately Integrated    Frequency of Communication with Friends and Family: More than three times a week    Frequency of Social Gatherings with Friends and Family: More than three times a week    Attends [employer] Services: 1 to 4 times per year    Active Member of Clubs or Organizations:  Yes    Attends Club or Organization Meetings: 1 to 4 times per year    Marital Status: Never    Intimate Partner Violence: Not At Risk    Fear of Current or Ex-Partner: No    Emotionally Abused: No    Physically Abused: No    Sexually Abused: No   Housing Stability: Unknown    Unable to Pay for Housing in the Last Year: No    Number of [place] in the Last Year: Not on file    Unstable Housing in the Last Year: No       Current Outpatient Medications:     DENTAGEL 1 1 % GEL, AT BED AFTER BRUSHING AND FLOSSING APPLY 1 DROP OF GEL PER TOOTH LEAVE TRAYS IN MOUTH FOR 10 MINUTES, Disp: , Rfl: 11    levothyroxine (Levoxyl) 75 mcg tablet, Take 1 tablet (75 mcg total) by mouth daily, Disp: 30 tablet, Rfl: 5    calcium carbonate-vitamin D (OSCAL-D) 500 mg-200 units per tablet, Take 1 tablet by mouth 2 (two) times a day with meals (Patient not taking: Reported on 2/11/2022 ), Disp: 60 tablet, Rfl: 0    Multiple Vitamins-Minerals (PRESERVISION AREDS 2 PO), Take by mouth 2 (two) times a day  (Patient not taking: Reported on 4/8/2022 ), Disp: , Rfl:   Allergies   Allergen Reactions    Codeine Anaphylaxis     Difficulty breathing     Vitals:    04/25/22 0858   BP: 166/94   BP Location: Left arm   Patient Position: Sitting   Cuff Size: Standard   Pulse: 71   Resp: 16   Temp: 97 9 °F (36 6 °C)   TempSrc: Temporal   SpO2: 98%   Weight: 55 kg (121 lb 4 1 oz)   Height: 5' 6" (1 676 m)

## 2022-05-18 ENCOUNTER — HOSPITAL ENCOUNTER (OUTPATIENT)
Dept: MAMMOGRAPHY | Facility: CLINIC | Age: 72
Discharge: HOME | End: 2022-05-18
Payer: MEDICARE

## 2022-05-18 VITALS — BODY MASS INDEX: 19.13 KG/M2 | HEIGHT: 66 IN | WEIGHT: 119 LBS

## 2022-05-18 DIAGNOSIS — Z12.31 ENCOUNTER FOR SCREENING MAMMOGRAM FOR BREAST CANCER: ICD-10-CM

## 2022-05-18 PROCEDURE — 77067 SCR MAMMO BI INCL CAD: CPT

## 2022-05-18 PROCEDURE — 77063 BREAST TOMOSYNTHESIS BI: CPT

## 2022-08-15 ENCOUNTER — APPOINTMENT (OUTPATIENT)
Dept: LAB | Facility: CLINIC | Age: 72
End: 2022-08-15
Payer: MEDICARE

## 2022-08-15 DIAGNOSIS — E03.9 HYPOTHYROIDISM, UNSPECIFIED TYPE: ICD-10-CM

## 2022-08-15 DIAGNOSIS — C01 CANCER OF BASE OF TONGUE (HCC): ICD-10-CM

## 2022-08-15 LAB
ALBUMIN SERPL BCP-MCNC: 3.9 G/DL (ref 3.5–5)
ALP SERPL-CCNC: 93 U/L (ref 46–116)
ALT SERPL W P-5'-P-CCNC: 29 U/L (ref 12–78)
ANION GAP SERPL CALCULATED.3IONS-SCNC: 5 MMOL/L (ref 4–13)
AST SERPL W P-5'-P-CCNC: 25 U/L (ref 5–45)
BASOPHILS # BLD AUTO: 0.05 THOUSANDS/ΜL (ref 0–0.1)
BASOPHILS NFR BLD AUTO: 1 % (ref 0–1)
BILIRUB SERPL-MCNC: 0.49 MG/DL (ref 0.2–1)
BUN SERPL-MCNC: 13 MG/DL (ref 5–25)
CALCIUM SERPL-MCNC: 10 MG/DL (ref 8.3–10.1)
CHLORIDE SERPL-SCNC: 105 MMOL/L (ref 96–108)
CO2 SERPL-SCNC: 29 MMOL/L (ref 21–32)
CREAT SERPL-MCNC: 0.92 MG/DL (ref 0.6–1.3)
EOSINOPHIL # BLD AUTO: 0.13 THOUSAND/ΜL (ref 0–0.61)
EOSINOPHIL NFR BLD AUTO: 2 % (ref 0–6)
ERYTHROCYTE [DISTWIDTH] IN BLOOD BY AUTOMATED COUNT: 11.6 % (ref 11.6–15.1)
GFR SERPL CREATININE-BSD FRML MDRD: 61 ML/MIN/1.73SQ M
GLUCOSE SERPL-MCNC: 118 MG/DL (ref 65–140)
HCT VFR BLD AUTO: 41.9 % (ref 34.8–46.1)
HGB BLD-MCNC: 13.7 G/DL (ref 11.5–15.4)
IMM GRANULOCYTES # BLD AUTO: 0.02 THOUSAND/UL (ref 0–0.2)
IMM GRANULOCYTES NFR BLD AUTO: 0 % (ref 0–2)
LYMPHOCYTES # BLD AUTO: 1.15 THOUSANDS/ΜL (ref 0.6–4.47)
LYMPHOCYTES NFR BLD AUTO: 19 % (ref 14–44)
MAGNESIUM SERPL-MCNC: 2.1 MG/DL (ref 1.6–2.6)
MCH RBC QN AUTO: 32.5 PG (ref 26.8–34.3)
MCHC RBC AUTO-ENTMCNC: 32.7 G/DL (ref 31.4–37.4)
MCV RBC AUTO: 99 FL (ref 82–98)
MONOCYTES # BLD AUTO: 0.6 THOUSAND/ΜL (ref 0.17–1.22)
MONOCYTES NFR BLD AUTO: 10 % (ref 4–12)
NEUTROPHILS # BLD AUTO: 4.09 THOUSANDS/ΜL (ref 1.85–7.62)
NEUTS SEG NFR BLD AUTO: 68 % (ref 43–75)
NRBC BLD AUTO-RTO: 0 /100 WBCS
PLATELET # BLD AUTO: 272 THOUSANDS/UL (ref 149–390)
PMV BLD AUTO: 8.9 FL (ref 8.9–12.7)
POTASSIUM SERPL-SCNC: 3.5 MMOL/L (ref 3.5–5.3)
PROT SERPL-MCNC: 7.6 G/DL (ref 6.4–8.4)
RBC # BLD AUTO: 4.22 MILLION/UL (ref 3.81–5.12)
SODIUM SERPL-SCNC: 139 MMOL/L (ref 135–147)
TSH SERPL DL<=0.05 MIU/L-ACNC: 4.11 UIU/ML (ref 0.45–4.5)
WBC # BLD AUTO: 6.04 THOUSAND/UL (ref 4.31–10.16)

## 2022-08-15 PROCEDURE — 36415 COLL VENOUS BLD VENIPUNCTURE: CPT

## 2022-08-15 PROCEDURE — 80053 COMPREHEN METABOLIC PANEL: CPT

## 2022-08-15 PROCEDURE — 83735 ASSAY OF MAGNESIUM: CPT

## 2022-08-15 PROCEDURE — 84443 ASSAY THYROID STIM HORMONE: CPT

## 2022-08-15 PROCEDURE — 85025 COMPLETE CBC W/AUTO DIFF WBC: CPT

## 2022-08-19 ENCOUNTER — OFFICE VISIT (OUTPATIENT)
Dept: HEMATOLOGY ONCOLOGY | Facility: CLINIC | Age: 72
End: 2022-08-19
Payer: MEDICARE

## 2022-08-19 VITALS
BODY MASS INDEX: 20.15 KG/M2 | RESPIRATION RATE: 18 BRPM | HEIGHT: 66 IN | DIASTOLIC BLOOD PRESSURE: 80 MMHG | WEIGHT: 125.4 LBS | TEMPERATURE: 96.9 F | HEART RATE: 74 BPM | SYSTOLIC BLOOD PRESSURE: 132 MMHG | OXYGEN SATURATION: 97 %

## 2022-08-19 DIAGNOSIS — D53.9 NUTRITIONAL ANEMIA: ICD-10-CM

## 2022-08-19 DIAGNOSIS — E03.9 HYPOTHYROIDISM, UNSPECIFIED TYPE: ICD-10-CM

## 2022-08-19 DIAGNOSIS — C01 CANCER OF BASE OF TONGUE (HCC): Primary | ICD-10-CM

## 2022-08-19 DIAGNOSIS — D75.89 MACROCYTOSIS: ICD-10-CM

## 2022-08-19 PROBLEM — E46 PROTEIN-CALORIE MALNUTRITION, UNSPECIFIED SEVERITY (HCC): Status: ACTIVE | Noted: 2022-08-19

## 2022-08-19 PROCEDURE — 99214 OFFICE O/P EST MOD 30 MIN: CPT | Performed by: NURSE PRACTITIONER

## 2022-08-19 NOTE — PROGRESS NOTES
Hematology/Oncology Outpatient Follow-up  Eleanor Kee 74 y o  female [id number]    Date:  8/19/2022      Assessment and Plan:  1  Cancer of base of tongue [facility])  Patient completed her concurrent chemoradiation August 2019  Does not seem to have any obvious hint of recurrence at this time  We will continue to monitor her closely according to the NCCN guidelines  Encouraged her to continue to follow-up with her ENT provider on a regular basis for laryngoscopy; she was just seen by them last month and will be seeing them again in November  I did encourage her to continue to stay active/exercise on a regular basis, consume a healthy well-balanced diet, limit/avoid alcohol use  Encouraged her to continue to follow-up with her primary care team for routine health maintenance/vaccinations  She is up-to-date with her mammography  Screening colonoscopy is due next month recommended she contact her colorectal surgeon to schedule  - CBC and differential; Future  - Comprehensive metabolic panel; Future  - TSH, 3rd generation with Free T4 reflex; Future    2  Hypothyroidism, unspecified type  - TSH, 3rd generation with Free T4 reflex; Future    3  Macrocytosis  Patient continues to have mild macrocytosis  TSH is normal on levothyroxine  Will check folic acid and vitamin B12  She does admit that she drinks wine on occasion a typically once a week not frequently  - Folate; Future  - Vitamin B12; Future    HPI:  Patient presents today for a follow-up visit  She states that overall she is doing well and has no new complaints  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 but 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; is considering getting a part-time job more so for socialization and some extra money  She was just seen by her ENT provider 07/08/2022 laryngoscopy was done in office with no obvious hint of recurrence/malignant process    She states that she continues to have dry mouth but is 50% improved from what it was prior   She mentions that she occasion has difficulties with pills getting stuck in her throat but overall is doing well without any significant dysphagia  Her most recent laboratory studies from 08/15/2022 showed the higher than average MCV 99 otherwise normal CBC hemoglobin 13 7  CMP is normal   Her TSH is normal 4 11; is taking levothyroxine 75 mcg daily  She had her annual mammogram done recently 05/18/2022 which was read as biRADS2  Is due for her 5 year follow-up colonoscopy next month  Oncology History Overview Note   Patient without significant past medical history  She stated that she had a history of tobacco use and quit many years ago  She also admitted the consuming alcohol around 6 oz per week  Her father had a history of prostate cancer  The patient complained about right-sided throat pain in abnormal feeling with movement of the tongue  She also had right-sided ear pain for about 6 months which was treated with 2 courses of antibiotics without any improvement  She eventually was seen by Dr Magalie Benjamin her ENT doctor who performed extensive evaluation including endoscopic evaluation which revealed a mass in the base of the tongue on the right side  A biopsy from the base of the tongue mass came back 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  The HPV status was negative  The patient then had a PET-CT scan on the 14th May 2019 which showed:  IMPRESSION:  1  Large hypermetabolic mass in the right oral cavity, right tonsillar region and right tongue base, approaching the level of the vallecula compatible with known malignancy  Involvement of the right soft palate is not excluded  2   Multiple hypermetabolic right cervical nodes as above concerning for metastases  3   Asymmetric enlargement of the right thyroid with associated FDG uptake  Further evaluation with ultrasound recommended  4   No hypermetabolic metastases in the chest abdomen pelvis    5  Aneurysmal ascending thoracic aorta measuring 4 2 x 4 3 cm  The estimated clinical stage is T2 N2 M0  Postreatment PET/CT 10/28/2019:  IMPRESSION:  1  Significantly decreased FDG uptake at the right tongue base and right oral cavity compatible with response to treatment  Mild residual FDG uptake remaining at the right tongue base, residual disease is not excluded  2   No FDG avid lymph nodes remaining  3   Persistent asymmetry in the right thyroid gland of uncertain significance, question inflammatory  No underlying nodule seen here on prior anatomic imaging  New FDG uptake in what is likely pyramidal lobe activity just superior to the thyroid   cartilage  Correlate with thyroid function studies for thyroiditis  4  No findings for hypermetabolic metastasis in the chest, abdomen or pelvis  Cancer of base of tongue ([facility] [address] )   5/3/2019 Biopsy    Right BOT biopsy  Biopsy + for SCC moderately differentiated,  Incompletely excised          6/10/2019 - 6/30/2019 Chemotherapy    CISplatin (PLATINOL) 174 mg, mannitol 12 5 g in sodium chloride 0 9 % 500 mL IVPB, 100 mg/m2 = 174 mg, Intravenous, Once, 1 of 3 cycles  Administration: 174 mg (6/10/2019)  potassium chloride 20 mEq, magnesium sulfate 1 g in sodium chloride 0 9 % 1,000 mL IVPB, , Intravenous, Once, 1 of 3 cycles  Administration:  (6/10/2019),  (6/10/2019)  (1 cycle only d/c'd due to significant hearing loss after 1 treatment)       6/10/2019 - 7/29/2019 Radiation    Plan ID Energy Fractions Dose per Fraction (cGy) Dose Correction (cGy) Total Dose Delivered (cGy) Elapsed Days   Head and Neck 6X 35 / 35 200 0 7,000 52     Dr Brenda Gallagher     7/1/2019 - 8/19/2019 Chemotherapy    CARBOplatin (PARAPLATIN) 115 5 mg in sodium chloride 0 9 % 250 mL IVPB, 70 mg/m2 = 115 5 mg (100 % of original dose 70 mg/m2), Intravenous, Once, 2 of 2 cycles  Dose modification: 70 mg/m2 (original dose 70 mg/m2, Cycle 1)  Administration: 115 5 mg (7/1/2019), 115 5 mg (7/2/2019), 115 5 mg (7/3/2019), 115 5 mg (7/5/2019), 115 5 mg (7/31/2019), 115 5 mg (8/1/2019), 115 5 mg (8/2/2019), 115 5 mg (7/30/2019)  (completed 2 cycles)           Interval history:    ROS: Review of Systems   Constitutional: Negative for activity change, appetite change, chills, fatigue, fever and unexpected weight change  HENT: Negative for congestion, mouth sores, nosebleeds, sore throat and trouble swallowing  Eyes: Negative  Respiratory: Negative for cough, chest tightness and shortness of breath  Cardiovascular: Negative for chest pain, palpitations and leg swelling  Gastrointestinal: Negative for abdominal distention, abdominal pain, blood in stool, constipation, diarrhea, nausea and vomiting  Genitourinary: Negative for difficulty urinating, dysuria, frequency, hematuria and urgency  Musculoskeletal: Negative for arthralgias, back pain, gait problem, joint swelling and myalgias  Skin: Negative for color change, pallor and rash  Neurological: Positive for numbness (toes)  Negative for dizziness, weakness, light-headedness and headaches  Hematological: Negative for adenopathy  Does not bruise/bleed easily  Psychiatric/Behavioral: Negative for dysphoric mood and sleep disturbance  The patient is not nervous/anxious          Past Medical History:   Diagnosis Date    Anemia 7/15/2019    Dehydration 6/17/2019    Disease of thyroid gland     Fatigue 3/27/2019    Hypokalemia 8/9/2019    Tongue cancer ([facility] [address] ) 2019    Squamous cell       Past Surgical History:   Procedure Laterality Date    ADENOIDECTOMY      APPENDECTOMY  1990    CATARACT EXTRACTION Left     HYSTERECTOMY  2012    total    IR PICC LINE  6/5/2019    OOPHORECTOMY Bilateral 2012    NC XCAPSL CTRC RMVL INSJ IO LENS PROSTH W/O ECP Right 8/25/2021    Procedure: CATARACT SURGERY W/INTRAOCULAR LENS;  Surgeon: Pamela Patel MD;  Location: [address] [place] [address];  Service: Ophthalmology    TONSILLECTOMY         Social History Socioeconomic History    Marital status:      Spouse name: None    Number of children: None    Years of education: None    Highest education level: None   Occupational History     Employer: [employer]   Tobacco Use    Smoking status: Former Smoker     Packs/day: 0 50     Years: 5 00     Pack years: 2 50     Types: Cigarettes     Quit date: 1978     Years since quittin 6    Smokeless tobacco: Former User    Tobacco comment: no passive smoke exposure   Vaping Use    Vaping Use: Never used   Substance and Sexual Activity    Alcohol use: Yes     Comment: Rarely    Drug use: Never    Sexual activity: Not Currently   Other Topics Concern    None   Social History Narrative    None     Social Determinants of Health     Financial Resource Strain: Not on file   Food Insecurity: Not on file   Transportation Needs: Not on file   Physical Activity: Not on file   Stress: Not on file   Social Connections: Not on file   Intimate Partner Violence: Not on file   Housing Stability: Not on file       Family History   Problem Relation Age of Onset    Parkinsonism Mother         Cardiac abnormality Taylersaul Beckwith)    Prostate cancer Father     Rheum arthritis Sister     Hypothyroidism Sister     No Known Problems Maternal Grandmother     No Known Problems Maternal Grandfather     No Known Problems Paternal Grandmother     No Known Problems Paternal Grandfather     No Known Problems Paternal Aunt     No Known Problems Paternal Aunt     No Known Problems Paternal Aunt     No Known Problems Paternal Aunt     Hashimoto's thyroiditis Family     Breast cancer Neg Hx        Allergies   Allergen Reactions    Codeine Anaphylaxis     Difficulty breathing         Current Outpatient Medications:     levothyroxine (Levoxyl) 75 mcg tablet, Take 1 tablet (75 mcg total) by mouth daily, Disp: 30 tablet, Rfl: 5    calcium carbonate-vitamin D (OSCAL-D) 500 mg-200 units per tablet, Take 1 tablet by mouth 2 (two) times a day with meals (Patient not taking: No sig reported), Disp: 60 tablet, Rfl: 0    DENTAGEL 1 1 % GEL, AT BED AFTER BRUSHING AND FLOSSING APPLY 1 DROP OF GEL PER TOOTH LEAVE TRAYS IN MOUTH FOR 10 MINUTES (Patient not taking: No sig reported), Disp: , Rfl: 11    Multiple Vitamins-Minerals (PRESERVISION AREDS 2 PO), Take by mouth 2 (two) times a day  (Patient not taking: No sig reported), Disp: , Rfl:       Physical Exam:  /80 (BP Location: Left arm, Patient Position: Sitting, Cuff Size: Adult)   Pulse 74   Temp (!) 96 9 °F (36 1 °C) (Tympanic)   Resp 18   Ht 5' 6" (1 676 m)   Wt 56 9 kg (125 lb 6 4 oz)   SpO2 97%   BMI 20 24 kg/m²     Physical Exam  Vitals reviewed  Constitutional:       General: She is not in acute distress  Appearance: She is well-developed  She is not diaphoretic  HENT:      Head: Normocephalic and atraumatic  Eyes:      General: No scleral icterus  Conjunctiva/sclera: Conjunctivae normal       Pupils: Pupils are equal, round, and reactive to light  Neck:      Thyroid: No thyromegaly  Comments: Skin on neck sclerosed due to prior radiation  Cardiovascular:      Rate and Rhythm: Normal rate and regular rhythm  Heart sounds: Normal heart sounds  No murmur heard  Pulmonary:      Effort: Pulmonary effort is normal  No respiratory distress  Breath sounds: Normal breath sounds  Chest:   Breasts:      Right: No axillary adenopathy  Left: No axillary adenopathy  Abdominal:      General: Abdomen is flat  There is no distension  Palpations: Abdomen is soft  There is no hepatomegaly or splenomegaly  Tenderness: There is no abdominal tenderness  Musculoskeletal:         General: No swelling  Normal range of motion  Cervical back: Normal range of motion and neck supple  Lymphadenopathy:      Cervical: No cervical adenopathy  Upper Body:      Right upper body: No axillary adenopathy        Left upper body: No axillary adenopathy  Skin:     General: Skin is warm and dry  Findings: No erythema or rash  Neurological:      General: No focal deficit present  Mental Status: She is alert and oriented to person, place, and time  Psychiatric:         Mood and Affect: Affect normal  Mood is anxious  Behavior: Behavior normal  Behavior is cooperative  Thought Content: Thought content normal          Judgment: Judgment normal            Labs:  Lab Results   Component Value Date    WBC 6 04 08/15/2022    HGB 13 7 08/15/2022    HCT 41 9 08/15/2022    MCV 99 (H) 08/15/2022     08/15/2022     Lab Results   Component Value Date     04/24/2018    K 3 5 08/15/2022     08/15/2022    CO2 29 08/15/2022    ANIONGAP 11 04/24/2018    BUN 13 08/15/2022    CREATININE 0 92 08/15/2022    GLUCOSE 85 04/24/2018    GLUF 106 (H) 02/08/2022    CALCIUM 10 0 08/15/2022    AST 25 08/15/2022    ALT 29 08/15/2022    ALKPHOS 93 08/15/2022    EGFR 61 08/15/2022       Patient voiced understanding and agreement in the above discussion  Aware to contact our office with questions/symptoms in the interim  This note has been generated by voice recognition software system  Therefore, there may be spelling, grammar, and or syntax errors  Please contact if questions arise

## 2023-02-13 ENCOUNTER — HOSPITAL ENCOUNTER (OUTPATIENT)
Dept: RADIOLOGY | Facility: HOSPITAL | Age: 73
Discharge: HOME | End: 2023-02-13

## 2023-02-13 DIAGNOSIS — C01 CANCER OF BASE OF TONGUE (HCC): ICD-10-CM

## 2023-02-16 ENCOUNTER — APPOINTMENT (OUTPATIENT)
Dept: LAB | Facility: CLINIC | Age: 73
End: 2023-02-16

## 2023-02-16 DIAGNOSIS — E03.9 HYPOTHYROIDISM, UNSPECIFIED TYPE: ICD-10-CM

## 2023-02-16 DIAGNOSIS — D53.9 NUTRITIONAL ANEMIA: ICD-10-CM

## 2023-02-16 DIAGNOSIS — D75.89 MACROCYTOSIS: ICD-10-CM

## 2023-02-16 DIAGNOSIS — C01 CANCER OF BASE OF TONGUE (HCC): ICD-10-CM

## 2023-02-16 LAB
ALBUMIN SERPL BCP-MCNC: 3.8 G/DL (ref 3.5–5)
ALP SERPL-CCNC: 72 U/L (ref 46–116)
ALT SERPL W P-5'-P-CCNC: 26 U/L (ref 12–78)
ANION GAP SERPL CALCULATED.3IONS-SCNC: 2 MMOL/L (ref 4–13)
AST SERPL W P-5'-P-CCNC: 26 U/L (ref 5–45)
BASOPHILS # BLD AUTO: 0.06 THOUSANDS/ÂΜL (ref 0–0.1)
BASOPHILS NFR BLD AUTO: 1 % (ref 0–1)
BILIRUB SERPL-MCNC: 0.83 MG/DL (ref 0.2–1)
BUN SERPL-MCNC: 17 MG/DL (ref 5–25)
CALCIUM SERPL-MCNC: 9.4 MG/DL (ref 8.3–10.1)
CHLORIDE SERPL-SCNC: 106 MMOL/L (ref 96–108)
CO2 SERPL-SCNC: 31 MMOL/L (ref 21–32)
CREAT SERPL-MCNC: 0.79 MG/DL (ref 0.6–1.3)
EOSINOPHIL # BLD AUTO: 0.16 THOUSAND/ÂΜL (ref 0–0.61)
EOSINOPHIL NFR BLD AUTO: 3 % (ref 0–6)
ERYTHROCYTE [DISTWIDTH] IN BLOOD BY AUTOMATED COUNT: 12 % (ref 11.6–15.1)
FOLATE SERPL-MCNC: >20 NG/ML (ref 3.1–17.5)
GFR SERPL CREATININE-BSD FRML MDRD: 73 ML/MIN/1.73SQ M
GLUCOSE P FAST SERPL-MCNC: 97 MG/DL (ref 65–99)
HCT VFR BLD AUTO: 42.7 % (ref 34.8–46.1)
HGB BLD-MCNC: 13.2 G/DL (ref 11.5–15.4)
IMM GRANULOCYTES # BLD AUTO: 0.01 THOUSAND/UL (ref 0–0.2)
IMM GRANULOCYTES NFR BLD AUTO: 0 % (ref 0–2)
LYMPHOCYTES # BLD AUTO: 1.22 THOUSANDS/ÂΜL (ref 0.6–4.47)
LYMPHOCYTES NFR BLD AUTO: 22 % (ref 14–44)
MCH RBC QN AUTO: 31.9 PG (ref 26.8–34.3)
MCHC RBC AUTO-ENTMCNC: 30.9 G/DL (ref 31.4–37.4)
MCV RBC AUTO: 103 FL (ref 82–98)
MONOCYTES # BLD AUTO: 0.69 THOUSAND/ÂΜL (ref 0.17–1.22)
MONOCYTES NFR BLD AUTO: 13 % (ref 4–12)
NEUTROPHILS # BLD AUTO: 3.33 THOUSANDS/ÂΜL (ref 1.85–7.62)
NEUTS SEG NFR BLD AUTO: 61 % (ref 43–75)
NRBC BLD AUTO-RTO: 0 /100 WBCS
PLATELET # BLD AUTO: 268 THOUSANDS/UL (ref 149–390)
PMV BLD AUTO: 9.2 FL (ref 8.9–12.7)
POTASSIUM SERPL-SCNC: 4 MMOL/L (ref 3.5–5.3)
PROT SERPL-MCNC: 7 G/DL (ref 6.4–8.4)
RBC # BLD AUTO: 4.14 MILLION/UL (ref 3.81–5.12)
SODIUM SERPL-SCNC: 139 MMOL/L (ref 135–147)
TSH SERPL DL<=0.05 MIU/L-ACNC: 2.37 UIU/ML (ref 0.45–4.5)
VIT B12 SERPL-MCNC: 789 PG/ML (ref 100–900)
WBC # BLD AUTO: 5.47 THOUSAND/UL (ref 4.31–10.16)

## 2023-02-20 ENCOUNTER — OFFICE VISIT (OUTPATIENT)
Dept: HEMATOLOGY ONCOLOGY | Facility: CLINIC | Age: 73
End: 2023-02-20

## 2023-02-20 VITALS
HEART RATE: 67 BPM | HEIGHT: 66 IN | BODY MASS INDEX: 19.93 KG/M2 | RESPIRATION RATE: 17 BRPM | DIASTOLIC BLOOD PRESSURE: 82 MMHG | WEIGHT: 124 LBS | OXYGEN SATURATION: 97 % | SYSTOLIC BLOOD PRESSURE: 140 MMHG | TEMPERATURE: 97.2 F

## 2023-02-20 DIAGNOSIS — C01 CANCER OF BASE OF TONGUE (HCC): Primary | ICD-10-CM

## 2023-02-20 DIAGNOSIS — D75.89 MACROCYTOSIS: ICD-10-CM

## 2023-02-20 DIAGNOSIS — E03.9 HYPOTHYROIDISM, UNSPECIFIED TYPE: ICD-10-CM

## 2023-02-20 NOTE — PROGRESS NOTES
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 according to the laryngoscopy which was done last month by the ENT team   We will see the patient again in 6 months from now for close follow-up  She is to follow-up with the ENT team and the radiation oncology team as suggested  - CBC and differential; Future  - Comprehensive metabolic panel; Future  - Magnesium; Future  - TSH, 3rd generation with Free T4 reflex; Future    2  Macrocytosis  Recent vitamin B12 and folate are within normal range  - CBC and differential; Future  - TSH, 3rd generation with Free T4 reflex; Future    3  Hypothyroidism, unspecified type  She is on levothyroxine  Recent TSH was within normal range   - TSH, 3rd generation with Free T4 reflex; Future        HPI:  The patient came today for follow-up visit  She was recently seen by the ENT team who did an endoscopy in January which was negative for any obvious hint of recurrence of her base of the tongue squamous cell carcinoma  She also had a chest x-ray on 2/13/2023 which was negative for any active abnormality  Blood work on 2/16/2023 showed hemoglobin of 13 2 with normal white cells and platelets  MCV was 103  Creatinine 0 7 with normal calcium and liver enzymes  TSH was 2 3  Folate more than 20  Vitamin B12 789  Oncology History Overview Note   Patient without significant past medical history  She stated that she had a history of tobacco use and quit many years ago  She also admitted the consuming alcohol around 6 oz per week  Her father had a history of prostate cancer       The patient complained about right-sided throat pain in abnormal feeling with movement of the tongue  She also had right-sided ear pain for about 6 months which was treated with 2 courses of antibiotics without any improvement  She eventually was seen by Dr Douglas Argueta her ENT doctor who performed extensive evaluation including endoscopic evaluation which revealed a mass in the base of the tongue on the right side  A biopsy from the base of the tongue mass came back 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  The HPV status was negative  The patient then had a PET-CT scan on the 14th May 2019 which showed:  IMPRESSION:  1  Large hypermetabolic mass in the right oral cavity, right tonsillar region and right tongue base, approaching the level of the vallecula compatible with known malignancy  Involvement of the right soft palate is not excluded  2   Multiple hypermetabolic right cervical nodes as above concerning for metastases  3   Asymmetric enlargement of the right thyroid with associated FDG uptake  Further evaluation with ultrasound recommended  4   No hypermetabolic metastases in the chest abdomen pelvis  5   Aneurysmal ascending thoracic aorta measuring 4 2 x 4 3 cm  The estimated clinical stage is T2 N2 M0  Postreatment PET/CT 10/28/2019:  IMPRESSION:  1  Significantly decreased FDG uptake at the right tongue base and right oral cavity compatible with response to treatment  Mild residual FDG uptake remaining at the right tongue base, residual disease is not excluded  2   No FDG avid lymph nodes remaining  3   Persistent asymmetry in the right thyroid gland of uncertain significance, question inflammatory  No underlying nodule seen here on prior anatomic imaging  New FDG uptake in what is likely pyramidal lobe activity just superior to the thyroid   cartilage  Correlate with thyroid function studies for thyroiditis  4  No findings for hypermetabolic metastasis in the chest, abdomen or pelvis       Cancer of base of tongue ([address] )   5/3/2019 Biopsy    Right BOT biopsy  Biopsy + for SCC moderately differentiated,  Incompletely excised  6/10/2019 - 6/30/2019 Chemotherapy    CISplatin (PLATINOL) 174 mg, mannitol 12 5 g in sodium chloride 0 9 % 500 mL IVPB, 100 mg/m2 = 174 mg, Intravenous, Once, 1 of 3 cycles  Administration: 174 mg (6/10/2019)  potassium chloride 20 mEq, magnesium sulfate 1 g in sodium chloride 0 9 % 1,000 mL IVPB, , Intravenous, Once, 1 of 3 cycles  Administration:  (6/10/2019),  (6/10/2019)  (1 cycle only d/c'd due to significant hearing loss after 1 treatment)       6/10/2019 - 7/29/2019 Radiation    Plan ID Energy Fractions Dose per Fraction (cGy) Dose Correction (cGy) Total Dose Delivered (cGy) Elapsed Days   Head and Neck 6X 35 / 35 200 0 7,000 52     Dr Reynaldo Raymond     7/1/2019 - 8/19/2019 Chemotherapy    CARBOplatin (PARAPLATIN) 115 5 mg in sodium chloride 0 9 % 250 mL IVPB, 70 mg/m2 = 115 5 mg (100 % of original dose 70 mg/m2), Intravenous, Once, 2 of 2 cycles  Dose modification: 70 mg/m2 (original dose 70 mg/m2, Cycle 1)  Administration: 115 5 mg (7/1/2019), 115 5 mg (7/2/2019), 115 5 mg (7/3/2019), 115 5 mg (7/5/2019), 115 5 mg (7/31/2019), 115 5 mg (8/1/2019), 115 5 mg (8/2/2019), 115 5 mg (7/30/2019)  (completed 2 cycles)           Interval history:    ROS: Review of Systems   Constitutional: Negative for chills and fever  HENT: Negative for ear pain and sore throat  Eyes: Negative for pain and visual disturbance  Respiratory: Negative for cough and shortness of breath  Cardiovascular: Negative for chest pain and palpitations  Gastrointestinal: Negative for abdominal pain and vomiting  Genitourinary: Negative for dysuria and hematuria  Musculoskeletal: Negative for arthralgias and back pain  Skin: Negative for color change and rash  Neurological: Negative for seizures and syncope  All other systems reviewed and are negative        Past Medical History:   Diagnosis Date   • Anemia 7/15/2019   • Dehydration 6/17/2019 • Disease of thyroid gland    • Fatigue 3/27/2019   • Hypokalemia 2019   • Tongue cancer ([facility] [address] ) 2019    Squamous cell       Past Surgical History:   Procedure Laterality Date   • ADENOIDECTOMY     • APPENDECTOMY     • CATARACT EXTRACTION Left    • HYSTERECTOMY  2012    total   • IR PICC LINE  2019   • OOPHORECTOMY Bilateral    • SD XCAPSL CTRC RMVL INSJ IO LENS PROSTH W/O ECP Right 2021    Procedure: CATARACT SURGERY W/INTRAOCULAR LENS;  Surgeon: Vidhya Church MD;  Location: [address] [place] [address] MAIN OR;  Service: Ophthalmology   • TONSILLECTOMY         Social History     Socioeconomic History   • Marital status:      Spouse name: None   • Number of children: None   • Years of education: None   • Highest education level: None   Occupational History     Employer: [employer]   Tobacco Use   • Smoking status: Former     Packs/day: 0 50     Years: 5 00     Pack years: 2 50     Types: Cigarettes     Quit date: 1978     Years since quittin 1   • Smokeless tobacco: Former   • Tobacco comments:     no passive smoke exposure   Vaping Use   • Vaping Use: Never used   Substance and Sexual Activity   • Alcohol use: Yes     Comment: Rarely   • Drug use: Never   • Sexual activity: Not Currently   Other Topics Concern   • None   Social History Narrative   • None     Social Determinants of Health     Financial Resource Strain: Not on file   Food Insecurity: Not on file   Transportation Needs: Not on file   Physical Activity: Not on file   Stress: Not on file   Social Connections: Not on file   Intimate Partner Violence: Not on file   Housing Stability: Not on file       Family History   Problem Relation Age of Onset   • Parkinsonism Mother         Cardiac abnormality [facility])   • Prostate cancer Father    • Rheum arthritis Sister    • Hypothyroidism Sister    • No Known Problems Maternal Grandmother    • No Known Problems Maternal Grandfather    • No Known Problems Paternal Grandmother    • No Known Problems Paternal Grandfather    • No Known Problems Paternal Aunt    • No Known Problems Paternal Aunt    • No Known Problems Paternal Aunt    • No Known Problems Paternal Aunt    • Hashimoto's thyroiditis Family    • Breast cancer Neg Hx        Allergies   Allergen Reactions   • Codeine Anaphylaxis     Difficulty breathing         Current Outpatient Medications:   •  DENTAGEL 1 1 % GEL, , Disp: , Rfl: 11  •  fluticasone (FLONASE) 50 mcg/act nasal spray, 2 sprays into each nostril 2 (two) times a day, Disp: 16 g, Rfl: 3  •  levothyroxine (Levoxyl) 75 mcg tablet, Take 1 tablet (75 mcg total) by mouth daily, Disp: 30 tablet, Rfl: 5  •  Multiple Vitamins-Minerals (PRESERVISION AREDS 2 PO), Take by mouth 2 (two) times a day, Disp: , Rfl:   •  VITAMIN D, CHOLECALCIFEROL, PO, Take by mouth, Disp: , Rfl:   •  calcium carbonate-vitamin D (OSCAL-D) 500 mg-200 units per tablet, Take 1 tablet by mouth 2 (two) times a day with meals (Patient not taking: Reported on 12/28/2022), Disp: 60 tablet, Rfl: 0      Physical Exam:  /82 (BP Location: Left arm, Patient Position: Sitting, Cuff Size: Adult)   Pulse 67   Temp (!) 97 2 °F (36 2 °C)   Resp 17   Ht 5' 6" (1 676 m)   Wt 56 2 kg (124 lb)   SpO2 97%   BMI 20 01 kg/m²     Physical Exam  Constitutional:       General: She is not in acute distress  Appearance: She is well-developed  She is not diaphoretic  HENT:      Head: Normocephalic and atraumatic  Nose: Nose normal    Eyes:      General: No scleral icterus  Right eye: No discharge  Left eye: No discharge  Conjunctiva/sclera: Conjunctivae normal       Pupils: Pupils are equal, round, and reactive to light  Neck:      Thyroid: No thyromegaly  Vascular: No JVD  Trachea: No tracheal deviation  Cardiovascular:      Rate and Rhythm: Normal rate and regular rhythm  Heart sounds: Normal heart sounds  No murmur heard  No friction rub     Pulmonary:      Effort: Pulmonary effort is normal  No respiratory distress  Breath sounds: Normal breath sounds  No stridor  No wheezing or rales  Chest:      Chest wall: No tenderness  Abdominal:      General: Bowel sounds are normal  There is no distension  Palpations: Abdomen is soft  There is no hepatomegaly, splenomegaly or mass  Tenderness: There is no abdominal tenderness  There is no guarding or rebound  Musculoskeletal:         General: No tenderness or deformity  Normal range of motion  Cervical back: Normal range of motion and neck supple  Lymphadenopathy:      Cervical: No cervical adenopathy  Skin:     General: Skin is warm and dry  Coloration: Skin is not pale  Findings: Rash (Skin changes in the neck region due to chronic sequela from radiation ) present  No erythema  Neurological:      Mental Status: She is alert and oriented to person, place, and time  Cranial Nerves: No cranial nerve deficit  Coordination: Coordination normal       Deep Tendon Reflexes: Reflexes are normal and symmetric  Psychiatric:         Behavior: Behavior normal          Thought Content: Thought content normal          Judgment: Judgment normal            Labs:  Lab Results   Component Value Date    WBC 5 47 02/16/2023    HGB 13 2 02/16/2023    HCT 42 7 02/16/2023     (H) 02/16/2023     02/16/2023     Lab Results   Component Value Date     04/24/2018    K 4 0 02/16/2023     02/16/2023    CO2 31 02/16/2023    ANIONGAP 11 04/24/2018    BUN 17 02/16/2023    CREATININE 0 79 02/16/2023    GLUCOSE 85 04/24/2018    GLUF 97 02/16/2023    CALCIUM 9 4 02/16/2023    AST 26 02/16/2023    ALT 26 02/16/2023    ALKPHOS 72 02/16/2023    EGFR 73 02/16/2023     No results found for: TSH    Patient voiced understanding and agreement in the above discussion  Aware to contact our office with questions/symptoms in the interim

## 2023-02-21 ENCOUNTER — TELEPHONE (OUTPATIENT)
Dept: FAMILY MEDICINE CLINIC | Facility: CLINIC | Age: 73
End: 2023-02-21

## 2023-04-04 ENCOUNTER — TELEPHONE (OUTPATIENT)
Dept: FAMILY MEDICINE CLINIC | Facility: CLINIC | Age: 73
End: 2023-04-04

## 2023-04-07 ENCOUNTER — TELEPHONE (OUTPATIENT)
Dept: FAMILY MEDICINE CLINIC | Facility: CLINIC | Age: 73
End: 2023-04-07

## 2023-04-07 NOTE — TELEPHONE ENCOUNTER
----- Message from Eve Dubin sent at 3/24/2023  3:09 PM EDT -----    ----- Message -----  From: Marcia Aponte MD  Sent: 3/17/2023   9:33 AM EDT  To: Qi Dubin    Patient is due for medicare exam ,is she still our patient

## 2023-04-26 ENCOUNTER — CLINICAL SUPPORT (OUTPATIENT)
Dept: RADIATION ONCOLOGY | Facility: CLINIC | Age: 73
End: 2023-04-26
Attending: RADIOLOGY

## 2023-04-26 ENCOUNTER — RADIATION ONCOLOGY FOLLOW-UP (OUTPATIENT)
Dept: RADIATION ONCOLOGY | Facility: CLINIC | Age: 73
End: 2023-04-26
Attending: RADIOLOGY
Payer: MEDICARE

## 2023-04-26 VITALS
DIASTOLIC BLOOD PRESSURE: 90 MMHG | TEMPERATURE: 97.8 F | HEART RATE: 75 BPM | SYSTOLIC BLOOD PRESSURE: 168 MMHG | WEIGHT: 124.56 LBS | HEIGHT: 66 IN | BODY MASS INDEX: 20.02 KG/M2 | OXYGEN SATURATION: 99 %

## 2023-04-26 DIAGNOSIS — C01 CANCER OF BASE OF TONGUE (HCC): Primary | ICD-10-CM

## 2023-04-26 PROCEDURE — 99211 OFF/OP EST MAY X REQ PHY/QHP: CPT | Performed by: RADIOLOGY

## 2023-04-26 NOTE — PROGRESS NOTES
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 extending into the right tonsillar region who is status post definitive radiation therapy with concurrent chemotherapy which completed 7/29/19  She was last seen in radiation on 04/25/2022  She returns today for her follow up  Interval History:   1/23/23  Dr Jitendra Velasquez, Otolaryngology       Procedure Notes: Scope passed through the Left nasal cavity       Nasopharyngeal soft tissues normal  Eustachian tubes clear bilaterally   Base of tongue normal lingual tonsillar tissue  Valleculae clear without cysts  Posterior pharyngeal wall normal       Larynx    Epiglottis normal  Supraglottic larynx normal without mucosal lesions  Including false cords and ventricle  Right aryepiglottic fold normal  Left aryepiglottic fold normal   Right arytenoid normal  Left arytenoid normal  Right vocal cord normal  Left vocal cord normal  Normal vocal cord mobility  Subglottis normal  Inter arytenoid and post cricoid regions clear without inflammation or mucosal lesions    Hypopharynx  Pyriorm sinuses clear without secretions or lesions       2/20/23  Dr Lavon Rodriguez  Recent endoscopy shows no recurrence  Recent CXR negative     She reports she is feeling well  Josh Zhao is eating well and following a regular diet  She states she can eat everything and her taste is normal   She denies any dysphagia    She has no nausea and no vomiting   She reports less dryness in the mouth  She continues to drink fluids and water frequently  Her weight has been stable    She worked at [employer] [place] [employer]  Josh Zhao worked previously full-time as an  and then was off for about 6 months to receive her treatments   She resumed working part-time on the weekends/3 days per week in the spring of 2020     She retired in 2021  She remains active and exercises 2-3 days per week  She walks 3 to 5 miles daily  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  She lives in her daughter's [address] [place] has been converted to an apartment for her since   She is having no difficulty with her teeth and is seeing her dentist every 6 months   She does have fluoride trays   She has not smoked cigarettes since         Upcomin23    ENT Dr Elham Garcia  23  Hem/Onc    Historical Information   Oncology History   Cancer of base of tongue ([facility] [address] )   5/3/2019 Biopsy    Right BOT biopsy  Biopsy + for SCC moderately differentiated,  Incompletely excised          6/10/2019 - 2019 Chemotherapy    CISplatin (PLATINOL) 174 mg, mannitol 12 5 g in sodium chloride 0 9 % 500 mL IVPB, 100 mg/m2 = 174 mg, Intravenous, Once, 1 of 3 cycles  Administration: 174 mg (6/10/2019)  potassium chloride 20 mEq, magnesium sulfate 1 g in sodium chloride 0 9 % 1,000 mL IVPB, , Intravenous, Once, 1 of 3 cycles  Administration:  (6/10/2019),  (6/10/2019)  (1 cycle only d/c'd due to significant hearing loss after 1 treatment)       6/10/2019 - 2019 Radiation    Plan ID Energy Fractions Dose per Fraction (cGy) Dose Correction (cGy) Total Dose Delivered (cGy) Elapsed Days   Head and Neck 6X 35 / 35 200 0 7,000 52     Dr Ruben Iverson     2019 - 2019 Chemotherapy    CARBOplatin (PARAPLATIN) 115 5 mg in sodium chloride 0 9 % 250 mL IVPB, 70 mg/m2 = 115 5 mg (100 % of original dose 70 mg/m2), Intravenous, Once, 2 of 2 cycles  Dose modification: 70 mg/m2 (original dose 70 mg/m2, Cycle 1)  Administration: 115 5 mg (2019), 115 5 mg (2019), 115 5 mg (7/3/2019), 115 5 mg (2019), 115 5 mg (2019), 115 5 mg (2019), 115 5 mg (2019), 115 5 mg (2019)  (completed 2 cycles)           Past Medical History:   Diagnosis Date   • Anemia 7/15/2019   • Dehydration 2019   • Disease of thyroid gland    • Fatigue 3/27/2019   • Hypokalemia 2019   • Tongue cancer ([facility] [address] ) 2019    Squamous cell     Past Surgical History:   Procedure Laterality Date   • ADENOIDECTOMY     • APPENDECTOMY     • CATARACT EXTRACTION Left    • HYSTERECTOMY  2012    total   • IR PICC LINE  2019   • OOPHORECTOMY Bilateral    • IA XCAPSL CTRC RMVL INSJ IO LENS PROSTH W/O ECP Right 2021    Procedure: CATARACT SURGERY W/INTRAOCULAR LENS;  Surgeon: Anni Saucedo MD;  Location: [address] [place] [address];  Service: Ophthalmology   • TONSILLECTOMY       Social History   Social History     Substance and Sexual Activity   Alcohol Use Yes    Comment: Rarely     Social History     Substance and Sexual Activity   Drug Use Never     Social History     Tobacco Use   Smoking Status Former   • Packs/day: 0 50   • Years: 5 00   • Pack years: 2 50   • Types: Cigarettes   • Quit date: 1978   • Years since quittin 3   Smokeless Tobacco Never   Tobacco Comments    no passive smoke exposure     Meds/Allergies     Current Outpatient Medications:   •  DENTAGEL 1 1 % GEL, , Disp: , Rfl: 11  •  levothyroxine (Levoxyl) 75 mcg tablet, Take 1 tablet (75 mcg total) by mouth daily, Disp: 30 tablet, Rfl: 5  •  Multiple Vitamins-Minerals (PRESERVISION AREDS 2 PO), Take by mouth 2 (two) times a day, Disp: , Rfl:   •  VITAMIN D, CHOLECALCIFEROL, PO, Take by mouth, Disp: , Rfl:   •  calcium carbonate-vitamin D (OSCAL-D) 500 mg-200 units per tablet, Take 1 tablet by mouth 2 (two) times a day with meals (Patient not taking: Reported on 2022), Disp: 60 tablet, Rfl: 0  •  fluticasone (FLONASE) 50 mcg/act nasal spray, 2 sprays into each nostril 2 (two) times a day (Patient not taking: Reported on 2023), Disp: 16 g, Rfl: 3  Allergies   Allergen Reactions   • Codeine Anaphylaxis "Difficulty breathing     Review of Systems  Constitutional: Negative  HENT: Negative  Mild dry mouth, sips on water   Eyes: Negative  Prior cataract surgery  Respiratory: Negative  Cardiovascular: Negative  Gastrointestinal: Negative  Endocrine: Negative  Negative for cold intolerance (Sensitive to the cold)  Genitourinary: Negative  Musculoskeletal: Negative  Negative for neck pain  Sees chiropractor monthly   Skin: Negative  Allergic/Immunologic: Positive for environmental allergies  Negative for food allergies  Neurological: Negative  Hematological: Negative  Psychiatric/Behavioral: Negative for sleep disturbance       OBJECTIVE:   /90 (BP Location: Left arm, Patient Position: Sitting, Cuff Size: Standard)   Pulse 75   Temp 97 8 °F (36 6 °C) (Temporal)   Ht 5' 6\" (1 676 m)   Wt 56 5 kg (124 lb 9 oz)   SpO2 99%   BMI 20 10 kg/m²   Pain Assessment:  0  ECOG/Zubrod/WHO: 0 - Asymptomatic    Physical Exam   Constitutional: She is oriented to person, place, and time  She appears well-developed and well-nourished  No distress  HENT:   Head: Normocephalic and atraumatic  Mouth/Throat: No oropharyngeal exudate    Right base of tongue mass remains resolved without any tongue deviation   Her teeth are in good repair    Eyes: Pupils are equal, round, and reactive to light  Conjunctivae and EOM are normal  No scleral icterus  Neck: Normal range of motion  Neck supple  No tracheal deviation present  No thyromegaly present  Cardiovascular: Normal rate, regular rhythm and normal heart sounds  Pulmonary/Chest: Effort normal and breath sounds normal  No respiratory distress  She has no wheezes  She has no rales  Abdominal: Soft  Bowel sounds are normal  She exhibits no distension and no mass  There is no tenderness  Musculoskeletal: Normal range of motion  She exhibits no edema or tenderness     Lymphadenopathy:     She has no cervical adenopathy     She " has no supraclavicular lymphadenopathy     Neurological: She is alert and oriented to person, place, and time  No cranial nerve deficit  Coordination normal    Skin: Skin is warm and dry  No rash noted  She is not diaphoretic  No erythema  No pallor  Psychiatric: She has a normal mood and affect  Her behavior is normal  Judgment and thought content normal    Nursing note and vitals reviewed  RESULTS    Lab Results: No results found for this or any previous visit (from the past 672 hour(s))  Imaging Studies:No results found  Assessment/Plan:  No orders of the defined types were placed in this encounter  Kellie Young is a 74y o  year old female with a locally advanced stage DEON, T2-T3, N2b, 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 extending into the right tonsillar region and toward the right soft palate   Staging PET-CT confirmed disease in the right oral cavity, tonsillar region and right tongue base approaching the level of the vallecula compatible with malignancy  There were multiple right cervical lymph nodes consistent with metastatic disease   There were no metastasis in the chest, abdomen, or pelvis   She understood that due to the extent of her disease, surgical resection was not possible   We recommended definitive radiation therapy with concurrent chemotherapy   She completed treatment on August 3, 2019 and returns today for follow-up      She is doing well  Gustavo Dickens has no significant dysphagia and had minimal mouth dryness   Her taste is good and she is able to eat everything   She has stable weight  Gustavo Dickens has no clinical evidence of any recurrent disease   She stop smoking tobacco in 1978   She had repeat PET-CT study October 28, 2019 that showed a significant improvement with good response in the base of tongue region  Rafasin Valadez was some low-level residual activity that is likely post treatment changes   She has regular follow-ups with Dr Faby Chaidez examination on January 23, "2023 revealed no evidence of disease   She has follow-up appointment June 9, 2023 with Dr Quinten Matos August 21, 2023 with Dr Farzana Parson will return here for follow-up in 12 months  Marycruz Valenzuela MD  5/31/9166,8:49 PM    Portions of the record may have been created with voice recognition software   Occasional wrong word or \"sound a like\" substitutions may have occurred due to the inherent limitations of voice recognition software   Read the chart carefully and recognize, using context, where substitutions have occurred        "

## 2023-04-26 NOTE — PROGRESS NOTES
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 extending into the right tonsillar region who is status post definitive radiation therapy with concurrent chemotherapy which completed 19  She was last seen in radiation on 2022  She returns today for her follow up     23  Dr Samy Jade, Otolaryngology      Procedure Notes: Scope passed through the Left nasal cavity       Nasopharyngeal soft tissues normal  Eustachian tubes clear bilaterally   Base of tongue normal lingual tonsillar tissue  Valleculae clear without cysts  Posterior pharyngeal wall normal       Larynx    Epiglottis normal  Supraglottic larynx normal without mucosal lesions  Including false cords and ventricle  Right aryepiglottic fold normal  Left aryepiglottic fold normal   Right arytenoid normal  Left arytenoid normal  Right vocal cord normal  Left vocal cord normal  Normal vocal cord mobility  Subglottis normal  Inter arytenoid and post cricoid regions clear without inflammation or mucosal lesions    Hypopharynx  Pyriorm sinuses clear without secretions or lesions      23  Dr Simon Moore  Recent endoscopy shows no recurrence  Recent CXR negative    Upcomin23    ENT  23  Hem/Onc      Follow up visit     Oncology History   Cancer of base of tongue ([facility] [address] )   5/3/2019 Biopsy    Right BOT biopsy  Biopsy + for SCC moderately differentiated,  Incompletely excised          6/10/2019 - 2019 Chemotherapy    CISplatin (PLATINOL) 174 mg, mannitol 12 5 g in sodium chloride 0 9 % 500 mL IVPB, 100 mg/m2 = 174 mg, Intravenous, Once, 1 of 3 cycles  Administration: 174 mg (6/10/2019)  potassium chloride 20 mEq, magnesium sulfate 1 g in sodium chloride 0 9 % 1,000 mL IVPB, , Intravenous, Once, 1 of 3 cycles  Administration:  (6/10/2019),  (6/10/2019)  (1 cycle only d/c'd due to significant hearing loss after 1 treatment)       6/10/2019 - 2019 Radiation    Plan ID Energy Fractions Dose per Fraction (cGy) Dose Correction (cGy) Total Dose Delivered (cGy) Elapsed Days   Head and Neck 6X 35 / 35 200 0 7,000 52     Dr Carolina Coleman     7/1/2019 - 8/19/2019 Chemotherapy    CARBOplatin (PARAPLATIN) 115 5 mg in sodium chloride 0 9 % 250 mL IVPB, 70 mg/m2 = 115 5 mg (100 % of original dose 70 mg/m2), Intravenous, Once, 2 of 2 cycles  Dose modification: 70 mg/m2 (original dose 70 mg/m2, Cycle 1)  Administration: 115 5 mg (7/1/2019), 115 5 mg (7/2/2019), 115 5 mg (7/3/2019), 115 5 mg (7/5/2019), 115 5 mg (7/31/2019), 115 5 mg (8/1/2019), 115 5 mg (8/2/2019), 115 5 mg (7/30/2019)  (completed 2 cycles)           Review of Systems:  Review of Systems   Constitutional: Negative  HENT: Negative  Mild dry mouth, sips on water   Eyes: Negative  Prior cataract surgery     Respiratory: Negative  Cardiovascular: Negative  Gastrointestinal: Negative  Endocrine: Negative  Negative for cold intolerance (Sensitive to the cold)  Genitourinary: Negative  Musculoskeletal: Negative  Negative for neck pain  Sees chiropractor monthly   Skin: Negative  Allergic/Immunologic: Positive for environmental allergies  Negative for food allergies  Neurological: Negative  Hematological: Negative  Psychiatric/Behavioral: Negative for sleep disturbance         Clinical Trial: no    Teaching Completed    Health Maintenance   Topic Date Due   • Hepatitis C Screening  Never done   • Medicare Annual Wellness Visit (AWV)  Never done   • COVID-19 Vaccine (3 - Pfizer risk series) 03/03/2021   • Fall Risk  08/30/2022   • Urinary Incontinence Screening  08/30/2022   • Pneumococcal Vaccine: 65+ Years (2 - PPSV23 if available, else PCV20) 08/30/2022   • Colorectal Cancer Screening  09/19/2022   • Depression Screening  04/25/2023   • Breast Cancer Screening: Mammogram  05/18/2023   • BMI: Adult  02/20/2024   • Osteoporosis Screening  Completed   • Influenza Vaccine  Completed   • HIB Vaccine  Aged Out   • IPV Vaccine  Aged Out   • Hepatitis A Vaccine  Aged Out   • Meningococcal ACWY Vaccine  Aged Out   • HPV Vaccine  Aged Out     Patient Active Problem List   Diagnosis   • Multiple thyroid nodules   • Cancer of base of tongue ([facility] [address] )   • Thoracic aortic aneurysm ([facility] [address] )   • Encounter for chemotherapy management   • Oral thrush   • Radiation thyroiditis   • Hypothyroidism   • Vitamin D deficiency   • Foot callus   • Pre-op examination   • Post-menopause   • Cataracta   • History of hysterectomy   • Immunocompromised ([facility] [address] )   • Encounter for routine adult physical exam with abnormal findings   • Osteopenia of neck of left femur   • Protein-calorie malnutrition, unspecified severity ([facility] [address] )     Past Medical History:   Diagnosis Date   • Anemia 7/15/2019   • Dehydration 6/17/2019   • Disease of thyroid gland    • Fatigue 3/27/2019   • Hypokalemia 8/9/2019   • Tongue cancer ([facility] [address] ) 2019    Squamous cell     Past Surgical History:   Procedure Laterality Date   • ADENOIDECTOMY     • APPENDECTOMY  1990   • CATARACT EXTRACTION Left    • HYSTERECTOMY  2012    total   • IR PICC LINE  6/5/2019   • OOPHORECTOMY Bilateral 2012   • PA XCAPSL CTRC RMVL INSJ IO LENS PROSTH W/O ECP Right 8/25/2021    Procedure: CATARACT SURGERY W/INTRAOCULAR LENS;  Surgeon: Anni Saucedo MD;  Location: [address] [place] [address];  Service: Ophthalmology   • TONSILLECTOMY       Family History   Problem Relation Age of Onset   • Parkinsonism Mother         Cardiac abnormality [facility])   • Prostate cancer Father    • Rheum arthritis Sister    • Hypothyroidism Sister    • No Known Problems Maternal Grandmother    • No Known Problems Maternal Grandfather    • No Known Problems Paternal Grandmother    • No Known Problems Paternal Grandfather    • No Known Problems Paternal Aunt    • No Known Problems Paternal Aunt    • No Known Problems Paternal Aunt    • No Known Problems Paternal Aunt    • Hashimoto's thyroiditis Family    • Breast cancer Neg Hx      Social History     Socioeconomic History   • Marital status:      Spouse name: Not on file   • Number of children: Not on file   • Years of education: Not on file   • Highest education level: Not on file   Occupational History     Employer: [employer]   Tobacco Use   • Smoking status: Former     Packs/day: 0 50     Years: 5 00     Pack years: 2 50     Types: Cigarettes     Quit date: 1978     Years since quittin 3   • Smokeless tobacco: Former   • Tobacco comments:     no passive smoke exposure   Vaping Use   • Vaping Use: Never used   Substance and Sexual Activity   • Alcohol use: Yes     Comment: Rarely   • Drug use: Never   • Sexual activity: Not Currently   Other Topics Concern   • Not on file   Social History Narrative   • Not on file     Social Determinants of Health     Financial Resource Strain: Not on file   Food Insecurity: Not on file   Transportation Needs: Not on file   Physical Activity: Not on file   Stress: Not on file   Social Connections: Not on file   Intimate Partner Violence: Not on file   Housing Stability: Not on file       Current Outpatient Medications:   •  calcium carbonate-vitamin D (OSCAL-D) 500 mg-200 units per tablet, Take 1 tablet by mouth 2 (two) times a day with meals (Patient not taking: Reported on 2022), Disp: 60 tablet, Rfl: 0  •  DENTAGEL 1 1 % GEL, , Disp: , Rfl: 11  •  fluticasone (FLONASE) 50 mcg/act nasal spray, 2 sprays into each nostril 2 (two) times a day, Disp: 16 g, Rfl: 3  •  levothyroxine (Levoxyl) 75 mcg tablet, Take 1 tablet (75 mcg total) by mouth daily, Disp: 30 tablet, Rfl: 5  •  Multiple Vitamins-Minerals (PRESERVISION AREDS 2 PO), Take by mouth 2 (two) times a day, Disp: , Rfl:   •  VITAMIN D, CHOLECALCIFEROL, PO, Take by mouth, Disp: , Rfl:   Allergies   Allergen Reactions   • Codeine Anaphylaxis     Difficulty breathing     There were no vitals filed for this visit

## 2023-04-27 ENCOUNTER — TELEPHONE (OUTPATIENT)
Dept: FAMILY MEDICINE CLINIC | Facility: CLINIC | Age: 73
End: 2023-04-27

## 2023-06-01 ENCOUNTER — TELEPHONE (OUTPATIENT)
Dept: FAMILY MEDICINE CLINIC | Facility: CLINIC | Age: 73
End: 2023-06-01

## 2023-06-01 NOTE — TELEPHONE ENCOUNTER
Patient called in and stated that she sees enough doctors every couple months and has blood work done often that she does not feel the need to come into the office  Patient stated she would still like to come in to get her flu shot from Dr Kwkau Ochoa but patient does not want to schedule medicare wellness every year

## 2023-08-07 ENCOUNTER — APPOINTMENT (OUTPATIENT)
Dept: LAB | Facility: CLINIC | Age: 73
End: 2023-08-07
Payer: MEDICARE

## 2023-08-07 DIAGNOSIS — E03.9 HYPOTHYROIDISM, UNSPECIFIED TYPE: ICD-10-CM

## 2023-08-07 DIAGNOSIS — C01 CANCER OF BASE OF TONGUE (HCC): ICD-10-CM

## 2023-08-07 DIAGNOSIS — D75.89 MACROCYTOSIS: ICD-10-CM

## 2023-08-07 LAB
ALBUMIN SERPL BCP-MCNC: 3.8 G/DL (ref 3.5–5)
ALP SERPL-CCNC: 74 U/L (ref 46–116)
ALT SERPL W P-5'-P-CCNC: 29 U/L (ref 12–78)
ANION GAP SERPL CALCULATED.3IONS-SCNC: 3 MMOL/L
AST SERPL W P-5'-P-CCNC: 19 U/L (ref 5–45)
BASOPHILS # BLD AUTO: 0.06 THOUSANDS/ÂΜL (ref 0–0.1)
BASOPHILS NFR BLD AUTO: 1 % (ref 0–1)
BILIRUB SERPL-MCNC: 0.62 MG/DL (ref 0.2–1)
BUN SERPL-MCNC: 16 MG/DL (ref 5–25)
CALCIUM SERPL-MCNC: 9.4 MG/DL (ref 8.3–10.1)
CHLORIDE SERPL-SCNC: 107 MMOL/L (ref 96–108)
CO2 SERPL-SCNC: 31 MMOL/L (ref 21–32)
CREAT SERPL-MCNC: 0.98 MG/DL (ref 0.6–1.3)
EOSINOPHIL # BLD AUTO: 0.18 THOUSAND/ÂΜL (ref 0–0.61)
EOSINOPHIL NFR BLD AUTO: 4 % (ref 0–6)
ERYTHROCYTE [DISTWIDTH] IN BLOOD BY AUTOMATED COUNT: 11.8 % (ref 11.6–15.1)
GFR SERPL CREATININE-BSD FRML MDRD: 56 ML/MIN/1.73SQ M
GLUCOSE P FAST SERPL-MCNC: 89 MG/DL (ref 65–99)
HCT VFR BLD AUTO: 40.1 % (ref 34.8–46.1)
HGB BLD-MCNC: 13.3 G/DL (ref 11.5–15.4)
IMM GRANULOCYTES # BLD AUTO: 0.01 THOUSAND/UL (ref 0–0.2)
IMM GRANULOCYTES NFR BLD AUTO: 0 % (ref 0–2)
LYMPHOCYTES # BLD AUTO: 1.27 THOUSANDS/ÂΜL (ref 0.6–4.47)
LYMPHOCYTES NFR BLD AUTO: 28 % (ref 14–44)
MAGNESIUM SERPL-MCNC: 2.3 MG/DL (ref 1.6–2.6)
MCH RBC QN AUTO: 32 PG (ref 26.8–34.3)
MCHC RBC AUTO-ENTMCNC: 33.2 G/DL (ref 31.4–37.4)
MCV RBC AUTO: 97 FL (ref 82–98)
MONOCYTES # BLD AUTO: 0.53 THOUSAND/ÂΜL (ref 0.17–1.22)
MONOCYTES NFR BLD AUTO: 12 % (ref 4–12)
NEUTROPHILS # BLD AUTO: 2.54 THOUSANDS/ÂΜL (ref 1.85–7.62)
NEUTS SEG NFR BLD AUTO: 55 % (ref 43–75)
NRBC BLD AUTO-RTO: 0 /100 WBCS
PLATELET # BLD AUTO: 242 THOUSANDS/UL (ref 149–390)
PMV BLD AUTO: 9.7 FL (ref 8.9–12.7)
POTASSIUM SERPL-SCNC: 3.7 MMOL/L (ref 3.5–5.3)
PROT SERPL-MCNC: 7.1 G/DL (ref 6.4–8.4)
RBC # BLD AUTO: 4.15 MILLION/UL (ref 3.81–5.12)
SODIUM SERPL-SCNC: 141 MMOL/L (ref 135–147)
WBC # BLD AUTO: 4.59 THOUSAND/UL (ref 4.31–10.16)

## 2023-08-07 PROCEDURE — 83735 ASSAY OF MAGNESIUM: CPT

## 2023-08-07 PROCEDURE — 84439 ASSAY OF FREE THYROXINE: CPT

## 2023-08-07 PROCEDURE — 80053 COMPREHEN METABOLIC PANEL: CPT

## 2023-08-07 PROCEDURE — 36415 COLL VENOUS BLD VENIPUNCTURE: CPT

## 2023-08-07 PROCEDURE — 84443 ASSAY THYROID STIM HORMONE: CPT

## 2023-08-07 PROCEDURE — 85025 COMPLETE CBC W/AUTO DIFF WBC: CPT

## 2023-08-08 LAB
T4 FREE SERPL-MCNC: 0.75 NG/DL (ref 0.61–1.12)
TSH SERPL DL<=0.05 MIU/L-ACNC: 9.61 UIU/ML (ref 0.45–4.5)

## 2023-08-10 ENCOUNTER — TELEPHONE (OUTPATIENT)
Dept: FAMILY MEDICINE CLINIC | Facility: CLINIC | Age: 73
End: 2023-08-10

## 2023-08-17 ENCOUNTER — TELEPHONE (OUTPATIENT)
Dept: HEMATOLOGY ONCOLOGY | Facility: CLINIC | Age: 73
End: 2023-08-17

## 2023-08-17 NOTE — TELEPHONE ENCOUNTER
Appointment Change  Cancel, Reschedule, Change to Virtual      Who are you speaking with? Patient   If it is not the patient, are they listed on an active communication consent form? N/A   Which provider is the appointment scheduled with? SALLY Alarcon   When is the appointment scheduled? Please list date and time  8/21 130pm   At which location is the appointment scheduled to take place? [place]   Was the appointment rescheduled or changed from an in person visit to a virtual visit? If so, please list the details of the change. 11/17/2023 1030am   What is the reason for the appointment change? Unable to make appointment   Was [facility] transport scheduled for this visit? No   Does [facility] transport need to be scheduled for the new visit (if applicable) No   Does the patient need an infusion appointment rescheduled? No   Does the patient have an infusion appointment scheduled? If so, when? No   Is the patient undergoing chemotherapy? No   Was the no-show policy reviewed for appointments being changed with less then 24 hours of notice?  Yes

## 2023-09-08 DIAGNOSIS — C01 CANCER OF BASE OF TONGUE (HCC): Primary | ICD-10-CM

## 2023-09-08 DIAGNOSIS — D75.89 MACROCYTOSIS: ICD-10-CM

## 2023-09-08 DIAGNOSIS — D53.9 NUTRITIONAL ANEMIA: ICD-10-CM

## 2023-09-08 DIAGNOSIS — E03.9 HYPOTHYROIDISM, UNSPECIFIED TYPE: ICD-10-CM

## 2023-09-08 DIAGNOSIS — Z12.31 ENCOUNTER FOR SCREENING MAMMOGRAM FOR BREAST CANCER: ICD-10-CM

## 2023-09-21 ENCOUNTER — APPOINTMENT (OUTPATIENT)
Dept: LAB | Facility: CLINIC | Age: 73
End: 2023-09-21
Payer: MEDICARE

## 2023-09-21 DIAGNOSIS — E06.4 RADIATION THYROIDITIS: ICD-10-CM

## 2023-09-21 LAB — TSH SERPL DL<=0.05 MIU/L-ACNC: 7.31 UIU/ML (ref 0.45–4.5)

## 2023-09-21 PROCEDURE — 84443 ASSAY THYROID STIM HORMONE: CPT

## 2023-09-21 PROCEDURE — 36415 COLL VENOUS BLD VENIPUNCTURE: CPT

## 2023-10-02 ENCOUNTER — TELEPHONE (OUTPATIENT)
Dept: HEMATOLOGY ONCOLOGY | Facility: CLINIC | Age: 73
End: 2023-10-02

## 2023-10-02 NOTE — TELEPHONE ENCOUNTER
I called [facility] regarding an appointment that they have scheduled with SALLY Moore scheduled on 11/17/23     I left a voicemail explaining to patient that this appointment will need to be rescheduled due to a change in the providers schedule. Patient was advised to call [facility] to reschedule.   Another attempt will be made to reschedule

## 2023-10-04 ENCOUNTER — TELEPHONE (OUTPATIENT)
Dept: HEMATOLOGY ONCOLOGY | Facility: CLINIC | Age: 73
End: 2023-10-04

## 2023-10-04 NOTE — TELEPHONE ENCOUNTER
I called [facility] regarding an appointment that they have scheduled with SALLY Zuleta scheduled on 11/17/23     I left a voicemail explaining to patient that this appointment will need to be rescheduled due to a change in the providers schedule. Patient was advised to call [facility] to reschedule.   Another attempt will be made to reschedule

## 2023-10-09 ENCOUNTER — RA CDI HCC (OUTPATIENT)
Dept: OTHER | Facility: HOSPITAL | Age: 73
End: 2023-10-09

## 2023-10-13 ENCOUNTER — TELEPHONE (OUTPATIENT)
Dept: HEMATOLOGY ONCOLOGY | Facility: CLINIC | Age: 73
End: 2023-10-13

## 2023-10-13 ENCOUNTER — OFFICE VISIT (OUTPATIENT)
Dept: FAMILY MEDICINE CLINIC | Facility: CLINIC | Age: 73
End: 2023-10-13
Payer: MEDICARE

## 2023-10-13 VITALS
HEIGHT: 65 IN | OXYGEN SATURATION: 98 % | SYSTOLIC BLOOD PRESSURE: 128 MMHG | BODY MASS INDEX: 20.49 KG/M2 | HEART RATE: 71 BPM | TEMPERATURE: 98.3 F | WEIGHT: 123 LBS | DIASTOLIC BLOOD PRESSURE: 70 MMHG

## 2023-10-13 DIAGNOSIS — Z23 ENCOUNTER FOR IMMUNIZATION: ICD-10-CM

## 2023-10-13 DIAGNOSIS — E03.9 ACQUIRED HYPOTHYROIDISM: ICD-10-CM

## 2023-10-13 DIAGNOSIS — I71.21 ANEURYSM OF ASCENDING AORTA WITHOUT RUPTURE (HCC): ICD-10-CM

## 2023-10-13 DIAGNOSIS — G62.0 NEUROPATHY DUE TO CHEMOTHERAPEUTIC DRUG: ICD-10-CM

## 2023-10-13 DIAGNOSIS — Z00.00 MEDICARE ANNUAL WELLNESS VISIT, SUBSEQUENT: Primary | ICD-10-CM

## 2023-10-13 DIAGNOSIS — T45.1X5A NEUROPATHY DUE TO CHEMOTHERAPEUTIC DRUG: ICD-10-CM

## 2023-10-13 DIAGNOSIS — Z23 NEED FOR INFLUENZA VACCINATION: ICD-10-CM

## 2023-10-13 DIAGNOSIS — N18.31 STAGE 3A CHRONIC KIDNEY DISEASE (HCC): ICD-10-CM

## 2023-10-13 DIAGNOSIS — E46 PROTEIN-CALORIE MALNUTRITION, UNSPECIFIED SEVERITY (HCC): ICD-10-CM

## 2023-10-13 DIAGNOSIS — D84.9 IMMUNOCOMPROMISED (HCC): ICD-10-CM

## 2023-10-13 DIAGNOSIS — Z78.0 POST-MENOPAUSE: ICD-10-CM

## 2023-10-13 PROCEDURE — G0438 PPPS, INITIAL VISIT: HCPCS | Performed by: FAMILY MEDICINE

## 2023-10-13 PROCEDURE — 99214 OFFICE O/P EST MOD 30 MIN: CPT | Performed by: FAMILY MEDICINE

## 2023-10-13 PROCEDURE — G0008 ADMIN INFLUENZA VIRUS VAC: HCPCS | Performed by: FAMILY MEDICINE

## 2023-10-13 PROCEDURE — 90662 IIV NO PRSV INCREASED AG IM: CPT | Performed by: FAMILY MEDICINE

## 2023-10-13 NOTE — ASSESSMENT & PLAN NOTE
Incidental finding on DEXA scan done in 2019 patient had aneurysm of the ascending versus size 4.2 cm recommended the patient to repeat a CAT scan of the chest with contrast to monitor the size and important to keep blood pressure well controlled and we will follow-up accordingly

## 2023-10-13 NOTE — ASSESSMENT & PLAN NOTE
Chronic uncontrolled follow-up with the ENT recent blood work controlled TSH patient currently taking levothyroxine 75 mcg once levothyroxine 50 mcg/days order for TSH to be done reviewed with the patient recommend follow-up

## 2023-10-13 NOTE — ASSESSMENT & PLAN NOTE
Body mass index is 20.47 kg/m².    Encourage patient to keep increase current intake of protein supplement

## 2023-10-13 NOTE — TELEPHONE ENCOUNTER
Appointment Change  Cancel, Reschedule, Change to Virtual      Who are you speaking with? LVM for patient x3   If it is not the patient, is the caller listed on the communication consent form? N/A   Which provider is the appointment scheduled with? SALLY Madrigal   When was the original appointment scheduled? Please list date and time 11/17/23 10:30AM     At which location is the appointment scheduled to take place? [place]   Was the appointment rescheduled? Was the appointment changed from an in person visit to a virtual visit? If so, please list the details of the change. No, LVM to inform patient of cancellation and encouraged patient to call and reschedule   What is the reason for the appointment change?  Provider

## 2023-10-13 NOTE — ASSESSMENT & PLAN NOTE
Patient above age 46 postmenopausal with history of vitamin D deficiency and history of smoking she is candidate for  screening for osteoporosis discussed with the patient agree

## 2023-10-13 NOTE — ASSESSMENT & PLAN NOTE
Lab Results   Component Value Date    EGFR 56 08/07/2023    EGFR 73 02/16/2023    EGFR 61 08/15/2022    CREATININE 0.98 08/07/2023    CREATININE 0.79 02/16/2023    CREATININE 0.92 08/15/2022   New diagnosis finding on recent blood work done in August 2023 ordered by hematology result discussed with the patient her GFR 56 discussed with the patient well hydration avoid NSAID recommend to recheck BMP and will follow-up with the results accordingly

## 2023-10-13 NOTE — PROGRESS NOTES
Assessment and Plan:     Problem List Items Addressed This Visit     Thoracic aortic aneurysm ([address])     Incidental finding on DEXA scan done in 2019 patient had aneurysm of the ascending versus size 4.2 cm recommended the patient to repeat a CAT scan of the chest with contrast to monitor the size and important to keep blood pressure well controlled and we will follow-up accordingly         Relevant Orders    CT chest w contrast    Hypothyroidism     Chronic uncontrolled follow-up with the ENT recent blood work controlled TSH patient currently taking levothyroxine 75 mcg once levothyroxine 50 mcg/days order for TSH to be done reviewed with the patient recommend follow-up         Post-menopause     Patient above age 46 postmenopausal with history of vitamin D deficiency and history of smoking she is candidate for  screening for osteoporosis discussed with the patient agree         Relevant Orders    DXA bone density spine hip and pelvis    Immunocompromised ([address])     Patient with history of tongue cancer status post chemo and radiation continue to follow-up with the ENT periodically         Medicare annual wellness visit, subsequent - Primary     Advice and education were given regarding nutrition, aerobic exercises, weight-bearing exercises, cardiovascular risk reduction, fall risk reduction, and age-appropriate supplements. The patient was counseled regarding instructions for management, risk factor reductions, prognosis, risks and benefits of treatment options, patient and family education, and importance of compliance with treatment. Protein-calorie malnutrition, unspecified severity ([address])     Body mass index is 20.47 kg/m².    Encourage patient to keep increase current intake of protein supplement         Neuropathy due to chemotherapeutic drug     Stage 3a chronic kidney disease [facility])     Lab Results   Component Value Date    EGFR 56 08/07/2023    EGFR 73 02/16/2023    EGFR 61 08/15/2022 CREATININE 0.98 08/07/2023    CREATININE 0.79 02/16/2023    CREATININE 0.92 08/15/2022   New diagnosis finding on recent blood work done in August 2023 ordered by hematology result discussed with the patient her GFR 64 discussed with the patient well hydration avoid NSAID recommend to recheck BMP and will follow-up with the results accordingly          Other Visit Diagnoses     Encounter for immunization        Relevant Orders    FLUZONE HIGH-DOSE: influenza vaccine, high-dose, preservative-free 0.7 mL (Completed)    Need for influenza vaccination        Benefit versus side effect reviewed with the patient    Relevant Orders    FLUZONE HIGH-DOSE: influenza vaccine, high-dose, preservative-free 0.7 mL (Completed)          Depression Screening and Follow-up Plan: Patient was screened for depression during today's encounter. They screened negative with a PHQ-2 score of 0. Preventive health issues were discussed with patient, and age appropriate screening tests were ordered as noted in patient's After Visit Summary. Personalized health advice and appropriate referrals for health education or preventive services given if needed, as noted in patient's After Visit Summary.      History of Present Illness:     Patient presents for a Medicare Wellness Visit    Patient here for Medicare exam and follow-up with a chronic condition patient with history of lung cancer status post chemo and radiation continue to follow-up with the ENT periodically she had hypothyroidism on levothyroxine 75 mcg and 50 mcg recent blood work which show TSH is uncontrolled dose had been adjusted by ENT and she is check her blood work soon patient also she had incidental finding on PET scan done in 2019 thoracic aneurysm and patient denies any chest pain short of breath no palpitation no dyspnea on exertion  Blood work from August 2023 reviewed with the patient       Patient Care Team:  Shelley Rodarte MD as PCP - General (Family Medicine)  Supriya Mclaughlin Joaquin Huggins MD as Medical Oncologist (Hematology and Oncology)  Herrera George MD (Otolaryngology)     Review of Systems:     Review of Systems   Constitutional:  Negative for chills and fever. HENT:  Negative for ear pain and sore throat. Eyes:  Negative for pain and visual disturbance. Respiratory:  Negative for cough and shortness of breath. Cardiovascular:  Negative for chest pain and palpitations. Gastrointestinal:  Negative for abdominal pain, blood in stool, constipation, diarrhea and vomiting. Genitourinary:  Negative for dysuria and hematuria. Musculoskeletal:  Negative for arthralgias and back pain. Skin:  Negative for color change and rash. Neurological:  Negative for seizures and syncope. Psychiatric/Behavioral:  Negative for behavioral problems. All other systems reviewed and are negative.        Problem List:     Patient Active Problem List   Diagnosis   • Multiple thyroid nodules   • Cancer of base of tongue ([address])   • Thoracic aortic aneurysm (HCC)   • Encounter for chemotherapy management   • Oral thrush   • Radiation thyroiditis   • Hypothyroidism   • Vitamin D deficiency   • Foot callus   • Pre-op examination   • Post-menopause   • Cataracta   • History of hysterectomy   • Immunocompromised ([address])   • Medicare annual wellness visit, subsequent   • Osteopenia of neck of left femur   • Protein-calorie malnutrition, unspecified severity ([address])   • Neuropathy due to chemotherapeutic drug    • Stage 3a chronic kidney disease ([address])      Past Medical and Surgical History:     Past Medical History:   Diagnosis Date   • Anemia 7/15/2019   • Dehydration 6/17/2019   • Disease of thyroid gland    • Fatigue 3/27/2019   • Hypokalemia 8/9/2019   • Tongue cancer ([address]) 2019    Squamous cell     Past Surgical History:   Procedure Laterality Date   • ADENOIDECTOMY     • APPENDECTOMY  1990   • CATARACT EXTRACTION Left    • HYSTERECTOMY  2012    total   • IR PICC LINE  6/5/2019   • OOPHORECTOMY Bilateral    • MI XCAPSL CTRC RMVL INSJ IO LENS PROSTH W/O ECP Right 2021    Procedure: CATARACT SURGERY W/INTRAOCULAR LENS;  Surgeon: Argelia Joiner MD;  Location: [address] [place] [address] OR;  Service: Ophthalmology   • TONSILLECTOMY        Family History:     Family History   Problem Relation Age of Onset   • Parkinsonism Mother         Cardiac abnormality [facility])   • Prostate cancer Father    • Rheum arthritis Sister    • Hypothyroidism Sister    • No Known Problems Maternal Grandmother    • No Known Problems Maternal Grandfather    • No Known Problems Paternal Grandmother    • No Known Problems Paternal Grandfather    • No Known Problems Paternal Aunt    • No Known Problems Paternal Aunt    • No Known Problems Paternal Aunt    • No Known Problems Paternal Aunt    • Hashimoto's thyroiditis Family    • Breast cancer Neg Hx       Social History:     Social History     Socioeconomic History   • Marital status:      Spouse name: None   • Number of children: None   • Years of education: None   • Highest education level: None   Occupational History     Employer: [employer]   Tobacco Use   • Smoking status: Former     Packs/day: 0.50     Years: 5.00     Total pack years: 2.50     Types: Cigarettes     Quit date: 1978     Years since quittin.8   • Smokeless tobacco: Never   • Tobacco comments:     no passive smoke exposure   Vaping Use   • Vaping Use: Never used   Substance and Sexual Activity   • Alcohol use: Yes     Comment: Rarely   • Drug use: Never   • Sexual activity: Not Currently   Other Topics Concern   • None   Social History Narrative   • None     Social Determinants of Health     Financial Resource Strain: Unknown (10/13/2023)    Overall Financial Resource Strain (CARDIA)    • Difficulty of Paying Living Expenses: Patient refused   Food Insecurity: No Food Insecurity (2021)    Hunger Vital Sign    • Worried About Running Out of Food in the Last Year: Never true    • Ran Out of Food in the Last Year: Never true   Transportation Needs: No Transportation Needs (10/13/2023)    PRAPARE - Transportation    • Lack of Transportation (Medical): No    • Lack of Transportation (Non-Medical): No   Physical Activity: Sufficiently Active (8/9/2021)    Exercise Vital Sign    • Days of Exercise per Week: 7 days    • Minutes of Exercise per Session: 60 min   Stress: No Stress Concern Present (8/9/2021)    [facility]    • Feeling of Stress : Not at all   Social Connections: Moderately Integrated (8/9/2021)    Social Connection and Isolation Panel [NHANES]    • Frequency of Communication with Friends and Family: More than three times a week    • Frequency of Social Gatherings with Friends and Family: More than three times a week    • Attends Buddhist Services: 1 to 4 times per year    • Active Member of Clubs or Organizations:  Yes    • Attends Club or Organization Meetings: 1 to 4 times per year    • Marital Status: Never    Intimate Partner Violence: Not At Risk (8/9/2021)    Humiliation, Afraid, Rape, and Kick questionnaire    • Fear of Current or Ex-Partner: No    • Emotionally Abused: No    • Physically Abused: No    • Sexually Abused: No   Housing Stability: Unknown (8/9/2021)    Housing Stability Vital Sign    • Unable to Pay for Housing in the Last Year: No    • Number of Places Lived in the Last Year: Not on file    • Unstable Housing in the Last Year: No      Medications and Allergies:     Current Outpatient Medications   Medication Sig Dispense Refill   • DENTAGEL 1.1 % GEL   11   • levothyroxine (Levo-T) 50 mcg tablet Take 1 tablet (50 mcg total) by mouth daily 30 tablet 5   • levothyroxine (Levoxyl) 75 mcg tablet Take 1 tablet (75 mcg total) by mouth daily 90 tablet 3   • Multiple Vitamins-Minerals (PRESERVISION AREDS 2 PO) Take by mouth 2 (two) times a day     • VITAMIN D, CHOLECALCIFEROL, PO Take by mouth       No current facility-administered medications for this visit. Allergies   Allergen Reactions   • Codeine Anaphylaxis     Difficulty breathing      Immunizations:     Immunization History   Administered Date(s) Administered   • COVID-19 PFIZER VACCINE 0.3 ML IM 01/13/2021, 02/03/2021   • INFLUENZA 10/30/2019, 09/30/2021, 10/24/2022   • Influenza Injectable, MDCK, Preservative Free, Quadrivalent, 0.5 mL 10/30/2019   • Influenza, high dose seasonal 0.7 mL 09/30/2021, 10/13/2023   • Pneumococcal Conjugate 13-Valent 08/30/2021   • Tdap 03/28/2013, 01/17/2019, 01/17/2019      Health Maintenance:         Topic Date Due   • Hepatitis C Screening  Never done   • Colorectal Cancer Screening  09/19/2022   • Breast Cancer Screening: Mammogram  05/18/2023         Topic Date Due   • COVID-19 Vaccine (3 - Pfizer risk series) 03/03/2021   • Pneumococcal Vaccine: 65+ Years (2 - PPSV23 if available, else PCV20) 10/25/2021      Medicare Screening Tests and Risk Assessments:     Jaswinder Francisco is here for her Initial Wellness visit. Last Medicare Wellness visit information reviewed, patient interviewed and updates made to the record today. Health Risk Assessment:   Patient rates overall health as very good. Patient feels that their physical health rating is much better. Patient is very satisfied with their life. Eyesight was rated as same. Hearing was rated as same. Patient feels that their emotional and mental health rating is same. Patients states they are never, rarely angry. Patient states they are never, rarely unusually tired/fatigued. Pain experienced in the last 7 days has been none. Patient states that she has experienced no weight loss or gain in last 6 months. Depression Screening:   PHQ-2 Score: 0      Fall Risk Screening: In the past year, patient has experienced: no history of falling in past year      Urinary Incontinence Screening:   Patient has not leaked urine accidently in the last six months.      Home Safety:  Patient does not have trouble with stairs inside or outside of their home. Patient has working smoke alarms and has working carbon monoxide detector. Home safety hazards include: none. Nutrition:   Current diet is Regular. Medications:   Patient is currently taking over-the-counter supplements. OTC medications include: see medication list. Patient is able to manage medications. Activities of Daily Living (ADLs)/Instrumental Activities of Daily Living (IADLs):   Walk and transfer into and out of bed and chair?: Yes  Dress and groom yourself?: Yes    Bathe or shower yourself?: Yes    Feed yourself?  Yes  Do your laundry/housekeeping?: Yes  Manage your money, pay your bills and track your expenses?: Yes  Make your own meals?: Yes    Do your own shopping?: Yes    Durable Medical Equipment Suppliers  none    Previous Hospitalizations:   Any hospitalizations or ED visits within the last 12 months?: Yes    How many hospitalizations have you had in the last year?: 1-2    Advance Care Planning:   Living will: No    Durable POA for healthcare: No    Advanced directive: No    Advanced directive counseling given: No    ACP document given: Yes    Patient declined ACP directive: No    End of Life Decisions reviewed with patient: Yes    Provider agrees with end of life decisions: Yes      Cognitive Screening:   Provider or family/friend/caregiver concerned regarding cognition?: No    PREVENTIVE SCREENINGS      Cardiovascular Screening:    General: Risks and Benefits Discussed    Due for: Lipid Panel      Diabetes Screening:     General: Screening Current      Colorectal Cancer Screening:     General: Screening Current      Breast Cancer Screening:     General: Patient Declines      Cervical Cancer Screening:    General: Screening Not Indicated      Osteoporosis Screening:    General: Risks and Benefits Discussed    Due for: DXA Appendicular      Abdominal Aortic Aneurysm (AAA) Screening:        General: Screening Not Indicated      Lung Cancer Screening:     General: Screening Not Indicated      Hepatitis C Screening:    General: Patient Declines    Screening, Brief Intervention, and Referral to Treatment (SBIRT)    Screening  Typical number of drinks in a day: 0  Typical number of drinks in a week: 0  Interpretation: Low risk drinking behavior. AUDIT-C Screenin) How often did you have a drink containing alcohol in the past year? never  2) How many drinks did you have on a typical day when you were drinking in the past year? 0  3) How often did you have 6 or more drinks on one occasion in the past year? never    AUDIT-C Score: 0  Interpretation: Score 0-2 (female): Negative screen for alcohol misuse    Single Item Drug Screening:  How often have you used an illegal drug (including marijuana) or a prescription medication for non-medical reasons in the past year? never    Single Item Drug Screen Score: 0  Interpretation: Negative screen for possible drug use disorder    Brief Intervention  Alcohol & drug use screenings were reviewed. No concerns regarding substance use disorder identified. No results found. Physical Exam:     /70   Pulse 71   Temp 98.3 °F (36.8 °C) (Tympanic)   Ht 5' 5" (1.651 m)   Wt 55.8 kg (123 lb)   SpO2 98%   Breastfeeding No   BMI 20.47 kg/m²     Physical Exam  Vitals and nursing note reviewed. Constitutional:       General: She is not in acute distress. Appearance: She is well-developed. HENT:      Head: Normocephalic and atraumatic. Right Ear: Tympanic membrane normal. There is no impacted cerumen. Left Ear: Tympanic membrane normal. There is no impacted cerumen. Nose: No rhinorrhea. Mouth/Throat:      Pharynx: No posterior oropharyngeal erythema. Eyes:      General:         Right eye: No discharge. Left eye: No discharge. Conjunctiva/sclera: Conjunctivae normal.   Cardiovascular:      Rate and Rhythm: Normal rate and regular rhythm. Heart sounds: No murmur heard. Pulmonary:      Effort: Pulmonary effort is normal. No respiratory distress. Breath sounds: Normal breath sounds. Abdominal:      Palpations: Abdomen is soft. Tenderness: There is no abdominal tenderness. Musculoskeletal:         General: No swelling. Cervical back: Neck supple. Skin:     General: Skin is warm and dry. Capillary Refill: Capillary refill takes less than 2 seconds. Findings: No erythema or rash. Neurological:      Mental Status: She is alert and oriented to person, place, and time.    Psychiatric:         Mood and Affect: Mood normal.          Aura Saul MD

## 2023-10-13 NOTE — PATIENT INSTRUCTIONS
Medicare Preventive Visit Patient Instructions  Thank you for completing your Welcome to Medicare Visit or Medicare Annual Wellness Visit today. Your next wellness visit will be due in one year (10/13/2024). The screening/preventive services that you may require over the next 5-10 years are detailed below. Some tests may not apply to you based off risk factors and/or age. Screening tests ordered at today's visit but not completed yet may show as past due. Also, please note that scanned in results may not display below. Preventive Screenings:  Service Recommendations Previous Testing/Comments   Colorectal Cancer Screening  * Colonoscopy    * Fecal Occult Blood Test (FOBT)/Fecal Immunochemical Test (FIT)  * Fecal DNA/Cologuard Test  * Flexible Sigmoidoscopy Age: 43-73 years old   Colonoscopy: every 10 years (may be performed more frequently if at higher risk)  OR  FOBT/FIT: every 1 year  OR  Cologuard: every 3 years  OR  Sigmoidoscopy: every 5 years  Screening may be recommended earlier than age 39 if at higher risk for colorectal cancer. Also, an individualized decision between you and your healthcare provider will decide whether screening between the ages of 77-80 would be appropriate. Colonoscopy: 09/19/2017  FOBT/FIT: Not on file  Cologuard: Not on file  Sigmoidoscopy: Not on file          Breast Cancer Screening Age: 36 years old  Frequency: every 1-2 years  Not required if history of left and right mastectomy Mammogram: 05/18/2022    Screening Current   Cervical Cancer Screening Between the ages of 21-29, pap smear recommended once every 3 years. Between the ages of 32-69, can perform pap smear with HPV co-testing every 5 years.    Recommendations may differ for women with a history of total hysterectomy, cervical cancer, or abnormal pap smears in past. Pap Smear: 03/29/2018    Screening Not Indicated   Hepatitis C Screening Once for adults born between 1945 and 1965  More frequently in patients at high risk for Hepatitis C Hep C Antibody: Not on file        Diabetes Screening 1-2 times per year if you're at risk for diabetes or have pre-diabetes Fasting glucose: 89 mg/dL (8/7/2023)  A1C: No results in last 5 years (No results in last 5 years)  Screening Current   Cholesterol Screening Once every 5 years if you don't have a lipid disorder. May order more often based on risk factors. Lipid panel: Not on file          Other Preventive Screenings Covered by Medicare:  Abdominal Aortic Aneurysm (AAA) Screening: covered once if your at risk. You're considered to be at risk if you have a family history of AAA. Lung Cancer Screening: covers low dose CT scan once per year if you meet all of the following conditions: (1) Age 48-67; (2) No signs or symptoms of lung cancer; (3) Current smoker or have quit smoking within the last 15 years; (4) You have a tobacco smoking history of at least 20 pack years (packs per day multiplied by number of years you smoked); (5) You get a written order from a healthcare provider. Glaucoma Screening: covered annually if you're considered high risk: (1) You have diabetes OR (2) Family history of glaucoma OR (3)  aged 48 and older OR (3)  American aged 72 and older  Osteoporosis Screening: covered every 2 years if you meet one of the following conditions: (1) You're estrogen deficient and at risk for osteoporosis based off medical history and other findings; (2) Have a vertebral abnormality; (3) On glucocorticoid therapy for more than 3 months; (4) Have primary hyperparathyroidism; (5) On osteoporosis medications and need to assess response to drug therapy. Last bone density test (DXA Scan): 08/11/2021. HIV Screening: covered annually if you're between the age of 14-79. Also covered annually if you are younger than 13 and older than 72 with risk factors for HIV infection. For pregnant patients, it is covered up to 3 times per pregnancy.     Immunizations:  Immunization Recommendations   Influenza Vaccine Annual influenza vaccination during flu season is recommended for all persons aged >= 6 months who do not have contraindications   Pneumococcal Vaccine   * Pneumococcal conjugate vaccine = PCV13 (Prevnar 13), PCV15 (Vaxneuvance), PCV20 (Prevnar 20)  * Pneumococcal polysaccharide vaccine = PPSV23 (Pneumovax) Adults 96-86 yo with certain risk factors or if 69+ yo  If never received any pneumonia vaccine: recommend Prevnar 20 (PCV20)  Give PCV20 if previously received 1 dose of PCV13 or PPSV23   Hepatitis B Vaccine 3 dose series if at intermediate or high risk (ex: diabetes, end stage renal disease, liver disease)   Respiratory syncytial virus (RSV) Vaccine - COVERED BY MEDICARE PART D  * RSVPreF3 (Arexvy) CDC recommends that adults 61years of age and older may receive a single dose of RSV vaccine using shared clinical decision-making (SCDM)   Tetanus (Td) Vaccine - COST NOT COVERED BY MEDICARE PART B Following completion of primary series, a booster dose should be given every 10 years to maintain immunity against tetanus. Td may also be given as tetanus wound prophylaxis. Tdap Vaccine - COST NOT COVERED BY MEDICARE PART B Recommended at least once for all adults. For pregnant patients, recommended with each pregnancy. Shingles Vaccine (Shingrix) - COST NOT COVERED BY MEDICARE PART B  2 shot series recommended in those 19 years and older who have or will have weakened immune systems or those 50 years and older     Health Maintenance Due:      Topic Date Due   • Hepatitis C Screening  Never done   • Colorectal Cancer Screening  09/19/2022   • Breast Cancer Screening: Mammogram  05/18/2023     Immunizations Due:      Topic Date Due   • COVID-19 Vaccine (3 - Pfizer risk series) 03/03/2021   • Pneumococcal Vaccine: 65+ Years (2 - PPSV23 if available, else PCV20) 10/25/2021   • Influenza Vaccine (1) 09/01/2023     Advance Directives   What are advance directives?   Advance directives are legal documents that state your wishes and plans for medical care. These plans are made ahead of time in case you lose your ability to make decisions for yourself. Advance directives can apply to any medical decision, such as the treatments you want, and if you want to donate organs. What are the types of advance directives? There are many types of advance directives, and each state has rules about how to use them. You may choose a combination of any of the following:  Living will: This is a written record of the treatment you want. You can also choose which treatments you do not want, which to limit, and which to stop at a certain time. This includes surgery, medicine, IV fluid, and tube feedings. Durable power of  for [facility]): This is a written record that states who you want to make healthcare choices for you when you are unable to make them for yourself. This person, called a proxy, is usually a family member or a friend. You may choose more than 1 proxy. Do not resuscitate (DNR) order:  A DNR order is used in case your heart stops beating or you stop breathing. It is a request not to have certain forms of treatment, such as CPR. A DNR order may be included in other types of advance directives. Medical directive: This covers the care that you want if you are in a coma, near death, or unable to make decisions for yourself. You can list the treatments you want for each condition. Treatment may include pain medicine, surgery, blood transfusions, dialysis, IV or tube feedings, and a ventilator (breathing machine). Values history: This document has questions about your views, beliefs, and how you feel and think about life. This information can help others choose the care that you would choose. Why are advance directives important? An advance directive helps you control your care. Although spoken wishes may be used, it is better to have your wishes written down.  Spoken wishes can be misunderstood, or not followed. Treatments may be given even if you do not want them. An advance directive may make it easier for your family to make difficult choices about your care. © Copyright [employer] [facility] 2018 Information is for End User's use only and may not be sold, redistributed or otherwise used for commercial purposes.  All illustrations and images included in CareNotes® are the copyrighted property of [employer] Inc. or [address] [place] [address]

## 2023-10-13 NOTE — ASSESSMENT & PLAN NOTE
Patient with history of tongue cancer status post chemo and radiation continue to follow-up with the ENT periodically The skin at the access site was anesthetized. Using a micropuncture needle with the Seldinger technique the right internal jugular vein was unsuccessfully accessed times 1 in an antegrade fashion over the guidewire.

## 2023-10-18 ENCOUNTER — APPOINTMENT (OUTPATIENT)
Dept: LAB | Facility: CLINIC | Age: 73
End: 2023-10-18
Payer: MEDICARE

## 2023-10-18 DIAGNOSIS — C01 CANCER OF BASE OF TONGUE (HCC): ICD-10-CM

## 2023-10-18 DIAGNOSIS — D75.89 MACROCYTOSIS: ICD-10-CM

## 2023-10-18 DIAGNOSIS — Z12.31 ENCOUNTER FOR SCREENING MAMMOGRAM FOR BREAST CANCER: ICD-10-CM

## 2023-10-18 DIAGNOSIS — E03.9 HYPOTHYROIDISM, UNSPECIFIED TYPE: ICD-10-CM

## 2023-10-18 DIAGNOSIS — D53.9 NUTRITIONAL ANEMIA: ICD-10-CM

## 2023-10-18 LAB
ALBUMIN SERPL BCP-MCNC: 4.2 G/DL (ref 3.5–5)
ALP SERPL-CCNC: 70 U/L (ref 34–104)
ALT SERPL W P-5'-P-CCNC: 16 U/L (ref 7–52)
ANION GAP SERPL CALCULATED.3IONS-SCNC: 9 MMOL/L
AST SERPL W P-5'-P-CCNC: 21 U/L (ref 13–39)
BASOPHILS # BLD AUTO: 0.06 THOUSANDS/ÂΜL (ref 0–0.1)
BASOPHILS NFR BLD AUTO: 1 % (ref 0–1)
BILIRUB SERPL-MCNC: 0.88 MG/DL (ref 0.2–1)
BUN SERPL-MCNC: 14 MG/DL (ref 5–25)
CALCIUM SERPL-MCNC: 10.1 MG/DL (ref 8.4–10.2)
CHLORIDE SERPL-SCNC: 102 MMOL/L (ref 96–108)
CO2 SERPL-SCNC: 29 MMOL/L (ref 21–32)
CREAT SERPL-MCNC: 0.87 MG/DL (ref 0.6–1.3)
EOSINOPHIL # BLD AUTO: 0.33 THOUSAND/ÂΜL (ref 0–0.61)
EOSINOPHIL NFR BLD AUTO: 5 % (ref 0–6)
ERYTHROCYTE [DISTWIDTH] IN BLOOD BY AUTOMATED COUNT: 11.9 % (ref 11.6–15.1)
GFR SERPL CREATININE-BSD FRML MDRD: 65 ML/MIN/1.73SQ M
GLUCOSE P FAST SERPL-MCNC: 93 MG/DL (ref 65–99)
HCT VFR BLD AUTO: 42.2 % (ref 34.8–46.1)
HGB BLD-MCNC: 13.5 G/DL (ref 11.5–15.4)
IMM GRANULOCYTES # BLD AUTO: 0.01 THOUSAND/UL (ref 0–0.2)
IMM GRANULOCYTES NFR BLD AUTO: 0 % (ref 0–2)
LYMPHOCYTES # BLD AUTO: 1.11 THOUSANDS/ÂΜL (ref 0.6–4.47)
LYMPHOCYTES NFR BLD AUTO: 18 % (ref 14–44)
MAGNESIUM SERPL-MCNC: 2 MG/DL (ref 1.9–2.7)
MCH RBC QN AUTO: 31 PG (ref 26.8–34.3)
MCHC RBC AUTO-ENTMCNC: 32 G/DL (ref 31.4–37.4)
MCV RBC AUTO: 97 FL (ref 82–98)
MONOCYTES # BLD AUTO: 0.61 THOUSAND/ÂΜL (ref 0.17–1.22)
MONOCYTES NFR BLD AUTO: 10 % (ref 4–12)
NEUTROPHILS # BLD AUTO: 4.09 THOUSANDS/ÂΜL (ref 1.85–7.62)
NEUTS SEG NFR BLD AUTO: 66 % (ref 43–75)
NRBC BLD AUTO-RTO: 0 /100 WBCS
PLATELET # BLD AUTO: 256 THOUSANDS/UL (ref 149–390)
PMV BLD AUTO: 9.1 FL (ref 8.9–12.7)
POTASSIUM SERPL-SCNC: 4.3 MMOL/L (ref 3.5–5.3)
PROT SERPL-MCNC: 6.9 G/DL (ref 6.4–8.4)
RBC # BLD AUTO: 4.36 MILLION/UL (ref 3.81–5.12)
SODIUM SERPL-SCNC: 140 MMOL/L (ref 135–147)
WBC # BLD AUTO: 6.21 THOUSAND/UL (ref 4.31–10.16)

## 2023-10-18 PROCEDURE — 36415 COLL VENOUS BLD VENIPUNCTURE: CPT

## 2023-10-18 PROCEDURE — 85025 COMPLETE CBC W/AUTO DIFF WBC: CPT

## 2023-10-18 PROCEDURE — 80053 COMPREHEN METABOLIC PANEL: CPT

## 2023-10-18 PROCEDURE — 83735 ASSAY OF MAGNESIUM: CPT

## 2023-10-23 ENCOUNTER — HOSPITAL ENCOUNTER (OUTPATIENT)
Dept: CT IMAGING | Facility: HOSPITAL | Age: 73
Discharge: HOME | End: 2023-10-23
Payer: MEDICARE

## 2023-10-23 DIAGNOSIS — I71.21 ANEURYSM OF ASCENDING AORTA WITHOUT RUPTURE (HCC): ICD-10-CM

## 2023-10-23 PROCEDURE — G1004 CDSM NDSC: HCPCS

## 2023-10-23 PROCEDURE — 71260 CT THORAX DX C+: CPT

## 2023-10-23 RX ADMIN — IOHEXOL 85 ML: 350 INJECTION, SOLUTION INTRAVENOUS at 11:46

## 2023-10-30 ENCOUNTER — TELEPHONE (OUTPATIENT)
Dept: FAMILY MEDICINE CLINIC | Facility: CLINIC | Age: 73
End: 2023-10-30

## 2023-10-30 DIAGNOSIS — I71.21 ANEURYSM OF ASCENDING AORTA WITHOUT RUPTURE (HCC): Primary | ICD-10-CM

## 2023-10-30 NOTE — TELEPHONE ENCOUNTER
Called and left message for patient to return call to the office to advise results. Patient is to repeat CT chest with contrast IV in one year.      Orders placed

## 2023-10-30 NOTE — TELEPHONE ENCOUNTER
----- Message from Rafael Murdock MD sent at 10/30/2023 12:26 PM EDT -----  Stable in aortic aneurysm   Repeat Ctscan of chest with IV contrast in 1 year

## 2023-11-30 NOTE — PLAN OF CARE
Critical Care Critical Care Critical Care Critical Care Internal Medicine Internal Medicine Nephrology Nephrology Problem: Potential for Falls  Goal: Patient will remain free of falls  Description  INTERVENTIONS:  - Assess patient frequently for physical needs  -  Identify cognitive and physical deficits and behaviors that affect risk of falls    -  [place] fall precautions as indicated by assessment   - Educate patient/family on patient safety including physical limitations  - Instruct patient to call for assistance with activity based on assessment  - Modify environment to reduce risk of injury  - Consider OT/PT consult to assist with strengthening/mobility  Outcome: Progressing Pulmonology Nephrology Nephrology Nephrology Nephrology Nephrology Nephrology Pulmonology Pulmonology Pulmonology Cardiology Critical Care Nephrology Nephrology Pulmonology Cardiology Cardiology Cardiology Cardiology Cardiology Cardiology Cardiology Cardiology Internal Medicine Internal Medicine Internal Medicine Internal Medicine Internal Medicine

## 2023-12-12 PROBLEM — Z00.00 MEDICARE ANNUAL WELLNESS VISIT, SUBSEQUENT: Status: RESOLVED | Noted: 2021-08-30 | Resolved: 2023-12-12

## 2023-12-18 ENCOUNTER — HOSPITAL ENCOUNTER (OUTPATIENT)
Dept: BONE DENSITY | Facility: CLINIC | Age: 73
Discharge: HOME | End: 2023-12-18
Payer: MEDICARE

## 2023-12-18 DIAGNOSIS — Z78.0 POST-MENOPAUSE: ICD-10-CM

## 2023-12-18 PROCEDURE — 77080 DXA BONE DENSITY AXIAL: CPT

## 2023-12-20 ENCOUNTER — RA CDI HCC (OUTPATIENT)
Dept: OTHER | Facility: HOSPITAL | Age: 73
End: 2023-12-20

## 2023-12-20 ENCOUNTER — TELEPHONE (OUTPATIENT)
Dept: ADMINISTRATIVE | Facility: OTHER | Age: 73
End: 2023-12-20

## 2023-12-20 NOTE — TELEPHONE ENCOUNTER
12/20/23 2:23 PM    Patient contacted (left message) to bring Advance Directive, POLST, or Living Will document to next scheduled pcp visit.    Thank you.  You Salomon [facility]

## 2023-12-22 ENCOUNTER — APPOINTMENT (OUTPATIENT)
Dept: LAB | Facility: CLINIC | Age: 73
End: 2023-12-22
Payer: MEDICARE

## 2023-12-22 DIAGNOSIS — E06.4 RADIATION THYROIDITIS: ICD-10-CM

## 2023-12-22 PROCEDURE — 84439 ASSAY OF FREE THYROXINE: CPT

## 2023-12-22 PROCEDURE — 84443 ASSAY THYROID STIM HORMONE: CPT

## 2023-12-22 PROCEDURE — 36415 COLL VENOUS BLD VENIPUNCTURE: CPT

## 2023-12-23 LAB
T4 FREE SERPL-MCNC: 1.45 NG/DL (ref 0.61–1.12)
TSH SERPL DL<=0.05 MIU/L-ACNC: 0.06 UIU/ML (ref 0.45–4.5)

## 2023-12-27 ENCOUNTER — OFFICE VISIT (OUTPATIENT)
Dept: FAMILY MEDICINE CLINIC | Facility: CLINIC | Age: 73
End: 2023-12-27
Payer: MEDICARE

## 2023-12-27 VITALS
TEMPERATURE: 96.2 F | SYSTOLIC BLOOD PRESSURE: 130 MMHG | WEIGHT: 123.4 LBS | BODY MASS INDEX: 20.56 KG/M2 | HEIGHT: 65 IN | DIASTOLIC BLOOD PRESSURE: 86 MMHG | OXYGEN SATURATION: 98 % | HEART RATE: 76 BPM

## 2023-12-27 DIAGNOSIS — Z13.29 SCREENING FOR THYROID DISORDER: ICD-10-CM

## 2023-12-27 DIAGNOSIS — M85.852 OSTEOPENIA OF NECK OF LEFT FEMUR: ICD-10-CM

## 2023-12-27 DIAGNOSIS — E55.9 VITAMIN D DEFICIENCY: ICD-10-CM

## 2023-12-27 DIAGNOSIS — E03.9 ACQUIRED HYPOTHYROIDISM: Primary | ICD-10-CM

## 2023-12-27 PROCEDURE — 99214 OFFICE O/P EST MOD 30 MIN: CPT | Performed by: FAMILY MEDICINE

## 2023-12-27 RX ORDER — LEVOTHYROXINE SODIUM 0.1 MG/1
100 TABLET ORAL
Qty: 90 TABLET | Refills: 0 | Status: SHIPPED | OUTPATIENT
Start: 2023-12-27

## 2023-12-27 NOTE — ASSESSMENT & PLAN NOTE
Chronic uncontrolled overtreated recommend decrease levothyroxine to 100 mcg once a day proper use and possible side effect discussed plan to recheck TSH with a free T4 in 8-week

## 2023-12-27 NOTE — PROGRESS NOTES
Name: Brittanie Bernabe      : 1950      MRN: [id number]  Encounter Provider: Radha Ramirez MD  Encounter Date: 2023   Encounter department: [facility]    Assessment & Plan     1. Acquired hypothyroidism  Assessment & Plan:  Chronic uncontrolled overtreated recommend decrease levothyroxine to 100 mcg once a day proper use and possible side effect discussed plan to recheck TSH with a free T4 in 8-week    Orders:  -     levothyroxine (Euthyrox) 100 mcg tablet; Take 1 tablet (100 mcg total) by mouth daily in the early morning    2. Osteopenia of neck of left femur  Assessment & Plan:   chronic DEXA scan showed decrease in the bone density compared with before discussed with patient recommend calcium and vitamin D supplement for precaution and fracture risk discussed with the patient    Orders:  -     Calcium Carb-Cholecalciferol 600-12.5 MG-MCG CAPS; One tablet twice /day    3. Screening for thyroid disorder  -     TSH, 3rd generation with Free T4 reflex; Future; Expected date: 2024    4. Vitamin D deficiency  Assessment & Plan:  Chronic recommend the patient to take calcium and vitamin D supplement secondary to osteopenia and she agreed vitamin D rich diet reviewed             Subjective      Patient here follow-up with a chronic condition compliant with the medication tolerated well without side effect recent blood work discussed with patient and DEXA scan reviewed with the patient      Review of Systems   Constitutional:  Negative for chills and fever.   HENT:  Negative for ear pain and sore throat.    Eyes:  Negative for pain and visual disturbance.   Respiratory:  Negative for cough and shortness of breath.    Cardiovascular:  Negative for chest pain and palpitations.   Gastrointestinal:  Negative for abdominal pain, constipation, diarrhea and vomiting.   Genitourinary:  Negative for dysuria and hematuria.   Musculoskeletal:  Negative for arthralgias and back  "pain.   Skin:  Negative for color change and rash.   Neurological:  Negative for seizures and syncope.   All other systems reviewed and are negative.      Current Outpatient Medications on File Prior to Visit   Medication Sig   • DENTAGEL 1.1 % GEL    • Multiple Vitamins-Minerals (PRESERVISION AREDS 2 PO) Take by mouth 2 (two) times a day   • VITAMIN D, CHOLECALCIFEROL, PO Take by mouth   • [DISCONTINUED] levothyroxine (Levo-T) 50 mcg tablet Take 1 tablet (50 mcg total) by mouth daily   • [DISCONTINUED] levothyroxine (Levoxyl) 75 mcg tablet Take 1 tablet (75 mcg total) by mouth daily       Objective     /86 (BP Location: Left arm, Patient Position: Sitting)   Pulse 76   Temp (!) 96.2 °F (35.7 °C) (Tympanic)   Ht 5' 5\" (1.651 m)   Wt 56 kg (123 lb 6.4 oz)   SpO2 98%   BMI 20.53 kg/m²     Physical Exam  Vitals and nursing note reviewed.   Constitutional:       General: She is not in acute distress.     Appearance: She is not diaphoretic.   HENT:      Head: Normocephalic and atraumatic.      Right Ear: Tympanic membrane normal. There is no impacted cerumen.      Left Ear: Tympanic membrane normal. There is no impacted cerumen.      Nose: Nose normal. No congestion or rhinorrhea.      Mouth/Throat:      Pharynx: No posterior oropharyngeal erythema.   Eyes:      General: No scleral icterus.        Right eye: No discharge.         Left eye: No discharge.   Cardiovascular:      Rate and Rhythm: Normal rate and regular rhythm.      Heart sounds: No murmur heard.  Pulmonary:      Effort: Pulmonary effort is normal. No respiratory distress.   Abdominal:      General: There is no distension.      Tenderness: There is no abdominal tenderness.   Musculoskeletal:         General: Normal range of motion.      Cervical back: Normal range of motion.      Right lower leg: No edema.      Left lower leg: No edema.   Skin:     Findings: No rash.   Neurological:      Mental Status: She is alert and oriented to person, place, " and time.       Radha Ramirez MD

## 2023-12-27 NOTE — ASSESSMENT & PLAN NOTE
Chronic recommend the patient to take calcium and vitamin D supplement secondary to osteopenia and she agreed vitamin D rich diet reviewed

## 2023-12-27 NOTE — ASSESSMENT & PLAN NOTE
chronic DEXA scan showed decrease in the bone density compared with before discussed with patient recommend calcium and vitamin D supplement for precaution and fracture risk discussed with the patient

## 2024-02-08 ENCOUNTER — APPOINTMENT (OUTPATIENT)
Dept: LAB | Facility: CLINIC | Age: 74
End: 2024-02-08
Payer: MEDICARE

## 2024-02-08 DIAGNOSIS — Z13.29 SCREENING FOR THYROID DISORDER: ICD-10-CM

## 2024-02-08 LAB
T4 FREE SERPL-MCNC: 1.04 NG/DL (ref 0.61–1.12)
TSH SERPL DL<=0.05 MIU/L-ACNC: 0.14 UIU/ML (ref 0.45–4.5)

## 2024-02-08 PROCEDURE — 36415 COLL VENOUS BLD VENIPUNCTURE: CPT

## 2024-02-08 PROCEDURE — 84439 ASSAY OF FREE THYROXINE: CPT

## 2024-02-08 PROCEDURE — 84443 ASSAY THYROID STIM HORMONE: CPT

## 2024-02-13 ENCOUNTER — TELEPHONE (OUTPATIENT)
Age: 74
End: 2024-02-13

## 2024-02-13 NOTE — TELEPHONE ENCOUNTER
Patient calling stating she keep getting calls and vm from the automatic system appt her appt that was cancelled.

## 2024-02-23 ENCOUNTER — TELEPHONE (OUTPATIENT)
Dept: ADMINISTRATIVE | Facility: OTHER | Age: 74
End: 2024-02-23

## 2024-02-23 NOTE — TELEPHONE ENCOUNTER
02/23/24 3:14 PM    Patient contacted (left message) to bring Advance Directive, POLST, or Living Will document to next scheduled pcp visit.    Thank you.  Linette Chaparro MA  [facility]

## 2024-02-28 ENCOUNTER — OFFICE VISIT (OUTPATIENT)
Dept: FAMILY MEDICINE CLINIC | Facility: CLINIC | Age: 74
End: 2024-02-28
Payer: MEDICARE

## 2024-02-28 VITALS
WEIGHT: 126 LBS | OXYGEN SATURATION: 99 % | HEIGHT: 65 IN | TEMPERATURE: 99.3 F | BODY MASS INDEX: 20.99 KG/M2 | SYSTOLIC BLOOD PRESSURE: 110 MMHG | HEART RATE: 78 BPM | DIASTOLIC BLOOD PRESSURE: 70 MMHG

## 2024-02-28 DIAGNOSIS — E03.9 ACQUIRED HYPOTHYROIDISM: ICD-10-CM

## 2024-02-28 DIAGNOSIS — I71.21 ANEURYSM OF ASCENDING AORTA WITHOUT RUPTURE (HCC): ICD-10-CM

## 2024-02-28 DIAGNOSIS — E46 PROTEIN-CALORIE MALNUTRITION, UNSPECIFIED SEVERITY (HCC): ICD-10-CM

## 2024-02-28 DIAGNOSIS — G56.03 BILATERAL CARPAL TUNNEL SYNDROME: Primary | ICD-10-CM

## 2024-02-28 DIAGNOSIS — G62.0 NEUROPATHY DUE TO CHEMOTHERAPEUTIC DRUG: ICD-10-CM

## 2024-02-28 DIAGNOSIS — Z13.29 SCREENING FOR THYROID DISORDER: ICD-10-CM

## 2024-02-28 DIAGNOSIS — D84.9 IMMUNOCOMPROMISED (HCC): ICD-10-CM

## 2024-02-28 DIAGNOSIS — N18.31 STAGE 3A CHRONIC KIDNEY DISEASE (HCC): ICD-10-CM

## 2024-02-28 DIAGNOSIS — C77.0 SECONDARY MALIGNANT NEOPLASM OF CERVICAL LYMPH NODE (HCC): ICD-10-CM

## 2024-02-28 DIAGNOSIS — T45.1X5A NEUROPATHY DUE TO CHEMOTHERAPEUTIC DRUG: ICD-10-CM

## 2024-02-28 PROCEDURE — G2211 COMPLEX E/M VISIT ADD ON: HCPCS | Performed by: FAMILY MEDICINE

## 2024-02-28 PROCEDURE — 99214 OFFICE O/P EST MOD 30 MIN: CPT | Performed by: FAMILY MEDICINE

## 2024-02-28 RX ORDER — MAGNESIUM CHLORIDE 71.5 G/G
2 TABLET ORAL DAILY
COMMUNITY

## 2024-02-28 RX ORDER — FOLIC ACID/MULTIVIT,IRON,MINER 0.4MG-18MG
1 TABLET ORAL DAILY
COMMUNITY

## 2024-03-01 ENCOUNTER — APPOINTMENT (OUTPATIENT)
Dept: LAB | Facility: CLINIC | Age: 74
End: 2024-03-01
Payer: MEDICARE

## 2024-03-01 DIAGNOSIS — N18.31 STAGE 3A CHRONIC KIDNEY DISEASE (HCC): ICD-10-CM

## 2024-03-01 DIAGNOSIS — E46 PROTEIN-CALORIE MALNUTRITION, UNSPECIFIED SEVERITY (HCC): ICD-10-CM

## 2024-03-01 DIAGNOSIS — C77.0 SECONDARY MALIGNANT NEOPLASM OF CERVICAL LYMPH NODE (HCC): ICD-10-CM

## 2024-03-01 DIAGNOSIS — T45.1X5A NEUROPATHY DUE TO CHEMOTHERAPEUTIC DRUG: ICD-10-CM

## 2024-03-01 DIAGNOSIS — G62.0 NEUROPATHY DUE TO CHEMOTHERAPEUTIC DRUG: ICD-10-CM

## 2024-03-01 DIAGNOSIS — G56.03 BILATERAL CARPAL TUNNEL SYNDROME: ICD-10-CM

## 2024-03-01 DIAGNOSIS — I71.21 ANEURYSM OF ASCENDING AORTA WITHOUT RUPTURE (HCC): ICD-10-CM

## 2024-03-01 DIAGNOSIS — Z13.29 SCREENING FOR THYROID DISORDER: ICD-10-CM

## 2024-03-01 DIAGNOSIS — D84.9 IMMUNOCOMPROMISED (HCC): ICD-10-CM

## 2024-03-01 DIAGNOSIS — E03.9 ACQUIRED HYPOTHYROIDISM: ICD-10-CM

## 2024-03-01 LAB
ALBUMIN SERPL BCP-MCNC: 4.4 G/DL (ref 3.5–5)
ALP SERPL-CCNC: 67 U/L (ref 34–104)
ALT SERPL W P-5'-P-CCNC: 14 U/L (ref 7–52)
ANION GAP SERPL CALCULATED.3IONS-SCNC: 9 MMOL/L
AST SERPL W P-5'-P-CCNC: 20 U/L (ref 13–39)
BASOPHILS # BLD AUTO: 0.07 THOUSANDS/ÂΜL (ref 0–0.1)
BASOPHILS NFR BLD AUTO: 2 % (ref 0–1)
BILIRUB SERPL-MCNC: 0.82 MG/DL (ref 0.2–1)
BUN SERPL-MCNC: 14 MG/DL (ref 5–25)
CALCIUM SERPL-MCNC: 9.2 MG/DL (ref 8.4–10.2)
CHLORIDE SERPL-SCNC: 104 MMOL/L (ref 96–108)
CHOLEST SERPL-MCNC: 193 MG/DL
CO2 SERPL-SCNC: 29 MMOL/L (ref 21–32)
CREAT SERPL-MCNC: 0.75 MG/DL (ref 0.6–1.3)
EOSINOPHIL # BLD AUTO: 0.16 THOUSAND/ÂΜL (ref 0–0.61)
EOSINOPHIL NFR BLD AUTO: 3 % (ref 0–6)
ERYTHROCYTE [DISTWIDTH] IN BLOOD BY AUTOMATED COUNT: 11.9 % (ref 11.6–15.1)
GFR SERPL CREATININE-BSD FRML MDRD: 78 ML/MIN/1.73SQ M
GLUCOSE P FAST SERPL-MCNC: 97 MG/DL (ref 65–99)
HCT VFR BLD AUTO: 42.6 % (ref 34.8–46.1)
HDLC SERPL-MCNC: 79 MG/DL
HGB BLD-MCNC: 13.7 G/DL (ref 11.5–15.4)
IMM GRANULOCYTES # BLD AUTO: 0 THOUSAND/UL (ref 0–0.2)
IMM GRANULOCYTES NFR BLD AUTO: 0 % (ref 0–2)
LDLC SERPL CALC-MCNC: 95 MG/DL (ref 0–100)
LYMPHOCYTES # BLD AUTO: 1.24 THOUSANDS/ÂΜL (ref 0.6–4.47)
LYMPHOCYTES NFR BLD AUTO: 26 % (ref 14–44)
MCH RBC QN AUTO: 30.6 PG (ref 26.8–34.3)
MCHC RBC AUTO-ENTMCNC: 32.2 G/DL (ref 31.4–37.4)
MCV RBC AUTO: 95 FL (ref 82–98)
MONOCYTES # BLD AUTO: 0.48 THOUSAND/ÂΜL (ref 0.17–1.22)
MONOCYTES NFR BLD AUTO: 10 % (ref 4–12)
NEUTROPHILS # BLD AUTO: 2.74 THOUSANDS/ÂΜL (ref 1.85–7.62)
NEUTS SEG NFR BLD AUTO: 59 % (ref 43–75)
NRBC BLD AUTO-RTO: 0 /100 WBCS
PLATELET # BLD AUTO: 252 THOUSANDS/UL (ref 149–390)
PMV BLD AUTO: 9.5 FL (ref 8.9–12.7)
POTASSIUM SERPL-SCNC: 3.8 MMOL/L (ref 3.5–5.3)
PROT SERPL-MCNC: 6.8 G/DL (ref 6.4–8.4)
RBC # BLD AUTO: 4.48 MILLION/UL (ref 3.81–5.12)
SODIUM SERPL-SCNC: 142 MMOL/L (ref 135–147)
T4 FREE SERPL-MCNC: 1.04 NG/DL (ref 0.61–1.12)
TRIGL SERPL-MCNC: 95 MG/DL
TSH SERPL DL<=0.05 MIU/L-ACNC: 0.26 UIU/ML (ref 0.45–4.5)
WBC # BLD AUTO: 4.69 THOUSAND/UL (ref 4.31–10.16)

## 2024-03-01 PROCEDURE — 84443 ASSAY THYROID STIM HORMONE: CPT

## 2024-03-01 PROCEDURE — 80053 COMPREHEN METABOLIC PANEL: CPT

## 2024-03-01 PROCEDURE — 36415 COLL VENOUS BLD VENIPUNCTURE: CPT

## 2024-03-01 PROCEDURE — 85025 COMPLETE CBC W/AUTO DIFF WBC: CPT

## 2024-03-01 PROCEDURE — 84439 ASSAY OF FREE THYROXINE: CPT

## 2024-03-01 PROCEDURE — 80061 LIPID PANEL: CPT

## 2024-03-02 PROBLEM — G56.03 BILATERAL CARPAL TUNNEL SYNDROME: Status: ACTIVE | Noted: 2024-02-28

## 2024-03-02 PROBLEM — G56.03 BILATERAL CARPAL TUNNEL SYNDROME: Status: ACTIVE | Noted: 2024-03-02

## 2024-03-02 NOTE — ASSESSMENT & PLAN NOTE
New diagnosis symptomatic care discussed with patient natural of the problem recommend to wear splint she will hold on that until she gets the results of the MSK of bilateral wrist we will order today

## 2024-03-02 NOTE — PROGRESS NOTES
Name: Brittanie Bernabe      : 1950      MRN: [id number]  Encounter Provider: Radha Ramirez MD  Encounter Date: 2024   Encounter department: [facility]    Assessment & Plan     1. 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 Immunocompromised (HCC)  Assessment & Plan:  Patient follow-up with the ENT periodically she had a history of tongue cancer    Orders:  -     CBC and differential; Future  -     Comprehensive metabolic panel; Future    4. Neuropathy due to chemotherapeutic drug   -     CBC and differential; Future  -     Comprehensive metabolic panel; Future    5. Protein-calorie malnutrition, unspecified severity (HCC)  Assessment & Plan:  BMI stable encourage patient to continue protein supplement   Body mass index is 21.29 kg/m².       Orders:  -     CBC and differential; Future  -     Comprehensive metabolic panel; Future    6. Aneurysm of ascending aorta without rupture (HCC)  Assessment & Plan:  Chronic asymptomatic blood pressure well-controlled patient will continue have yearly CTA of the chest as she is due in 2024    Orders:  -     CBC and differential; Future  -     Comprehensive metabolic panel; Future    7. Stage 3a chronic kidney disease (HCC)  -     CBC and differential; Future  -     Comprehensive metabolic panel; Future    8. Acquired  hypothyroidism  Assessment & Plan:  Chronic asymptomatic improvement TSH compared with before continue with levothyroxine 100 mcg once a day    Orders:  -     Lipid Panel with Direct LDL reflex; Future    9. Screening for thyroid disorder  -     TSH, 3rd generation with Free T4 reflex; Future           Subjective      Patient here follow-up with a chronic condition and she concerned about the pain and bilateral hand and tingling sensation at tip of the finger patient known to have history of repetitive movement of the hand she used to do typing no muscle weakness no rash no headache no blurred vision double vision recent blood work discussed with the patient      Review of Systems   Constitutional:  Negative for chills and fever.   HENT:  Negative for ear pain and sore throat.    Eyes:  Negative for pain and visual disturbance.   Respiratory:  Negative for cough and shortness of breath.    Cardiovascular:  Negative for chest pain and palpitations.   Gastrointestinal:  Negative for abdominal pain, constipation, diarrhea and vomiting.   Genitourinary:  Negative for dysuria and hematuria.   Musculoskeletal:  Positive for arthralgias.   Skin:  Negative for color change and rash.   Neurological:  Positive for numbness. Negative for seizures and syncope.   All other systems reviewed and are negative.      Current Outpatient Medications on File Prior to Visit   Medication Sig   • magnesium chloride-calcium (Slow-Mag) 71.5-119 mg Take 2 tablets by mouth daily   • Omega-3 Fatty Acids (Omega-3 Fish Oil) 1200 MG CAPS Take 1 capsule by mouth daily   • Calcium Carb-Cholecalciferol 600-12.5 MG-MCG CAPS One tablet twice /day   • DENTAGEL 1.1 % GEL    • levothyroxine (Euthyrox) 100 mcg tablet Take 1 tablet (100 mcg total) by mouth daily in the early morning   • Multiple Vitamins-Minerals (PRESERVISION AREDS 2 PO) Take by mouth 2 (two) times a day Using res-q advanced eye support   • VITAMIN D, CHOLECALCIFEROL, PO Take by mouth  "      Objective     /70 (BP Location: Left arm, Patient Position: Sitting)   Pulse 78   Temp 99.3 °F (37.4 °C) (Tympanic)   Ht 5' 4.5\" (1.638 m)   Wt 57.2 kg (126 lb)   SpO2 99%   Breastfeeding No   BMI 21.29 kg/m²     Physical Exam  Vitals and nursing note reviewed.   Constitutional:       General: She is not in acute distress.     Appearance: She is not diaphoretic.   HENT:      Head: Normocephalic and atraumatic.      Right Ear: Tympanic membrane normal. There is no impacted cerumen.      Left Ear: Tympanic membrane normal. There is no impacted cerumen.      Nose: Nose normal. No congestion or rhinorrhea.      Mouth/Throat:      Pharynx: No posterior oropharyngeal erythema.   Eyes:      General: No scleral icterus.        Right eye: No discharge.         Left eye: No discharge.   Cardiovascular:      Rate and Rhythm: Normal rate and regular rhythm.      Heart sounds: No murmur heard.  Pulmonary:      Effort: Pulmonary effort is normal. No respiratory distress.   Abdominal:      General: There is no distension.      Tenderness: There is no abdominal tenderness.   Musculoskeletal:         General: Normal range of motion.      Cervical back: Normal range of motion.      Right lower leg: No edema.      Left lower leg: No edema.      Comments: Tinel sign is positive B/L   Skin:     Findings: No rash.   Neurological:      Mental Status: She is alert and oriented to person, place, and time.       Radha Ramirez MD    "

## 2024-03-02 NOTE — ASSESSMENT & PLAN NOTE
Chronic asymptomatic improvement TSH compared with before continue with levothyroxine 100 mcg once a day

## 2024-03-02 NOTE — ASSESSMENT & PLAN NOTE
Chronic asymptomatic blood pressure well-controlled patient will continue have yearly CTA of the chest as she is due in October 2024   179.8

## 2024-03-23 DIAGNOSIS — E03.9 ACQUIRED HYPOTHYROIDISM: ICD-10-CM

## 2024-03-24 RX ORDER — LEVOTHYROXINE SODIUM 0.1 MG/1
100 TABLET ORAL
Qty: 90 TABLET | Refills: 0 | Status: SHIPPED | OUTPATIENT
Start: 2024-03-24

## 2024-04-24 ENCOUNTER — RADIATION ONCOLOGY FOLLOW-UP (OUTPATIENT)
Dept: RADIATION ONCOLOGY | Facility: CLINIC | Age: 74
End: 2024-04-24
Attending: RADIOLOGY
Payer: MEDICARE

## 2024-04-24 VITALS
HEART RATE: 72 BPM | HEIGHT: 64 IN | OXYGEN SATURATION: 99 % | DIASTOLIC BLOOD PRESSURE: 96 MMHG | BODY MASS INDEX: 21.23 KG/M2 | TEMPERATURE: 98.7 F | RESPIRATION RATE: 16 BRPM | SYSTOLIC BLOOD PRESSURE: 141 MMHG | WEIGHT: 124.34 LBS

## 2024-04-24 DIAGNOSIS — C77.0 SECONDARY MALIGNANT NEOPLASM OF CERVICAL LYMPH NODE (HCC): ICD-10-CM

## 2024-04-24 DIAGNOSIS — C01 CANCER OF BASE OF TONGUE (HCC): Primary | ICD-10-CM

## 2024-04-24 PROCEDURE — 99211 OFF/OP EST MAY X REQ PHY/QHP: CPT | Performed by: RADIOLOGY

## 2024-04-24 PROCEDURE — 99213 OFFICE O/P EST LOW 20 MIN: CPT | Performed by: RADIOLOGY

## 2024-04-24 NOTE — PROGRESS NOTES
Brittanie Bernabe 1950 is a 73 y.o. 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 She was last seen in radiation on 23 She returns today for her follow up.     23  Dr. Tate  Laryngoscopy:   Epiglottis normal  Supraglottic larynx normal without mucosal lesions  Including false cords and ventricle  Right aryepiglottic fold normal  Left aryepiglottic fold normal   Right arytenoid normal  Left arytenoid normal  Right vocal cord normal  Left vocal cord normal  Normal vocal cord mobility  Subglottis normal  Inter arytenoid and post cricoid regions clear without inflammation or mucosal lesions    Hypopharynx  Pyriorm sinuses clear without secretions or lesions     Upcomin/3/24  Dr. Tate    Follow up visit     Oncology History   Cancer of base of tongue (HCC)   5/3/2019 Biopsy    Right BOT biopsy. Biopsy + for SCC moderately differentiated,  Incompletely excised.        6/10/2019 - 2019 Chemotherapy    CISplatin (PLATINOL) 174 mg, mannitol 12.5 g in sodium chloride 0.9 % 500 mL IVPB, 100 mg/m2 = 174 mg, Intravenous, Once, 1 of 3 cycles  Administration: 174 mg (6/10/2019)  potassium chloride 20 mEq, magnesium sulfate 1 g in sodium chloride 0.9 % 1,000 mL IVPB, , Intravenous, Once, 1 of 3 cycles  Administration:  (6/10/2019),  (6/10/2019)  (1 cycle only d/c'd due to significant hearing loss after 1 treatment)       6/10/2019 - 2019 Radiation    Plan ID Energy Fractions Dose per Fraction (cGy) Dose Correction (cGy) Total Dose Delivered (cGy) Elapsed Days   Head and Neck 6X 35 / 35 200 0 7,000 49     Dr Faith     2019 - 2019 Chemotherapy    CARBOplatin (PARAPLATIN) 115.5 mg in sodium chloride 0.9 % 250 mL IVPB, 70 mg/m2 = 115.5 mg (100 % of original dose 70 mg/m2), Intravenous, Once, 2 of 2  cycles  Dose modification: 70 mg/m2 (original dose 70 mg/m2, Cycle 1)  Administration: 115.5 mg (7/1/2019), 115.5 mg (7/2/2019), 115.5 mg (7/3/2019), 115.5 mg (7/5/2019), 115.5 mg (7/31/2019), 115.5 mg (8/1/2019), 115.5 mg (8/2/2019), 115.5 mg (7/30/2019)  (completed 2 cycles)           Review of Systems:  Review of Systems   Constitutional:  Negative for appetite change.   HENT: Negative.     Eyes: Negative.    Respiratory: Negative.     Cardiovascular: Negative.    Gastrointestinal: Negative.    Endocrine: Positive for cold intolerance.   Genitourinary: Negative.    Musculoskeletal: Negative.    Skin: Negative.    Allergic/Immunologic: Negative.    Neurological: Negative.    Hematological: Negative.    Psychiatric/Behavioral:  Positive for sleep disturbance (occasional).        Clinical Trial: no    Pregnancy test needed:  no    Teaching completed    Health Maintenance   Topic Date Due    Hepatitis C Screening  Never done    Zoster Vaccine (1 of 2) Never done    COVID-19 Vaccine (3 - Pfizer risk series) 03/03/2021    Pneumococcal Vaccine: 65+ Years (2 of 2 - PPSV23 or PCV20) 10/25/2021    Colorectal Cancer Screening  09/19/2022    Breast Cancer Screening: Mammogram  05/18/2023    Medicare Annual Wellness Visit (AWV)  10/13/2024    Fall Risk  02/28/2025    Depression Screening  02/28/2025    Urinary Incontinence Screening  02/28/2025    BMI: Adult  02/28/2025    Osteoporosis Screening  Completed    Influenza Vaccine  Completed    HIB Vaccine  Aged Out    IPV Vaccine  Aged Out    Hepatitis A Vaccine  Aged Out    Meningococcal ACWY Vaccine  Aged Out    HPV Vaccine  Aged Out     Patient Active Problem List   Diagnosis    Multiple thyroid nodules    Cancer of base of tongue (HCC)    Thoracic aortic aneurysm (HCC)    Encounter for chemotherapy management    Oral thrush    Radiation thyroiditis    Hypothyroidism    Vitamin D deficiency    Foot callus    Pre-op examination    Post-menopause    Cataracta    History of  hysterectomy    Immunocompromised (HCC)    Osteopenia of neck of left femur    Protein-calorie malnutrition, unspecified severity (HCC)    Neuropathy due to chemotherapeutic drug (HCC)    Stage 3a chronic kidney disease (HCC)    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)    Bilateral carpal tunnel syndrome     Past Medical History:   Diagnosis Date    Anemia 7/15/2019    Dehydration 6/17/2019    Disease of thyroid gland     Fatigue 3/27/2019    Hypokalemia 8/9/2019    Tongue cancer (HCC) 2019    Squamous cell     Past Surgical History:   Procedure Laterality Date    ADENOIDECTOMY      APPENDECTOMY  1990    CATARACT EXTRACTION Left     HYSTERECTOMY  2012    total    IR PICC LINE  6/5/2019    OOPHORECTOMY Bilateral 2012    OK XCAPSL CTRC RMVL INSJ IO LENS PROSTH W/O ECP Right 8/25/2021    Procedure: CATARACT SURGERY W/INTRAOCULAR LENS;  Surgeon: Yaw Gandhi MD;  Location:  MAIN OR;  Service: Ophthalmology    TONSILLECTOMY       Family History   Problem Relation Age of Onset    Parkinsonism Mother         Cardiac abnormality ([place])    Prostate cancer Father     Rheum arthritis Sister     Hypothyroidism Sister     No Known Problems Maternal Grandmother     No Known Problems Maternal Grandfather     No Known Problems Paternal Grandmother     No Known Problems Paternal Grandfather     No Known Problems Paternal Aunt     No Known Problems Paternal Aunt     No Known Problems Paternal Aunt     No Known Problems Paternal Aunt     Hashimoto's thyroiditis Family     Breast cancer Neg Hx      Social History     Socioeconomic History    Marital status:      Spouse name: Not on file    Number of children: Not on file    Years of education: Not on file    Highest education level: Not on file   Occupational History     Employer: [employer]   Tobacco Use    Smoking status: Former     Current packs/day: 0.00     Average packs/day: 0.5 packs/day for 5.0 years (2.5 ttl pk-yrs)     Types:  Cigarettes     Start date: 1973     Quit date: 1978     Years since quittin.3     Passive exposure: Never    Smokeless tobacco: Never    Tobacco comments:     no passive smoke exposure   Vaping Use    Vaping status: Never Used   Substance and Sexual Activity    Alcohol use: Yes     Comment: Rarely    Drug use: Never    Sexual activity: Not Currently   Other Topics Concern    Not on file   Social History Narrative    Not on file     Social Determinants of Health     Financial Resource Strain: Patient Declined (10/13/2023)    Overall Financial Resource Strain (CARDIA)     Difficulty of Paying Living Expenses: Patient declined   Food Insecurity: No Food Insecurity (2021)    Hunger Vital Sign     Worried About Running Out of Food in the Last Year: Never true     Ran Out of Food in the Last Year: Never true   Transportation Needs: No Transportation Needs (10/13/2023)    PRAPARE - Transportation     Lack of Transportation (Medical): No     Lack of Transportation (Non-Medical): No   Physical Activity: Sufficiently Active (2021)    Exercise Vital Sign     Days of Exercise per Week: 7 days     Minutes of Exercise per Session: 60 min   Stress: No Stress Concern Present (2021)    [place] [employer] - Occupational Stress Questionnaire     Feeling of Stress : Not at all   Social Connections: Moderately Integrated (2021)    Social Connection and Isolation Panel [NHANES]     Frequency of Communication with Friends and Family: More than three times a week     Frequency of Social Gatherings with Friends and Family: More than three times a week     Attends Jew Services: 1 to 4 times per year     Active Member of Clubs or Organizations: Yes     Attends Club or Organization Meetings: 1 to 4 times per year     Marital Status: Never    Intimate Partner Violence: Not At Risk (2021)    Humiliation, Afraid, Rape, and Kick questionnaire     Fear of Current or Ex-Partner: No  "    Emotionally Abused: No     Physically Abused: No     Sexually Abused: No   Housing Stability: Unknown (8/9/2021)    Housing Stability Vital Sign     Unable to Pay for Housing in the Last Year: No     Number of Places Lived in the Last Year: Not on file     Unstable Housing in the Last Year: No       Current Outpatient Medications:     Calcium Carb-Cholecalciferol 600-12.5 MG-MCG CAPS, One tablet twice /day, Disp: 180 capsule, Rfl: 0    DENTAGEL 1.1 % GEL, , Disp: , Rfl: 11    levothyroxine 100 mcg tablet, Take 1 tablet (100 mcg total) by mouth daily in the early morning, Disp: 90 tablet, Rfl: 0    magnesium chloride-calcium (Slow-Mag) 71.5-119 mg, Take 2 tablets by mouth daily, Disp: , Rfl:     Multiple Vitamins-Minerals (PRESERVISION AREDS 2 PO), Take by mouth 2 (two) times a day Using res-q advanced eye support, Disp: , Rfl:     Omega-3 Fatty Acids (Omega-3 Fish Oil) 1200 MG CAPS, Take 1 capsule by mouth daily, Disp: , Rfl:     VITAMIN D, CHOLECALCIFEROL, PO, Take by mouth, Disp: , Rfl:   Allergies   Allergen Reactions    Codeine Anaphylaxis     Difficulty breathing     Vitals:    04/24/24 1256   Height: 5' 4\" (1.626 m)         "

## 2024-04-24 NOTE — PROGRESS NOTES
Follow-up - Radiation Oncology   Brittanie Bernabe 1950 73 y.o. female [id number]      History of Present Illness   Cancer Staging   Cancer of base of tongue (HCC)  Staging form: Pharynx - Oropharynx, AJCC 8th Edition  - Clinical: Stage I (cT2, cN1, cM0, p16-) - Unsigned  Laterality: Right      Brittanie Bernabe is a 73 y.o. 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 She was last seen in radiation on 04/26/23 She returns today for her follow up.     Interval History:   12/1/23  Dr. Tate  Laryngoscopy:   Epiglottis normal  Supraglottic larynx normal without mucosal lesions  Including false cords and ventricle  Right aryepiglottic fold normal  Left aryepiglottic fold normal   Right arytenoid normal  Left arytenoid normal  Right vocal cord normal  Left vocal cord normal  Normal vocal cord mobility  Subglottis normal  Inter arytenoid and post cricoid regions clear without inflammation or mucosal lesions    Hypopharynx  Pyriorm sinuses clear without secretions or lesions      She reports she is feeling well.  She is eating well and following a regular diet. She states she can eat everything and her taste is normal.  She denies any dysphagia.   She has no nausea and no vomiting.  She reports mild dryness in the mouth.  She continues to drink fluids and water frequently.  Her weight has been stable.  She worked at [employer] for 17 years.  She worked previously full-time as an  and then was off for about 6 months to receive her treatments.  She resumed working part-time on the weekends/3 days per week in the spring of 2020.    She retired in September 2021.  She remains active and exercises 2-3 days per week.    She walks 3 to 5 miles daily.  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 She lives in her daughter's Garage which has been converted to an apartment for her since . She is having no difficulty with her teeth and is seeing her dentist every 6 months.  She does have fluoride trays.  She has not smoked cigarettes since .   She reports some mild numbness of her hands.  She will be having a cubital carpal ultrasound May 8    Upcomin/3/24  Dr. Tate  24 Dr. Ramirez    Historical Information   Oncology History   Cancer of base of tongue (HCC)   5/3/2019 Biopsy    Right BOT biopsy. Biopsy + for SCC moderately differentiated,  Incompletely excised.        6/10/2019 - 2019 Chemotherapy    CISplatin (PLATINOL) 174 mg, mannitol 12.5 g in sodium chloride 0.9 % 500 mL IVPB, 100 mg/m2 = 174 mg, Intravenous, Once, 1 of 3 cycles  Administration: 174 mg (6/10/2019)  potassium chloride 20 mEq, magnesium sulfate 1 g in sodium chloride 0.9 % 1,000 mL IVPB, , Intravenous, Once, 1 of 3 cycles  Administration:  (6/10/2019),  (6/10/2019)  (1 cycle only d/c'd due to significant hearing loss after 1 treatment)       6/10/2019 - 2019 Radiation    Plan ID Energy Fractions Dose per Fraction (cGy) Dose Correction (cGy) Total Dose Delivered (cGy) Elapsed Days   Head and Neck 6X 35 / 35 200 0 7,000 49     Dr Faith     2019 - 2019 Chemotherapy    CARBOplatin (PARAPLATIN) 115.5 mg in sodium chloride 0.9 % 250 mL IVPB, 70 mg/m2 = 115.5 mg (100 % of original dose 70 mg/m2), Intravenous, Once, 2 of 2 cycles  Dose modification: 70 mg/m2 (original dose 70 mg/m2, Cycle 1)  Administration: 115.5 mg (2019), 115.5 mg (2019), 115.5 mg (7/3/2019), 115.5 mg (2019), 115.5 mg (2019), 115.5 mg (2019), 115.5 mg (2019), 115.5 mg (2019)  (completed 2 cycles)         Past Medical History:   Diagnosis Date    Anemia 7/15/2019    Dehydration 2019    Disease of thyroid gland     Fatigue 3/27/2019    Hypokalemia 2019    Tongue cancer (HCC) 2019    Squamous  cell     Past Surgical History:   Procedure Laterality Date    ADENOIDECTOMY      APPENDECTOMY      CATARACT EXTRACTION Left     HYSTERECTOMY      total    IR PICC LINE  2019    OOPHORECTOMY Bilateral     TN XCAPSL CTRC RMVL INSJ IO LENS PROSTH W/O ECP Right 2021    Procedure: CATARACT SURGERY W/INTRAOCULAR LENS;  Surgeon: Yaw Gandhi MD;  Location:  MAIN OR;  Service: Ophthalmology    TONSILLECTOMY       Social History   Social History     Substance and Sexual Activity   Alcohol Use Not Currently     Social History     Substance and Sexual Activity   Drug Use Never     Social History     Tobacco Use   Smoking Status Former    Current packs/day: 0.00    Average packs/day: 0.5 packs/day for 5.0 years (2.5 ttl pk-yrs)    Types: Cigarettes    Start date: 1973    Quit date: 1978    Years since quittin.3    Passive exposure: Never   Smokeless Tobacco Never   Tobacco Comments    no passive smoke exposure     Meds/Allergies     Current Outpatient Medications:     Calcium Carb-Cholecalciferol 600-12.5 MG-MCG CAPS, One tablet twice /day, Disp: 180 capsule, Rfl: 0    DENTAGEL 1.1 % GEL, , Disp: , Rfl: 11    levothyroxine 100 mcg tablet, Take 1 tablet (100 mcg total) by mouth daily in the early morning, Disp: 90 tablet, Rfl: 0    Omega-3 Fatty Acids (Omega-3 Fish Oil) 1200 MG CAPS, Take 1 capsule by mouth daily, Disp: , Rfl:     VITAMIN D, CHOLECALCIFEROL, PO, Take by mouth, Disp: , Rfl:     magnesium chloride-calcium (Slow-Mag) 71.5-119 mg, Take 2 tablets by mouth daily (Patient not taking: Reported on 2024), Disp: , Rfl:     Multiple Vitamins-Minerals (PRESERVISION AREDS 2 PO), Take by mouth 2 (two) times a day Using res-q advanced eye support (Patient not taking: Reported on 2024), Disp: , Rfl:   Allergies   Allergen Reactions    Codeine Anaphylaxis     Difficulty breathing     Review of Systems  Constitutional:  Negative for appetite change.   HENT: Negative.     Eyes:  "Negative.    Respiratory: Negative.     Cardiovascular: Negative.    Gastrointestinal: Negative.    Endocrine: Positive for cold intolerance.   Genitourinary: Negative.    Musculoskeletal: Negative.    Skin: Negative.    Allergic/Immunologic: Negative.    Neurological: Negative.    Hematological: Negative.    Psychiatric/Behavioral:  Positive for sleep disturbance (occasional).      OBJECTIVE:   /96 (BP Location: Left arm)   Pulse 72   Temp 98.7 °F (37.1 °C) (Temporal)   Resp 16   Ht 5' 4\" (1.626 m)   Wt 56.4 kg (124 lb 5.4 oz)   SpO2 99%   BMI 21.34 kg/m²   Pain Assessment:  0  ECOG/Zubrod/WHO: 0 - Asymptomatic    Physical Exam   Constitutional: She is oriented to person, place, and time. She appears well-developed and well-nourished. No distress.   HENT:   Head: Normocephalic and atraumatic.   Mouth/Throat: No oropharyngeal exudate.   Right base of tongue mass remains resolved without any tongue deviation.  Her teeth are in good repair.   Eyes: Pupils are equal, round, and reactive to light. Conjunctivae and EOM are normal. No scleral icterus.   Neck: Normal range of motion. Neck supple. No tracheal deviation present. No thyromegaly present.   Cardiovascular: Normal rate, regular rhythm and normal heart sounds.   Pulmonary/Chest: Effort normal and breath sounds normal. No respiratory distress. She has no wheezes. She has no rales.   Abdominal: Soft. Bowel sounds are normal. She exhibits no distension and no mass. There is no tenderness.   Musculoskeletal: Normal range of motion. She exhibits no edema or tenderness.   Lymphadenopathy:     She has no cervical adenopathy.    She has no supraclavicular lymphadenopathy.    Neurological: She is alert and oriented to person, place, and time. No cranial nerve deficit. Coordination normal.   Skin: Skin is warm and dry. No rash noted. She is not diaphoretic. No erythema. No pallor.   Psychiatric: She has a normal mood and affect. Her behavior is normal. " Judgment and thought content normal.   Nursing note and vitals reviewed.     RESULTS    Lab Results: No results found for this or any previous visit (from the past 672 hour(s)).    Imaging Studies:No results found.    Assessment/Plan:  No orders of the defined types were placed in this encounter.       Brittanie Bernabe is a 73 y.o. year old female with a locally advanced stage DEON, T2-T3, N2b, 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 extending into the right tonsillar region and toward the right soft palate.  Staging PET-CT confirmed disease in the right oral cavity, tonsillar region and right tongue base approaching the level of the vallecula compatible with malignancy. There were multiple right cervical lymph nodes consistent with metastatic disease.  There were no metastasis in the chest, abdomen, or pelvis.  She understood that due to the extent of her disease, surgical resection was not possible.  We recommended definitive radiation therapy with concurrent chemotherapy.  She completed treatment on August 3, 2019 and returns today for follow-up.     She is doing well.  She has no significant dysphagia and had minimal mouth dryness.  Her taste is good and she is able to eat everything.  She has stable weight.  She has no clinical evidence of any recurrent disease.  She stop smoking tobacco in 1978.  She had repeat PET-CT study October 28, 2019 that showed a significant improvement with good response in the base of tongue region.  There was some low-level residual activity that is likely post treatment changes. She has regular follow-ups with Dr. Tate and examination on December 1 , 2023 revealed no evidence of disease.  She has follow-up appointment Las[place] 3, 2024 with Dr. Tate and continue to follow with him thereafter most likely annually.  She remains without any evidence of recurrent disease now approaching 5 years since treatment.  Therefore, she will be discharged from follow-up in this office.    "    Carlton Faith MD  4/24/2024,1:35 PM    Portions of the record may have been created with voice recognition software.  Occasional wrong word or \"sound a like\" substitutions may have occurred due to the inherent limitations of voice recognition software.  Read the chart carefully and recognize, using context, where substitutions have occurred.        "

## 2024-05-08 ENCOUNTER — HOSPITAL ENCOUNTER (OUTPATIENT)
Dept: ULTRASOUND IMAGING | Facility: HOSPITAL | Age: 74
Discharge: HOME | End: 2024-05-08
Payer: MEDICARE

## 2024-05-08 DIAGNOSIS — G56.03 BILATERAL CARPAL TUNNEL SYNDROME: ICD-10-CM

## 2024-05-08 PROCEDURE — 76882 US LMTD JT/FCL EVL NVASC XTR: CPT

## 2024-05-09 ENCOUNTER — TELEPHONE (OUTPATIENT)
Dept: FAMILY MEDICINE CLINIC | Facility: CLINIC | Age: 74
End: 2024-05-09

## 2024-05-09 DIAGNOSIS — G56.03 BILATERAL CARPAL TUNNEL SYNDROME: Primary | ICD-10-CM

## 2024-05-09 NOTE — TELEPHONE ENCOUNTER
Left message on machine that  prescription for wrist splints for carpal tunnel faxed over to  clinic at [address] [facility] [address] phone # [phone] referral placed to hand surgeon phone [phone]

## 2024-05-09 NOTE — TELEPHONE ENCOUNTER
----- Message from Radha Ramirez MD sent at 5/9/2024 12:15 PM EDT -----  MSK result possible carpal tunnel recommend to wear splint B/L and refer to hand surgeon

## 2024-06-08 DIAGNOSIS — E03.9 ACQUIRED HYPOTHYROIDISM: ICD-10-CM

## 2024-06-08 RX ORDER — LEVOTHYROXINE SODIUM 0.1 MG/1
100 TABLET ORAL
Qty: 90 TABLET | Refills: 1 | Status: SHIPPED | OUTPATIENT
Start: 2024-06-08

## 2024-06-20 ENCOUNTER — RA CDI HCC (OUTPATIENT)
Dept: OTHER | Facility: HOSPITAL | Age: 74
End: 2024-06-20

## 2024-06-20 PROBLEM — Z01.818 PRE-OP EXAMINATION: Status: RESOLVED | Noted: 2021-08-09 | Resolved: 2024-06-20

## 2024-06-21 ENCOUNTER — TELEPHONE (OUTPATIENT)
Dept: ADMINISTRATIVE | Facility: OTHER | Age: 74
End: 2024-06-21

## 2024-06-21 NOTE — TELEPHONE ENCOUNTER
06/21/24 11:44 AM    Patient contacted to bring Advance Directive, POLST, or Living Will document to next scheduled pcp visit.[facility] Department left message.    Thank you.  Linette Chaparro MA  [facility]

## 2024-06-24 ENCOUNTER — APPOINTMENT (OUTPATIENT)
Dept: LAB | Facility: CLINIC | Age: 74
End: 2024-06-24
Payer: MEDICARE

## 2024-06-24 DIAGNOSIS — E06.4 RADIATION THYROIDITIS: ICD-10-CM

## 2024-06-24 LAB
T4 FREE SERPL-MCNC: 1.1 NG/DL (ref 0.61–1.12)
TSH SERPL DL<=0.05 MIU/L-ACNC: 0.29 UIU/ML (ref 0.45–4.5)

## 2024-06-24 PROCEDURE — 84439 ASSAY OF FREE THYROXINE: CPT

## 2024-06-24 PROCEDURE — 84443 ASSAY THYROID STIM HORMONE: CPT

## 2024-06-24 PROCEDURE — 36415 COLL VENOUS BLD VENIPUNCTURE: CPT

## 2024-08-21 ENCOUNTER — OFFICE VISIT (OUTPATIENT)
Dept: OBGYN CLINIC | Facility: MEDICAL CENTER | Age: 74
End: 2024-08-21
Payer: MEDICARE

## 2024-08-21 VITALS
HEIGHT: 65 IN | BODY MASS INDEX: 20.49 KG/M2 | HEART RATE: 74 BPM | SYSTOLIC BLOOD PRESSURE: 150 MMHG | DIASTOLIC BLOOD PRESSURE: 82 MMHG | WEIGHT: 123 LBS

## 2024-08-21 DIAGNOSIS — G56.03 CARPAL TUNNEL SYNDROME, BILATERAL: Primary | ICD-10-CM

## 2024-08-21 PROCEDURE — 99204 OFFICE O/P NEW MOD 45 MIN: CPT | Performed by: ORTHOPAEDIC SURGERY

## 2024-08-21 RX ORDER — ACETAMINOPHEN 325 MG/1
975 TABLET ORAL ONCE
Status: CANCELLED | OUTPATIENT
Start: 2024-08-29 | End: 2024-08-21

## 2024-08-21 NOTE — H&P
The HAND & UPPER EXTREMITY OFFICE VISIT   Referred By:  Referral Self  No address on file      Chief Complaint:     Bilateral hand pain right greater than left    History of Present Illness:   74 y.o., right hand dominant female presents with several months of bilateral hand pain numbness and tingling.  At night she has burning pain and tingling into all the fingers but primarily the thumb index middle and ring fingers.  During the day she has decreased sensation and cold feeling in the fingers of her bilateral hands worse on the right side.  States she is dropping objects.  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  She would like to continue working at the shelter as she is passionate about it.      She has previously tried oral medications without significant benefit.  Her PCP had mentioned bracing but she has not tried that yet.  No other treatments.  Underwent ultrasounds of her bilateral wrist but has not yet heard the results.      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     Past Surgical History:   Procedure Laterality Date    ADENOIDECTOMY      APPENDECTOMY  1990    CATARACT EXTRACTION Left     HYSTERECTOMY  2012    total    IR PICC LINE  6/5/2019    OOPHORECTOMY Bilateral 2012    IN XCAPSL CTRC RMVL INSJ IO LENS PROSTH W/O ECP Right 8/25/2021    Procedure: CATARACT SURGERY W/INTRAOCULAR LENS;  Surgeon: Yaw Gandhi MD;  Location:  MAIN OR;  Service: Ophthalmology    TONSILLECTOMY       Family History   Problem Relation Age of Onset    Parkinsonism Mother         Cardiac abnormality ([place])    Prostate cancer Father     Rheum arthritis Sister     Hypothyroidism Sister     No Known  Problems Maternal Grandmother     No Known Problems Maternal Grandfather     No Known Problems Paternal Grandmother     No Known Problems Paternal Grandfather     No Known Problems Paternal Aunt     No Known Problems Paternal Aunt     No Known Problems Paternal Aunt     No Known Problems Paternal Aunt     Hashimoto's thyroiditis Family     Breast cancer Neg Hx      Social History     Socioeconomic History    Marital status:      Spouse name: Not on file    Number of children: Not on file    Years of education: Not on file    Highest education level: Not on file   Occupational History     Employer: [employer]   Tobacco Use    Smoking status: Former     Current packs/day: 0.00     Average packs/day: 0.5 packs/day for 5.0 years (2.5 ttl pk-yrs)     Types: Cigarettes     Start date: 1973     Quit date: 1978     Years since quittin.6     Passive exposure: Never    Smokeless tobacco: Never    Tobacco comments:     no passive smoke exposure   Vaping Use    Vaping status: Never Used   Substance and Sexual Activity    Alcohol use: Not Currently    Drug use: Never    Sexual activity: Not Currently   Other Topics Concern    Not on file   Social History Narrative    Not on file     Social Determinants of Health     Financial Resource Strain: Patient Declined (10/13/2023)    Overall Financial Resource Strain (CARDIA)     Difficulty of Paying Living Expenses: Patient declined   Food Insecurity: No Food Insecurity (2021)    Hunger Vital Sign     Worried About Running Out of Food in the Last Year: Never true     Ran Out of Food in the Last Year: Never true   Transportation Needs: No Transportation Needs (10/13/2023)    PRAPARE - Transportation     Lack of Transportation (Medical): No     Lack of Transportation (Non-Medical): No   Physical Activity: Sufficiently Active (2021)    Exercise Vital Sign     Days of Exercise per Week: 7 days     Minutes of Exercise per Session: 60 min   Stress:  "No Stress Concern Present (8/9/2021)    [place] [employer] - Occupational Stress Questionnaire     Feeling of Stress : Not at all   Social Connections: Unknown (6/18/2024)    Received from [email or web address]     How often do you feel lonely or isolated from those around you? (Adult - for ages 18 years and over): Not on file   Intimate Partner Violence: Not At Risk (8/9/2021)    Humiliation, Afraid, Rape, and Kick questionnaire     Fear of Current or Ex-Partner: No     Emotionally Abused: No     Physically Abused: No     Sexually Abused: No   Housing Stability: Unknown (8/9/2021)    Housing Stability Vital Sign     Unable to Pay for Housing in the Last Year: No     Number of Places Lived in the Last Year: Not on file     Unstable Housing in the Last Year: No     Scheduled Meds:  Continuous Infusions:No current facility-administered medications for this visit.    PRN Meds:.  Allergies   Allergen Reactions    Codeine Anaphylaxis     Difficulty breathing           Physical Examination:    /82   Pulse 74   Ht 5' 4.5\" (1.638 m)   Wt 55.8 kg (123 lb)   BMI 20.79 kg/m²     Gen: A&Ox3, NAD  Cardiac: regular rate  Chest: non labored breathing  Abdomen: Non-distended    Cervcial Spine: Negative Spurling's    Right Upper Extremity:  Skin CDI  No obvious deformity of the shoulder, arm, elbow, forearm, wrist, hand  Paresthesias to light touch in the median nerve distribution.  Intact sensation to light touch in the ulnar, radial, axillary nerve distributions  2-point discrimination: Thumb 8 mm, index finger 8 mm, middle finger 7 mm, radial ring finger 6 mm, ulnar ring finger 6 mm, small finger 7 mm  Positive Durkan's, negative Tinel's at the wrist  Negative Tinel's at the elbow, negative elbow flexion compression test  There is anterior subluxation of the ulnar nerve at the cubital tunnel on elbow flexion  5/5 motor thumb abduction, interosseous  2+RP      Left Upper Extremity:  Skin " CDI  No obvious deformity of the shoulder, arm, elbow, forearm, wrist, hand  Paresthesias to light touch in the median and ulnar nerve distribution.  Intact sensation to light touch in the radial, axillary nerve distributions  2-point discrimination: Thumb 10 mm, index finger 5 mm, middle finger 6 mm, radial ring finger 7 mm, ulnar ring finger 7 mm, small finger 9 mm  Positive Durkan's, negative Tinel's at the wrist  Negative Tinel's at the elbow,   Positive elbow flexion compression test  No subluxation of the ulnar nerve at the cubital tunnel on elbow flexion  5/5 motor thumb abduction, interosseous  2+RP      Studies:  5/8/2024: Ultrasound of the bilateral wrist was personally reviewed interpreted.  Demonstrates a right-sided maximal median nerve cross-sectional area of12.7 mm² with a risk of formation of 1.9 consistent with carpal tunnel syndrome  Left sided maximal median nerve cross-sectional area of 11.0 mm² with a wrist to forearm ratio of 2.1, consistent with carpal tunnel syndrome.    Assessment and Plan:  1. Carpal tunnel syndrome, bilateral  Case request operating room: RELEASE CARPAL TUNNEL -right    Case request operating room: RELEASE CARPAL TUNNEL -right          74 y.o. female presents with signs and symptoms as well as her ultrasound findings are consistent with the above diagnosis.  We discussed the natural history of this condition and its pathogenesis.  We did also discuss that on the left side primarily she does have some signs of cubital tunnel syndrome as well but the majority of her symptoms seem to be more carpal tunnel related.  We discussed operative and nonoperative treatment options.  Nonoperative options include bracing, corticosteroid injections, oral topical medications, activity modifications.  Operative intervention involves a surgical nerve release at the carpal tunnel and possibly cubital tunnel.    Given her persistent symptoms and diminished sensation throughout the day I do  "not recommend corticosteroid injections as being a long-term solution.  Bracing may be helpful but is not guaranteed to provide full relief of her constant paresthesias.  She would like to proceed with surgical intervention with it being the most \"permanent\" of the solutions.  We did discuss that on her left side in particular she may have some symptoms of ulnar nerve irritation and that we could obtain bilateral elbow ultrasounds prior to surgical intervention to confirm that there is no involvement of the cubital tunnel.  However she thinks that most of her symptoms are more in the thumb side of the hand and would prefer to proceed with a carpal tunnel release.  She understands that there is a risk that she could have some residual numbness and tingling in the small finger following surgery which may require return to the operating room for a later cubital tunnel release.  She is accepting of this potential limitation.  The risk of surgery include but not limited to infection, bleeding, wound healing complications, pillar pain, damage to the nerve or surrounding structures, stiffness, complex regional pain syndrome, incomplete relief of symptoms, recurrence, anesthetic complications.  She understands these risks and wishes to proceed.  We discussed the pre-, jamil and postoperative course and expected outcomes.  Informed consent was signed in the office today.    Right carpal tunnel release  Local anesthetic        she expressed understanding of the plan and agreed. We encouraged them to contact our office with any questions or concerns.         Morro Lynn MD  Hand and Upper Extremity Surgery        *This note was dictated using Dragon voice recognition software. Please excuse any word substitutions or errors.*      "

## 2024-08-21 NOTE — PROGRESS NOTES
The HAND & UPPER EXTREMITY OFFICE VISIT   Referred By:  Referral Self  No address on file      Chief Complaint:     Bilateral hand pain right greater than left    History of Present Illness:   74 y.o., right hand dominant female presents with several months of bilateral hand pain numbness and tingling.  At night she has burning pain and tingling into all the fingers but primarily the thumb index middle and ring fingers.  During the day she has decreased sensation and cold feeling in the fingers of her bilateral hands worse on the right side.  States she is dropping objects.  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  She would like to continue working at the shelter as she is passionate about it.      She has previously tried oral medications without significant benefit.  Her PCP had mentioned bracing but she has not tried that yet.  No other treatments.  Underwent ultrasounds of her bilateral wrist but has not yet heard the results.      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     Past Surgical History:   Procedure Laterality Date    ADENOIDECTOMY      APPENDECTOMY  1990    CATARACT EXTRACTION Left     HYSTERECTOMY  2012    total    IR PICC LINE  6/5/2019    OOPHORECTOMY Bilateral 2012    DC XCAPSL CTRC RMVL INSJ IO LENS PROSTH W/O ECP Right 8/25/2021    Procedure: CATARACT SURGERY W/INTRAOCULAR LENS;  Surgeon: Yaw Gandhi MD;  Location:  MAIN OR;  Service: Ophthalmology    TONSILLECTOMY       Family History   Problem Relation Age of Onset    Parkinsonism Mother         Cardiac abnormality ([place])    Prostate cancer Father     Rheum arthritis Sister     Hypothyroidism Sister     No Known  Problems Maternal Grandmother     No Known Problems Maternal Grandfather     No Known Problems Paternal Grandmother     No Known Problems Paternal Grandfather     No Known Problems Paternal Aunt     No Known Problems Paternal Aunt     No Known Problems Paternal Aunt     No Known Problems Paternal Aunt     Hashimoto's thyroiditis Family     Breast cancer Neg Hx      Social History     Socioeconomic History    Marital status:      Spouse name: Not on file    Number of children: Not on file    Years of education: Not on file    Highest education level: Not on file   Occupational History     Employer: [employer]   Tobacco Use    Smoking status: Former     Current packs/day: 0.00     Average packs/day: 0.5 packs/day for 5.0 years (2.5 ttl pk-yrs)     Types: Cigarettes     Start date: 1973     Quit date: 1978     Years since quittin.6     Passive exposure: Never    Smokeless tobacco: Never    Tobacco comments:     no passive smoke exposure   Vaping Use    Vaping status: Never Used   Substance and Sexual Activity    Alcohol use: Not Currently    Drug use: Never    Sexual activity: Not Currently   Other Topics Concern    Not on file   Social History Narrative    Not on file     Social Determinants of Health     Financial Resource Strain: Patient Declined (10/13/2023)    Overall Financial Resource Strain (CARDIA)     Difficulty of Paying Living Expenses: Patient declined   Food Insecurity: No Food Insecurity (2021)    Hunger Vital Sign     Worried About Running Out of Food in the Last Year: Never true     Ran Out of Food in the Last Year: Never true   Transportation Needs: No Transportation Needs (10/13/2023)    PRAPARE - Transportation     Lack of Transportation (Medical): No     Lack of Transportation (Non-Medical): No   Physical Activity: Sufficiently Active (2021)    Exercise Vital Sign     Days of Exercise per Week: 7 days     Minutes of Exercise per Session: 60 min   Stress:  "No Stress Concern Present (8/9/2021)    [place] [employer] - Occupational Stress Questionnaire     Feeling of Stress : Not at all   Social Connections: Unknown (6/18/2024)    Received from [employer]     How often do you feel lonely or isolated from those around you? (Adult - for ages 18 years and over): Not on file   Intimate Partner Violence: Not At Risk (8/9/2021)    Humiliation, Afraid, Rape, and Kick questionnaire     Fear of Current or Ex-Partner: No     Emotionally Abused: No     Physically Abused: No     Sexually Abused: No   Housing Stability: Unknown (8/9/2021)    Housing Stability Vital Sign     Unable to Pay for Housing in the Last Year: No     Number of Places Lived in the Last Year: Not on file     Unstable Housing in the Last Year: No     Scheduled Meds:  Continuous Infusions:No current facility-administered medications for this visit.    PRN Meds:.  Allergies   Allergen Reactions    Codeine Anaphylaxis     Difficulty breathing           Physical Examination:    /82   Pulse 74   Ht 5' 4.5\" (1.638 m)   Wt 55.8 kg (123 lb)   BMI 20.79 kg/m²     Gen: A&Ox3, NAD  Cardiac: regular rate  Chest: non labored breathing  Abdomen: Non-distended    Cervcial Spine: Negative Spurling's    Right Upper Extremity:  Skin CDI  No obvious deformity of the shoulder, arm, elbow, forearm, wrist, hand  Paresthesias to light touch in the median nerve distribution.  Intact sensation to light touch in the ulnar, radial, axillary nerve distributions  2-point discrimination: Thumb 8 mm, index finger 8 mm, middle finger 7 mm, radial ring finger 6 mm, ulnar ring finger 6 mm, small finger 7 mm  Positive Durkan's, negative Tinel's at the wrist  Negative Tinel's at the elbow, negative elbow flexion compression test  There is anterior subluxation of the ulnar nerve at the cubital tunnel on elbow flexion  5/5 motor thumb abduction, interosseous  2+RP      Left Upper Extremity:  Skin " CDI  No obvious deformity of the shoulder, arm, elbow, forearm, wrist, hand  Paresthesias to light touch in the median and ulnar nerve distribution.  Intact sensation to light touch in the radial, axillary nerve distributions  2-point discrimination: Thumb 10 mm, index finger 5 mm, middle finger 6 mm, radial ring finger 7 mm, ulnar ring finger 7 mm, small finger 9 mm  Positive Durkan's, negative Tinel's at the wrist  Negative Tinel's at the elbow,   Positive elbow flexion compression test  No subluxation of the ulnar nerve at the cubital tunnel on elbow flexion  5/5 motor thumb abduction, interosseous  2+RP      Studies:  5/8/2024: Ultrasound of the bilateral wrist was personally reviewed interpreted.  Demonstrates a right-sided maximal median nerve cross-sectional area of12.7 mm² with a risk of formation of 1.9 consistent with carpal tunnel syndrome  Left sided maximal median nerve cross-sectional area of 11.0 mm² with a wrist to forearm ratio of 2.1, consistent with carpal tunnel syndrome.    Assessment and Plan:  1. Carpal tunnel syndrome, bilateral  Case request operating room: RELEASE CARPAL TUNNEL -right    Case request operating room: RELEASE CARPAL TUNNEL -right          74 y.o. female presents with signs and symptoms as well as her ultrasound findings are consistent with the above diagnosis.  We discussed the natural history of this condition and its pathogenesis.  We did also discuss that on the left side primarily she does have some signs of cubital tunnel syndrome as well but the majority of her symptoms seem to be more carpal tunnel related.  We discussed operative and nonoperative treatment options.  Nonoperative options include bracing, corticosteroid injections, oral topical medications, activity modifications.  Operative intervention involves a surgical nerve release at the carpal tunnel and possibly cubital tunnel.    Given her persistent symptoms and diminished sensation throughout the day I do  "not recommend corticosteroid injections as being a long-term solution.  Bracing may be helpful but is not guaranteed to provide full relief of her constant paresthesias.  She would like to proceed with surgical intervention with it being the most \"permanent\" of the solutions.  We did discuss that on her left side in particular she may have some symptoms of ulnar nerve irritation and that we could obtain bilateral elbow ultrasounds prior to surgical intervention to confirm that there is no involvement of the cubital tunnel.  However she thinks that most of her symptoms are more in the thumb side of the hand and would prefer to proceed with a carpal tunnel release.  She understands that there is a risk that she could have some residual numbness and tingling in the small finger following surgery which may require return to the operating room for a later cubital tunnel release.  She is accepting of this potential limitation.  The risk of surgery include but not limited to infection, bleeding, wound healing complications, pillar pain, damage to the nerve or surrounding structures, stiffness, complex regional pain syndrome, incomplete relief of symptoms, recurrence, anesthetic complications.  She understands these risks and wishes to proceed.  We discussed the pre-, jamil and postoperative course and expected outcomes.  Informed consent was signed in the office today.    Right carpal tunnel release  Local anesthetic        she expressed understanding of the plan and agreed. We encouraged them to contact our office with any questions or concerns.         Morro Lynn MD  Hand and Upper Extremity Surgery        *This note was dictated using Dragon voice recognition software. Please excuse any word substitutions or errors.*    "

## 2024-08-29 ENCOUNTER — HOSPITAL ENCOUNTER (OUTPATIENT)
Age: 74
Setting detail: OUTPATIENT SURGERY
Discharge: HOME | End: 2024-08-29
Attending: ORTHOPAEDIC SURGERY | Admitting: ORTHOPAEDIC SURGERY
Payer: MEDICARE

## 2024-08-29 VITALS
SYSTOLIC BLOOD PRESSURE: 155 MMHG | BODY MASS INDEX: 20.46 KG/M2 | RESPIRATION RATE: 16 BRPM | HEIGHT: 65 IN | DIASTOLIC BLOOD PRESSURE: 73 MMHG | TEMPERATURE: 99.2 F | WEIGHT: 122.8 LBS | OXYGEN SATURATION: 97 % | HEART RATE: 66 BPM

## 2024-08-29 DIAGNOSIS — Z47.89 AFTERCARE FOLLOWING SURGERY OF THE MUSCULOSKELETAL SYSTEM: ICD-10-CM

## 2024-08-29 DIAGNOSIS — G56.03 BILATERAL CARPAL TUNNEL SYNDROME: Primary | ICD-10-CM

## 2024-08-29 PROCEDURE — 64721 CARPAL TUNNEL SURGERY: CPT | Performed by: ORTHOPAEDIC SURGERY

## 2024-08-29 PROCEDURE — 64721 CARPAL TUNNEL SURGERY: CPT

## 2024-08-29 RX ORDER — ACETAMINOPHEN 500 MG
1000 TABLET ORAL EVERY 6 HOURS PRN
Qty: 60 TABLET | Refills: 0 | Status: SHIPPED | OUTPATIENT
Start: 2024-08-29

## 2024-08-29 RX ORDER — IBUPROFEN 800 MG/1
800 TABLET, FILM COATED ORAL EVERY 8 HOURS PRN
Qty: 30 TABLET | Refills: 0 | Status: SHIPPED | OUTPATIENT
Start: 2024-08-29

## 2024-08-29 RX ORDER — ACETAMINOPHEN 325 MG/1
650 TABLET ORAL EVERY 6 HOURS PRN
Status: DISCONTINUED | OUTPATIENT
Start: 2024-08-29 | End: 2024-08-29 | Stop reason: HOSPADM

## 2024-08-29 RX ORDER — IBUPROFEN 600 MG/1
600 TABLET, FILM COATED ORAL EVERY 6 HOURS PRN
Status: DISCONTINUED | OUTPATIENT
Start: 2024-08-29 | End: 2024-08-29 | Stop reason: HOSPADM

## 2024-08-29 RX ORDER — ACETAMINOPHEN 325 MG/1
975 TABLET ORAL ONCE
Status: COMPLETED | OUTPATIENT
Start: 2024-08-29 | End: 2024-08-29

## 2024-08-29 RX ADMIN — LIDOCAINE HYDROCHLORIDE,EPINEPHRINE BITARTRATE: 10; .01 INJECTION, SOLUTION INFILTRATION; PERINEURAL at 14:01

## 2024-08-29 RX ADMIN — ACETAMINOPHEN 325MG 975 MG: 325 TABLET ORAL at 14:00

## 2024-08-29 NOTE — OP NOTE
OPERATIVE REPORT  PATIENT NAME: Brittanie Bernabe    :  1950  MRN: [id number]  Pt Location: [facility] OR ROOM 04    SURGERY DATE: 2024    Surgeons and Role:     * Morro Lynn MD - Primary     * Noemi Aldrich PA-C - Assisting    Preop Diagnosis:  Carpal tunnel syndrome, bilateral [G56.03]    Post-Op Diagnosis Codes:     * Carpal tunnel syndrome, bilateral [G56.03]    Procedure(s):  Right - RELEASE CARPAL TUNNEL -right    Specimen(s):  * No specimens in log *    Estimated Blood Loss:   Minimal    Drains:  * No LDAs found *    Anesthesia Type:   Local    Operative Indications:  Carpal tunnel syndrome, bilateral [G56.03]    Failed appropriate non operative management and elected to proceed with surgical release for quality of life reasons.     Operative Findings:  Thickened transverse carpal ligament      Complications:   None    Procedure and Technique:  On the day of surgery, I met the patient in the pre-operative area. The patient was identified by name and . Once again the risks benefits and alternatives of carpal tunnel release were discussed with the patient. Risks included pain, stiffness, swelling, injury to neurovascular to musculoskeletal structure, need for reoperation, thromboembolic event, death, and related anesthetic complications. Specific to this procedure the risks of incomplete release, incomplete recovery, and nerve injury were discussed and the potential treatment options. Benefits included nerve release, halting further nerve damage, improved symptoms, improved sensation and overall improved function of the hand.     Patient was amenable to proceed and the operative extremity was marked according to hospital protocol.  A local injection of 50/50 solution of lidocaine 1% with epinephrine and marcaine 0.25% was performed in the pre operative area. Patient was then brought to the OR. The operative extremity was prepped and draped in a standard fashion. A timeout was performed prior  to incision identifying the name and laterality of the procedure.     A 2 cm incision was carried out at the intersection of Webster's cardinal line and the radial aspect of the ring finger proximally towards the wrist flexion crease. Skin, subcutaneous tissue, and palmar fascia was incised. The transverse carpal ligament was identified. The distal extent was identified by palmar adipose tissue.    The release began at the distal extent and was carried out sharply through the operative field of view until completely released to the level of the perivascular adipose tissue.. We then used the blunt tippped scissors to dissect and isolate the proximal extent of the TCL and the antebrachial fascia, and performed a smooth release proximally. We directly visualized the intact median nerve at the proximal extent of the release.  We checked our release proximally and distally and noted no further compressing fascial or ligamentous bands. The leaflets of the transverse carpal ligament were completely .     Hemostasis achieved with electrocautery. We closed with skin with 4-0 nylon suture. A sterile dressing was applied.    Post operatively the patient will be allowed weight bearing on the operative extremity for light activities. They will follow up as planned within 2 weeks.     I was present for the entire procedure., A qualified resident physician was not available., and A physician assistant was required during the procedure for retraction, tissue handling, dissection and suturing.    Patient Disposition:  PACU         SIGNATURE: Morro Lynn MD  DATE: August 29, 2024  TIME: 2:24 PM

## 2024-08-29 NOTE — DISCHARGE INSTR - AVS FIRST PAGE
POST-OPERATIVE INSTRUCTIONS  CARPAL TUNNEL RELEASE    You have just undergone a carpal tunnel release by Dr. Lynn in the operating room.  It is our wish that your postoperative recovery be as quick and comfortable as possible.  Please carefully review the following items that are important in your recovery.    Pain Control:  After any operation, a certain degree of pain is to be expected.  A prescription for acetaminophen and/or ibuprofen has been electronically sent to your pharmacy. Do not exceed 3000 mg of Tylenol per day. This medication will relieve most of your pain but may not relieve all of the pain. Some pain is normal post operatively.     When you go home, please keep your operated arm elevated at all times (above the level of your heart.)  If you do this, your swelling will be diminished and your pain will be diminished as well.    Finally, it is generally expected that night time symptoms of pain and numbness should be improved within the first week after surgery. It may take six to nine months for a full recovery of your carpal tunnel, such that the numbness in your fingertips will totally go away.      Dressing Care:  After surgery, make sure that your dressing is kept dry.  You can take a shower if you cover your arm with a plastic bag, such as a newspaper bag, and use tape or rubber bands. If your dressing gets wet you may take it off, place Band-Aids on the wound and re-cover it with sterile dressings which you can obtain at your local drug store.    Please remove your dressing down to the incision 3 days after your surgery. For example, if your had surgery on a Monday, do this on Thursday.  If your surgery was on Thursday, do this on Sunday.   If your dressing gets wet prior to removing it, please take if off and place something clean, or bandaids, on the wound.    Weight Bearing:  You should NOT bear weight >5lbs through your operative extremity. Do not push off using your operative extremity.      It is ok to move your fingers as tolerated to prevent stiffness. You may also use your operative hand to assist with light activities of daily living such as putting on clothes, brushing your teeth and eating as tolerated    Please work on these finger range of motions exercises between now and you follow up visit:         Follow Up:  If you don't already have a scheduled postoperative appointment, please call our office for a follow-up appointment. It is best to call the day after surgery to make an appointment in 10-14 days.  At your first postoperative visit, you will be seen by either Dr. Lynn, his PA; or one of their associates. The sutures will be removed and you may be asked to see a hand therapist to optimize your functional result. Each of the hand therapists that you will be referred to have received special training in the care of the hand and upper extremity.    When to Call:  It is normal to see minor staining on the hand surgery dressing after surgery. If there is significant bleeding, you are advised to call the office during regular office hours to have this checked.     If you feel that you have a surgical emergency postoperatively that requires immediate attention, please call [phone]. In addition, any emergency can also be handled by the emergency room.        If you have questions regarding your surgery postop that you feel requires attention, please call the office.   If this occurs after our regular office hours, there is a message with instructions to talk to the physician on call.

## 2024-09-12 ENCOUNTER — OFFICE VISIT (OUTPATIENT)
Dept: OBGYN CLINIC | Facility: MEDICAL CENTER | Age: 74
End: 2024-09-12
Payer: MEDICARE

## 2024-09-12 VITALS — BODY MASS INDEX: 20.33 KG/M2 | HEIGHT: 65 IN | WEIGHT: 122 LBS

## 2024-09-12 DIAGNOSIS — M79.672 BILATERAL FOOT PAIN: ICD-10-CM

## 2024-09-12 DIAGNOSIS — Z98.890 S/P CARPAL TUNNEL RELEASE: ICD-10-CM

## 2024-09-12 DIAGNOSIS — M79.671 BILATERAL FOOT PAIN: ICD-10-CM

## 2024-09-12 DIAGNOSIS — G56.02 CARPAL TUNNEL SYNDROME OF LEFT WRIST: Primary | ICD-10-CM

## 2024-09-12 PROCEDURE — 99214 OFFICE O/P EST MOD 30 MIN: CPT | Performed by: ORTHOPAEDIC SURGERY

## 2024-09-12 RX ORDER — ACETAMINOPHEN 325 MG/1
975 TABLET ORAL ONCE
OUTPATIENT
Start: 2024-09-12 | End: 2024-09-12

## 2024-09-12 NOTE — H&P
HAND & UPPER EXTREMITY OFFICE VISIT   Referred By:  No referring provider defined for this encounter.      Chief Complaint:     Left hand pain    Surgery:  Surgery Date: 8/29/2024 - RELEASE CARPAL TUNNEL -right - Right     History of Present Illness:   Patient presents now 14 days status post the above surgery. she reports doing very well since the procedure and is very happy with her results. She denies any further pain or numbness/tingling in the right hand. She denies any fevers/chills.     Her primary complaint today is left hand pain.  Continues to have carpal tunnel symptoms which are significant affecting her quality of life.  She would also like to discuss proceeding with surgery for the left hand.     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     Past Surgical History:   Procedure Laterality Date    ADENOIDECTOMY      APPENDECTOMY  1990    CATARACT EXTRACTION Left     HYSTERECTOMY  2012    total    IR PICC LINE  6/5/2019    OOPHORECTOMY Bilateral 2012    [place] NEUROPLASTY &/TRANSPOS MEDIAN NRV CARPAL TUNNE Right 8/29/2024    Procedure: RELEASE CARPAL TUNNEL -right;  Surgeon: Morro Lynn MD;  Location:  MAIN OR;  Service: Orthopedics    [place] XCAPSL CTRC RMVL INSJ IO LENS PROSTH W/O ECP Right 8/25/2021    Procedure: CATARACT SURGERY W/INTRAOCULAR LENS;  Surgeon: Yaw Gandhi MD;  Location:  MAIN OR;  Service: Ophthalmology    TONSILLECTOMY       Family History   Problem Relation Age of Onset    Parkinsonism Mother         Cardiac abnormality (Hole)    Prostate cancer Father     Rheum arthritis Sister     Hypothyroidism Sister     No Known Problems Maternal Grandmother     No Known Problems Maternal Grandfather     No Known Problems Paternal Grandmother     No Known Problems  Paternal Grandfather     No Known Problems Paternal Aunt     No Known Problems Paternal Aunt     No Known Problems Paternal Aunt     No Known Problems Paternal Aunt     Hashimoto's thyroiditis Family     Breast cancer Neg Hx      Social History     Socioeconomic History    Marital status:      Spouse name: Not on file    Number of children: Not on file    Years of education: Not on file    Highest education level: Not on file   Occupational History     Employer: [employer]   Tobacco Use    Smoking status: Former     Current packs/day: 0.00     Average packs/day: 0.5 packs/day for 5.0 years (2.5 ttl pk-yrs)     Types: Cigarettes     Start date: 1973     Quit date: 1978     Years since quittin.7     Passive exposure: Never    Smokeless tobacco: Never    Tobacco comments:     no passive smoke exposure   Vaping Use    Vaping status: Never Used   Substance and Sexual Activity    Alcohol use: Not Currently    Drug use: Never    Sexual activity: Not Currently   Other Topics Concern    Not on file   Social History Narrative    Not on file     Social Determinants of Health     Financial Resource Strain: Patient Declined (10/13/2023)    Overall Financial Resource Strain (CARDIA)     Difficulty of Paying Living Expenses: Patient declined   Food Insecurity: No Food Insecurity (2021)    Hunger Vital Sign     Worried About Running Out of Food in the Last Year: Never true     Ran Out of Food in the Last Year: Never true   Transportation Needs: No Transportation Needs (10/13/2023)    PRAPARE - Transportation     Lack of Transportation (Medical): No     Lack of Transportation (Non-Medical): No   Physical Activity: Sufficiently Active (2021)    Exercise Vital Sign     Days of Exercise per Week: 7 days     Minutes of Exercise per Session: 60 min   Stress: No Stress Concern Present (2021)    [employer] [place] [employer] - Occupational Stress Questionnaire     Feeling of Stress  ": Not at all   Social Connections: Unknown (6/18/2024)    Received from [facility]     How often do you feel lonely or isolated from those around you? (Adult - for ages 18 years and over): Not on file   Intimate Partner Violence: Not At Risk (8/9/2021)    Humiliation, Afraid, Rape, and Kick questionnaire     Fear of Current or Ex-Partner: No     Emotionally Abused: No     Physically Abused: No     Sexually Abused: No   Housing Stability: Unknown (8/9/2021)    Housing Stability Vital Sign     Unable to Pay for Housing in the Last Year: No     Number of Places Lived in the Last Year: Not on file     Unstable Housing in the Last Year: No     Scheduled Meds:  Continuous Infusions:No current facility-administered medications for this visit.    PRN Meds:.  Allergies   Allergen Reactions    Codeine Anaphylaxis     Difficulty breathing       Physical Examination:    Ht 5' 4.5\" (1.638 m)   Wt 55.3 kg (122 lb)   BMI 20.62 kg/m²     Gen: A&Ox3, NAD    Right Upper Extremity:  Dressing clean and dry, removed  Incision healing well without signs of infection   Sutures intact, removed  Swelling Negative  Non-tender around incision site  Sensation intact to light touch in the axillary median, ulnar, and radial nerve distributions  Full composite fist, full finger ROM  2+RP    Left Upper Extremity:  Skin CDI  No obvious deformity of the shoulder, arm, elbow, forearm, wrist, hand  Paresthesias to light touch in the median and ulnar nerve distribution.  Intact sensation to light touch in the radial, axillary nerve distributions  2-point discrimination: Thumb 10 mm, index finger 5 mm, middle finger 6 mm, radial ring finger 7 mm, ulnar ring finger 7 mm, small finger 9 mm  Positive Durkan's, negative Tinel's at the wrist  Negative Tinel's at the elbow,   Positive elbow flexion compression test  No subluxation of the ulnar nerve at the cubital tunnel on elbow flexion  5/5 motor thumb abduction, " interosseous  2+RP    Studies:  No new imaging to review    Assessment and Plan:  1. Carpal tunnel syndrome of left wrist  Case request operating room: RELEASE CARPAL TUNNEL - Left    Case request operating room: RELEASE CARPAL TUNNEL - Left      2. Bilateral foot pain  Ambulatory Referral to Podiatry      3. S/P carpal tunnel release      right, with excellent relief          74 y.o. female presents 14 days status post RELEASE CARPAL TUNNEL -right - Right. She reports doing extremely well since the surgery and is thrilled with her results. Sutures removed in the office today. she may continue regular hygiene and apply lotions/oils and perform scar massage as needed. she may participate in all activities as tolerated without further restrictions. Recommend avoiding doing dishes and volunteering at the animal shelter for a few more days until her suture holes are completely closed due to the risk of infection.       Her main issue today is her continued left hand pain and symptoms related to carpal tunnel syndrome.  These are significantly affecting her quality of life and refractory to nonoperative management.  She would like to proceed with surgery for the left hand. We reviewed the risks of surgery including infection, bleeding, injury to nearby structures including the nerve, poor wound healing, pillar pain, stiffness, CRPS, and incomplete resolution or recurrence of symptoms. We reviewed the details of the procedure and the anticipated recovery period. All questions answered to patient's satisfaction. Written consent obtained in the office today.     Left carpal tunnel release  Local anesthetic    She also expressed chronic pain in the bilateral feet which is contributing to difficulty walking. Referral placed to podiatry.     she expressed understanding of the plan and agreed. We encouraged them to contact our office with any questions or concerns.       Morro Lynn MD  Hand and Upper Extremity  Surgery          *This note was dictated using Dragon voice recognition software. Please excuse any word substitutions or errors.*

## 2024-09-12 NOTE — PROGRESS NOTES
HAND & UPPER EXTREMITY OFFICE VISIT   Referred By:  No referring provider defined for this encounter.      Chief Complaint:     Left hand pain    Surgery:  Surgery Date: 8/29/2024 - RELEASE CARPAL TUNNEL -right - Right     History of Present Illness:   Patient presents now 14 days status post the above surgery. she reports doing very well since the procedure and is very happy with her results. She denies any further pain or numbness/tingling in the right hand. She denies any fevers/chills.     Her primary complaint today is left hand pain.  Continues to have carpal tunnel symptoms which are significant affecting her quality of life.  She would also like to discuss proceeding with surgery for the left hand.     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     Past Surgical History:   Procedure Laterality Date    ADENOIDECTOMY      APPENDECTOMY  1990    CATARACT EXTRACTION Left     HYSTERECTOMY  2012    total    IR PICC LINE  6/5/2019    OOPHORECTOMY Bilateral 2012    [place] NEUROPLASTY &/TRANSPOS MEDIAN NRV CARPAL TUNNE Right 8/29/2024    Procedure: RELEASE CARPAL TUNNEL -right;  Surgeon: Morro Lynn MD;  Location:  MAIN OR;  Service: Orthopedics    [place] XCAPSL CTRC RMVL INSJ IO LENS PROSTH W/O ECP Right 8/25/2021    Procedure: CATARACT SURGERY W/INTRAOCULAR LENS;  Surgeon: Yaw Gandhi MD;  Location:  MAIN OR;  Service: Ophthalmology    TONSILLECTOMY       Family History   Problem Relation Age of Onset    Parkinsonism Mother         Cardiac abnormality (Hole)    Prostate cancer Father     Rheum arthritis Sister     Hypothyroidism Sister     No Known Problems Maternal Grandmother     No Known Problems Maternal Grandfather     No Known Problems Paternal Grandmother     No Known Problems  Paternal Grandfather     No Known Problems Paternal Aunt     No Known Problems Paternal Aunt     No Known Problems Paternal Aunt     No Known Problems Paternal Aunt     Hashimoto's thyroiditis Family     Breast cancer Neg Hx      Social History     Socioeconomic History    Marital status:      Spouse name: Not on file    Number of children: Not on file    Years of education: Not on file    Highest education level: Not on file   Occupational History     Employer: [employer]   Tobacco Use    Smoking status: Former     Current packs/day: 0.00     Average packs/day: 0.5 packs/day for 5.0 years (2.5 ttl pk-yrs)     Types: Cigarettes     Start date: 1973     Quit date: 1978     Years since quittin.7     Passive exposure: Never    Smokeless tobacco: Never    Tobacco comments:     no passive smoke exposure   Vaping Use    Vaping status: Never Used   Substance and Sexual Activity    Alcohol use: Not Currently    Drug use: Never    Sexual activity: Not Currently   Other Topics Concern    Not on file   Social History Narrative    Not on file     Social Determinants of Health     Financial Resource Strain: Patient Declined (10/13/2023)    Overall Financial Resource Strain (CARDIA)     Difficulty of Paying Living Expenses: Patient declined   Food Insecurity: No Food Insecurity (2021)    Hunger Vital Sign     Worried About Running Out of Food in the Last Year: Never true     Ran Out of Food in the Last Year: Never true   Transportation Needs: No Transportation Needs (10/13/2023)    PRAPARE - Transportation     Lack of Transportation (Medical): No     Lack of Transportation (Non-Medical): No   Physical Activity: Sufficiently Active (2021)    Exercise Vital Sign     Days of Exercise per Week: 7 days     Minutes of Exercise per Session: 60 min   Stress: No Stress Concern Present (2021)    [employer] [place] [employer] - Occupational Stress Questionnaire     Feeling of Stress  ": Not at all   Social Connections: Unknown (6/18/2024)    Received from [facility]     How often do you feel lonely or isolated from those around you? (Adult - for ages 18 years and over): Not on file   Intimate Partner Violence: Not At Risk (8/9/2021)    Humiliation, Afraid, Rape, and Kick questionnaire     Fear of Current or Ex-Partner: No     Emotionally Abused: No     Physically Abused: No     Sexually Abused: No   Housing Stability: Unknown (8/9/2021)    Housing Stability Vital Sign     Unable to Pay for Housing in the Last Year: No     Number of Places Lived in the Last Year: Not on file     Unstable Housing in the Last Year: No     Scheduled Meds:  Continuous Infusions:No current facility-administered medications for this visit.    PRN Meds:.  Allergies   Allergen Reactions    Codeine Anaphylaxis     Difficulty breathing       Physical Examination:    Ht 5' 4.5\" (1.638 m)   Wt 55.3 kg (122 lb)   BMI 20.62 kg/m²     Gen: A&Ox3, NAD    Right Upper Extremity:  Dressing clean and dry, removed  Incision healing well without signs of infection   Sutures intact, removed  Swelling Negative  Non-tender around incision site  Sensation intact to light touch in the axillary median, ulnar, and radial nerve distributions  Full composite fist, full finger ROM  2+RP    Left Upper Extremity:  Skin CDI  No obvious deformity of the shoulder, arm, elbow, forearm, wrist, hand  Paresthesias to light touch in the median and ulnar nerve distribution.  Intact sensation to light touch in the radial, axillary nerve distributions  2-point discrimination: Thumb 10 mm, index finger 5 mm, middle finger 6 mm, radial ring finger 7 mm, ulnar ring finger 7 mm, small finger 9 mm  Positive Durkan's, negative Tinel's at the wrist  Negative Tinel's at the elbow,   Positive elbow flexion compression test  No subluxation of the ulnar nerve at the cubital tunnel on elbow flexion  5/5 motor thumb abduction, " interosseous  2+RP    Studies:  No new imaging to review    Assessment and Plan:  1. Carpal tunnel syndrome of left wrist  Case request operating room: RELEASE CARPAL TUNNEL - Left    Case request operating room: RELEASE CARPAL TUNNEL - Left      2. Bilateral foot pain  Ambulatory Referral to Podiatry      3. S/P carpal tunnel release      right, with excellent relief          74 y.o. female presents 14 days status post RELEASE CARPAL TUNNEL -right - Right. She reports doing extremely well since the surgery and is thrilled with her results. Sutures removed in the office today. she may continue regular hygiene and apply lotions/oils and perform scar massage as needed. she may participate in all activities as tolerated without further restrictions. Recommend avoiding doing dishes and volunteering at the animal shelter for a few more days until her suture holes are completely closed due to the risk of infection.       Her main issue today is her continued left hand pain and symptoms related to carpal tunnel syndrome.  These are significantly affecting her quality of life and refractory to nonoperative management.  She would like to proceed with surgery for the left hand. We reviewed the risks of surgery including infection, bleeding, injury to nearby structures including the nerve, poor wound healing, pillar pain, stiffness, CRPS, and incomplete resolution or recurrence of symptoms. We reviewed the details of the procedure and the anticipated recovery period. All questions answered to patient's satisfaction. Written consent obtained in the office today.     Left carpal tunnel release  Local anesthetic    She also expressed chronic pain in the bilateral feet which is contributing to difficulty walking. Referral placed to podiatry.     she expressed understanding of the plan and agreed. We encouraged them to contact our office with any questions or concerns.       Morro Lynn MD  Hand and Upper Extremity  Surgery          *This note was dictated using Dragon voice recognition software. Please excuse any word substitutions or errors.*

## 2024-10-03 ENCOUNTER — TELEPHONE (OUTPATIENT)
Dept: OBGYN CLINIC | Facility: HOSPITAL | Age: 74
End: 2024-10-03

## 2024-10-03 ENCOUNTER — HOSPITAL ENCOUNTER (OUTPATIENT)
Age: 74
Setting detail: OUTPATIENT SURGERY
Discharge: HOME | End: 2024-10-03
Attending: ORTHOPAEDIC SURGERY | Admitting: ORTHOPAEDIC SURGERY
Payer: MEDICARE

## 2024-10-03 VITALS — TEMPERATURE: 97.5 F | OXYGEN SATURATION: 98 % | RESPIRATION RATE: 16 BRPM | HEART RATE: 73 BPM

## 2024-10-03 DIAGNOSIS — G56.03 BILATERAL CARPAL TUNNEL SYNDROME: Primary | ICD-10-CM

## 2024-10-03 PROCEDURE — 64721 CARPAL TUNNEL SURGERY: CPT | Performed by: ORTHOPAEDIC SURGERY

## 2024-10-03 PROCEDURE — 64721 CARPAL TUNNEL SURGERY: CPT | Performed by: PHYSICIAN ASSISTANT

## 2024-10-03 RX ORDER — IBUPROFEN 600 MG/1
600 TABLET, FILM COATED ORAL EVERY 6 HOURS PRN
Qty: 30 TABLET | Refills: 0 | Status: SHIPPED | OUTPATIENT
Start: 2024-10-03

## 2024-10-03 RX ORDER — ACETAMINOPHEN 325 MG/1
650 TABLET ORAL EVERY 6 HOURS PRN
Status: DISCONTINUED | OUTPATIENT
Start: 2024-10-03 | End: 2024-10-03 | Stop reason: HOSPADM

## 2024-10-03 RX ORDER — MAGNESIUM HYDROXIDE 1200 MG/15ML
LIQUID ORAL AS NEEDED
Status: DISCONTINUED | OUTPATIENT
Start: 2024-10-03 | End: 2024-10-03 | Stop reason: HOSPADM

## 2024-10-03 RX ORDER — ACETAMINOPHEN 325 MG/1
975 TABLET ORAL ONCE
Status: COMPLETED | OUTPATIENT
Start: 2024-10-03 | End: 2024-10-03

## 2024-10-03 RX ORDER — OXYCODONE HYDROCHLORIDE 5 MG/1
5 TABLET ORAL EVERY 4 HOURS PRN
Status: DISCONTINUED | OUTPATIENT
Start: 2024-10-03 | End: 2024-10-03 | Stop reason: HOSPADM

## 2024-10-03 RX ORDER — SENNOSIDES 8.6 MG
650 CAPSULE ORAL EVERY 8 HOURS PRN
Qty: 30 TABLET | Refills: 0 | Status: SHIPPED | OUTPATIENT
Start: 2024-10-03

## 2024-10-03 RX ADMIN — ACETAMINOPHEN 325MG 975 MG: 325 TABLET ORAL at 11:13

## 2024-10-03 RX ADMIN — LIDOCAINE HYDROCHLORIDE,EPINEPHRINE BITARTRATE: 10; .01 INJECTION, SOLUTION INFILTRATION; PERINEURAL at 11:15

## 2024-10-03 NOTE — TELEPHONE ENCOUNTER
According to chart, Tylenol 650mg CR was confirmed by pharmacy as received at 1119 at same time Motrin was received.  Called and notified pt.  She will check w/pharmacy.  CB if needed.

## 2024-10-03 NOTE — OP NOTE
OPERATIVE REPORT  PATIENT NAME: Brittanie Bernabe    :  1950  MRN: [id number]  Pt Location: [facility] OR ROOM 03    SURGERY DATE: 10/3/2024    Surgeons and Role:     * Morro Lynn MD - Primary     * Zoltan Quiroz PA-C - Assisting    Preop Diagnosis:  Carpal tunnel syndrome of left wrist [G56.02]    Post-Op Diagnosis Codes:     * Carpal tunnel syndrome of left wrist [G56.02]    Procedure(s):  Left - RELEASE CARPAL TUNNEL - Left    Specimen(s):  * No specimens in log *    Estimated Blood Loss:   Minimal    Drains:  * No LDAs found *    Anesthesia Type:   Local    Operative Indications:  Carpal tunnel syndrome of left wrist [G56.02]    75 y/o female with b/l carpal tunnel syndrome, which has been refractory to appropriate non operative management. Previously did well with a right sided carpal tunnel release in August and wished to proceed with the left.     Operative Findings:  Thickened transverse carpal ligament at the distal aspect.       Complications:   None    Procedure and Technique:  On the day of surgery, I met the patient in the pre-operative area. The patient was identified by name and . Once again the risks benefits and alternatives of carpal tunnel release were discussed with the patient. Risks included pain, stiffness, swelling, injury to neurovascular to musculoskeletal structure, need for reoperation, thromboembolic event, death, and related anesthetic complications. Specific to this procedure the risks of incomplete release, incomplete recovery, and nerve injury were discussed and the potential treatment options. Benefits included nerve release, halting further nerve damage, improved symptoms, improved sensation and overall improved function of the hand.     Patient was amenable to proceed and the operative extremity was marked according to hospital protocol.  A local injection of 50/50 solution of lidocaine 1% with epinephrine and marcaine 0.25% was performed in the pre operative area.  Patient was then brought to the OR. The operative extremity was prepped and draped in a standard fashion. A timeout was performed prior to incision identifying the name and laterality of the procedure.     A 2 cm incision was carried out at the intersection of Webster's cardinal line and the radial aspect of the ring finger proximally towards the wrist flexion crease. Skin, subcutaneous tissue, and palmar fascia was incised. The transverse carpal ligament was identified. The distal extent was identified by palmar adipose tissue.    The release began at the distal extent and was carried out sharply through the operative field of view until completely released to the level of the perivascular adipose tissue.. We then used the blunt tippped scissors to dissect and isolate the proximal extent of the TCL and the antebrachial fascia, and performed a smooth release proximally. We directly visualized the intact median nerve at the proximal extent of the release.  We checked our release proximally and distally and noted no further compressing fascial or ligamentous bands. The leaflets of the transverse carpal ligament were completely .     Hemostasis achieved with electrocautery. We closed with skin with 4-0 nylon suture. A sterile dressing was applied.    Post operatively the patient will be allowed weight bearing on the operative extremity for light activities. They will follow up as planned within 2 weeks.     I was present for the entire procedure., A qualified resident physician was not available., and A physician assistant was required during the procedure for retraction, tissue handling, dissection and suturing.    Patient Disposition:  PACU              SIGNATURE: Morro Lynn MD  DATE: October 3, 2024  TIME: 11:42 AM

## 2024-10-03 NOTE — DISCHARGE INSTR - AVS FIRST PAGE
POST-OPERATIVE INSTRUCTIONS  CARPAL TUNNEL RELEASE    You have just undergone a carpal tunnel release by Dr. Lynn in the operating room.  It is our wish that your postoperative recovery be as quick and comfortable as possible.  Please carefully review the following items that are important in your recovery.    Pain Control:  After any operation, a certain degree of pain is to be expected.  A prescription for acetaminophen and/or ibruprofen has been electronically sent to your pharmacy. Do not exceed 3000 mg of Tylenol per day. This medication will relieve most of your pain but may not relieve all of the pain. Some pain is normal post operatively.     When you go home, please keep your operated arm elevated at all times (above the level of your heart.)  If you do this, your swelling will be diminished and your pain will be diminished as well.    Finally, it is generally expected that night time symptoms of pain and numbness should be improved within the first week after surgery. It may take six to nine months for a full recovery of your carpal tunnel, such that the numbness in your fingertips will totally go away.      Dressing Care:  After surgery, make sure that your dressing is kept dry.  You can take a shower if you cover your arm with a plastic bag, such as a newspaper bag, and use tape or rubber bands. If your dressing gets wet you may take it off, place Band-Aids on the wound and re-cover it with sterile dressings which you can obtain at your local drug store.    Please remove your dressing down to the incision 3 days after your surgery. For example, if your had surgery on a Monday, do this on Thursday.  If your surgery was on Thursday, do this on Sunday.   If your dressing gets wet prior to removing it, please take if off and place something clean, or bandaids, on the wound.    Weight Bearing:  You should NOT bear weight >5lbs through your operative extremity. Do not push off using your operative  extremity.     It is ok to move your fingers as tolerated to prevent stiffness. You may also use your operative hand to assist with light activities of daily living such as putting on clothes, brushing your teeth and eating as tolerated    Please work on these finger range of motions exercises between now and you follow up visit:         Follow Up:  If you don't already have a scheduled postoperative appointment, please call our office for a follow-up appointment. It is best to call the day after surgery to make an appointment in 10-14 days.  At your first postoperative visit, you will be seen by either Dr. Lynn, his PA; or one of their associates. The sutures will be removed and you may be asked to see a hand therapist to optimize your functional result. Each of the hand therapists that you will be referred to have received special training in the care of the hand and upper extremity.    When to Call:  It is normal to see minor staining on the hand surgery dressing after surgery. If there is significant bleeding, you are advised to call the office during regular office hours to have this checked.     If you feel that you have a surgical emergency postoperatively that requires immediate attention, please call [phone]. In addition, any emergency can also be handled by the emergency room.        If you have questions regarding your surgery postop that you feel requires attention, please call the office.   If this occurs after our regular office hours, there is a message with instructions to talk to the physician on call.

## 2024-10-03 NOTE — TELEPHONE ENCOUNTER
Caller: Brittanie    Doctor: Shane    Reason for call: Patient had surgery this morning, went to pharmacy and they only had her motrin prescription, she stated she was told she would be getting a prescription for Tylenol 650 mg (Acetaminophen). Patient was told to call the office since the physician is in surgery.     Call back#: [phone]

## 2024-10-17 ENCOUNTER — OFFICE VISIT (OUTPATIENT)
Dept: OBGYN CLINIC | Facility: MEDICAL CENTER | Age: 74
End: 2024-10-17

## 2024-10-17 VITALS
HEIGHT: 65 IN | DIASTOLIC BLOOD PRESSURE: 81 MMHG | HEART RATE: 65 BPM | BODY MASS INDEX: 20.49 KG/M2 | WEIGHT: 123 LBS | SYSTOLIC BLOOD PRESSURE: 143 MMHG

## 2024-10-17 DIAGNOSIS — Z98.890 S/P BILATERAL CARPAL TUNNEL RELEASE: Primary | ICD-10-CM

## 2024-10-17 PROCEDURE — 99024 POSTOP FOLLOW-UP VISIT: CPT | Performed by: ORTHOPAEDIC SURGERY

## 2024-10-17 NOTE — PROGRESS NOTES
HAND & UPPER EXTREMITY OFFICE VISIT   Referred By:  Referral Self  No address on file      Chief Complaint:     Residual numbness and tingling that is resolving however is still present at her finger tips      Surgery:  Surgery Date: 10/3/2024 - RELEASE CARPAL TUNNEL - Left - Left     History of Present Illness:   Patient presents now 14 days status post the above surgery. she reports that she is doing very well. She states that she does still have the numbness and tingling however she is aware that it will take some time to resolve. Overall she is extremely happy with both surgeries to her left and right carpal tunnel.    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     Past Surgical History:   Procedure Laterality Date    ADENOIDECTOMY      APPENDECTOMY  1990    CATARACT EXTRACTION Left     HYSTERECTOMY  2012    total    IR PICC LINE  6/5/2019    OOPHORECTOMY Bilateral 2012    ID NEUROPLASTY &/TRANSPOS MEDIAN NRV CARPAL TUNNE Right 8/29/2024    Procedure: RELEASE CARPAL TUNNEL -right;  Surgeon: Morro Lynn MD;  Location:  MAIN OR;  Service: Orthopedics    ID NEUROPLASTY &/TRANSPOS MEDIAN NRV CARPAL TUNNE Left 10/3/2024    Procedure: RELEASE CARPAL TUNNEL - Left;  Surgeon: Morro Lynn MD;  Location:  MAIN OR;  Service: Orthopedics    ID XCAPSL CTRC RMVL INSJ IO LENS PROSTH W/O ECP Right 8/25/2021    Procedure: CATARACT SURGERY W/INTRAOCULAR LENS;  Surgeon: Yaw Gandhi MD;  Location:  MAIN OR;  Service: Ophthalmology    TONSILLECTOMY       Family History   Problem Relation Age of Onset    Parkinsonism Mother         Cardiac abnormality ([place])    Prostate cancer Father     Rheum arthritis Sister     Hypothyroidism Sister     No Known Problems Maternal Grandmother     No  Known Problems Maternal Grandfather     No Known Problems Paternal Grandmother     No Known Problems Paternal Grandfather     No Known Problems Paternal Aunt     No Known Problems Paternal Aunt     No Known Problems Paternal Aunt     No Known Problems Paternal Aunt     Hashimoto's thyroiditis Family     Breast cancer Neg Hx      Social History     Socioeconomic History    Marital status:      Spouse name: Not on file    Number of children: Not on file    Years of education: Not on file    Highest education level: Not on file   Occupational History     Employer: [email or web address]   Tobacco Use    Smoking status: Former     Current packs/day: 0.00     Average packs/day: 0.5 packs/day for 5.0 years (2.5 ttl pk-yrs)     Types: Cigarettes     Start date: 1973     Quit date: 1978     Years since quittin.8     Passive exposure: Never    Smokeless tobacco: Never    Tobacco comments:     no passive smoke exposure   Vaping Use    Vaping status: Never Used   Substance and Sexual Activity    Alcohol use: Not Currently    Drug use: Never    Sexual activity: Not Currently   Other Topics Concern    Not on file   Social History Narrative    Not on file     Social Determinants of Health     Financial Resource Strain: Patient Declined (10/13/2023)    Overall Financial Resource Strain (CARDIA)     Difficulty of Paying Living Expenses: Patient declined   Food Insecurity: No Food Insecurity (2021)    Hunger Vital Sign     Worried About Running Out of Food in the Last Year: Never true     Ran Out of Food in the Last Year: Never true   Transportation Needs: No Transportation Needs (10/13/2023)    PRAPARE - Transportation     Lack of Transportation (Medical): No     Lack of Transportation (Non-Medical): No   Physical Activity: Sufficiently Active (2021)    Exercise Vital Sign     Days of Exercise per Week: 7 days     Minutes of Exercise per Session: 60 min   Stress: No Stress Concern Present (2021)  "   [place] [employer] - Occupational Stress Questionnaire     Feeling of Stress : Not at all   Social Connections: Unknown (6/18/2024)    Received from [facility]     How often do you feel lonely or isolated from those around you? (Adult - for ages 18 years and over): Not on file   Intimate Partner Violence: Not At Risk (8/9/2021)    Humiliation, Afraid, Rape, and Kick questionnaire     Fear of Current or Ex-Partner: No     Emotionally Abused: No     Physically Abused: No     Sexually Abused: No   Housing Stability: Unknown (8/9/2021)    Housing Stability Vital Sign     Unable to Pay for Housing in the Last Year: No     Number of Places Lived in the Last Year: Not on file     Unstable Housing in the Last Year: No     Scheduled Meds:  Continuous Infusions:No current facility-administered medications for this visit.    PRN Meds:.  Allergies   Allergen Reactions    Codeine Anaphylaxis     Difficulty breathing       Physical Examination:    /81   Pulse 65   Ht 5' 4.5\" (1.638 m)   Wt 55.8 kg (123 lb)   BMI 20.79 kg/m²     Gen: A&Ox3, NAD    Right Upper Extremity:  Incision healing well without signs of infection   Swelling Negative  NTTP on exam  Sensation intact to light touch in the axillary median, ulnar, and radial nerve distributions  5/5 throughout  Warm, well-perfused digits  Cap refill <2s      Left Upper Extremity:  Incision healing well without signs of infection   Sutures intact, removed in office today  Swelling Negative  NTTP on exam  Sensation intact to light touch in the axillary median, ulnar, and radial nerve distributions  5/5 motor throughout  Warm, well-perfused digits  Cap refill <2s      Studies:  No no studies to review    Assessment and Plan:  1. S/p bilateral carpal tunnel release            74 y.o. female presents 14 days status post RELEASE CARPAL TUNNEL - Left - Left. Incision is healing well. She may shower and wash her hands as normal. " Explained again that the numbness and tingling will continue to resolve with time.    It is recommended she return to the office in 1 month, or sooner should symptoms worsen for re-evaluation of resolving numbness and tingling of bilateral finger tips.    she expressed understanding of the plan and agreed. We encouraged them to contact our office with any questions or concerns.         Morro Lynn MD  Hand and Upper Extremity Surgery          *This note was dictated using Dragon voice recognition software. Please excuse any word substitutions or errors.*      Scribe Attestation      I,:  Tamela Negro am acting as a scribe while in the presence of the attending physician.:       I,:  Morro Lynn MD personally performed the services described in this documentation    as scribed in my presence.:

## 2024-11-07 ENCOUNTER — APPOINTMENT (OUTPATIENT)
Dept: LAB | Facility: CLINIC | Age: 74
End: 2024-11-07
Payer: MEDICARE

## 2024-11-07 DIAGNOSIS — E06.4 RADIATION THYROIDITIS: ICD-10-CM

## 2024-11-07 LAB — TSH SERPL DL<=0.05 MIU/L-ACNC: 0.8 UIU/ML (ref 0.45–4.5)

## 2024-11-07 PROCEDURE — 84443 ASSAY THYROID STIM HORMONE: CPT

## 2024-11-07 PROCEDURE — 36415 COLL VENOUS BLD VENIPUNCTURE: CPT

## 2024-11-12 NOTE — RESULT ENCOUNTER NOTE
Call pt Tell her thyroid blood tests were normal.  She does not need to get it repeated now at this time because they told me she had not gotten the lab done and it was

## 2024-11-30 DIAGNOSIS — E03.9 ACQUIRED HYPOTHYROIDISM: ICD-10-CM

## 2024-12-03 ENCOUNTER — RA CDI HCC (OUTPATIENT)
Dept: OTHER | Facility: HOSPITAL | Age: 74
End: 2024-12-03

## 2024-12-03 RX ORDER — LEVOTHYROXINE SODIUM 100 UG/1
100 TABLET ORAL
Qty: 90 TABLET | Refills: 0 | Status: SHIPPED | OUTPATIENT
Start: 2024-12-03

## 2024-12-09 ENCOUNTER — APPOINTMENT (OUTPATIENT)
Dept: LAB | Facility: CLINIC | Age: 74
End: 2024-12-09
Payer: MEDICARE

## 2024-12-09 ENCOUNTER — OFFICE VISIT (OUTPATIENT)
Dept: FAMILY MEDICINE CLINIC | Facility: CLINIC | Age: 74
End: 2024-12-09
Payer: MEDICARE

## 2024-12-09 VITALS
HEART RATE: 70 BPM | DIASTOLIC BLOOD PRESSURE: 80 MMHG | HEIGHT: 65 IN | RESPIRATION RATE: 16 BRPM | WEIGHT: 125.2 LBS | OXYGEN SATURATION: 98 % | SYSTOLIC BLOOD PRESSURE: 140 MMHG | TEMPERATURE: 99.1 F | BODY MASS INDEX: 20.86 KG/M2

## 2024-12-09 DIAGNOSIS — N18.2 STAGE 2 CHRONIC KIDNEY DISEASE: ICD-10-CM

## 2024-12-09 DIAGNOSIS — I71.21 ANEURYSM OF ASCENDING AORTA WITHOUT RUPTURE (HCC): ICD-10-CM

## 2024-12-09 DIAGNOSIS — G56.03 BILATERAL CARPAL TUNNEL SYNDROME: ICD-10-CM

## 2024-12-09 DIAGNOSIS — Z23 NEED FOR COVID-19 VACCINE: ICD-10-CM

## 2024-12-09 DIAGNOSIS — E55.9 VITAMIN D DEFICIENCY: ICD-10-CM

## 2024-12-09 DIAGNOSIS — M79.671 FOOT PAIN, BILATERAL: ICD-10-CM

## 2024-12-09 DIAGNOSIS — M85.80 OSTEOPENIA, UNSPECIFIED LOCATION: ICD-10-CM

## 2024-12-09 DIAGNOSIS — T45.1X5A NEUROPATHY DUE TO CHEMOTHERAPEUTIC DRUG (HCC): ICD-10-CM

## 2024-12-09 DIAGNOSIS — C01 CANCER OF BASE OF TONGUE (HCC): ICD-10-CM

## 2024-12-09 DIAGNOSIS — E06.4 RADIATION THYROIDITIS: ICD-10-CM

## 2024-12-09 DIAGNOSIS — R03.0 ELEVATED BLOOD PRESSURE READING: ICD-10-CM

## 2024-12-09 DIAGNOSIS — Z12.31 BREAST CANCER SCREENING BY MAMMOGRAM: ICD-10-CM

## 2024-12-09 DIAGNOSIS — M79.672 FOOT PAIN, BILATERAL: ICD-10-CM

## 2024-12-09 DIAGNOSIS — E04.2 MULTIPLE THYROID NODULES: ICD-10-CM

## 2024-12-09 DIAGNOSIS — Z23 NEED FOR ZOSTER VACCINE: ICD-10-CM

## 2024-12-09 DIAGNOSIS — N18.2 STAGE 2 CHRONIC KIDNEY DISEASE: Primary | ICD-10-CM

## 2024-12-09 DIAGNOSIS — E03.9 ACQUIRED HYPOTHYROIDISM: ICD-10-CM

## 2024-12-09 DIAGNOSIS — Z23 NEED FOR VACCINATION FOR STREP PNEUMONIAE: ICD-10-CM

## 2024-12-09 DIAGNOSIS — Z29.11 NEED FOR RSV VACCINATION: ICD-10-CM

## 2024-12-09 DIAGNOSIS — Z00.00 ENCOUNTER FOR ANNUAL WELLNESS VISIT (AWV) IN MEDICARE PATIENT: ICD-10-CM

## 2024-12-09 DIAGNOSIS — G62.0 NEUROPATHY DUE TO CHEMOTHERAPEUTIC DRUG (HCC): ICD-10-CM

## 2024-12-09 PROBLEM — D84.9 IMMUNOCOMPROMISED (HCC): Status: RESOLVED | Noted: 2021-08-23 | Resolved: 2024-12-09

## 2024-12-09 PROBLEM — Z78.0 POST-MENOPAUSE: Status: RESOLVED | Noted: 2021-08-09 | Resolved: 2024-12-09

## 2024-12-09 PROBLEM — Z90.710 HISTORY OF HYSTERECTOMY: Status: RESOLVED | Noted: 2021-08-23 | Resolved: 2024-12-09

## 2024-12-09 PROBLEM — Z98.890 S/P CARPAL TUNNEL RELEASE: Status: RESOLVED | Noted: 2024-09-12 | Resolved: 2024-12-09

## 2024-12-09 PROBLEM — H26.9 CATARACTA: Status: RESOLVED | Noted: 2021-08-09 | Resolved: 2024-12-09

## 2024-12-09 PROBLEM — N18.9 CKD (CHRONIC KIDNEY DISEASE): Status: ACTIVE | Noted: 2023-10-13

## 2024-12-09 PROBLEM — B37.0 ORAL THRUSH: Status: RESOLVED | Noted: 2019-11-01 | Resolved: 2024-12-09

## 2024-12-09 PROBLEM — E46 PROTEIN-CALORIE MALNUTRITION, UNSPECIFIED SEVERITY (HCC): Status: RESOLVED | Noted: 2022-08-19 | Resolved: 2024-12-09

## 2024-12-09 PROBLEM — Z51.11 ENCOUNTER FOR CHEMOTHERAPY MANAGEMENT: Status: RESOLVED | Noted: 2019-07-30 | Resolved: 2024-12-09

## 2024-12-09 LAB
25(OH)D3 SERPL-MCNC: 57 NG/ML (ref 30–100)
ANION GAP SERPL CALCULATED.3IONS-SCNC: 8 MMOL/L (ref 4–13)
BUN SERPL-MCNC: 16 MG/DL (ref 5–25)
CALCIUM SERPL-MCNC: 9.6 MG/DL (ref 8.4–10.2)
CHLORIDE SERPL-SCNC: 104 MMOL/L (ref 96–108)
CO2 SERPL-SCNC: 29 MMOL/L (ref 21–32)
CREAT SERPL-MCNC: 0.74 MG/DL (ref 0.6–1.3)
GFR SERPL CREATININE-BSD FRML MDRD: 78 ML/MIN/1.73SQ M
GLUCOSE P FAST SERPL-MCNC: 96 MG/DL (ref 65–99)
POTASSIUM SERPL-SCNC: 4 MMOL/L (ref 3.5–5.3)
SODIUM SERPL-SCNC: 141 MMOL/L (ref 135–147)
TSH SERPL DL<=0.05 MIU/L-ACNC: 1.34 UIU/ML (ref 0.45–4.5)

## 2024-12-09 PROCEDURE — 99214 OFFICE O/P EST MOD 30 MIN: CPT | Performed by: FAMILY MEDICINE

## 2024-12-09 PROCEDURE — 36415 COLL VENOUS BLD VENIPUNCTURE: CPT

## 2024-12-09 PROCEDURE — G0439 PPPS, SUBSEQ VISIT: HCPCS | Performed by: FAMILY MEDICINE

## 2024-12-09 PROCEDURE — 84443 ASSAY THYROID STIM HORMONE: CPT

## 2024-12-09 PROCEDURE — 80048 BASIC METABOLIC PNL TOTAL CA: CPT

## 2024-12-09 PROCEDURE — 82306 VITAMIN D 25 HYDROXY: CPT

## 2024-12-09 PROCEDURE — 93000 ELECTROCARDIOGRAM COMPLETE: CPT | Performed by: FAMILY MEDICINE

## 2024-12-09 NOTE — ASSESSMENT & PLAN NOTE
BP is elevated. Has been for a couple times.  Check labs, CBC done in March.  EKG normal today.  Recheck in January.  Prefer not to start medications if we do not have to.  Will reevaluate then and determine need.  This will also be after blood work.    Orders:    POCT ECG

## 2024-12-09 NOTE — ASSESSMENT & PLAN NOTE
Some pain still in the feet.  Check XR, PETROS. If all negative, assume neuropathy, and consider gabapentin.    Orders:    XR foot 3+ vw left; Future    XR foot 3+ vw right; Future    XR ankle 3+ vw left; Future    XR ankle 3+ vw right; Future    VAS PETROS and waveform analysis, multiple levels; Future    Ambulatory Referral to Podiatry; Future

## 2024-12-09 NOTE — ASSESSMENT & PLAN NOTE
CT scan due. Reordered.    Orders:    CT chest w contrast; Future    Basic metabolic panel; Future

## 2024-12-09 NOTE — ASSESSMENT & PLAN NOTE
Lab Results   Component Value Date    EGFR 78 03/01/2024    EGFR 65 10/18/2023    EGFR 56 08/07/2023    CREATININE 0.75 03/01/2024    CREATININE 0.87 10/18/2023    CREATININE 0.98 08/07/2023   GFR is now in 70's. Likely CKD1 vs CKD 2.  Continue with hydration.  BP control.    Orders:    Basic metabolic panel; Future

## 2024-12-09 NOTE — PROGRESS NOTES
Name: Brittanie Bernabe      : 1950      MRN: [id number]  Encounter Provider: Ramy Huynh MD  Encounter Date: 2024   Encounter department: [facility] PRIMARY [facility]    Assessment & Plan  Stage 2 chronic kidney disease  Lab Results   Component Value Date    EGFR 78 2024    EGFR 65 10/18/2023    EGFR 56 2023    CREATININE 0.75 2024    CREATININE 0.87 10/18/2023    CREATININE 0.98 2023   GFR is now in 70's. Likely CKD1 vs CKD 2.  Continue with hydration.  BP control.    Orders:    Basic metabolic panel; Future    Cancer of base of tongue (HCC)  Following with Bebeto HERNÁNDEZ. No changes.  Post XRT and chemo.           Multiple thyroid nodules  Following with ENT.  Has labs from before.  On Synthroid.         Acquired hypothyroidism  On synthroid.  Labs done recently. November. No changes.  ENT recommended check again.  2025..         Neuropathy due to chemotherapeutic drug (HCC)  Some pain still in the feet.  Check XR, PETROS. If all negative, assume neuropathy, and consider gabapentin.    Orders:    XR foot 3+ vw left; Future    XR foot 3+ vw right; Future    XR ankle 3+ vw left; Future    XR ankle 3+ vw right; Future    VAS PETROS and waveform analysis, multiple levels; Future    Ambulatory Referral to Podiatry; Future    Aneurysm of ascending aorta without rupture (HCC)  CT scan due. Reordered.    Orders:    CT chest w contrast; Future    Basic metabolic panel; Future    Vitamin D deficiency  Noted in past, but no recent check.  Ordeed.    Orders:    Vitamin D 25 hydroxy; Future    Bilateral carpal tunnel syndrome  Post bilateral repair.  Follow per hand surgeon.           Elevated blood pressure reading  BP is elevated. Has been for a couple times.  Check labs, CBC done in March.  EKG normal today.  Recheck in January.  Prefer not to start medications if we do not have to.  Will reevaluate then and determine need.  This will also be after blood work.    Orders:    POCT  ECG    Osteopenia, unspecified location  Noted on Dexa scan. Frax score 2.6/11. This means that medications are not indicated, but would encourage exercise and Ca supplement, and follow Vit D level.         Encounter for annual wellness visit (AWV) in Medicare patient         Need for COVID-19 vaccine         Need for zoster vaccine         Need for vaccination for Strep pneumoniae         Need for RSV vaccination         Breast cancer screening by mammogram    Orders:    Mammo screening bilateral w 3d and cad; Future    Foot pain, bilateral    Orders:    XR foot 3+ vw left; Future    XR foot 3+ vw right; Future    XR ankle 3+ vw left; Future    XR ankle 3+ vw right; Future    VAS PETROS and waveform analysis, multiple levels; Future    Ambulatory Referral to Podiatry; Future       Preventive health issues were discussed with patient, and age appropriate screening tests were ordered as noted in patient's After Visit Summary. Personalized health advice and appropriate referrals for health education or preventive services given if needed, as noted in patient's After Visit Summary.      1. Stage 2 chronic kidney disease  Assessment & Plan:  Lab Results   Component Value Date    EGFR 78 03/01/2024    EGFR 65 10/18/2023    EGFR 56 08/07/2023    CREATININE 0.75 03/01/2024    CREATININE 0.87 10/18/2023    CREATININE 0.98 08/07/2023   GFR is now in 70's. Likely CKD1 vs CKD 2.  Continue with hydration.  BP control.         Orders:  -     Basic metabolic panel; Future  2. Cancer of base of tongue (HCC)  Assessment & Plan:  Following with Bebeto HERNÁNDEZ. No changes.  Post XRT and chemo.           3. Multiple thyroid nodules  Assessment & Plan:  Following with ENT.  Has labs from before.  On Synthroid.         4. Acquired hypothyroidism  Assessment & Plan:  On synthroid.  Labs done recently. November. No changes.  ENT recommended check again.  Jan 2025..         5. Neuropathy due to chemotherapeutic drug (HCC)  Assessment & Plan:  Some  pain still in the feet.  Check XR, PETROS. If all negative, assume neuropathy, and consider gabapentin.         Orders:  -     XR foot 3+ vw left; Future; Expected date: 12/09/2024  -     XR foot 3+ vw right; Future; Expected date: 12/09/2024  -     XR ankle 3+ vw left; Future; Expected date: 12/09/2024  -     XR ankle 3+ vw right; Future; Expected date: 12/09/2024  -     VAS PETROS and waveform analysis, multiple levels; Future; Expected date: 12/09/2024  -     Ambulatory Referral to Podiatry; Future  6. Aneurysm of ascending aorta without rupture (HCC)  Assessment & Plan:  CT scan due. Reordered.         Orders:  -     CT chest w contrast; Future; Expected date: 12/09/2024  -     Basic metabolic panel; Future  7. Vitamin D deficiency  Assessment & Plan:  Noted in past, but no recent check.  Ordeed.         Orders:  -     Vitamin D 25 hydroxy; Future  8. Bilateral carpal tunnel syndrome  Assessment & Plan:  Post bilateral repair.  Follow per hand surgeon.           9. Elevated blood pressure reading  Assessment & Plan:  BP is elevated. Has been for a couple times.  Check labs, CBC done in March.  EKG.         Orders:  -     POCT ECG  10. Osteopenia, unspecified location  Assessment & Plan:  Noted on Dexa scan. Frax score 2.6/11. This means that medications are not indicated, but would encourage exercise and Ca supplement, and follow Vit D level.         11. Encounter for annual wellness visit (AWV) in Medicare patient  Comments:  Reviewed HM, Advanced directives, vaccines.  12. Need for COVID-19 vaccine  Comments:  Consider Covid vacination.  13. Need for zoster vaccine  Comments:  Recommend Shingrix at local pharmacy.  14. Need for vaccination for Strep pneumoniae  Comments:  Recommend Prevnar 20.  15. Need for RSV vaccination  Comments:  Consider RSV at local pharmacy.  16. Breast cancer screening by mammogram  -     Mammo screening bilateral w 3d and cad; Future  17. Foot pain, bilateral  -     XR foot 3+ vw left;  Future; Expected date: 12/09/2024  -     XR foot 3+ vw right; Future; Expected date: 12/09/2024  -     XR ankle 3+ vw left; Future; Expected date: 12/09/2024  -     XR ankle 3+ vw right; Future; Expected date: 12/09/2024  -     VAS PETROS and waveform analysis, multiple levels; Future; Expected date: 12/09/2024  -     Ambulatory Referral to Podiatry; Future      Chief Complaint   Patient presents with    Medicare Wellness Visit     Patient in office for AWV, establish care and discuss elevated blood pressure.         History of Present Illness     Had elevated BP lately.  She was concerned about this.  Not on medications.  Last few visits have had elevation in BP.    Has had some problems with her feet.  Pain at times.  Most often is significant pain.  Has used B vit before.  Minimal help if at all.    Hammertoes: Using pads from [facility].  Did not help, caused some problems with this.  Was rubbing on the top of the toes, and had some bleeding.    History Oral cancer.  Seeing ENT, and rad onc.  Hasn't seen Hematology lately.           Patient Care Team:  Ramy Huynh MD as PCP - General (Family Medicine)  Richard Yanes MD as Medical Oncologist (Hematology and Oncology)  Jovon Tate MD (Otolaryngology)  Carlton Faith MD (Radiation Oncology)    Review of Systems   Constitutional: Negative.    HENT: Negative.     Eyes: Negative.    Respiratory: Negative.     Cardiovascular: Negative.    Gastrointestinal: Negative.    Endocrine: Negative.    Genitourinary: Negative.    Musculoskeletal:         Foot pain.     Skin: Negative.    Allergic/Immunologic: Negative.    Neurological: Negative.    Hematological: Negative.    Psychiatric/Behavioral: Negative.       Medical History Reviewed by provider this encounter:  Tobacco  Allergies  Meds  Problems  Med Hx  Surg Hx  Fam Hx       Annual Wellness Visit Questionnaire   Brittanie is here for her Subsequent Wellness visit.     Health Risk  Assessment:   Patient rates overall health as very good. Patient feels that their physical health rating is much better. Patient is satisfied with their life. Eyesight was rated as same. Hearing was rated as same. Patient feels that their emotional and mental health rating is same. Patients states they are never, rarely angry. Patient states they are never, rarely unusually tired/fatigued. Pain experienced in the last 7 days has been none. Patient states that she has experienced no weight loss or gain in last 6 months.     Depression Screening:   PHQ-2 Score: 0      Fall Risk Screening:   In the past year, patient has experienced: no history of falling in past year      Urinary Incontinence Screening:   Patient has not leaked urine accidently in the last six months.     Home Safety:  Patient does not have trouble with stairs inside or outside of their home. Patient has working smoke alarms and has working carbon monoxide detector.     Nutrition:   Current diet is Regular.     Medications:   Patient is currently taking over-the-counter supplements. OTC medications include: see medication list. Patient is able to manage medications.     Activities of Daily Living (ADLs)/Instrumental Activities of Daily Living (IADLs):   Walk and transfer into and out of bed and chair?: Yes  Dress and groom yourself?: Yes    Bathe or shower yourself?: Yes    Feed yourself? Yes  Do your laundry/housekeeping?: Yes  Manage your money, pay your bills and track your expenses?: Yes  Make your own meals?: Yes    Do your own shopping?: Yes    Previous Hospitalizations:   Any hospitalizations or ED visits within the last 12 months?: No      Advance Care Planning:   Living will: No    Durable POA for healthcare: No    Advanced directive: No    Advanced directive counseling given: Yes    ACP document given: Yes      Cognitive Screening:   Provider or family/friend/caregiver concerned regarding cognition?: No    PREVENTIVE SCREENINGS       Cardiovascular Screening:    General: Screening Current      Diabetes Screening:     General: Screening Current      Colorectal Cancer Screening:     General: Risks and Benefits Discussed      Breast Cancer Screening:     General: Risks and Benefits Discussed      Cervical Cancer Screening:    General: Screening Not Indicated      Osteoporosis Screening:    General: Risks and Benefits Discussed and Screening Current      Abdominal Aortic Aneurysm (AAA) Screening:        General: Screening Not Indicated      Lung Cancer Screening:     General: Screening Not Indicated      Hepatitis C Screening:    General: Screening Not Indicated    Screening, Brief Intervention, and Referral to Treatment (SBIRT)    Screening  Typical number of drinks in a day: 0  Typical number of drinks in a week: 0  Interpretation: Low risk drinking behavior.    Single Item Drug Screening:  How often have you used an illegal drug (including marijuana) or a prescription medication for non-medical reasons in the past year? never    Single Item Drug Screen Score: 0  Interpretation: Negative screen for possible drug use disorder    Social Drivers of Health     Financial Resource Strain: Patient Declined (10/13/2023)    Overall Financial Resource Strain (CARDIA)     Difficulty of Paying Living Expenses: Patient declined   Food Insecurity: No Food Insecurity (12/9/2024)    Hunger Vital Sign     Worried About Running Out of Food in the Last Year: Never true     Ran Out of Food in the Last Year: Never true   Transportation Needs: No Transportation Needs (12/9/2024)    PRAPARE - Transportation     Lack of Transportation (Medical): No     Lack of Transportation (Non-Medical): No   Housing Stability: Low Risk  (12/9/2024)    Housing Stability Vital Sign     Unable to Pay for Housing in the Last Year: No     Number of Times Moved in the Last Year: 0     Homeless in the Last Year: No   Utilities: Not At Risk (12/9/2024)    [facility] Utilities     Threatened with  "loss of utilities: No     No results found.    Objective   /80 (BP Location: Right arm, Patient Position: Sitting, Cuff Size: Adult)   Pulse 70   Temp 99.1 °F (37.3 °C) (Temporal)   Resp 16   Ht 5' 4.5\" (1.638 m)   Wt 56.8 kg (125 lb 3.2 oz)   SpO2 98%   BMI 21.16 kg/m²     Physical Exam    "

## 2024-12-09 NOTE — PROGRESS NOTES
"Name: Brittanie Bernabe      : 1950      MRN: [id number]  Encounter Provider: Ramy Huynh MD  Encounter Date: 2024   Encounter department: [facility]  :  Assessment & Plan           History of Present Illness {?Quick Links Encounters * My Last Note * Last Note in Specialty * Snapshot * Since Last Visit * History :77425}    HPI  Review of Systems   Constitutional:  Negative for appetite change and fever.   Respiratory:  Negative for chest tightness and shortness of breath.    Cardiovascular:  Negative for chest pain, palpitations and leg swelling.   Gastrointestinal:  Negative for abdominal pain.   Genitourinary:  Negative for difficulty urinating.   Musculoskeletal:  Negative for arthralgias and myalgias.   Skin:  Negative for wound.   Neurological:  Negative for dizziness, light-headedness and headaches.   Psychiatric/Behavioral:  Negative for dysphoric mood and sleep disturbance. The patient is not nervous/anxious.      {Select to insert medical history sections (Optional):14585}     Objective {?Quick Links Trend Vitals * Enter New Vitals * Results Review * Timeline (Adult) * Labs * Imaging * Cardiology * Procedures * Lung Cancer Screening * Surgical eConsent :94493}  /80 (BP Location: Right arm, Patient Position: Sitting, Cuff Size: Adult)   Pulse 70   Temp 99.1 °F (37.3 °C) (Temporal)   Resp 16   Ht 5' 4.5\" (1.638 m)   Wt 56.8 kg (125 lb 3.2 oz)   SpO2 98%   BMI 21.16 kg/m²      Physical Exam  {Administrative / Billing Section (Optional):19103}  "

## 2024-12-09 NOTE — ASSESSMENT & PLAN NOTE
On synthroid.  Labs done recently. November. No changes.  ENT recommended check again.  Jan 2025..

## 2024-12-09 NOTE — ASSESSMENT & PLAN NOTE
Noted on Dexa scan. Frax score 2.6/11. This means that medications are not indicated, but would encourage exercise and Ca supplement, and follow Vit D level.

## 2024-12-09 NOTE — PATIENT INSTRUCTIONS
1. Stage 2 chronic kidney disease  Assessment & Plan:  Lab Results   Component Value Date    EGFR 78 03/01/2024    EGFR 65 10/18/2023    EGFR 56 08/07/2023    CREATININE 0.75 03/01/2024    CREATININE 0.87 10/18/2023    CREATININE 0.98 08/07/2023   GFR is now in 70's. Likely CKD1 vs CKD 2.  Continue with hydration.  BP control.    Orders:    Basic metabolic panel; Future    Orders:  -     Basic metabolic panel; Future  2. Cancer of base of tongue (HCC)  Assessment & Plan:  Following with Bebeto HERNÁNDEZ. No changes.  Post XRT and chemo.           3. Multiple thyroid nodules  Assessment & Plan:  Following with ENT.  Has labs from before.  On Synthroid.         4. Acquired hypothyroidism  Assessment & Plan:  On synthroid.  Labs done recently. November. No changes.  ENT recommended check again.  Jan 2025..         5. Neuropathy due to chemotherapeutic drug (HCC)  Assessment & Plan:  Some pain still in the feet.  Check XR, PETROS. If all negative, assume neuropathy, and consider gabapentin.    Orders:    XR foot 3+ vw left; Future    XR foot 3+ vw right; Future    XR ankle 3+ vw left; Future    XR ankle 3+ vw right; Future    VAS PETROS and waveform analysis, multiple levels; Future    Ambulatory Referral to Podiatry; Future    Orders:  -     XR foot 3+ vw left; Future; Expected date: 12/09/2024  -     XR foot 3+ vw right; Future; Expected date: 12/09/2024  -     XR ankle 3+ vw left; Future; Expected date: 12/09/2024  -     XR ankle 3+ vw right; Future; Expected date: 12/09/2024  -     VAS PETROS and waveform analysis, multiple levels; Future; Expected date: 12/09/2024  -     Ambulatory Referral to Podiatry; Future  6. Aneurysm of ascending aorta without rupture (HCC)  Assessment & Plan:  CT scan due. Reordered.    Orders:    CT chest w contrast; Future    Basic metabolic panel; Future    Orders:  -     CT chest w contrast; Future; Expected date: 12/09/2024  -     Basic metabolic panel; Future  7. Vitamin D deficiency  Assessment &  Plan:  Noted in past, but no recent check.  Ordeed.    Orders:    Vitamin D 25 hydroxy; Future    Orders:  -     Vitamin D 25 hydroxy; Future  8. Bilateral carpal tunnel syndrome  Assessment & Plan:  Post bilateral repair.  Follow per hand surgeon.           9. Elevated blood pressure reading  Assessment & Plan:  BP is elevated. Has been for a couple times.  Check labs, CBC done in March.  EKG normal today.  Recheck in January.  Prefer not to start medications if we do not have to.  Will reevaluate then and determine need.  This will also be after blood work.    Orders:    POCT ECG    Orders:  -     POCT ECG  10. Osteopenia, unspecified location  Assessment & Plan:  Noted on Dexa scan. Frax score 2.6/11. This means that medications are not indicated, but would encourage exercise and Ca supplement, and follow Vit D level.         11. Encounter for annual wellness visit (AWV) in Medicare patient  Comments:  Reviewed HM, Advanced directives, vaccines.  12. Need for COVID-19 vaccine  Comments:  Consider Covid vacination.  13. Need for zoster vaccine  Comments:  Recommend Shingrix at local pharmacy.  14. Need for vaccination for Strep pneumoniae  Comments:  Recommend Prevnar 20.  15. Need for RSV vaccination  Comments:  Consider RSV at local pharmacy.  16. Breast cancer screening by mammogram  -     Mammo screening bilateral w 3d and cad; Future  17. Foot pain, bilateral  -     XR foot 3+ vw left; Future; Expected date: 12/09/2024  -     XR foot 3+ vw right; Future; Expected date: 12/09/2024  -     XR ankle 3+ vw left; Future; Expected date: 12/09/2024  -     XR ankle 3+ vw right; Future; Expected date: 12/09/2024  -     VAS PETROS and waveform analysis, multiple levels; Future; Expected date: 12/09/2024  -     Ambulatory Referral to Podiatry; Future      COVID 19 Instructions    Brittanie Bernabe was advised to limit contact with others to essential tasks such as getting food, medications, and medical care.    Proper  handwashing reviewed, and Hand sanitzer when washing is not available.    If the patient develops symptoms of COVID 19, the patient should call the office as soon as possible.    It is strongly recommended that Flu Vaccinations be obtained.      Virtual Visits:  [place]: This works on smart phones (any phone with Internet browsing capability).  You should get a text message when the provider is ready to see you.  Click on the link in the text message, and the call should start.  You will need to type in your name, and allow camera and microphone access.  This is HIPPA compliant, and secure.      If you have not already done so, get immunized to COVID 19.      We are committed to getting you vaccinated as soon as possible and will be closely following [facility] and [facility] guidelines as they are released and revised.  Please refer to our COVID-19 vaccine webpage for the most up to date information on the vaccine and our distribution efforts.    This site will also have the most up to date recommendations for COVID booster vaccine.    [email or web address]-vaccine    Call [phone] ([phone]), option 7    You can also visit https://www.vaccines.gov/ to find vaccines in your area.    OUR LOCATION:    [facility]  [address] [place] [address], [address]  [place], [address] [phone]  Fax: [phone]    Lab services, Rheumatology, and OB/GYN are at this location as well.  Thank you for your inquiry about the RSV vaccine.  As you can see below, the CDC has recommended that you discuss this with your Primary Care provider and decide if the vaccine is right for you.  This discussion can be accomplished at your next office visit.  The vaccine is currently available at your local pharmacy if you choose to get it prior to your next office visit.     Respiratory syncytial (sin-[facility]) virus, or RSV, is a common respiratory virus that usually causes mild, cold-like  symptoms. Most people recover in a week or two, but RSV can be serious. Infants and older adults are more likely to develop severe RSV and need hospitalization. Vaccines are available to protect older adults from severe RSV. Monoclonal antibody products are available to protect infants and young children from severe RSV.    CDC Recommendations  Adults 60 years old and over  Adults 60 years of age and older may receive a single dose of RSV vaccine using shared clinical decision-making.    Infants and young children  1 dose of nirsevimab for all infants younger than 8 months born during or entering their first RSV season.  1 dose of nirsevimab for infants and children 8-19 months old who are at increased risk for severe RSV disease and entering their second RSV season.  Note: A different monoclonal antibody, palivizumab, is limited to children under 24 months of age with certain conditions that place them at high risk for severe RSV disease. It must be given once a month during RSV season. Please see AAP guidelines for palivizumab.    Pregnant people  1 dose of maternal RSV vaccine during weeks 32 through 36 of pregnancy, administered immediately before or during RSV season. Abrysvo is the only RSV vaccine recommended during pregnancy.    If you have any questions about RSV or the products above, talk to your healthcare provider.

## 2024-12-10 ENCOUNTER — RESULTS FOLLOW-UP (OUTPATIENT)
Dept: FAMILY MEDICINE CLINIC | Facility: CLINIC | Age: 74
End: 2024-12-10

## 2024-12-11 NOTE — TELEPHONE ENCOUNTER
Relayed results to patient as per provider message. Patient expressed understanding and did not have any further questions.

## 2024-12-18 ENCOUNTER — HOSPITAL ENCOUNTER (OUTPATIENT)
Dept: CT IMAGING | Facility: HOSPITAL | Age: 74
Discharge: HOME | End: 2024-12-18
Attending: FAMILY MEDICINE
Payer: MEDICARE

## 2024-12-18 ENCOUNTER — HOSPITAL ENCOUNTER (OUTPATIENT)
Dept: RADIOLOGY | Facility: HOSPITAL | Age: 74
Discharge: HOME | End: 2024-12-18
Payer: MEDICARE

## 2024-12-18 DIAGNOSIS — G62.0 NEUROPATHY DUE TO CHEMOTHERAPEUTIC DRUG (HCC): ICD-10-CM

## 2024-12-18 DIAGNOSIS — M79.672 FOOT PAIN, BILATERAL: ICD-10-CM

## 2024-12-18 DIAGNOSIS — M79.671 FOOT PAIN, BILATERAL: ICD-10-CM

## 2024-12-18 DIAGNOSIS — I71.21 ANEURYSM OF ASCENDING AORTA WITHOUT RUPTURE (HCC): ICD-10-CM

## 2024-12-18 DIAGNOSIS — T45.1X5A NEUROPATHY DUE TO CHEMOTHERAPEUTIC DRUG (HCC): ICD-10-CM

## 2024-12-18 PROCEDURE — 73610 X-RAY EXAM OF ANKLE: CPT

## 2024-12-18 PROCEDURE — 71260 CT THORAX DX C+: CPT

## 2024-12-18 PROCEDURE — 73630 X-RAY EXAM OF FOOT: CPT

## 2024-12-18 RX ADMIN — IOHEXOL 90 ML: 350 INJECTION, SOLUTION INTRAVENOUS at 13:05

## 2024-12-19 NOTE — RESULT ENCOUNTER NOTE
Tests were not normal, and should follow at  your scheduled Office visit. Please call about this, if [employer] message is not available or not read by patient.

## 2024-12-27 NOTE — RESULT ENCOUNTER NOTE
Tests were not normal, and should follow at  your scheduled Office visit.  Please see the patient message for MyChart for discussion/instructions if patient has MyChart account.

## 2025-01-27 ENCOUNTER — OFFICE VISIT (OUTPATIENT)
Dept: FAMILY MEDICINE CLINIC | Facility: CLINIC | Age: 75
End: 2025-01-27
Payer: MEDICARE

## 2025-01-27 VITALS
RESPIRATION RATE: 16 BRPM | BODY MASS INDEX: 21.34 KG/M2 | WEIGHT: 125 LBS | HEIGHT: 64 IN | HEART RATE: 75 BPM | DIASTOLIC BLOOD PRESSURE: 78 MMHG | OXYGEN SATURATION: 95 % | SYSTOLIC BLOOD PRESSURE: 128 MMHG

## 2025-01-27 DIAGNOSIS — G62.0 NEUROPATHY DUE TO CHEMOTHERAPEUTIC DRUG (HCC): ICD-10-CM

## 2025-01-27 DIAGNOSIS — N18.1 STAGE 1 CHRONIC KIDNEY DISEASE: ICD-10-CM

## 2025-01-27 DIAGNOSIS — C01 CANCER OF BASE OF TONGUE (HCC): ICD-10-CM

## 2025-01-27 DIAGNOSIS — R03.0 ELEVATED BLOOD PRESSURE READING: ICD-10-CM

## 2025-01-27 DIAGNOSIS — I71.21 ANEURYSM OF ASCENDING AORTA WITHOUT RUPTURE (HCC): Primary | ICD-10-CM

## 2025-01-27 DIAGNOSIS — T45.1X5A NEUROPATHY DUE TO CHEMOTHERAPEUTIC DRUG (HCC): ICD-10-CM

## 2025-01-27 DIAGNOSIS — E03.9 ACQUIRED HYPOTHYROIDISM: ICD-10-CM

## 2025-01-27 DIAGNOSIS — C77.0 SECONDARY MALIGNANT NEOPLASM OF CERVICAL LYMPH NODE (HCC): ICD-10-CM

## 2025-01-27 PROCEDURE — 99214 OFFICE O/P EST MOD 30 MIN: CPT | Performed by: FAMILY MEDICINE

## 2025-01-27 PROCEDURE — G2211 COMPLEX E/M VISIT ADD ON: HCPCS | Performed by: FAMILY MEDICINE

## 2025-01-27 NOTE — ASSESSMENT & PLAN NOTE
CT scan showed 4.2 cm ectasia, unchanged from 2019, follow-up recommended for 1 year by radiology.  Orders:  •  CT chest wo contrast; Future

## 2025-01-27 NOTE — PATIENT INSTRUCTIONS
1. Aneurysm of ascending aorta without rupture (HCC)  Assessment & Plan:  CT scan showed 4.2 cm ectasia, unchanged from 2019, follow-up recommended for 1 year by radiology.  Orders:    CT chest wo contrast; Future    Orders:  -     CT chest wo contrast; Future; Expected date: 01/27/2026  2. Acquired hypothyroidism  Assessment & Plan:  TSH is stable.  This was secondary to radiation treatment that was abnormal.  Continue levothyroxine at current dosage without change       3. Elevated blood pressure reading  Assessment & Plan:  Blood pressure today is not elevated.       4. Stage 1 chronic kidney disease  Assessment & Plan:  Lab Results   Component Value Date    EGFR 78 12/09/2024    EGFR 78 03/01/2024    EGFR 65 10/18/2023    CREATININE 0.74 12/09/2024    CREATININE 0.75 03/01/2024    CREATININE 0.87 10/18/2023   GFR reviewed from most recent laboratory studies.  It remains at a high level, 78.  This is essentially normal.  Will change to CKD 1.  Continue to follow as appropriate for routine laboratory studies.  Maintain adequate hydration, blood pressure control.           5. Cancer of base of tongue (HCC)  Assessment & Plan:  Continues to follow with ENT.  No specific change       6. Neuropathy due to chemotherapeutic drug (HCC)  Assessment & Plan:  Patient did have prior fractures on the right foot at the third fourth and fifth metatarsals.  They have healed, but may be responsible for some of her heel issues.  Follow-up with podiatry as appropriate.    Could consider using gabapentin if the neuropathy becomes more problematic.  We certainly start with it only at at bedtime.    She mentioned that when she has the symptoms she will get up and walk for a little bit, and then can get back to sleep for couple of hours.  So far, that has been tolerable for her.  If it changes later on, she will let us know and see if she wants to try the gabapentin at that point.       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             COVID 19 Instructions    Brittanie Bernabe was advised to limit contact with others to essential tasks such as getting food, medications, and medical care.    Proper handwashing reviewed, and Hand sanitzer when washing is not available.    If the patient develops symptoms of COVID 19, the patient should call the office as soon as possible.    It is strongly recommended that Flu Vaccinations be obtained.      Virtual Visits:  [employer]: This works on smart phones (any phone with Internet browsing capability).  You should get a text message when the provider is ready to see you.  Click on the link in the text message, and the call should start.  You will need to type in your name, and allow camera and microphone access.  This is HIPPA compliant, and secure.      If you have not already done so, get immunized to COVID 19.      We are committed to getting you vaccinated as soon as possible and will be closely following CDC and [facility] guidelines as they are released and revised.  Please refer to our COVID-19 vaccine webpage for the most up to date information on the vaccine and our distribution efforts.    This site will also have the most up to date recommendations for COVID booster vaccine.    [email or web address]-[email or web address]/protect-[email or web address]/covid-[email or web address]-vaccine    Call [phone] ([phone]), option 7    You can also visit https://www.vaccines.gov/ to find vaccines in your area.    OUR LOCATION:    [facility]  [facility], [address]  [place], [place], [address] [phone]  Fax: [phone]    Lab services, Rheumatology, and OB/GYN are at this location as well.

## 2025-01-27 NOTE — ASSESSMENT & PLAN NOTE
Lab Results   Component Value Date    EGFR 78 12/09/2024    EGFR 78 03/01/2024    EGFR 65 10/18/2023    CREATININE 0.74 12/09/2024    CREATININE 0.75 03/01/2024    CREATININE 0.87 10/18/2023   GFR reviewed from most recent laboratory studies.  It remains at a high level, 78.  This is essentially normal.  Will change to CKD 1.  Continue to follow as appropriate for routine laboratory studies.  Maintain adequate hydration, blood pressure control.

## 2025-01-27 NOTE — ASSESSMENT & PLAN NOTE
Patient did have prior fractures on the right foot at the third fourth and fifth metatarsals.  They have healed, but may be responsible for some of her heel issues.  Follow-up with podiatry as appropriate.    Could consider using gabapentin if the neuropathy becomes more problematic.  We certainly start with it only at at bedtime.    She mentioned that when she has the symptoms she will get up and walk for a little bit, and then can get back to sleep for couple of hours.  So far, that has been tolerable for her.  If it changes later on, she will let us know and see if she wants to try the gabapentin at that point.

## 2025-01-27 NOTE — PROGRESS NOTES
Name: Brittanie Bernabe      : 1950      MRN: [id number]  Encounter Provider: Ramy Huynh MD  Encounter Date: 2025   Encounter department: [facility]  :  Assessment & Plan  Aneurysm of ascending aorta without rupture (HCC)  CT scan showed 4.2 cm ectasia, unchanged from 2019, follow-up recommended for 1 year by radiology.  Orders:  •  CT chest wo contrast; Future    Acquired hypothyroidism  TSH is stable.  This was secondary to radiation treatment that was abnormal.  Continue levothyroxine at current dosage without change       Elevated blood pressure reading  Blood pressure today is not elevated.       Stage 1 chronic kidney disease  Lab Results   Component Value Date    EGFR 78 2024    EGFR 78 2024    EGFR 65 10/18/2023    CREATININE 0.74 2024    CREATININE 0.75 2024    CREATININE 0.87 10/18/2023   GFR reviewed from most recent laboratory studies.  It remains at a high level, 78.  This is essentially normal.  Will change to CKD 1.  Continue to follow as appropriate for routine laboratory studies.  Maintain adequate hydration, blood pressure control.         Cancer of base of tongue (HCC)  Continues to follow with ENT.  No specific change       Neuropathy due to chemotherapeutic drug (HCC)  Patient did have prior fractures on the right foot at the third fourth and fifth metatarsals.  They have healed, but may be responsible for some of her heel issues.  Follow-up with podiatry as appropriate.    Could consider using gabapentin if the neuropathy becomes more problematic.  We certainly start with it only at at bedtime.    She mentioned that when she has the symptoms she will get up and walk for a little bit, and then can get back to sleep for couple of hours.  So far, that has been tolerable for her.  If it changes later on, she will let us know and see if she wants to try the gabapentin at that point.       Secondary malignant neoplasm of cervical  "lymph node (HCC)  Follow-up with ENT.              History of Present Illness   HPI  Review of Systems    Objective   /78 (BP Location: Left arm, Patient Position: Sitting, Cuff Size: Standard)   Pulse 75   Resp 16   Ht 5' 4\" (1.626 m)   Wt 56.7 kg (125 lb)   SpO2 95%   BMI 21.46 kg/m²      Physical Exam    "

## 2025-01-27 NOTE — ASSESSMENT & PLAN NOTE
TSH is stable.  This was secondary to radiation treatment that was abnormal.  Continue levothyroxine at current dosage without change

## 2025-02-04 ENCOUNTER — APPOINTMENT (OUTPATIENT)
Dept: RADIOLOGY | Facility: MEDICAL CENTER | Age: 75
End: 2025-02-04
Payer: MEDICARE

## 2025-02-04 ENCOUNTER — OFFICE VISIT (OUTPATIENT)
Dept: OBGYN CLINIC | Facility: MEDICAL CENTER | Age: 75
End: 2025-02-04
Payer: MEDICARE

## 2025-02-04 VITALS — WEIGHT: 122.4 LBS | BODY MASS INDEX: 20.9 KG/M2 | HEIGHT: 64 IN

## 2025-02-04 DIAGNOSIS — M79.601 RIGHT ARM PAIN: Primary | ICD-10-CM

## 2025-02-04 DIAGNOSIS — M79.601 RIGHT ARM PAIN: ICD-10-CM

## 2025-02-04 PROCEDURE — 73090 X-RAY EXAM OF FOREARM: CPT

## 2025-02-04 PROCEDURE — 99213 OFFICE O/P EST LOW 20 MIN: CPT | Performed by: ORTHOPAEDIC SURGERY

## 2025-02-04 PROCEDURE — 73060 X-RAY EXAM OF HUMERUS: CPT

## 2025-02-04 NOTE — PROGRESS NOTES
HAND & UPPER EXTREMITY OFFICE VISIT   Referred By:  No referring provider defined for this encounter.      Chief Complaint:     Residual numbness and tingling that is resolving however is still present at her finger tips      Surgery:  Surgery Date: 10/3/2024 - RELEASE CARPAL TUNNEL - Left - Left     8/29/2024 Release of Right Carpal tunnel    History of Present Illness:   Patient presents now 124 days status post the above surgery. she reports that her Left hand is still doing very well.     She states this past Saturday she started to get shooting pains in her Right UE.  Pain is noted in the shoulder, arm and proximal forearm areas. There is shooting pains into the dorsal hand as well that are random and come and go. There is a constant ache in her arm and forearm. Pain is also noted in the thenar area as well. She denies numbness.  States the symptoms feel different than her previous carpal tunnel syndrome.  That is still doing well.    She reports being unable to use her hand for ADL's and she is dropping items. She states her hands are cold as well.   These pains wake her at night so she used ice but the only area iced worked was on her shoulder.   Denies neck pain and no injuries    Past Medical History:  Past Medical History:   Diagnosis Date    Anemia 07/15/2019    Cataracta 08/09/2021    Bilateral removals.      Dehydration 06/17/2019    Disease of thyroid gland     Encounter for chemotherapy management 07/30/2019    Finished Aug 2019.         Fatigue 03/27/2019    History of hysterectomy 08/23/2021    Hypokalemia 08/09/2019    Immunocompromised (HCC) 08/23/2021    Related to Chemo and radiation. No longer on these.         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     Past Surgical History:   Procedure Laterality Date     ADENOIDECTOMY      APPENDECTOMY  1990    CATARACT EXTRACTION Left     HAND SURGERY Bilateral     HYSTERECTOMY  2012    total    IR PICC LINE  2019    OOPHORECTOMY Bilateral 2012    [place] NEUROPLASTY &/TRANSPOS MEDIAN NRV CARPAL TUNNE Right 2024    Procedure: RELEASE CARPAL TUNNEL -right;  Surgeon: Morro Lynn MD;  Location:  MAIN OR;  Service: Orthopedics    [place] NEUROPLASTY &/TRANSPOS MEDIAN NRV CARPAL TUNNE Left 10/03/2024    Procedure: RELEASE CARPAL TUNNEL - Left;  Surgeon: Morro Lynn MD;  Location:  MAIN OR;  Service: Orthopedics    [place] XCAPSL CTRC RMVL INSJ IO LENS PROSTH W/O ECP Right 2021    Procedure: CATARACT SURGERY W/INTRAOCULAR LENS;  Surgeon: Yaw Gandhi MD;  Location:  MAIN OR;  Service: Ophthalmology    TONSILLECTOMY       Family History   Problem Relation Age of Onset    Parkinsonism Mother         Cardiac abnormality ([place])    Prostate cancer Father     Rheum arthritis Sister     Hypothyroidism Sister     No Known Problems Maternal Grandmother     No Known Problems Maternal Grandfather     No Known Problems Paternal Grandmother     No Known Problems Paternal Grandfather     No Known Problems Paternal Aunt     No Known Problems Paternal Aunt     No Known Problems Paternal Aunt     No Known Problems Paternal Aunt     Hashimoto's thyroiditis Family     Breast cancer Neg Hx      Social History     Socioeconomic History    Marital status:      Spouse name: Not on file    Number of children: Not on file    Years of education: Not on file    Highest education level: Not on file   Occupational History     Employer: [employer]   Tobacco Use    Smoking status: Former     Current packs/day: 0.00     Average packs/day: 0.5 packs/day for 5.0 years (2.5 ttl pk-yrs)     Types: Cigarettes     Start date: 1973     Quit date: 1978     Years since quittin.1     Passive exposure: Never    Smokeless tobacco: Never    Tobacco comments:     no  passive smoke exposure   Vaping Use    Vaping status: Former   Substance and Sexual Activity    Alcohol use: Not Currently    Drug use: Never    Sexual activity: Not Currently   Other Topics Concern    Not on file   Social History Narrative    Not on file     Social Drivers of Health     Financial Resource Strain: Patient Declined (10/13/2023)    Overall Financial Resource Strain (CARDIA)     Difficulty of Paying Living Expenses: Patient declined   Food Insecurity: No Food Insecurity (12/9/2024)    Hunger Vital Sign     Worried About Running Out of Food in the Last Year: Never true     Ran Out of Food in the Last Year: Never true   Transportation Needs: No Transportation Needs (12/9/2024)    PRAPARE - Transportation     Lack of Transportation (Medical): No     Lack of Transportation (Non-Medical): No   Physical Activity: Sufficiently Active (8/9/2021)    Exercise Vital Sign     Days of Exercise per Week: 7 days     Minutes of Exercise per Session: 60 min   Stress: No Stress Concern Present (8/9/2021)    [employer] - Occupational Stress Questionnaire     Feeling of Stress : Not at all   Social Connections: Unknown (6/18/2024)    Received from [employer]    Social Crisp     How often do you feel lonely or isolated from those around you? (Adult - for ages 18 years and over): Not on file   Intimate Partner Violence: Not At Risk (8/9/2021)    Humiliation, Afraid, Rape, and Kick questionnaire     Fear of Current or Ex-Partner: No     Emotionally Abused: No     Physically Abused: No     Sexually Abused: No   Housing Stability: Low Risk  (12/9/2024)    Housing Stability Vital Sign     Unable to Pay for Housing in the Last Year: No     Number of Times Moved in the Last Year: 0     Homeless in the Last Year: No     Scheduled Meds:  Continuous Infusions:No current facility-administered medications for this visit.    PRN Meds:.  Allergies   Allergen Reactions    Codeine Anaphylaxis      "Difficulty breathing       Physical Examination:    Ht 5' 4\" (1.626 m)   Wt 55.5 kg (122 lb 6.4 oz)   BMI 21.01 kg/m²     Gen: A&Ox3, NAD    Right Upper Extremity:  Incision healing well without signs of infection   Swelling Negative  NTTP on exam  Sensation intact to light touch in the axillary median, ulnar, and radial nerve distributions  Pain with light touch anterior, posterior arm, radial forearm, lateral shoulder  Trapezius is tight and  TTP   No pain jamil cervical  muscles  TTP biceps tendon, deltoid,  triceps, biceps with increase muscle tone, Brachioradialis , Flexor muscles of the forearm, Dorsal extensor tendons, 1st dorsal compartment   No pain TFCC fovea  Mild discomfort MP joints   - Pressure shear test CMC joint  Warm, well-perfused digits  Cap refill <2s    Shoulder     Full AROM flexion with pain mid arch  5/5 shoulder forward flexion with pain  5/5 deltoid with pain  5/5 elbow flexion with pain  5/5 elbow extension with pain  5/5 wrist extension and flexion resisted  4/5 thumb extension compared to left  5/5  strength      Left Upper Extremity:  Incision healing well without signs of infection   Sutures intact, removed in office today  Swelling Negative  NTTP on exam  Sensation intact to light touch in the axillary median, ulnar, and radial nerve distributions  5/5 motor throughout  Warm, well-perfused digits  Cap refill <2s      Studies:  2/4/2025: Radiographs of the Right Humerus and forearm show no acute fractures of dislocations.  No lytic lesions.  There are moderate degenerative changes at the elbow and AC joint    Assessment and Plan:  1. Right arm pain  XR humerus right    XR forearm 2 vw right    EMG 1 Limb    Ambulatory Referral to Physical Therapy            74 y.o. female presents 124 days status post RELEASE CARPAL TUNNEL - Left - Left.   Patient was doing well up until this past weekend.  Her XR show no fractures of her humerus or radius/ulna and no other bony patient's . her " exam is consistent with muscle spasm and possibly cervical radiculopathy.   I recommend she attend formal therapy which she was in agreement with  I also recommend we also obtain an EMG to R/O any cervical issues.     It is recommended she return to the office  after her EMG but also stated to give formal therapy a few weeks to evaluate if it is improving her symptoms    she expressed understanding of the plan and agreed. We encouraged them to contact our office with any questions or concerns.         Morro Lynn MD  Hand and Upper Extremity Surgery          *This note was dictated using Dragon voice recognition software. Please excuse any word substitutions or errors.*      Scribe Attestation      I,:  Chang Florian am acting as a scribe while in the presence of the attending physician.:       I,:  Morro Lynn MD personally performed the services described in this documentation    as scribed in my presence.:

## 2025-02-20 ENCOUNTER — EVALUATION (OUTPATIENT)
Dept: PHYSICAL THERAPY | Facility: REHABILITATION | Age: 75
End: 2025-02-20
Payer: MEDICARE

## 2025-02-20 DIAGNOSIS — M79.601 RIGHT ARM PAIN: Primary | ICD-10-CM

## 2025-02-20 PROCEDURE — 97161 PT EVAL LOW COMPLEX 20 MIN: CPT

## 2025-02-20 PROCEDURE — 97110 THERAPEUTIC EXERCISES: CPT

## 2025-02-20 NOTE — PROGRESS NOTES
PT Evaluation     Today's date: 2025  Patient name: Brittanie Bernabe  : 1950  MRN: [id number]  Referring provider: Morro Lynn,*  Dx:   Encounter Diagnosis     ICD-10-CM    1. Right arm pain  M79.601 Ambulatory Referral to Physical Therapy          Start Time: 0500  Stop Time: 0545  Total time in clinic (min): 45 minutes    Assessment  Impairments: abnormal muscle tone, abnormal or restricted ROM, impaired physical strength, lacks appropriate home exercise program, poor posture  and poor body mechanics  Functional limitations: Overhead reaching, lifting/carrying, pushing/pulling, sleeping on side, doing usual exercise program  Symptom irritability: moderate    Assessment details: Patient is a 74 y.o. female presenting to initial examination with chief complaint of chronic R shoulder pain. Signs and symptoms are consistent with potential strain/tendonitis of the supraspinatus. Primary impairments include painful/restricted shoulder ROM, gross weakness of the R shoulder, poor posture, hypertonicity of the pericervical musculature, and TTP localized to the supraspinatus insertion area. As a result of impairments patient experiences limitations with functional/daily activities including those listed above. Educated patient regarding plan of care and answered all patient questions to patient satisfaction. Patient would benefit from skilled PT interventions to address above impairments, achieve goals, and to maximize function. Thank you for the referral.    Understanding of Dx/Px/POC: good     Prognosis: good    Goals  Impairment Goals: 4-6 weeks  - Patient to decrease pain to 1-2/10  - Patient to improve shoulder AROM to equal to uninvolved side in all planes  - Patient to increase shoulder strength to 5/5 throughout    Functional Goals: by discharge  - Patient to discharge to independent [facility]  - Patient to improve subjective functional level to 85%  - Patient to be able to reach overhead without  increased pain/compensation/difficulty  - Patient to be able to reach behind back without increased pain/compensation/difficulty   - Patient to improve tolerance to performing her usual workout program without limitation due to pain          Plan  Patient would benefit from: skilled physical therapy  Referral necessary: No    Planned therapy interventions: manual therapy, abdominal trunk stabilization, activity modification, body mechanics training, neuromuscular re-education, nerve gliding, patient/caregiver education, postural training, strengthening, stretching, therapeutic activities, therapeutic exercise, home exercise program and functional ROM exercises    Frequency: 1x week  Duration in weeks: 4  Plan of Care beginning date: 2/20/2025  Plan of Care expiration date: 3/21/2025  Treatment plan discussed with: patient    Subjective Evaluation    History of Present Illness  Mechanism of injury: INITIAL EVALUATION  Patient reports to PT with chief complaint of chronic R shoulder pain   He describes the following regarding his current symptoms and subsequent restrictions:    - 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 Can sometimes get pain that radiates into the forearm/wrist  - reports no numbness or tingling present in the UE  - pain is constant and can be provoked by activity    Relevant Imaging: (as per interpretation)   - xray performed which didn't reveal  anything  Previous/Current Treatment:   - has tried ice and this provides relief  - self massage on the UT    Pain Exacerbated by   - donning/doffing clothing  - brushing the hair  - sleeping on the shoulder  - normal exercise program          Not a recurrent problem   Quality of life: good    Patient Goals  Patient goals for therapy: increased strength, independence with ADLs/IADLs, return to sport/leisure activities, increased motion and decreased pain    Pain  Current pain ratin  At best pain ratin  At worst pain ratin  Quality: tight, radiating, sharp and dull ache  Relieving factors: ice  Aggravating factors: lifting and overhead activity  Progression: worsening    Hand dominance: right    Treatments  No previous or current treatments      Objective     Postural Observations  Seated posture: fair  Standing posture: fair  Correction of posture: has no consistent effect      Palpation   Left   Hypertonic in the pectoralis major and pectoralis minor.     Right   Hypertonic in the biceps, levator scapulae, pectoralis major, pectoralis minor, supraspinatus, triceps and upper trapezius.   Tenderness of the anterior deltoid, biceps, levator scapulae, middle deltoid, posterior deltoid, supraspinatus, triceps and upper trapezius.     Tenderness     Right Shoulder  Tenderness in the AC joint, biceps tendon (proximal) and supraspinatus tendon. No tenderness in the clavicle, lateral scapula, medial scapula and scapular spine.     Neurological Testing     Sensation     Shoulder   Left Shoulder   Intact: light touch    Right Shoulder   Intact: Light touch    Active Range of Motion   Left Shoulder   Flexion: 170 degrees   Abduction: 170 degrees   External rotation 0°: 80 degrees   Internal rotation BTB: T5     Right Shoulder   Flexion: 110 degrees with pain  Abduction: 70 degrees with pain  External rotation 0°: 50 degrees with pain    Strength/Myotome Testing     Additional Strength Details  Plan to assess at  time of NV due to time constraints and pain levels    Tests     Right Shoulder   Positive AC shear and painful arc.   Negative belly press, drop arm and external rotation lag sign.            Precautions: Hx of Thoracic aortic aneurysm, oral cancer, CKD      Manuals 2/20            Shoulder PROM                                       Strength Assessment PLAN            Neuro Re-Ed             Scapular Retractions HEP            Rows vs TB             Pulldowns vs TB             ER ISO vs Wall                                                    Ther Ex             Pullys FLEX/SCAP             Table Slides FLEX HEP            Table Slides ABD HEP            UT/LS Stretch HEP                                                                Ther Activity                                       Gait Training                                       Modalities

## 2025-02-27 ENCOUNTER — OFFICE VISIT (OUTPATIENT)
Dept: PHYSICAL THERAPY | Facility: REHABILITATION | Age: 75
End: 2025-02-27
Payer: MEDICARE

## 2025-02-27 DIAGNOSIS — M79.601 RIGHT ARM PAIN: Primary | ICD-10-CM

## 2025-02-27 PROCEDURE — 97110 THERAPEUTIC EXERCISES: CPT

## 2025-02-27 PROCEDURE — 97112 NEUROMUSCULAR REEDUCATION: CPT

## 2025-02-27 NOTE — PROGRESS NOTES
"Daily Note     Today's date: 2025  Patient name: Brittanie Bernabe  : 1950  MRN: [id number]  Referring provider: Morro Lynn,*  Dx:   Encounter Diagnosis     ICD-10-CM    1. Right arm pain  M79.601           Start Time: 330  Stop Time: 415  Total time in clinic (min): 45 minutes    Subjective: Patient reports that there has been an improvement in symptom since IE      Objective: See treatment diary below      Assessment: Tolerated treatment well. Patient demonstrated fatigue post treatment, exhibited good technique with therapeutic exercises, and would benefit from continued PT. Patient capable of initiating gentle scapular strengthening and shoulder mobility work within tolerability. Interventions reviewed in clinic provide. Interventions reviewed in clinic provided to HEP and patient educated regarding proper dosage/form. Plan to progress as able.      Plan: Continue per plan of care.      Precautions: Hx of Thoracic aortic aneurysm, oral cancer, CKD      Manuals            Shoulder PROM                                       Strength Assessment PLAN            Neuro Re-Ed               Scapular Retractions HEP 5\"x10           Rows vs TB  2x10 (RTB)           Pulldowns vs TB  2x10 (RTB)           ER ISO vs Wall  5\"x10           Wall Slide Flexion  5\"x10           Wall Slide SCAP  5\"x10           Ther Ex             Pullys FLEX/SCAP             Table Slides FLEX HEP 10x10\"           Table Slides ABD HEP 10x10\"           UT/LS Stretch HEP            ER Stretch w/ Cane Towel Under Arm  10x10\"                                                  Ther Activity                                       Gait Training                                       Modalities                                            "

## 2025-03-06 ENCOUNTER — OFFICE VISIT (OUTPATIENT)
Dept: PHYSICAL THERAPY | Facility: REHABILITATION | Age: 75
End: 2025-03-06
Payer: MEDICARE

## 2025-03-06 DIAGNOSIS — M79.601 RIGHT ARM PAIN: Primary | ICD-10-CM

## 2025-03-06 PROCEDURE — 97112 NEUROMUSCULAR REEDUCATION: CPT

## 2025-03-06 PROCEDURE — 97110 THERAPEUTIC EXERCISES: CPT

## 2025-03-06 NOTE — PROGRESS NOTES
"Daily Note     Today's date: 3/6/2025  Patient name: Brittanie Bernabe  : 1950  MRN: [id number]  Referring provider: Morro Lynn,*  Dx:   Encounter Diagnosis     ICD-10-CM    1. Right arm pain  M79.601           Start Time: 024  Stop Time: 0330  Total time in clinic (min): 45 minutes    Subjective: Patient reports that she has been having a little more pain but not as significant as it initially was. Notes that the stretches progressed last session were a little difficult, and in addition she was feeling good enouhg to start using the arm more for ADLs and as a result thinks she did just a little too much.      Objective: See treatment diary below      Assessment: Tolerated treatment well. Patient demonstrated fatigue post treatment, exhibited good technique with therapeutic exercises, and would benefit from continued PT. Patient capable of progressing gentle scapular strengthening and shoulder mobility work within tolerability. Wall slide replaced with cane raise as this was more comfortable. Interventions reviewed in clinic provide. Interventions reviewed in clinic provided to HEP and patient educated regarding proper dosage/form. Plan to progress as able.      Plan: Continue per plan of care.      Precautions: Hx of Thoracic aortic aneurysm, oral cancer, CKD      Manuals 2/20 2/27 3/6          Shoulder PROM                                       Strength Assessment PLAN            Neuro Re-Ed               Scapular Retractions HEP 5\"x10           Rows vs TB  2x10 (RTB)           Pulldowns vs TB  2x10 (RTB)           ER ISO vs Wall  5\"x10 3\"x3x5 (towel under arm          Wall Slide Flexion  5\"x10 5\"x10 (cane)          Wall Slide SCAP  5\"x10 5\"x10 (Cane)          Scapular Punch   5\"x10          Ther Ex             Pullys FLEX/SCAP             Table Slides FLEX HEP 10x10\" 10x10\"          Table Slides ABD HEP 10x10\" 10x10\"          UT/LS Stretch HEP            ER Stretch w/ Cane Towel Under Arm  " "10x10\" 10x10\"                                                 Ther Activity                                       Gait Training                                       Modalities                                            "

## 2025-03-13 ENCOUNTER — OFFICE VISIT (OUTPATIENT)
Dept: PHYSICAL THERAPY | Facility: REHABILITATION | Age: 75
End: 2025-03-13
Payer: MEDICARE

## 2025-03-13 DIAGNOSIS — M79.601 RIGHT ARM PAIN: Primary | ICD-10-CM

## 2025-03-13 PROCEDURE — 97110 THERAPEUTIC EXERCISES: CPT

## 2025-03-13 PROCEDURE — 97112 NEUROMUSCULAR REEDUCATION: CPT

## 2025-03-13 NOTE — PROGRESS NOTES
"Daily Note     Today's date: 3/13/2025  Patient name: Brittanie Bernabe  : 1950  MRN: [id number]  Referring provider: Morro Lynn,*  Dx:   Encounter Diagnosis     ICD-10-CM    1. Right arm pain  M79.601           Start Time: 0245  Stop Time: 0330  Total time in clinic (min): 45 minutes    Subjective: Patient reports that she did well with progressions last session and pain is improving. Noted that she started back with wall slide and this went well      Objective: See treatment diary below      Assessment: Tolerated treatment well. Patient demonstrated fatigue post treatment, exhibited good technique with therapeutic exercises, and would benefit from continued PT. Patient capable of progressing gentle scapular strengthening and shoulder mobility work within tolerability. Wall slide replaced with cane raise as this was more comfortable. Interventions reviewed in clinic provide. Interventions reviewed in clinic provided to HEP and patient educated regarding proper dosage/form. Plan to progress as able.      Plan: Continue per plan of care.      Precautions: Hx of Thoracic aortic aneurysm, oral cancer, CKD      Manuals 2/20 2/27 3/6 3/13         Shoulder PROM                                       Strength Assessment PLAN            Neuro Re-Ed               Scapular Retractions HEP 5\"x10           Rows vs TB  2x10 (RTB)           Pulldowns vs TB  2x10 (RTB)           ER ISO vs Wall  5\"x10 3\"x3x5 (towel under arm (SL) 2x5         Wall Slide Flexion  5\"x10 5\"x10 (cane) (Wall) 5\"x10         Wall Slide SCAP  5\"x10 5\"x10 (Cane) (Wall) 2x5x5\"         Scapular Punch   5\"x10 5\"x10 (#2)         Ther Ex             Pullys FLEX/SCAP    5'/5'         Table Slides FLEX HEP 10x10\" 10x10\"          Table Slides ABD HEP 10x10\" 10x10\"          UT/LS Stretch HEP            ER Stretch w/ Cane Towel Under Arm  10x10\" 10x10\"                                                 Ther Activity                                     "   Gait Training                                       Modalities

## 2025-03-20 ENCOUNTER — OFFICE VISIT (OUTPATIENT)
Dept: PHYSICAL THERAPY | Facility: REHABILITATION | Age: 75
End: 2025-03-20
Payer: MEDICARE

## 2025-03-20 DIAGNOSIS — M79.601 RIGHT ARM PAIN: Primary | ICD-10-CM

## 2025-03-20 PROCEDURE — 97112 NEUROMUSCULAR REEDUCATION: CPT

## 2025-03-20 NOTE — PROGRESS NOTES
PT Re-Evaluation     Today's date: 3/20/2025  Patient name: Brittanie Bernabe  : 1950  MRN: [id number]  Referring provider: Morro Lynn,*  Dx:   Encounter Diagnosis     ICD-10-CM    1. Right arm pain  M79.601             Start Time: 024  Stop Time: 0330  Total time in clinic (min): 45 minutes    Assessment  Impairments: abnormal muscle tone, abnormal or restricted ROM, impaired physical strength, lacks appropriate home exercise program, poor posture  and poor body mechanics  Functional limitations: Overhead reaching, lifting/carrying, pushing/pulling, sleeping on side, doing usual exercise program  Symptom irritability: moderate    Assessment details: Patient is a 74 y.o. female presenting to re-examination with chief complaint of chronic R shoulder pain. Patient exhibits favorable progress toward objective and functional goals at time of reexamination. Patient exhibits improvements with shoulder strength, shoulder ROM, posture and postural awareness, and pain overall since initiating PT however continues to have limitations compared to prior level of function. Remaining functional limitations include those listed above. As a result of impairments patient has continued limitations with daily and functional activities and would benefit from continued skilled PT interventions to address these impairments in order to maximize function.   Understanding of Dx/Px/POC: good     Prognosis: good    Goals  Impairment Goals: 4-6 weeks  - Patient to decrease pain to 1-2/10 (PARTIALLY MET)  - Patient to improve shoulder AROM to equal to uninvolved side in all planes (PARTIALLY MET)  - Patient to increase shoulder strength to 5/5 throughout (PARTIALLY MET)    Functional Goals: by discharge  - Patient to discharge to independent [facility] (PARTIALLY MET)  - Patient to improve subjective functional level to 85% (PARTIALLY MET)  - Patient to be able to reach overhead without increased pain/compensation/difficulty  (PARTIALLY MET)  - Patient to be able to reach behind back without increased pain/compensation/difficulty (PARTIALLY MET)  - Patient to improve tolerance to performing her usual workout program without limitation due to pain (PARTIALLY MET)        Plan  Patient would benefit from: skilled physical therapy  Referral necessary: No    Planned therapy interventions: manual therapy, abdominal trunk stabilization, activity modification, body mechanics training, neuromuscular re-education, nerve gliding, patient/caregiver education, postural training, strengthening, stretching, therapeutic activities, therapeutic exercise, home exercise program and functional ROM exercises    Frequency: 1x week  Duration in weeks: 4  Plan of Care beginning date: 3/20/2025  Plan of Care expiration date: 2025  Treatment plan discussed with: patient    Subjective Evaluation    History of Present Illness  Mechanism of injury: Patient reports that she has made a lot of progress since starting PT    - no pain at night any more where previously she would have this frequently  - can now lift her arm up now with little pain and this is much improved  - has been able to progress with her exercises more so as of late and is encouraged by this  - feels like she is moving less apprehensive with the shoulder and this seems to be going well  - reaching across the body is still a painful movement  - still limits some of her usual exercise programming but is slowly doing more  - donning and doffing clothing slightly improved          Not a recurrent problem   Quality of life: good    Patient Goals  Patient goals for therapy: increased strength, independence with ADLs/IADLs, return to sport/leisure activities, increased motion and decreased pain    Pain  Current pain ratin  At best pain ratin  At worst pain ratin  Quality: tight, radiating, sharp and dull ache  Relieving factors: ice  Aggravating factors: lifting and overhead  activity  Progression: improved    Hand dominance: right    Treatments  No previous or current treatments      Objective     Postural Observations  Seated posture: fair  Standing posture: good  Correction of posture: has no consistent effect    Additional Postural Observation Details  Improved from IE    Palpation   Left   Hypertonic in the pectoralis major and pectoralis minor.     Right   No palpable tenderness to the levator scapulae, middle deltoid and posterior deltoid.   Hypertonic in the biceps, levator scapulae, pectoralis major, pectoralis minor, supraspinatus, triceps and upper trapezius.   Tenderness of the anterior deltoid, biceps, supraspinatus and triceps.     Additional Palpation Details  TTP reduced compared IE but more persistent in the bicep (Medial head) and tricep    Tenderness     Right Shoulder  Tenderness in the AC joint, biceps tendon (proximal) and supraspinatus tendon. No tenderness in the clavicle, lateral scapula, medial scapula and scapular spine.     Neurological Testing     Sensation     Shoulder   Left Shoulder   Intact: light touch    Right Shoulder   Intact: Light touch    Active Range of Motion   Left Shoulder   Flexion: 170 degrees   Abduction: 170 degrees   External rotation 0°: 80 degrees   Internal rotation BTB: T5     Right Shoulder   Flexion: 155 degrees with pain  Abduction: 125 degrees with pain  External rotation 0°: 65 degrees with pain    Strength/Myotome Testing     Left Shoulder     Planes of Motion   Flexion: 4+   Abduction: 4+   External rotation at 0°: 4+     Isolated Muscles   Biceps: 4+   Triceps: 4+     Right Shoulder     Planes of Motion   Flexion: 4-   Abduction: 3+   External rotation at 0°: 4-     Isolated Muscles   Biceps: 4   Triceps: 4+     Tests     Right Shoulder   Positive AC shear and painful arc.   Negative belly press, clunk, drop arm and external rotation lag sign.       Flowsheet Rows      Flowsheet Row Most Recent Value   PT/OT G-Codes    Current  "Score 50   Projected Score 68               Precautions: Hx of Thoracic aortic aneurysm, oral cancer, CKD      Manuals 2/20 2/27 3/6 3/13 3/20        Shoulder PROM                                       Strength Assessment PLAN    RE-EVAL        Neuro Re-Ed               Scapular Retractions HEP 5\"x10           Rows vs TB  2x10 (RTB)           Pulldowns vs TB  2x10 (RTB)           ER ISO vs Wall  5\"x10 3\"x3x5 (towel under arm (SL) 2x5         Wall Slide Flexion  5\"x10 5\"x10 (cane) (Wall) 5\"x10         Wall Slide SCAP  5\"x10 5\"x10 (Cane) (Wall) 2x5x5\"         Scapular Punch   5\"x10 5\"x10 (#2)         Ther Ex             Pullys FLEX/SCAP    5'/5'         Table Slides FLEX HEP 10x10\" 10x10\"          Table Slides ABD HEP 10x10\" 10x10\"          UT/LS Stretch HEP            ER Stretch w/ Cane Towel Under Arm  10x10\" 10x10\"                                                 Ther Activity                                       Gait Training                                       Modalities                                            "

## 2025-03-27 ENCOUNTER — OFFICE VISIT (OUTPATIENT)
Dept: PHYSICAL THERAPY | Facility: REHABILITATION | Age: 75
End: 2025-03-27
Payer: MEDICARE

## 2025-03-27 DIAGNOSIS — M79.601 RIGHT ARM PAIN: Primary | ICD-10-CM

## 2025-03-27 PROCEDURE — 97112 NEUROMUSCULAR REEDUCATION: CPT

## 2025-03-27 NOTE — PROGRESS NOTES
"Daily Note     Today's date: 3/27/2025  Patient name: Brittanie Bernabe  : 1950  MRN: [id number]  Referring provider: Morro Lynn,*  Dx:   Encounter Diagnosis     ICD-10-CM    1. Right arm pain  M79.601           Start Time: 0500  Stop Time: 0545  Total time in clinic (min): 45 minutes    Subjective: patient states that she is doing well today and that her shoulder is doing well, and mentions she did some of her exercises today and this went well      Objective: See treatment diary below      Assessment: Tolerated treatment well. Patient demonstrated fatigue post treatment, exhibited good technique with therapeutic exercises, and would benefit from continued PT. Patient continues to respond favorably to current program with slight progressions throughout. Continues to be challenged with ER strength but slowly improving. Introduced additional scapular strengthening in elevation with good tolerability, and progressed AAROM to standing with cane in HEP. Plan to continue to progress as symptoms allow.      Plan: Continue per plan of care.      Precautions: Hx of Thoracic aortic aneurysm, oral cancer, CKD      Manuals 2/20 2/27 3/6 3/13 3/20 3/27       Shoulder PROM                                       Strength Assessment PLAN    RE-EVAL        Neuro Re-Ed               Scapular Retractions HEP 5\"x10           Rows vs TB  2x10 (RTB)    2x10 (GTB)       Pulldowns vs TB  2x10 (RTB)           ER ISO vs Wall  5\"x10 3\"x3x5 (towel under arm (SL) 2x5  (SL) 3x5       Wall Slide Flexion  5\"x10 5\"x10 (cane) (Wall) 5\"x10  Cane FLEX 10x10\"       Wall Slide SCAP  5\"x10 5\"x10 (Cane) (Wall) 2x5x5\"  Cane ABD 10x10\"       Scapular Punch   5\"x10 5\"x10 (#2)         Lower Trap Set UL      2x5x3\"       Ther Ex             Pullys FLEX/SCAP    5'/5'         Table Slides FLEX HEP 10x10\" 10x10\"          Table Slides ABD HEP 10x10\" 10x10\"          UT/LS Stretch HEP            ER Stretch w/ Cane Towel Under Arm  10x10\" 10x10\"      "     Arm Bike      3'/3'                                 Ther Activity                                       Gait Training                                       Modalities

## 2025-04-03 ENCOUNTER — OFFICE VISIT (OUTPATIENT)
Dept: PHYSICAL THERAPY | Facility: REHABILITATION | Age: 75
End: 2025-04-03
Payer: MEDICARE

## 2025-04-03 DIAGNOSIS — M79.601 RIGHT ARM PAIN: Primary | ICD-10-CM

## 2025-04-03 PROCEDURE — 97112 NEUROMUSCULAR REEDUCATION: CPT

## 2025-04-03 PROCEDURE — 97110 THERAPEUTIC EXERCISES: CPT

## 2025-04-03 NOTE — PROGRESS NOTES
"Daily Note     Today's date: 4/3/2025  Patient name: Brittanie Bernabe  : 1950  MRN: [id number]  Referring provider: Morro Lynn,*  Dx:   Encounter Diagnosis     ICD-10-CM    1. Right arm pain  M79.601           Start Time: 0245  Stop Time: 0330  Total time in clinic (min): 45 minutes    Subjective: patient states that progressions made last session are challenging but not provacative. States things are going well    Objective: See treatment diary below    Assessment: Tolerated treatment well. Patient demonstrated fatigue post treatment, exhibited good technique with therapeutic exercises, and would benefit from continued PT. Patient continues to respond favorably to current program with slight progressions throughout. Improved tolerability for elevation against resistance into flexion and able to progress reps with ER strengthening. Provided progressions to HEP. Plan to progress as able.       Plan: Continue per plan of care.      Precautions: Hx of Thoracic aortic aneurysm, oral cancer, CKD      Manuals 2/20 2/27 3/6 3/13 3/20 3/27 4/3      Shoulder PROM                                       Strength Assessment PLAN    RE-EVAL        Neuro Re-Ed               Scapular Retractions HEP 5\"x10           Rows vs TB  2x10 (RTB)    2x10 (GTB) 3x10 (KVNG) + ER      Pulldowns vs TB  2x10 (RTB)     2x10 (RTB)      ER ISO vs Wall  5\"x10 3\"x3x5 (towel under arm (SL) 2x5  (SL) 3x5 (SL) 2x10      Wall Slide Flexion  5\"x10 5\"x10 (cane) (Wall) 5\"x10  Cane FLEX 10x10\" Cane FLEX 10x10\"      Wall Slide SCAP  5\"x10 5\"x10 (Cane) (Wall) 2x5x5\"  Cane ABD 10x10\" Cane ABD 10x10\"      Scapular Punch   5\"x10 5\"x10 (#2)         Lower Trap Set UL      2x5x3\" 3x5      Ther Ex             Pullys FLEX/SCAP    5'/5'         Table Slides FLEX HEP 10x10\" 10x10\"          Table Slides ABD HEP 10x10\" 10x10\"          UT/LS Stretch HEP            ER Stretch w/ Cane Towel Under Arm  10x10\" 10x10\"          Arm Bike      3'/3' 4'/4'      DB " Flexion       2x10                   Ther Activity                                       Gait Training                                       Modalities

## 2025-04-10 ENCOUNTER — OFFICE VISIT (OUTPATIENT)
Dept: PHYSICAL THERAPY | Facility: REHABILITATION | Age: 75
End: 2025-04-10
Payer: MEDICARE

## 2025-04-10 DIAGNOSIS — M79.601 RIGHT ARM PAIN: Primary | ICD-10-CM

## 2025-04-10 PROCEDURE — 97112 NEUROMUSCULAR REEDUCATION: CPT

## 2025-04-10 PROCEDURE — 97110 THERAPEUTIC EXERCISES: CPT

## 2025-04-10 NOTE — PROGRESS NOTES
"Daily Note     Today's date: 4/10/2025  Patient name: Brittanie Bernabe  : 1950  MRN: [id number]  Referring provider: Morro Lynn,*  Dx:   Encounter Diagnosis     ICD-10-CM    1. Right arm pain  M79.601           Start Time: 0245  Stop Time: 0330  Total time in clinic (min): 45 minutes    Subjective: patient states that she has been doing well with progressions. Describes some difficulties recently with pain in the shoulder and elbow with taking off shirts.     Objective: See treatment diary below    Assessment: Tolerated treatment well. Patient demonstrated fatigue post treatment, exhibited good technique with therapeutic exercises, and would benefit from continued PT. Patient continues to respond favorably to current program with slight progressions throughout. Time spent focusing on overhead mobility into functional ER as patient continues with pain and restriction. Stretching provided to HEP. Plan to continue to progress as able    Plan: Continue per plan of care.      Precautions: Hx of Thoracic aortic aneurysm, oral cancer, CKD      Manuals 2/20 2/27 3/6 3/13 3/20 3/27 4/3 4/10     Shoulder PROM        ER+ IR @ 90 10min                               Strength Assessment PLAN    RE-EVAL        Neuro Re-Ed               Scapular Retractions HEP 5\"x10           Rows vs TB  2x10 (RTB)    2x10 (GTB) 3x10 (KVNG) + ER      Pulldowns vs TB  2x10 (RTB)     2x10 (RTB)      Face Pulls vs TB        3x5 (ytb)     ER ISO vs Wall  5\"x10 3\"x3x5 (towel under arm (SL) 2x5  (SL) 3x5 (SL) 2x10 (SL) 2x10 (#1)     Wall Slide Flexion  5\"x10 5\"x10 (cane) (Wall) 5\"x10  Cane FLEX 10x10\" Cane FLEX 10x10\"      Wall Slide SCAP  5\"x10 5\"x10 (Cane) (Wall) 2x5x5\"  Cane ABD 10x10\" Cane ABD 10x10\"      Scapular Punch   5\"x10 5\"x10 (#2)         Lower Trap Set UL      2x5x3\" 3x5      Ther Ex             Pullys FLEX/SCAP    5'/5'         Table Slides FLEX HEP 10x10\" 10x10\"          Table Slides ABD HEP 10x10\" 10x10\"          UT/LS " "Stretch HEP            ER Stretch w/ Cane Towel Under Arm  10x10\" 10x10\"          Arm Bike      3'/3' 4'/4' 4'/4'     DB Flexion       2x10 2x10 (#1)     ER Stretch @ 90 w/ cane Supine        10x10\"     IR Stretch Behind Back w/ Strap        10x10\"     Ther Activity                                       Gait Training                                       Modalities                                            "

## 2025-04-17 ENCOUNTER — APPOINTMENT (OUTPATIENT)
Dept: PHYSICAL THERAPY | Facility: REHABILITATION | Age: 75
End: 2025-04-17
Payer: MEDICARE

## 2025-04-24 ENCOUNTER — OFFICE VISIT (OUTPATIENT)
Dept: PHYSICAL THERAPY | Facility: REHABILITATION | Age: 75
End: 2025-04-24
Attending: ORTHOPAEDIC SURGERY
Payer: MEDICARE

## 2025-04-24 DIAGNOSIS — M79.601 RIGHT ARM PAIN: Primary | ICD-10-CM

## 2025-04-24 PROCEDURE — 97112 NEUROMUSCULAR REEDUCATION: CPT

## 2025-04-24 PROCEDURE — 97110 THERAPEUTIC EXERCISES: CPT

## 2025-04-24 NOTE — PROGRESS NOTES
PT Re-Evaluation     Today's date: 2025  Patient name: Brittanie Bernabe  : 1950  MRN: [id number]  Referring provider: Morro Lynn,*  Dx:   Encounter Diagnosis     ICD-10-CM    1. Right arm pain  M79.601             Start Time: 0245  Stop Time: 0330  Total time in clinic (min): 45 minutes    Assessment  Impairments: abnormal muscle tone, abnormal or restricted ROM, impaired physical strength, lacks appropriate home exercise program, poor posture  and poor body mechanics  Symptom irritability: low    Assessment details: Patient is a 74 y.o. female presenting to re-examination with chief complaint of chronic R shoulder pain. Patient exhibits favorable progress toward objective and functional goals at time of reexamination. Patient exhibits improvements with shoulder strength, shoulder ROM, posture and postural awareness, and pain overall since previous assessment. At this time, patient will be placed on 3-week hold to assess the efficacy of independent HEP in management of any remaining symptoms. Patient will be contacted post hold period to determine of skilled serves are still required.  HEP reviewed and patient questions answered to satisfaction. Good prognosis.    Understanding of Dx/Px/POC: good     Prognosis: good    Goals  Impairment Goals: 4-6 weeks  - Patient to decrease pain to 1-2/10 (MET)  - Patient to improve shoulder AROM to equal to uninvolved side in all planes (MET)  - Patient to increase shoulder strength to 5/5 throughout (PARTIALLY MET)    Functional Goals: by discharge  - Patient to discharge to independent HEP (PARTIALLY MET)  - Patient to improve subjective functional level to 85% (PARTIALLY MET)  - Patient to be able to reach overhead without increased pain/compensation/difficulty (MET)  - Patient to be able to reach behind back without increased pain/compensation/difficulty (MET)  - Patient to improve tolerance to performing her usual workout program without limitation due  to pain (PARTIALLY MET)        Plan  Patient would benefit from: skilled physical therapy  Referral necessary: No    Planned therapy interventions: home exercise program    Plan of Care beginning date: 2025  Treatment plan discussed with: patient  Plan details: At this time, patient will be placed on 3-week hold to assess the efficacy of independent HEP in management of any remaining symptoms. Patient will be contacted post hold period to determine of skilled serves are still required.  HEP reviewed and patient questions answered to satisfaction. Good prognosis.    Subjective Evaluation    History of Present Illness  Mechanism of injury: Patient reports that she has made a lot of progress since last assessment    - states that she is doing great with HEP and has been slowly progressing daily activity as well with good tolerability and has been using pain concepts discussed in session to guide her progressions  - she can reach overhead without difficulty now and has been able to reach overhead to access things from her cabinet. Notes she is still conscious about how she moving as to not move to quick and think about posture  - has more endurance with reaching over head as well and do more of these activities for longer  - reaching behind the back whole lot better now as well both for stretching and for function  - is able to brush her hair overhead as well but endurance is difficult with this  - doesn't have pain with vacuuming but arm gets tired after a while  - is comfortable with trail of independent HEP after today's session            Not a recurrent problem   Quality of life: good    Patient Goals  Patient goals for therapy: increased strength, independence with ADLs/IADLs, return to sport/leisure activities, increased motion and decreased pain    Pain  Current pain ratin  At best pain ratin  At worst pain ratin  Quality: tight, radiating, sharp and dull ache  Relieving factors:  ice  Aggravating factors: lifting and overhead activity  Progression: improved    Hand dominance: right    Treatments  No previous or current treatments      Objective     Postural Observations  Seated posture: good  Standing posture: good  Correction of posture: has no consistent effect    Additional Postural Observation Details  Improved from IE    Palpation   Left   Hypertonic in the pectoralis major and pectoralis minor.     Right   No palpable tenderness to the biceps, levator scapulae, middle deltoid, posterior deltoid and triceps.   Hypertonic in the biceps, levator scapulae, pectoralis major, pectoralis minor, supraspinatus, triceps and upper trapezius.   Tenderness of the anterior deltoid and supraspinatus.     Additional Palpation Details  TTP resolved    Tenderness     Right Shoulder  No tenderness in the AC joint, biceps tendon (proximal), clavicle, lateral scapula, medial scapula, scapular spine and supraspinatus tendon.     Neurological Testing     Sensation     Shoulder   Left Shoulder   Intact: light touch    Right Shoulder   Intact: Light touch    Active Range of Motion   Left Shoulder   Flexion: 170 degrees   Abduction: 170 degrees   External rotation 0°: 80 degrees   Internal rotation BTB: T5     Right Shoulder   Flexion: 170 degrees   Abduction: 170 degrees   External rotation 0°: 75 degrees     Strength/Myotome Testing     Left Shoulder     Planes of Motion   Flexion: 4+   Abduction: 4+   External rotation at 0°: 4+     Isolated Muscles   Biceps: 4+   Triceps: 4+     Right Shoulder     Planes of Motion   Flexion: 4   Abduction: 4   External rotation at 0°: 4     Isolated Muscles   Biceps: 4   Triceps: 4+     Tests     Right Shoulder   Negative AC shear, belly press, clunk, drop arm, external rotation lag sign and painful arc.                Precautions: Hx of Thoracic aortic aneurysm, oral cancer, CKD      Manuals 2/20 2/27 3/6 3/13 3/20 3/27 4/3 4/10 4/24    Shoulder PROM        ER+ IR @ 90  "10min                               Strength Assessment PLAN    RE-EVAL    RE-EVAL    Neuro Re-Ed               Scapular Retractions HEP 5\"x10           Rows vs TB  2x10 (RTB)    2x10 (GTB) 3x10 (KVNG) + ER      Pulldowns vs TB  2x10 (RTB)     2x10 (RTB)      Face Pulls vs TB        3x5 (ytb)     ER ISO vs Wall  5\"x10 3\"x3x5 (towel under arm (SL) 2x5  (SL) 3x5 (SL) 2x10 (SL) 2x10 (#1)     Wall Slide Flexion  5\"x10 5\"x10 (cane) (Wall) 5\"x10  Cane FLEX 10x10\" Cane FLEX 10x10\"      Wall Slide SCAP  5\"x10 5\"x10 (Cane) (Wall) 2x5x5\"  Cane ABD 10x10\" Cane ABD 10x10\"      Scapular Punch   5\"x10 5\"x10 (#2)         Lower Trap Set UL      2x5x3\" 3x5      Ther Ex             Pullys FLEX/SCAP    5'/5'         Table Slides FLEX HEP 10x10\" 10x10\"          Table Slides ABD HEP 10x10\" 10x10\"          UT/LS Stretch HEP            ER Stretch w/ Cane Towel Under Arm  10x10\" 10x10\"          Arm Bike      3'/3' 4'/4' 4'/4' 4'/4'    DB Flexion       2x10 2x10 (#1)     ER Stretch @ 90 w/ cane Supine        10x10\"     IR Stretch Behind Back w/ Strap        10x10\"     Ther Activity                                       Gait Training                                       Modalities                                            "

## 2025-05-09 ENCOUNTER — OFFICE VISIT (OUTPATIENT)
Dept: URGENT CARE | Facility: CLINIC | Age: 75
End: 2025-05-09
Payer: MEDICARE

## 2025-05-09 VITALS
RESPIRATION RATE: 18 BRPM | HEART RATE: 92 BPM | OXYGEN SATURATION: 98 % | SYSTOLIC BLOOD PRESSURE: 126 MMHG | DIASTOLIC BLOOD PRESSURE: 82 MMHG | TEMPERATURE: 98 F

## 2025-05-09 DIAGNOSIS — H60.503 ACUTE OTITIS EXTERNA OF BOTH EARS, UNSPECIFIED TYPE: Primary | ICD-10-CM

## 2025-05-09 PROCEDURE — 69209 REMOVE IMPACTED EAR WAX UNI: CPT

## 2025-05-09 PROCEDURE — G0463 HOSPITAL OUTPT CLINIC VISIT: HCPCS

## 2025-05-09 PROCEDURE — 99213 OFFICE O/P EST LOW 20 MIN: CPT

## 2025-05-09 RX ORDER — NEOMYCIN SULFATE, POLYMYXIN B SULFATE AND HYDROCORTISONE 10; 3.5; 1 MG/ML; MG/ML; [USP'U]/ML
4 SUSPENSION/ DROPS AURICULAR (OTIC) 4 TIMES DAILY
Qty: 10 ML | Refills: 0 | Status: SHIPPED | OUTPATIENT
Start: 2025-05-09

## 2025-05-09 NOTE — PROGRESS NOTES
[facility]        NAME: Brittanie Bernabe is a 74 y.o. female  : 1950    MRN: [id number]  DATE: May 9, 2025  TIME: 3:36 PM    Assessment and Plan   Acute otitis externa of both ears, unspecified type [H60.503]  1. Acute otitis externa of both ears, unspecified type  Ear cerumen removal    neomycin-polymyxin-hydrocortisone (CORTISPORIN) 0.35%-10,000 units/mL-1% otic suspension      Cerumen removal tolerated well  Prophylactic drops sent for otitis externa    Patient Instructions   You had a lot of ear wax build up in both ears today which we were able to remove with lavage.   Your external ear canal's look reddened so we are prescribing you a course of ear drop antibiotics prophylactically.    Follow up with Primary Care Provider in 3-5 days if not improving.  Proceed to Emergency Department if symptoms worsen.    If tests have been performed at [facility], our office will contact you with results if changes need to be made to the care plan discussed with you at the visit.  [facility]    Chief Complaint     Chief Complaint   Patient presents with   • Earache     Patient states that yesterday she started with pain and a muffled sound in her right ear.         History of Present Illness       Patient reports pain and muffled hearing in her right ear starting yesterday.  Reports no specific injuries.  No lightheadedness or dizziness.    Earache   Pertinent negatives include no abdominal pain, coughing, ear discharge, rash, sore throat or vomiting.       Review of Systems   Review of Systems   Constitutional:  Negative for chills and fever.   HENT:  Positive for ear pain. Negative for congestion, ear discharge and sore throat.    Eyes:  Negative for pain and visual disturbance.   Respiratory:  Negative for cough and shortness of breath.    Cardiovascular:  Negative for chest pain and palpitations.   Gastrointestinal:  Negative for abdominal pain and vomiting.    Genitourinary:  Negative for dysuria and hematuria.   Musculoskeletal:  Negative for arthralgias and back pain.   Skin:  Negative for color change and rash.   Neurological:  Negative for seizures and syncope.   All other systems reviewed and are negative.        Current Medications       Current Outpatient Medications:   •  DENTAGEL 1.1 % GEL, , Disp: , Rfl: 11  •  levothyroxine 100 mcg tablet, Take 1 tablet (100 mcg total) by mouth daily in the early morning, Disp: 90 tablet, Rfl: 0  •  magnesium chloride-calcium (Slow-Mag) 71.5-119 mg, Take 2 tablets by mouth daily, Disp: , Rfl:   •  neomycin-polymyxin-hydrocortisone (CORTISPORIN) 0.35%-10,000 units/mL-1% otic suspension, Administer 4 drops into both ears 4 (four) times a day For 7 days., Disp: 10 mL, Rfl: 0  •  acetaminophen (TYLENOL) 650 mg CR tablet, Take 1 tablet (650 mg total) by mouth every 8 (eight) hours as needed for mild pain (Patient not taking: Reported on 2/4/2025), Disp: 30 tablet, Rfl: 0  •  Calcium Carb-Cholecalciferol 600-12.5 MG-MCG CAPS, One tablet twice /day (Patient not taking: Reported on 12/9/2024), Disp: 180 capsule, Rfl: 0  •  ibuprofen (MOTRIN) 600 mg tablet, Take 1 tablet (600 mg total) by mouth every 6 (six) hours as needed for moderate pain (Patient not taking: Reported on 12/9/2024), Disp: 30 tablet, Rfl: 0  •  Multiple Vitamins-Minerals (PRESERVISION AREDS 2 PO), Take by mouth 2 (two) times a day Using res-q advanced eye support (Patient not taking: Reported on 4/24/2024), Disp: , Rfl:   •  Omega-3 Fatty Acids (Omega-3 Fish Oil) 1200 MG CAPS, Take 1 capsule by mouth daily (Patient not taking: Reported on 2/4/2025), Disp: , Rfl:   •  VITAMIN D, CHOLECALCIFEROL, PO, Take 5,000 Doses by mouth One a week per Pt (Patient not taking: Reported on 2/4/2025), Disp: , Rfl:     Current Allergies     Allergies as of 05/09/2025 - Reviewed 05/09/2025   Allergen Reaction Noted   • Codeine Anaphylaxis 03/27/2019            The following portions  of the patient's history were reviewed and updated as appropriate: allergies, current medications, past family history, past medical history, past social history, past surgical history and problem list.     Past Medical History:   Diagnosis Date   • Anemia 07/15/2019   • Cataracta 08/09/2021    Bilateral removals.     • Dehydration 06/17/2019   • Disease of thyroid gland    • Encounter for chemotherapy management 07/30/2019    Finished Aug 2019.        • Fatigue 03/27/2019   • History of hysterectomy 08/23/2021   • Hypokalemia 08/09/2019   • Immunocompromised (HCC) 08/23/2021    Related to Chemo and radiation. No longer on these.        • Pre-op examination 08/09/2021   • Protein-calorie malnutrition, unspecified severity (HCC) 08/19/2022   • S/P carpal tunnel release 09/12/2024    right, with excellent relief     • Tongue cancer (HCC) 2019    Squamous cell       Past Surgical History:   Procedure Laterality Date   • ADENOIDECTOMY     • APPENDECTOMY  1990   • CATARACT EXTRACTION Left    • HAND SURGERY Bilateral    • HYSTERECTOMY  2012    total   • IR PICC LINE  06/05/2019   • OOPHORECTOMY Bilateral 2012   • VA NEUROPLASTY &/TRANSPOS MEDIAN NRV CARPAL TUNNE Right 08/29/2024    Procedure: RELEASE CARPAL TUNNEL -right;  Surgeon: Morro Lynn MD;  Location:  MAIN OR;  Service: Orthopedics   • VA NEUROPLASTY &/TRANSPOS MEDIAN NRV CARPAL TUNNE Left 10/03/2024    Procedure: RELEASE CARPAL TUNNEL - Left;  Surgeon: Morro Lynn MD;  Location:  MAIN OR;  Service: Orthopedics   • VA XCAPSL CTRC RMVL INSJ IO LENS PROSTH W/O ECP Right 08/25/2021    Procedure: CATARACT SURGERY W/INTRAOCULAR LENS;  Surgeon: Yaw Gandhi MD;  Location:  MAIN OR;  Service: Ophthalmology   • TONSILLECTOMY         Family History   Problem Relation Age of Onset   • Parkinsonism Mother         Cardiac abnormality ([place]ole)   • Prostate cancer Father    • Rheum arthritis Sister    • Hypothyroidism Sister    • No Known Problems  Maternal Grandmother    • No Known Problems Maternal Grandfather    • No Known Problems Paternal Grandmother    • No Known Problems Paternal Grandfather    • No Known Problems Paternal Aunt    • No Known Problems Paternal Aunt    • No Known Problems Paternal Aunt    • No Known Problems Paternal Aunt    • Hashimoto's thyroiditis Family    • Breast cancer Neg Hx          Medications have been verified.        Objective   /82   Pulse 92   Temp 98 °F (36.7 °C) (Tympanic)   Resp 18   SpO2 98%   No LMP recorded. Patient has had a hysterectomy.      Physical Exam     Physical Exam  Vitals and nursing note reviewed.   Constitutional:       Appearance: Normal appearance.   HENT:      Head: Normocephalic and atraumatic.      Right Ear: There is impacted cerumen.      Left Ear: There is impacted cerumen.      Nose: Nose normal.      Mouth/Throat:      Mouth: Mucous membranes are moist.   Cardiovascular:      Pulses: Normal pulses.   Pulmonary:      Effort: Pulmonary effort is normal.   Skin:     General: Skin is warm and dry.      Capillary Refill: Capillary refill takes less than 2 seconds.   Neurological:      General: No focal deficit present.      Mental Status: She is alert and oriented to person, place, and time. Mental status is at baseline.      Sensory: No sensory deficit.      Motor: No weakness.   Psychiatric:         Mood and Affect: Mood normal.         Behavior: Behavior normal.         Thought Content: Thought content normal.           Ear cerumen removal    Date/Time: 5/9/2025 1:30 PM    Performed by: SALLY Go  Authorized by: SALLY Go    Universal Protocol:  Procedure performed by: (Nemo KONG student)  Consent given by: patient  Patient understanding: patient states understanding of the procedure being performed    Patient location:  Clinic  Procedure details:     Location:  Both ears    Procedure type: irrigation only      Approach:  External  Post-procedure details:      Complication:  None    Hearing quality:  Improved    Patient tolerance of procedure:  Tolerated well, no immediate complications

## 2025-05-09 NOTE — PATIENT INSTRUCTIONS
You had a lot of ear wax build up in both ears today which we were able to remove with lavage.   Your external ear canal's look reddened so we are prescribing you a course of ear drop antibiotics prophylactically.    Follow up with Primary Care Provider in 3-5 days if not improving.  Proceed to Emergency Department if symptoms worsen.    If tests have been performed at [facility], our office will contact you with results if changes need to be made to the care plan discussed with you at the visit.  You can review your full results on [facility].

## 2025-05-23 ENCOUNTER — APPOINTMENT (OUTPATIENT)
Dept: LAB | Facility: CLINIC | Age: 75
End: 2025-05-23
Payer: MEDICARE

## 2025-05-23 DIAGNOSIS — E06.4 RADIATION THYROIDITIS: ICD-10-CM

## 2025-05-23 LAB — TSH SERPL DL<=0.05 MIU/L-ACNC: 1.96 UIU/ML (ref 0.45–4.5)

## 2025-05-23 PROCEDURE — 36415 COLL VENOUS BLD VENIPUNCTURE: CPT

## 2025-05-23 PROCEDURE — 84443 ASSAY THYROID STIM HORMONE: CPT

## (undated) DEVICE — THE MONARCH® "D" CARTRIDGE IS A SINGLE-USE POLYPROPYLENE CARTRIDGE FOR POSTERIOR CHAMBER IOL DELIVERY: Brand: MONARCH® III

## (undated) DEVICE — NEEDLE 25G X 1 1/2

## (undated) DEVICE — GLOVE SRG BIOGEL 6.5

## (undated) DEVICE — KNIFE LIGHT 10,PK: Brand: KNIFELIGHT

## (undated) DEVICE — GLOVE INDICATOR PI UNDERGLOVE SZ 8 BLUE

## (undated) DEVICE — SUT ETHILON 4-0 PS-2 18 IN 1667H

## (undated) DEVICE — SUTURE - NEEDLE TYPE P-5,BLACK BRAIDED SILK(BBS), SUTURE SIZE 4-0, SUTURE LENGTH 12,SINGLE ARMED: Brand: A.C.S® (ALCON CLOSURE SYSTEM)

## (undated) DEVICE — GLOVE PI ULTRA TOUCH SZ.8.0

## (undated) DEVICE — NEEDLE BLUNT 18 G X 1 1/2IN

## (undated) DEVICE — POV-IOD SOLUTION 4OZ BT

## (undated) DEVICE — PAD CAST 4 IN COTTON NON STERILE

## (undated) DEVICE — PREP PAD BNS: Brand: CONVERTORS

## (undated) DEVICE — STERILE BETHLEHEM PLASTIC HAND: Brand: CARDINAL HEALTH

## (undated) DEVICE — OCCLUSIVE GAUZE STRIP,3% BISMUTH TRIBROMOPHENATE IN PETROLATUM BLEND: Brand: XEROFORM

## (undated) DEVICE — GLOVE INDICATOR PI UNDERGLOVE SZ 6.5 BLUE

## (undated) DEVICE — SYRINGE 3ML LL

## (undated) DEVICE — CRADLE EXTREMITY UNIVERSAL CONTOURED

## (undated) DEVICE — CHLORHEXIDINE 4PCT 4 OZ

## (undated) DEVICE — NEEDLE FILTER 5 MICR 19G X 1.5IN

## (undated) DEVICE — GLOVE PI ULTRA TOUCH SZ.7.0

## (undated) DEVICE — MICROSURGICAL INSTRUMENT HYDRODISSECTION CANNULA 25GA, 8MM BEND: Brand: ALCON

## (undated) DEVICE — ACE WRAP 4 IN STERILE

## (undated) DEVICE — PACK CUSTOM EYE BASIC

## (undated) DEVICE — SCD SEQUENTIAL COMPRESSION COMFORT SLEEVE MEDIUM KNEE LENGTH: Brand: KENDALL SCD

## (undated) DEVICE — SATINCRESCENT® KNIFE ANGLED BEVEL UP: Brand: SATINCRESCENT®

## (undated) DEVICE — INTENDED FOR TISSUE SEPARATION, AND OTHER PROCEDURES THAT REQUIRE A SHARP SURGICAL BLADE TO PUNCTURE OR CUT.: Brand: BARD-PARKER ® CARBON RIB-BACK BLADES

## (undated) DEVICE — PREMIUM DRY TRAY LF: Brand: MEDLINE INDUSTRIES, INC.

## (undated) DEVICE — GLOVE SRG DERMASSURE SZ 7

## (undated) DEVICE — WETFIELD ERASER

## (undated) DEVICE — GAUZE SPONGES,16 PLY: Brand: CURITY

## (undated) DEVICE — ALCON SURGICAL BLADE 64: Brand: ALCON

## (undated) DEVICE — CABLE BIPOLAR DISP MEGADYNE

## (undated) DEVICE — EYE PADS 1 5/8"X2 5/8": Brand: MCKESSON

## (undated) DEVICE — GLOVE PI ULTRA TOUCH SZ.6.5

## (undated) DEVICE — OPHTHALMIC KNIFE 15°: Brand: ALCON

## (undated) DEVICE — INTREPID® TRANSFORMER IA HP: Brand: INTREPID®

## (undated) DEVICE — MICROSURGICAL INSTRUMENT "J" SHAPED CANNULA 27GA: Brand: ALCON